# Patient Record
Sex: MALE | Race: WHITE | Employment: OTHER | URBAN - METROPOLITAN AREA
[De-identification: names, ages, dates, MRNs, and addresses within clinical notes are randomized per-mention and may not be internally consistent; named-entity substitution may affect disease eponyms.]

---

## 2017-05-15 ENCOUNTER — APPOINTMENT (OUTPATIENT)
Dept: GENERAL RADIOLOGY | Age: 72
DRG: 853 | End: 2017-05-15
Attending: EMERGENCY MEDICINE
Payer: MEDICARE

## 2017-05-15 ENCOUNTER — HOSPITAL ENCOUNTER (INPATIENT)
Age: 72
LOS: 16 days | Discharge: HOME | DRG: 853 | End: 2017-05-31
Attending: EMERGENCY MEDICINE | Admitting: INTERNAL MEDICINE
Payer: MEDICARE

## 2017-05-15 ENCOUNTER — APPOINTMENT (OUTPATIENT)
Dept: CT IMAGING | Age: 72
DRG: 853 | End: 2017-05-15
Attending: INTERNAL MEDICINE
Payer: MEDICARE

## 2017-05-15 DIAGNOSIS — R06.89 INEFFECTIVE AIRWAY CLEARANCE: ICD-10-CM

## 2017-05-15 DIAGNOSIS — N17.9 AKI (ACUTE KIDNEY INJURY) (HCC): ICD-10-CM

## 2017-05-15 DIAGNOSIS — R29.818 SUSPECTED SLEEP APNEA: ICD-10-CM

## 2017-05-15 DIAGNOSIS — J91.8 PARAPNEUMONIC EFFUSION: ICD-10-CM

## 2017-05-15 DIAGNOSIS — J90 PLEURAL EFFUSION, BILATERAL: ICD-10-CM

## 2017-05-15 DIAGNOSIS — J18.9 PNEUMONIA OF BOTH LUNGS DUE TO INFECTIOUS ORGANISM, UNSPECIFIED PART OF LUNG: Primary | ICD-10-CM

## 2017-05-15 DIAGNOSIS — R13.10 DYSPHAGIA, UNSPECIFIED TYPE: ICD-10-CM

## 2017-05-15 DIAGNOSIS — D84.9 IMMUNOSUPPRESSION (HCC): Chronic | ICD-10-CM

## 2017-05-15 DIAGNOSIS — M47.819 SPONDYLOARTHRITIS: Chronic | ICD-10-CM

## 2017-05-15 DIAGNOSIS — D75.839 THROMBOCYTOSIS: ICD-10-CM

## 2017-05-15 DIAGNOSIS — J18.9 PARAPNEUMONIC EFFUSION: ICD-10-CM

## 2017-05-15 DIAGNOSIS — E61.1 IRON DEFICIENCY: ICD-10-CM

## 2017-05-15 DIAGNOSIS — M45.4 ANKYLOSING SPONDYLITIS OF THORACIC REGION (HCC): ICD-10-CM

## 2017-05-15 DIAGNOSIS — B37.9 CANDIDIASIS: ICD-10-CM

## 2017-05-15 DIAGNOSIS — J18.9 CAP (COMMUNITY ACQUIRED PNEUMONIA): ICD-10-CM

## 2017-05-15 DIAGNOSIS — R09.02 HYPOXEMIA: ICD-10-CM

## 2017-05-15 DIAGNOSIS — A41.9 SEPSIS, DUE TO UNSPECIFIED ORGANISM: ICD-10-CM

## 2017-05-15 DIAGNOSIS — J93.9 PNEUMOTHORAX ON LEFT: ICD-10-CM

## 2017-05-15 LAB
ALBUMIN SERPL BCP-MCNC: 2.7 G/DL (ref 3.2–4.6)
ALBUMIN/GLOB SERPL: 0.7 {RATIO} (ref 1.2–3.5)
ALP SERPL-CCNC: 52 U/L (ref 50–136)
ALT SERPL-CCNC: 19 U/L (ref 12–65)
ANION GAP BLD CALC-SCNC: 10 MMOL/L (ref 7–16)
ANION GAP BLD CALC-SCNC: 11 MMOL/L (ref 7–16)
ARTERIAL PATENCY WRIST A: POSITIVE
AST SERPL W P-5'-P-CCNC: 27 U/L (ref 15–37)
ATRIAL RATE: 96 BPM
BASE DEFICIT BLDA-SCNC: 2.7 MMOL/L (ref 0–2)
BASOPHILS # BLD AUTO: 0 K/UL (ref 0–0.2)
BASOPHILS # BLD: 0 % (ref 0–2)
BDY SITE: ABNORMAL
BILIRUB SERPL-MCNC: 1.3 MG/DL (ref 0.2–1.1)
BNP SERPL-MCNC: 192 PG/ML
BUN SERPL-MCNC: 26 MG/DL (ref 8–23)
BUN SERPL-MCNC: 29 MG/DL (ref 8–23)
CALCIUM SERPL-MCNC: 7.3 MG/DL (ref 8.3–10.4)
CALCIUM SERPL-MCNC: 8.2 MG/DL (ref 8.3–10.4)
CALCULATED P AXIS, ECG09: 50 DEGREES
CALCULATED R AXIS, ECG10: 82 DEGREES
CALCULATED T AXIS, ECG11: 80 DEGREES
CHLORIDE SERPL-SCNC: 105 MMOL/L (ref 98–107)
CHLORIDE SERPL-SCNC: 98 MMOL/L (ref 98–107)
CO2 SERPL-SCNC: 25 MMOL/L (ref 21–32)
CO2 SERPL-SCNC: 27 MMOL/L (ref 21–32)
COHGB MFR BLD: 0.3 % (ref 0.5–1.5)
CREAT SERPL-MCNC: 0.95 MG/DL (ref 0.8–1.5)
CREAT SERPL-MCNC: 1.11 MG/DL (ref 0.8–1.5)
DIAGNOSIS, 93000: NORMAL
DIFFERENTIAL METHOD BLD: ABNORMAL
DO-HGB BLD-MCNC: 2 % (ref 0–5)
EOSINOPHIL # BLD: 0 K/UL (ref 0–0.8)
EOSINOPHIL NFR BLD: 0 % (ref 0.5–7.8)
ERYTHROCYTE [DISTWIDTH] IN BLOOD BY AUTOMATED COUNT: 13 % (ref 11.9–14.6)
FIO2 ON VENT: 50 %
GAS FLOW.O2 O2 DELIVERY SYS: 7 L/MIN
GLOBULIN SER CALC-MCNC: 4 G/DL (ref 2.3–3.5)
GLUCOSE SERPL-MCNC: 66 MG/DL (ref 65–100)
GLUCOSE SERPL-MCNC: 78 MG/DL (ref 65–100)
HCO3 BLDA-SCNC: 21 MMOL/L (ref 22–26)
HCT VFR BLD AUTO: 34.2 % (ref 41.1–50.3)
HGB BLD-MCNC: 11.3 G/DL (ref 13.6–17.2)
HGB BLDMV-MCNC: 11.6 GM/DL (ref 11.7–15)
IMM GRANULOCYTES # BLD: 0 K/UL (ref 0–0.5)
IMM GRANULOCYTES NFR BLD AUTO: 0.9 % (ref 0–5)
LACTATE BLD-SCNC: 2.4 MMOL/L (ref 0.5–1.9)
LACTATE BLD-SCNC: 2.5 MMOL/L (ref 0.5–1.9)
LACTATE BLD-SCNC: 3.5 MMOL/L (ref 0.5–1.9)
LACTATE SERPL-SCNC: 4.2 MMOL/L (ref 0.4–2)
LYMPHOCYTES # BLD AUTO: 9 % (ref 13–44)
LYMPHOCYTES # BLD: 0.3 K/UL (ref 0.5–4.6)
MCH RBC QN AUTO: 30.3 PG (ref 26.1–32.9)
MCHC RBC AUTO-ENTMCNC: 33 G/DL (ref 31.4–35)
MCV RBC AUTO: 91.7 FL (ref 79.6–97.8)
METHGB MFR BLD: 0.8 % (ref 0–1.5)
MONOCYTES # BLD: 0.1 K/UL (ref 0.1–1.3)
MONOCYTES NFR BLD AUTO: 3 % (ref 4–12)
NEUTS SEG # BLD: 3 K/UL (ref 1.7–8.2)
NEUTS SEG NFR BLD AUTO: 87 % (ref 43–78)
OXYHGB MFR BLDA: 96.8 % (ref 94–97)
P-R INTERVAL, ECG05: 112 MS
PCO2 BLDA: 32 MMHG (ref 35–45)
PH BLDA: 7.43 [PH] (ref 7.35–7.45)
PLATELET # BLD AUTO: 126 K/UL (ref 150–450)
PMV BLD AUTO: 10.7 FL (ref 10.8–14.1)
PO2 BLDA: 107 MMHG (ref 75–100)
POTASSIUM SERPL-SCNC: 4 MMOL/L (ref 3.5–5.1)
POTASSIUM SERPL-SCNC: 4 MMOL/L (ref 3.5–5.1)
PROCALCITONIN SERPL-MCNC: 11.6 NG/ML
PROCALCITONIN SERPL-MCNC: 12.7 NG/ML
PROT SERPL-MCNC: 6.7 G/DL (ref 6.3–8.2)
Q-T INTERVAL, ECG07: 316 MS
QRS DURATION, ECG06: 86 MS
QTC CALCULATION (BEZET), ECG08: 399 MS
RBC # BLD AUTO: 3.73 M/UL (ref 4.23–5.67)
SAO2 % BLD: 98 % (ref 92–98.5)
SERVICE CMNT-IMP: ABNORMAL
SODIUM SERPL-SCNC: 136 MMOL/L (ref 136–145)
SODIUM SERPL-SCNC: 140 MMOL/L (ref 136–145)
TROPONIN I SERPL-MCNC: <0.02 NG/ML (ref 0.02–0.05)
VENTILATION MODE VENT: ABNORMAL
VENTRICULAR RATE, ECG03: 96 BPM
WBC # BLD AUTO: 3.5 K/UL (ref 4.3–11.1)

## 2017-05-15 PROCEDURE — 87070 CULTURE OTHR SPECIMN AEROBIC: CPT | Performed by: INTERNAL MEDICINE

## 2017-05-15 PROCEDURE — 85025 COMPLETE CBC W/AUTO DIFF WBC: CPT | Performed by: EMERGENCY MEDICINE

## 2017-05-15 PROCEDURE — 87077 CULTURE AEROBIC IDENTIFY: CPT | Performed by: INTERNAL MEDICINE

## 2017-05-15 PROCEDURE — 84145 PROCALCITONIN (PCT): CPT | Performed by: INTERNAL MEDICINE

## 2017-05-15 PROCEDURE — 87186 SC STD MICRODIL/AGAR DIL: CPT | Performed by: INTERNAL MEDICINE

## 2017-05-15 PROCEDURE — 74011250636 HC RX REV CODE- 250/636: Performed by: EMERGENCY MEDICINE

## 2017-05-15 PROCEDURE — 96365 THER/PROPH/DIAG IV INF INIT: CPT | Performed by: EMERGENCY MEDICINE

## 2017-05-15 PROCEDURE — 99223 1ST HOSP IP/OBS HIGH 75: CPT | Performed by: INTERNAL MEDICINE

## 2017-05-15 PROCEDURE — 99285 EMERGENCY DEPT VISIT HI MDM: CPT | Performed by: EMERGENCY MEDICINE

## 2017-05-15 PROCEDURE — 87040 BLOOD CULTURE FOR BACTERIA: CPT | Performed by: EMERGENCY MEDICINE

## 2017-05-15 PROCEDURE — 74011000258 HC RX REV CODE- 258: Performed by: EMERGENCY MEDICINE

## 2017-05-15 PROCEDURE — 71260 CT THORAX DX C+: CPT

## 2017-05-15 PROCEDURE — 36600 WITHDRAWAL OF ARTERIAL BLOOD: CPT

## 2017-05-15 PROCEDURE — 83880 ASSAY OF NATRIURETIC PEPTIDE: CPT | Performed by: EMERGENCY MEDICINE

## 2017-05-15 PROCEDURE — 96361 HYDRATE IV INFUSION ADD-ON: CPT | Performed by: EMERGENCY MEDICINE

## 2017-05-15 PROCEDURE — 74011250637 HC RX REV CODE- 250/637: Performed by: INTERNAL MEDICINE

## 2017-05-15 PROCEDURE — 65270000029 HC RM PRIVATE

## 2017-05-15 PROCEDURE — 83605 ASSAY OF LACTIC ACID: CPT

## 2017-05-15 PROCEDURE — 82803 BLOOD GASES ANY COMBINATION: CPT

## 2017-05-15 PROCEDURE — 84145 PROCALCITONIN (PCT): CPT | Performed by: EMERGENCY MEDICINE

## 2017-05-15 PROCEDURE — 74011636320 HC RX REV CODE- 636/320: Performed by: INTERNAL MEDICINE

## 2017-05-15 PROCEDURE — 71020 XR CHEST PA LAT: CPT

## 2017-05-15 PROCEDURE — 80053 COMPREHEN METABOLIC PANEL: CPT | Performed by: EMERGENCY MEDICINE

## 2017-05-15 PROCEDURE — 80048 BASIC METABOLIC PNL TOTAL CA: CPT | Performed by: INTERNAL MEDICINE

## 2017-05-15 PROCEDURE — 84484 ASSAY OF TROPONIN QUANT: CPT | Performed by: EMERGENCY MEDICINE

## 2017-05-15 PROCEDURE — 83605 ASSAY OF LACTIC ACID: CPT | Performed by: INTERNAL MEDICINE

## 2017-05-15 PROCEDURE — 36415 COLL VENOUS BLD VENIPUNCTURE: CPT | Performed by: INTERNAL MEDICINE

## 2017-05-15 PROCEDURE — 74011000258 HC RX REV CODE- 258: Performed by: INTERNAL MEDICINE

## 2017-05-15 PROCEDURE — 74011250636 HC RX REV CODE- 250/636: Performed by: INTERNAL MEDICINE

## 2017-05-15 PROCEDURE — 93005 ELECTROCARDIOGRAM TRACING: CPT | Performed by: EMERGENCY MEDICINE

## 2017-05-15 RX ORDER — SODIUM CHLORIDE, SODIUM LACTATE, POTASSIUM CHLORIDE, CALCIUM CHLORIDE 600; 310; 30; 20 MG/100ML; MG/100ML; MG/100ML; MG/100ML
100 INJECTION, SOLUTION INTRAVENOUS CONTINUOUS
Status: DISCONTINUED | OUTPATIENT
Start: 2017-05-15 | End: 2017-05-17

## 2017-05-15 RX ORDER — SODIUM CHLORIDE 0.9 % (FLUSH) 0.9 %
5-10 SYRINGE (ML) INJECTION AS NEEDED
Status: DISCONTINUED | OUTPATIENT
Start: 2017-05-15 | End: 2017-05-31 | Stop reason: HOSPADM

## 2017-05-15 RX ORDER — SODIUM CHLORIDE 0.9 % (FLUSH) 0.9 %
5 SYRINGE (ML) INJECTION EVERY 8 HOURS
Status: DISCONTINUED | OUTPATIENT
Start: 2017-05-15 | End: 2017-05-31 | Stop reason: HOSPADM

## 2017-05-15 RX ORDER — SODIUM CHLORIDE 0.9 % (FLUSH) 0.9 %
10 SYRINGE (ML) INJECTION
Status: COMPLETED | OUTPATIENT
Start: 2017-05-15 | End: 2017-05-15

## 2017-05-15 RX ORDER — TRAMADOL HYDROCHLORIDE 50 MG/1
50 TABLET ORAL
Status: DISCONTINUED | OUTPATIENT
Start: 2017-05-15 | End: 2017-05-31 | Stop reason: HOSPADM

## 2017-05-15 RX ORDER — PANTOPRAZOLE SODIUM 40 MG/1
40 TABLET, DELAYED RELEASE ORAL
Status: DISCONTINUED | OUTPATIENT
Start: 2017-05-16 | End: 2017-05-26

## 2017-05-15 RX ORDER — DULOXETIN HYDROCHLORIDE 30 MG/1
30 CAPSULE, DELAYED RELEASE ORAL DAILY
Status: DISCONTINUED | OUTPATIENT
Start: 2017-05-16 | End: 2017-05-31 | Stop reason: HOSPADM

## 2017-05-15 RX ORDER — ENOXAPARIN SODIUM 100 MG/ML
40 INJECTION SUBCUTANEOUS EVERY 24 HOURS
Status: DISCONTINUED | OUTPATIENT
Start: 2017-05-15 | End: 2017-05-31 | Stop reason: HOSPADM

## 2017-05-15 RX ORDER — HYDROCODONE BITARTRATE AND ACETAMINOPHEN 7.5; 325 MG/1; MG/1
1 TABLET ORAL
Status: DISCONTINUED | OUTPATIENT
Start: 2017-05-15 | End: 2017-05-21

## 2017-05-15 RX ADMIN — CEFTRIAXONE 2 G: 2 INJECTION, POWDER, FOR SOLUTION INTRAMUSCULAR; INTRAVENOUS at 20:12

## 2017-05-15 RX ADMIN — SODIUM CHLORIDE, SODIUM LACTATE, POTASSIUM CHLORIDE, AND CALCIUM CHLORIDE 100 ML/HR: 600; 310; 30; 20 INJECTION, SOLUTION INTRAVENOUS at 18:56

## 2017-05-15 RX ADMIN — PIPERACILLIN SODIUM,TAZOBACTAM SODIUM 4.5 G: 4; .5 INJECTION, POWDER, FOR SOLUTION INTRAVENOUS at 13:30

## 2017-05-15 RX ADMIN — IOPAMIDOL 100 ML: 755 INJECTION, SOLUTION INTRAVENOUS at 19:34

## 2017-05-15 RX ADMIN — SODIUM CHLORIDE 1000 ML: 900 INJECTION, SOLUTION INTRAVENOUS at 14:34

## 2017-05-15 RX ADMIN — SODIUM CHLORIDE 1000 ML: 900 INJECTION, SOLUTION INTRAVENOUS at 15:35

## 2017-05-15 RX ADMIN — Medication 10 ML: at 19:34

## 2017-05-15 RX ADMIN — AZITHROMYCIN MONOHYDRATE 500 MG: 500 INJECTION, POWDER, LYOPHILIZED, FOR SOLUTION INTRAVENOUS at 21:24

## 2017-05-15 RX ADMIN — SODIUM CHLORIDE 1000 ML: 900 INJECTION, SOLUTION INTRAVENOUS at 13:33

## 2017-05-15 RX ADMIN — HYDROCODONE BITARTRATE AND ACETAMINOPHEN 1 TABLET: 7.5; 325 TABLET ORAL at 22:42

## 2017-05-15 RX ADMIN — SODIUM CHLORIDE 100 ML: 900 INJECTION, SOLUTION INTRAVENOUS at 19:34

## 2017-05-15 RX ADMIN — Medication 5 ML: at 21:29

## 2017-05-15 RX ADMIN — TRAMADOL HYDROCHLORIDE 50 MG: 50 TABLET, FILM COATED ORAL at 18:55

## 2017-05-15 RX ADMIN — ENOXAPARIN SODIUM 40 MG: 40 INJECTION SUBCUTANEOUS at 18:55

## 2017-05-15 NOTE — ED NOTES
TRANSFER - OUT REPORT:    Verbal report given to Jason Doran on Jenniffer Stern  being transferred to 8th Floor for routine progression of care       Report consisted of patients Situation, Background, Assessment and   Recommendations(SBAR). Information from the following report(s) SBAR was reviewed with the receiving nurse. Lines:   2o gauge right and left [facility]          Opportunity for questions and clarification was provided.       Patient transported with:

## 2017-05-15 NOTE — PROGRESS NOTES
TRANSFER - IN REPORT:    Verbal report received from [facility], [facility] (name) on Eduar Reeder  being received from ER (unit) for routine progression of care      Report consisted of patients Situation, Background, Assessment and   Recommendations(SBAR). Information from the following report(s) Kardex, ED Summary, MAR and Recent Results was reviewed with the receiving nurse. Opportunity for questions and clarification was provided. Report given to Ana Peña RN who will be assuming primary care on arrival to floor. Lorenso Frankel

## 2017-05-15 NOTE — PROGRESS NOTES
Verbal bedside report given to oncoming nurse Ileene [place]. Patient's situation, background, assessment and recommendations provided. Opportunity for questions provided. Ultram 50mg PO given for c/o back pain. . No distress noted. Oncoming RN assumed care of patient.

## 2017-05-15 NOTE — ED TRIAGE NOTES
Per ems left side chest pain that radiates to his back, 9/10 that started Saturday while watching snl, per ems at [facility] saturation 80's patient placed on 4l via nc, patient 324 asa 3ntg, with pain 5/10, patient 300ml normal saline, Patient states coughing with no production, patient states sob upon movement.      bgl 74

## 2017-05-15 NOTE — CONSULTS
HISTORY AND PHYSICAL      mikiepedor Grant    5/15/2017    Date of Admission:  5/15/2017    The patient's chart is reviewed and the patient is discussed with the staff. Subjective:     Patient is a 80 y.o.  male  With a hx of anklelosing spondelitis on remikade was in usual state of health until Saturday 2 days ago when he developed shaking chills and nausea. He has been sob and has had left side pleuritic chest  And back pain. Today he was taken to Dr. Hellen Kraus office and O2 sat was low so he was sent to the er. He was noted to have lactate of 3.5 and cxr showed LLL and RUL infiltrate. .    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.90    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    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 Taking? DULoxetine (CYMBALTA) 30 mg capsule   No No   Sig: Take 1 Cap by mouth daily.    INFLIXIMAB (REMICADE IV) Yes No   Sig: by IntraVENous route. Every 6 weeks. calcium-vitamin D (OYSTER SHELL) 500 mg(1,250mg) -200 unit per tablet   Yes No   Sig: Take 1 Tab by mouth daily. cholecalciferol, vitamin D3, (VITAMIN D3) 2,000 unit Tab   Yes No   Sig: Take  by mouth. Taking 1 1/2 tabs of 2000 international units once daily   diclofenac (VOLTAREN) 1 % topical gel   No No   Sig: Apply 4 g to affected area four (4) times daily. omeprazole (PRILOSEC) 40 mg capsule   No No   Sig: Take 1 Cap by mouth daily. traMADol (ULTRAM) 50 mg tablet   No No   Sig: Take 1 Tab by mouth three (3) times daily as needed for Pain. Max Daily Amount: 150 mg. Facility-Administered Medications: None       Past Medical History:   Diagnosis Date    Arthritis     Elevated prostate specific antigen (PSA)     HLA-B27 positive     Impotence of organic origin     Neck pain     Osteoarthritis, hand     Osteopenia     Osteoporosis     Other nonspecific abnormal finding of lung field     Polyarthritis     Polymyalgia rheumatica (HCC)     Prostate cancer ([address]     Raynaud's disease     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[address]     Thoracic spine pain      Past Surgical History:   Procedure Laterality Date    BIOPSY PROSTATE      HX CATARACT REMOVAL  2012    left     HX CATARACT REMOVAL  2013    right    HX KNEE ARTHROSCOPY  2009    left    HX PROSTATECTOMY  2012    HX UROLOGICAL      prostate ultrasound and biopsy    HX VASECTOMY  40+ yrs     Social History     Social History    Marital status:      Spouse name: N/A    Number of children: N/A    Years of education: N/A     Occupational History    Not on file.      Social History Main Topics    Smoking status: Former Smoker     Packs/day: 1.00     Years: 15.00    Smokeless tobacco: Never Used      Comment: quit 1980    Alcohol use No    Drug use: No    Sexual activity: Not on file     Other Topics Concern    Not on file     Social History Narrative     Family History   Problem Relation Age of Onset   Jose A Rao Stroke Mother     Other Mother      cirrhosis    Stroke Father     Cancer Other      grandmother    Heart Disease Other      grandfather    Diabetes Other      grandfather     Allergies   Allergen Reactions    Codeine Nausea and Vomiting       Current Facility-Administered Medications   Medication Dose Route Frequency    sodium chloride 0.9 % bolus infusion 1,000 mL  1,000 mL IntraVENous ONCE     Current Outpatient Prescriptions   Medication Sig    DULoxetine (CYMBALTA) 30 mg capsule Take 1 Cap by mouth daily.  traMADol (ULTRAM) 50 mg tablet Take 1 Tab by mouth three (3) times daily as needed for Pain. Max Daily Amount: 150 mg.    omeprazole (PRILOSEC) 40 mg capsule Take 1 Cap by mouth daily.  diclofenac (VOLTAREN) 1 % topical gel Apply 4 g to affected area four (4) times daily.  INFLIXIMAB (REMICADE IV) by IntraVENous route. Every 6 weeks.  calcium-vitamin D (OYSTER SHELL) 500 mg(1,250mg) -200 unit per tablet Take 1 Tab by mouth daily.  cholecalciferol, vitamin D3, (VITAMIN D3) 2,000 unit Tab Take  by mouth. Taking 1 1/2 tabs of 2000 international units once daily           Objective:     Vitals:    05/15/17 1430 05/15/17 1446 05/15/17 1501 05/15/17 1516   BP: (!) 85/50 95/51 96/52 99/55   Pulse:  82 92    Resp:  14 (!) 32    Temp:       SpO2:  100% 100%    Weight:       Height:           PHYSICAL EXAM     Constitutional:  the patient is well developed and in no acute distress  EENMT:  Sclera clear, pupils equal, oral mucosa moist  Respiratory: decreased breath sounds on the left  Cardiovascular:  RRR without M,G,R  Gastrointestinal: soft and non-tender; with positive bowel sounds. Musculoskeletal: warm without cyanosis. There is bi lower leg edema.   Skin:  no jaundice or rashes, no wounds   Neurologic: no gross neuro deficits     Psychiatric:  alert and oriented x 3    CXR:      Recent Labs      05/15/17   1228   WBC  3.5*   HGB  11.3*   HCT  34.2*   PLT  126*     Recent Labs      05/15/17   1228   NA  136   K  4.0   CL  98   GLU  78   CO2  27   BUN  29*   CREA  1.11   CA  8.2*   TROIQ  <0.02*   ALB  2.7*   TBILI  1.3*   ALT  19   SGOT  27     No results for input(s): PH, PCO2, PO2, HCO3 in the last 72 hours. No results for input(s): LCAD, LAC in the last 72 hours. Assessment:  (Medical Decision Making)     Hospital Problems  Date Reviewed: 5/15/2017          Codes Class Noted POA    Immunosuppression ([facility] (Chronic) ICD-10-CM: D89.9  ICD-9-CM: 279.9  5/15/2017 Yes        * (Principal)CAP (community acquired pneumonia) ICD-10-CM: J18.9  ICD-9-CM: 816  5/15/2017 Yes        Sepsis ([facility] ICD-10-CM: A41.9  ICD-9-CM: 038.9, 995.91  5/15/2017 Unknown        DENIS (acute kidney injury) ([facility] ICD-10-CM: N17.9  ICD-9-CM: 584.9  5/15/2017 Unknown        Hypoxemia ICD-10-CM: R09.02  ICD-9-CM: 799.02  5/15/2017 Unknown        Ankylosing spondylitis ([facility] ICD-10-CM: M45.9  ICD-9-CM: 720.0  1/29/2015 Yes        Spondyloarthritis ([facility] (Chronic) ICD-10-CM: M46.90  ICD-9-CM: 721.90  9/17/2013 Yes              Plan:  (Medical Decision Making)     --Will admit for further medical management  --Supplemental O2 , abfg  --chest ct  --culture     Fluid resusitation  --antibiotic therapy for cap    Repeat   lactate    More than 50% of the time documented was spent in face-to-face contact with the patient and in the care of the patient on the floor/unit where the patient is located.     Brayden Frye MD

## 2017-05-15 NOTE — ED PROVIDER NOTES
HPI Comments: Patient presented to the ER complaining of chest pain. Patient states that he's been more weak and fatigued the past couple days. States he began experiencing some left-sided chest pain rating towards his back about 3 nights ago. States pain is worse with coughing as well as take a deep breath. He does report subjective chills. States cough has been minimally productive of sputum. He was seen at his primary care physician's office today where he found to be mildly hypotensive and hypoxic. He was sent to the ER for further evaluation. Patient is a 80 y.o. male presenting with chest pain. The history is provided by the patient. Chest Pain (Angina)    This is a new problem. The current episode started more than 2 days ago. The problem has not changed since onset. The pain is present in the left side. The pain is at a severity of 4/10. The pain is moderate. The quality of the pain is described as sharp. The pain does not radiate. Associated symptoms include cough and shortness of breath. Pertinent negatives include no abdominal pain, no back pain, no fever, no headaches, no malaise/fatigue, no nausea, no near-syncope and no vomiting. His past medical history does not include HTN.         Past Medical History:   Diagnosis Date    Arthritis     Elevated prostate specific antigen (PSA)     HLA-B27 positive     Impotence of organic origin     Neck pain     Osteoarthritis, hand     Osteopenia     Osteoporosis     Other nonspecific abnormal finding of lung field     Polyarthritis     Polymyalgia rheumatica ([address]     Prostate cancer ([address]     Raynaud's disease     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[address]     Thoracic spine pain        Past Surgical History:   Procedure Laterality Date    BIOPSY PROSTATE      HX CATARACT REMOVAL  2012    left     HX CATARACT REMOVAL  2013    right    HX KNEE ARTHROSCOPY  2009    left    HX PROSTATECTOMY  2012    HX UROLOGICAL      prostate ultrasound and biopsy    HX [de-identified]  40+ yrs         Family History:   Problem Relation Age of Onset   Alfredo Raman Stroke Mother     Other Mother      cirrhosis    Stroke Father     Cancer Other      grandmother    Heart Disease Other      grandfather    Diabetes Other      grandfather       Social History     Social History    Marital status:      Spouse name: N/A    Number of children: N/A    Years of education: N/A     Occupational History    Not on file. Social History Main Topics    Smoking status: Former Smoker     Packs/day: 1.00     Years: 15.00    Smokeless tobacco: Never Used      Comment: quit 1980    Alcohol use No    Drug use: No    Sexual activity: Not on file     Other Topics Concern    Not on file     Social History Narrative         ALLERGIES: Codeine    Review of Systems   Constitutional: Positive for fatigue. Negative for fever and malaise/fatigue. HENT: Negative for congestion. Eyes: Negative for photophobia, redness and visual disturbance. Respiratory: Positive for cough and shortness of breath. Negative for chest tightness. Cardiovascular: Positive for chest pain. Negative for near-syncope. Gastrointestinal: Negative for abdominal pain, nausea and vomiting. Endocrine: Negative for polydipsia and polyphagia. Genitourinary: Negative for flank pain, frequency and urgency. Musculoskeletal: Negative for back pain and gait problem. Skin: Negative for pallor and rash. Allergic/Immunologic: Negative for food allergies and immunocompromised state. Neurological: Negative for seizures, speech difficulty and headaches. Hematological: Negative for adenopathy. Does not bruise/bleed easily. Psychiatric/Behavioral: Negative for behavioral problems and confusion. All other systems reviewed and are negative.       Vitals:    05/15/17 1245 05/15/17 1314 05/15/17 1319 05/15/17 1320   BP: 101/50 93/53     Pulse: (!) 102  93    Resp: 18  27    Temp: 98.4 °F (36.9 °C)      SpO2: 91%  94% 93% Weight: 52.2 kg (115 lb)      Height: 5' 7.5\" (1.715 m)               Physical Exam   Constitutional: He is oriented to person, place, and time. He appears well-developed and well-nourished. HENT:   Head: Normocephalic and atraumatic. Mouth/Throat: Oropharynx is clear and moist.   Eyes: Conjunctivae and EOM are normal. Pupils are equal, round, and reactive to light. No scleral icterus. Neck: Normal range of motion. Neck supple. No tracheal deviation present. No thyromegaly present. Cardiovascular: Normal rate and regular rhythm. Pulmonary/Chest: Effort normal and breath sounds normal. No respiratory distress. He has no wheezes. Course breath sounds noted bilaterally particularly in left base   Abdominal: Soft. Bowel sounds are normal. He exhibits no distension. There is no tenderness. There is no rebound. Musculoskeletal: Normal range of motion. He exhibits no edema or deformity. Neurological: He is alert and oriented to person, place, and time. He has normal reflexes. No cranial nerve deficit. Nursing note and vitals reviewed. MDM  Number of Diagnoses or Management Options  Diagnosis management comments: Differential diagnosis includes PE, pleural effusions, pneumonia, volume overload    3:19 PM  Chest x-ray is consistent with pneumonia. Elevated lactic acid noted. Patient given IV fluids here as well as a dose of IV Zosyn    3:33 PM  Blood pressure has improved with IV fluids.   Case discussed with pulmonary for admission       Amount and/or Complexity of Data Reviewed  Clinical lab tests: ordered and reviewed  Tests in the radiology section of CPT®: ordered and reviewed    Risk of Complications, Morbidity, and/or Mortality  Presenting problems: high  Diagnostic procedures: high  Management options: high    Critical Care  Total time providing critical care: 30-74 minutes (Critical Care Time 60 Minutes: Excluding all billable procedures, time spent at the bedside directing patients resucsitation, updating family, and coordinating care with consultants  )    Patient Progress  Patient progress: stable    ED Course       Procedures

## 2017-05-15 NOTE — IP AVS SNAPSHOT
Current Discharge Medication List  
  
START taking these medications Dose & Instructions Dispensing Information Comments Morning Noon Evening Bedtime  
 aspirin 81 mg chewable tablet Your last dose was:  5/31/2017 9am  
Your next dose is:  6/1/2017 am  
   
 Dose:  81 mg Take 1 Tab by mouth daily. Quantity:  30 Tab Refills:  1  
     
  
   
   
   
  
 ferrous sulfate 325 mg (65 mg iron) tablet Your last dose was:  5/31/2017 9am  
Your next dose is:  5/31/2017 5pm  
   
 Dose:  325 mg Take 1 Tab by mouth two (2) times daily (with meals). Quantity:  60 Tab Refills:  1  
     
  
   
   
  
   
  
 potassium chloride 20 mEq tablet Commonly known as:  K-DUR, KLOR-CON Your last dose was:  5/31/2017 9am  
Your next dose is:  6/1/2017 am  
   
 Dose:  40 mEq Take 2 Tabs by mouth daily. Quantity:  30 Tab Refills:  1  
     
  
   
   
   
  
 predniSONE 20 mg tablet Commonly known as:  Nahomy Darnell Your last dose was:  Via IV 5/31/2017 am  
Your next dose is:  6/1/2017 am  
   
 Take 2 tablets daily for 1 week then 1 tablet daily until seen in the office for follow up Quantity:  40 Tab Refills:  0 CONTINUE these medications which have NOT CHANGED Dose & Instructions Dispensing Information Comments Morning Noon Evening Bedtime  
 calcium-vitamin D 500 mg(1,250mg) -200 unit per tablet Commonly known as:  OYSTER SHELL Your next dose is:  Resume home schedule. Dose:  1 Tab Take 1 Tab by mouth daily. Refills:  0  
     
  
   
   
   
  
 diclofenac 1 % Gel Commonly known as:  VOLTAREN Your next dose is: May resume home schedule. Dose:  4 g Apply 4 g to affected area four (4) times daily. Quantity:  100 g Refills:  2 DULoxetine 30 mg capsule Commonly known as:  CYMBALTA Your last dose was:  5/31/2017 9am  
Your next dose is:  6/1/2017 am  
   
 Dose:  30 mg  
 Take 1 Cap by mouth daily. Quantity:  90 Cap Refills:  1  
     
  
   
   
   
  
 omeprazole 40 mg capsule Commonly known as:  PRILOSEC Your last dose was:  5/31/2017 6am  
Your next dose is:  6/1/2017 before breakfast  
   
 Dose:  40 mg Take 1 Cap by mouth daily. Quantity:  30 Cap Refills:  5  
     
  
   
   
   
  
 traMADol 50 mg tablet Commonly known as:  ULTRAM  
Your next dose is:  As needed Dose:  50 mg Take 1 Tab by mouth three (3) times daily as needed for Pain. Max Daily Amount: 150 mg.  
 Quantity:  90 Tab Refills:  2 VITAMIN D3 2,000 unit Tab Generic drug:  cholecalciferol (vitamin D3) Your next dose is: May resume home schedule. Take  by mouth. Taking 1 1/2 tabs of 2000 international units once daily Refills:  0 STOP taking these medications REMICADE IV Where to Get Your Medications These medications were sent to [facility], [address] [place] Phone:  [phone]  
  ferrous sulfate 325 mg (65 mg iron) tablet Information on where to get these meds will be given to you by the nurse or doctor. ! Ask your nurse or doctor about these medications  
  aspirin 81 mg chewable tablet  
 potassium chloride 20 mEq tablet  
 predniSONE 20 mg tablet

## 2017-05-15 NOTE — IP AVS SNAPSHOT
[address] 
[phone] Patient: Maria G Navarrete MRN: [id number] OJR:[birth year] You are allergic to the following Allergen Reactions Codeine Nausea and Vomiting Immunizations Administered for This Admission Name Date  
 TB Skin Test (PPD) Intradermal 5/16/2017 Recent Documentation Height Weight BMI Smoking Status 1.702 m 53.5 kg 18.48 kg/m2 Former Smoker Unresulted Labs Order Current Status AFB CULTURE + SMEAR W/RFLX ID FROM CULTURE Preliminary result AFB CULTURE + SMEAR W/RFLX ID FROM CULTURE Preliminary result AFB CULTURE + SMEAR W/RFLX ID FROM CULTURE Preliminary result PLEASE READ & DOCUMENT PPD TEST IN 24 HRS Preliminary result Emergency Contacts Name Discharge Info Relation Home Work Mobile Pollyann Pump  Spouse [3] [phone] About your hospitalization You were admitted on:  May 15, 2017 You last received care in the:  [facility] 8 MED SURG You were discharged on:  May 31, 2017 Unit phone number:  [phone] Why you were hospitalized Your primary diagnosis was:  Cap (Community Acquired Pneumonia) Your diagnoses also included:  Spondyloarthritis (Hcc), Immunosuppression (Hcc), Ankylosing Spondylitis (Hcc), Sepsis (Hcc), Alfie (Acute Kidney Injury) (Hcc), Hypoxemia, Pleural Effusion, Bilateral, Candidiasis, Parapneumonic Effusion, Ineffective Airway Clearance, Thrombocytosis (Hcc), Suspected Sleep Apnea, Iron Deficiency, Pneumothorax On Left, Dysphagia Providers Seen During Your Hospitalizations Provider Role Specialty Primary office phone Blanca Catherine MD Attending Provider Emergency Medicine [phone] Jennifer Cruz MD Attending Provider Pulmonary Disease [phone] Your Primary Care Physician (PCP) Primary Care Physician Office Phone Office Fax Via Wero Berkowitz [address] [place] [address] [phone] Follow-up Information Follow up With Details Comments Contact Info [address] [facility] [address] 
[phone] Robin Shultz MD On 6/5/2017 1:45pm with Kimber Heredia NP [address]  [address] 
[phone] 
  
 [facility] [employer] On 6/7/2017 6/7/17 at 10:15 am   
 [facility] On 6/14/2017 Appointment time -1:00 pm [facility] [address] [facility] [address] Kam Mccall MD On 6/21/2017 10;50am 3 [address] [facility] 
[address] [facility] and Crit [facility] [address] 
[phone] Your Appointments Monday June 05, 2017  1:45 PM EDT Office Visit with Chente Mcgraw NP 1333 [facility] (hospitals 1051 [facility]) 101 [facility] ([facility]) [place] [address]  
[phone] Wednesday June 07, 2017 10:30 AM EDT  
SC PT INITIAL ORTHO with Alfred Simpson, PT  
[facility] ([address] [facility]) [address] [place] [phone]  
  
    
2695 [facility], [address] [place] [address] Wednesday June 14, 2017 10:00 AM EDT  
infusion with [facility] NURSE ONLY  
[facility] OSTEOPOROSIS AND ARTHRITIS ([facility] [address]) [address] [place] [id number] [phone] Wednesday June 14, 2017  1:00 PM EDT  
SC ST INITIAL VISIT with TAMIKA Cross ([address] [place] [address]) 217 [facility], 
[address] [place] [phone] Please arrive 10-15 minutes prior to your appointment time. Wear comfortable clothing. Please bring your insurance cards with you. Please bring a list of your medications including herbal supplements. Please bring a list of your allergies including food allergies. 1 [facility],[address], [address] [facility] [place], [facility] Wednesday June 21, 2017  8:30 AM EDT  
LAB with [facility] LAB RESOURCE 1333 [facility] ([facility] 1051 [facility]) 101 [facility]) [place] [address]  
[phone] Wednesday June 21, 2017 12:00 PM EDT HOSPITAL with Sunni Babb MD  
[facility] Pulmonary and Critical Care ([facility]) [address] 5601 [facility]  
[phone] Current Discharge Medication List  
  
START taking these medications Dose & Instructions Dispensing Information Comments Morning Noon Evening Bedtime  
 aspirin 81 mg chewable tablet Your last dose was:  5/31/2017 9am  
Your next dose is:  6/1/2017 am  
   
 Dose:  81 mg Take 1 Tab by mouth daily. Quantity:  30 Tab Refills:  1  
     
  
   
   
   
  
 ferrous sulfate 325 mg (65 mg iron) tablet Your last dose was:  5/31/2017 9am  
Your next dose is:  5/31/2017 5pm  
   
 Dose:  325 mg Take 1 Tab by mouth two (2) times daily (with meals). Quantity:  60 Tab Refills:  1  
     
  
   
   
  
   
  
 potassium chloride 20 mEq tablet Commonly known as:  K-DUR, KLOR-CON Your last dose was:  5/31/2017 9am  
Your next dose is:  6/1/2017 am  
   
 Dose:  40 mEq Take 2 Tabs by mouth daily. Quantity:  30 Tab Refills:  1  
     
  
   
   
   
  
 predniSONE 20 mg tablet Commonly known as:  Mey [place] Your last dose was:  Via IV 5/31/2017 am  
Your next dose is:  6/1/2017 am  
   
 Take 2 tablets daily for 1 week then 1 tablet daily until seen in the office for follow up Quantity:  40 Tab Refills:  0 CONTINUE these medications which have NOT CHANGED Dose & Instructions Dispensing Information Comments Morning Noon Evening Bedtime  
 calcium-vitamin D 500 mg(1,250mg) -200 unit per tablet Commonly known as:  OYSTER SHELL  
 Your next dose is:  Resume home schedule. Dose:  1 Tab Take 1 Tab by mouth daily. Refills:  0  
     
  
   
   
   
  
 diclofenac 1 % Gel Commonly known as:  VOLTAREN Your next dose is: May resume home schedule. Dose:  4 g Apply 4 g to affected area four (4) times daily. Quantity:  100 g Refills:  2 DULoxetine 30 mg capsule Commonly known as:  CYMBALTA Your last dose was:  5/31/2017 9am  
Your next dose is:  6/1/2017 am  
   
 Dose:  30 mg Take 1 Cap by mouth daily. Quantity:  90 Cap Refills:  1  
     
  
   
   
   
  
 omeprazole 40 mg capsule Commonly known as:  PRILOSEC Your last dose was:  5/31/2017 6am  
Your next dose is:  6/1/2017 before breakfast  
   
 Dose:  40 mg Take 1 Cap by mouth daily. Quantity:  30 Cap Refills:  5  
     
  
   
   
   
  
 traMADol 50 mg tablet Commonly known as:  ULTRAM  
Your next dose is:  As needed Dose:  50 mg Take 1 Tab by mouth three (3) times daily as needed for Pain. Max Daily Amount: 150 mg.  
 Quantity:  90 Tab Refills:  2 VITAMIN D3 2,000 unit Tab Generic drug:  cholecalciferol (vitamin D3) Your next dose is: May resume home schedule. Take  by mouth. Taking 1 1/2 tabs of 2000 international units once daily Refills:  0 STOP taking these medications REMICADE IV Where to Get Your Medications These medications were sent to [facility], 300 [facility] 250 [facility] Phone:  [phone]  
  ferrous sulfate 325 mg (65 mg iron) tablet Information on where to get these meds will be given to you by the nurse or doctor. ! Ask your nurse or doctor about these medications  
  aspirin 81 mg chewable tablet  
 potassium chloride 20 mEq tablet  
 predniSONE 20 mg tablet Discharge Instructions DISCHARGE SUMMARY from Nurse The following personal items are in your possession at time of discharge: 
 
Dental Appliances: Partials Visual Aid: Glasses Home Medications: None Jewelry: Ring, Watch, With patient Clothing: Pants, Shirt, Footwear Other Valuables: None PATIENT INSTRUCTIONS: 
 
 
F-face looks uneven A-arms unable to move or move unevenly S-speech slurred or non-existent T-time-call 911 as soon as signs and symptoms begin-DO NOT go Back to bed or wait to see if you get better-TIME IS BRAIN. Warning Signs of HEART ATTACK Call 911 if you have these symptoms: 
? Chest discomfort. Most heart attacks involve discomfort in the center of the chest that lasts more than a few minutes, or that goes away and comes back. It can feel like uncomfortable pressure, squeezing, fullness, or pain. ? Discomfort in other areas of the upper body. Symptoms can include pain or discomfort in one or both arms, the back, neck, jaw, or stomach. ? Shortness of breath with or without chest discomfort. ? Other signs may include breaking out in a cold sweat, nausea, or lightheadedness. Don't wait more than five minutes to call [address]! Fast action can save your life. Calling 911 is almost always the fastest way to get lifesaving treatment. Emergency Medical Services staff can begin treatment when they arrive  up to an hour sooner than if someone gets to the hospital by car. The discharge information has been reviewed with the patient. The patient verbalized understanding.  
 
Discharge medications reviewed with the patient and appropriate educational materials and side effects teaching were provided. Pneumonia: Care Instructions Your Care Instructions Pneumonia is an infection of the lungs. Most cases are caused by infections from bacteria or viruses. Pneumonia may be mild or very severe. If it is caused by bacteria, you will be treated with antibiotics. It may take a few weeks to a few months to recover fully from pneumonia, depending on how sick you were and whether your overall health is good. Follow-up care is a key part of your treatment and safety. Be sure to make and go to all appointments, and call your doctor if you are having problems. Its also a good idea to know your test results and keep a list of the medicines you take. How can you care for yourself at home? · Take your antibiotics exactly as directed. Do not stop taking the medicine just because you are feeling better. You need to take the full course of antibiotics. · Take your medicines exactly as prescribed. Call your doctor if you think you are having a problem with your medicine. · Get plenty of rest and sleep. You may feel weak and tired for a while, but your energy level will improve with time. · To prevent dehydration, drink plenty of fluids, enough so that your urine is light yellow or clear like water. Choose water and other caffeine-free clear liquids until you feel better. If you have kidney, heart, or liver disease and have to limit fluids, talk with your doctor before you increase the amount of fluids you drink. · Take care of your cough so you can rest. A cough that brings up mucus from your lungs is common with pneumonia. It is one way your body gets rid of the infection. But if coughing keeps you from resting or causes severe fatigue and chest-wall pain, talk to your doctor. He or she may suggest that you take a medicine to reduce the cough. · Use a vaporizer or humidifier to add moisture to your bedroom.  Follow the directions for cleaning the machine. · Do not smoke or allow others to smoke around you. Smoke will make your cough last longer. If you need help quitting, talk to your doctor about stop-smoking programs and medicines. These can increase your chances of quitting for good. · Take an over-the-counter pain medicine, such as acetaminophen (Tylenol), ibuprofen (Advil, Motrin), or naproxen (Aleve). Read and follow all instructions on the label. · Do not take two or more pain medicines at the same time unless the doctor told you to. Many pain medicines have acetaminophen, which is Tylenol. Too much acetaminophen (Tylenol) can be harmful. · If you were given a spirometer to measure how well your lungs are working, use it as instructed. This can help your doctor tell how your recovery is going. · To prevent pneumonia in the future, talk to your doctor about getting a flu vaccine (once a year) and a pneumococcal vaccine (one time only for most people). When should you call for help? Call 911 anytime you think you may need emergency care. For example, call if: 
· You have severe trouble breathing. Call your doctor now or seek immediate medical care if: 
· You cough up dark brown or bloody mucus (sputum). · You have new or worse trouble breathing. · You are dizzy or lightheaded, or you feel like you may faint. Watch closely for changes in your health, and be sure to contact your doctor if: 
· You have a new or higher fever. · You are coughing more deeply or more often. · You are not getting better after 2 days (48 hours). · You do not get better as expected. Where can you learn more? Go to http://bobo-gorge.info/. Enter [id number] in the search box to learn more about \"Pneumonia: Care Instructions. \" Current as of: May 23, 2016 Content Version: 11.2 © 9264-3454 [employer].  Care instructions adapted under license by [employer] (which disclaims liability or warranty for this information). If you have questions about a medical condition or this instruction, always ask your healthcare professional. [address] any warranty or liability for your use of this information. Sepsis: Care Instructions Your Care Instructions Sepsis is an infection that has spread throughout your body. It is a life-threatening condition and often causes extremely low blood pressure. This can lead to problems with many different organs. The cause of sepsis is not always clear, but it can happen as part of a long-term or sudden illness. Sometimes even a mild illness can lead to sepsis. Follow-up care is a key part of your treatment and safety. Be sure to make and go to all appointments, and call your doctor if you are having problems. Its also a good idea to know your test results and keep a list of the medicines you take. How can you care for yourself at home? · If your doctor prescribed antibiotics, take them as directed. Do not stop taking them just because you feel better. You need to take the full course of antibiotics. · Drink plenty of fluids, enough so that your urine is light yellow or clear like water. Choose water or caffeine-free clear liquids until you feel better. If you have kidney, heart, or liver disease and have to limit fluids, talk with your doctor before you increase your fluid intake. You can try rehydration drinks, such as Gatorade or Powerade. · Do not drink alcohol. · Eat a healthy diet. Include fruits, vegetables, and whole grains in your diet every day. · Walking is an easy way to get exercise. Gradually increase the amount you walk every day. Make sure your doctor knows that you are starting an exercise program. 
· Do not smoke or use other tobacco products. If you need help quitting, talk to your doctor about stop-smoking programs and medicines. These can increase your chances of quitting for good. When should you call for help? Call 911 anytime you think you may need emergency care. For example, call if: 
· You passed out (lost consciousness). Call your doctor now or seek immediate medical care if: 
· You have a fever or chills. · You have cool, pale, or clammy skin. · You are dizzy or lightheaded, or you feel like you may faint. · You have any new symptoms, such as a cough, pain in one part of your body, or urinary problems. Watch closely for changes in your health, and be sure to contact your doctor if: 
· You do not get better as expected. Where can you learn more? Go to http://bobo-gorge.info/. Enter I596 in the search box to learn more about \"Sepsis: Care Instructions. \" Current as of: May 27, 2016 Content Version: 11.2 © 8058-3235 [employer]. Care instructions adapted under license by [employer] (which disclaims liability or warranty for this information). If you have questions about a medical condition or this instruction, always ask your healthcare professional. [place] [address] any warranty or liability for your use of this information. Collapsed Lung: Care Instructions Your Care Instructions A collapsed lung (pneumothorax) is a buildup of air in the space between the lung and the chest wall. As more air builds up in this space, the pressure against the lung makes the lung collapse. This causes shortness of breath and chest pain because your lung cannot fully expand. A collapsed lung is usually caused by an injury to the chest, but it may also occur suddenly without an injury because of a lung illness, such as emphysema or lung fibrosis. Your lung may collapse after lung surgery or another medical procedure. Sometimes it happens for no known reason in an otherwise healthy person (spontaneous pneumothorax). Treatment depends on the cause of the collapse.  It may heal with rest, although your doctor will want to keep track of your progress. It can take several days for the lung to expand again. Your doctor may have drained the air with a needle or tube inserted into the space between your chest and the collapsed lung. If you have a chest tube, be sure to follow your doctor's instructions about how to care for the tube. You may need further treatment if you are not getting better. Surgery is sometimes needed to keep the lung inflated. The doctor will want to keep track of your progress, so you will need a follow-up exam within a few days. The doctor has checked you carefully, but problems can develop later. If you notice any problems or new symptoms, get medical treatment right away. Follow-up care is a key part of your treatment and safety. Be sure to make and go to all appointments, and call your doctor if you are having problems. It's also a good idea to know your test results and keep a list of the medicines you take. How can you care for yourself at home? · Get plenty of rest and sleep. You may feel weak and tired for a while, but your energy level will improve with time. · Hold a pillow against your chest when you cough or take deep breaths. This will support your chest and decrease your pain. · Take pain medicines exactly as directed. ¨ If the doctor gave you a prescription medicine for pain, take it as prescribed. ¨ If you are not taking a prescription pain medicine, ask your doctor if you can take an over-the-counter medicine. · If your doctor prescribed antibiotics, take them as directed. Do not stop taking them just because you feel better. You need to take the full course of antibiotics. · If you have a bandage over your chest tube, or the place where the chest tube was inserted, keep it clean and dry. Follow your doctor's instructions on bandage care. · If you go home with a tube in place, follow the doctor's directions.  Do not adjust the tube in any way. This could break the seal or cause other problems. Keep the tube dry. · Avoid any movements that require your muscles, especially your chest muscles, to strain. Such movements include laughing hard, bearing down to have a bowel movement, and heavy lifting. Try not to cough. · Do not fly in an airplane until your doctor tells you it is okay. Avoid any situations where there is increased air pressure. · Do not smoke or allow others to smoke around you. If you need help quitting, talk to your doctor about stop-smoking programs and medicines. These can increase your chances of quitting for good. When should you call for help? Call 911 anytime you think you may need emergency care. For example, call if: 
· You have severe trouble breathing. · You passed out (lost consciousness). Call your doctor now or seek immediate medical care if: 
· You have new or worse trouble breathing. · You have new pain or your pain gets worse. · You have a fever. · You cough up blood. · Your chest tube starts to come out or falls out. · You are bleeding through the bandage where the tube was put in. Watch closely for changes in your health, and be sure to contact your doctor if: · The skin around the place where the chest tube was put in is red or irritated. · You do not get better as expected. Where can you learn more? Go to http://bobo-gorge.info/. Enter Z195 in the search box to learn more about \"Collapsed Lung: Care Instructions. \" Current as of: May 23, 2016 Content Version: 11.2 © 7399-4809 [employer]. Care instructions adapted under license by [employer] (which disclaims liability or warranty for this information). If you have questions about a medical condition or this instruction, always ask your healthcare professional. [address] any warranty or liability for your use of this information. Discharge Orders Procedure Order Date Status Priority Quantity Spec Type Associated Dx SOCIAL WORK 05/31/17 0921 Normal Routine 1  Dysphagia, unspecified type [[id number]] Comments:  Please arrange for OP speech Rx follow up. ACO Transitions of Care Introducing [employer] 508 Renae Song offers a voluntary care coordination program to provide high quality service and care to [facility] fee-for-service beneficiaries. Hiram Mcmullen was designed to help you enhance your health and well-being through the following services: ? Transitions of Care  support for individuals who are transitioning from one care setting to another (example: Hospital to home). ? Chronic and Complex Care Coordination  support for individuals and caregivers of those with serious or chronic illnesses or with more than one chronic (ongoing) condition and those who take a number of different medications. If you meet specific medical criteria, a [address] [place] [address] Rd may call you directly to coordinate your care with your primary care physician and your other care providers. For questions about the [facility] programs, please, contact your physicians office. For general questions or additional information about Accountable Care Organizations: 
Please visit www.medicare.gov/acos. html or call 1-800-MEDICARE (7-201.416.9774) TTY users should call 0-385.391.1513. iKONVERSE Announcement We are excited to announce that we are making your provider's discharge notes available to you in iKONVERSE. You will see these notes when they are completed and signed by the physician that discharged you from your recent hospital stay.   If you have any questions or concerns about any information you see in iKONVERSE, please call the Health Information Department where you were seen or reach out to your Primary Care Provider for more information about your plan of care. Introducing [facility] & HEALTH SERVICES! Dear Michelle Hernandez: 
Thank you for requesting a [employer] account. Our records indicate that you already have an active [employer] account. You can access your account anytime at https://115 network disks. Newmerix/115 network disks Did you know that you can access your hospital and ER discharge instructions at any time in [employer]? You can also review all of your test results from your hospital stay or ER visit. Additional Information If you have questions, please visit the Frequently Asked Questions section of the [employer] website at [email or web address] network disks. [employer]merix/115 network disks/. Remember, [employer] is NOT to be used for urgent needs. For medical emergencies, dial 911. Now available from your iPhone and Android! General Information Please provide this summary of care documentation to your next provider. Patient Signature:  ____________________________________________________________ Date:  ____________________________________________________________  
  
David Rodriges Provider Signature:  ____________________________________________________________ Date:  ____________________________________________________________

## 2017-05-16 LAB
ALBUMIN SERPL BCP-MCNC: 2 G/DL (ref 3.2–4.6)
ALBUMIN/GLOB SERPL: 0.6 {RATIO} (ref 1.2–3.5)
ALP SERPL-CCNC: 45 U/L (ref 50–136)
ALT SERPL-CCNC: 17 U/L (ref 12–65)
ANION GAP BLD CALC-SCNC: 9 MMOL/L (ref 7–16)
AST SERPL W P-5'-P-CCNC: 29 U/L (ref 15–37)
BILIRUB SERPL-MCNC: 0.6 MG/DL (ref 0.2–1.1)
BUN SERPL-MCNC: 21 MG/DL (ref 8–23)
CALCIUM SERPL-MCNC: 7.3 MG/DL (ref 8.3–10.4)
CHLORIDE SERPL-SCNC: 107 MMOL/L (ref 98–107)
CO2 SERPL-SCNC: 24 MMOL/L (ref 21–32)
CREAT SERPL-MCNC: 0.72 MG/DL (ref 0.8–1.5)
GLOBULIN SER CALC-MCNC: 3.5 G/DL (ref 2.3–3.5)
GLUCOSE SERPL-MCNC: 76 MG/DL (ref 65–100)
LACTATE SERPL-SCNC: 1.4 MMOL/L (ref 0.4–2)
LACTATE SERPL-SCNC: 2.6 MMOL/L (ref 0.4–2)
POTASSIUM SERPL-SCNC: 3.6 MMOL/L (ref 3.5–5.1)
PROT SERPL-MCNC: 5.5 G/DL (ref 6.3–8.2)
SODIUM SERPL-SCNC: 140 MMOL/L (ref 136–145)

## 2017-05-16 PROCEDURE — 74011000258 HC RX REV CODE- 258: Performed by: INTERNAL MEDICINE

## 2017-05-16 PROCEDURE — 36415 COLL VENOUS BLD VENIPUNCTURE: CPT | Performed by: INTERNAL MEDICINE

## 2017-05-16 PROCEDURE — 77030020120 HC VLV RESP PEP HI -B

## 2017-05-16 PROCEDURE — 65270000029 HC RM PRIVATE

## 2017-05-16 PROCEDURE — 74011250636 HC RX REV CODE- 250/636: Performed by: INTERNAL MEDICINE

## 2017-05-16 PROCEDURE — 74011250637 HC RX REV CODE- 250/637: Performed by: INTERNAL MEDICINE

## 2017-05-16 PROCEDURE — 74011000302 HC RX REV CODE- 302: Performed by: INTERNAL MEDICINE

## 2017-05-16 PROCEDURE — 83605 ASSAY OF LACTIC ACID: CPT | Performed by: INTERNAL MEDICINE

## 2017-05-16 PROCEDURE — 77030032490 HC SLV COMPR SCD KNE COVD -B

## 2017-05-16 PROCEDURE — 80053 COMPREHEN METABOLIC PANEL: CPT | Performed by: INTERNAL MEDICINE

## 2017-05-16 PROCEDURE — 99232 SBSQ HOSP IP/OBS MODERATE 35: CPT | Performed by: INTERNAL MEDICINE

## 2017-05-16 PROCEDURE — 94760 N-INVAS EAR/PLS OXIMETRY 1: CPT

## 2017-05-16 PROCEDURE — 86580 TB INTRADERMAL TEST: CPT | Performed by: INTERNAL MEDICINE

## 2017-05-16 PROCEDURE — 77010033678 HC OXYGEN DAILY

## 2017-05-16 RX ADMIN — HYDROCODONE BITARTRATE AND ACETAMINOPHEN 1 TABLET: 7.5; 325 TABLET ORAL at 05:55

## 2017-05-16 RX ADMIN — DULOXETINE HYDROCHLORIDE 30 MG: 30 CAPSULE, DELAYED RELEASE ORAL at 09:20

## 2017-05-16 RX ADMIN — SODIUM CHLORIDE, SODIUM LACTATE, POTASSIUM CHLORIDE, AND CALCIUM CHLORIDE 100 ML/HR: 600; 310; 30; 20 INJECTION, SOLUTION INTRAVENOUS at 18:32

## 2017-05-16 RX ADMIN — PANTOPRAZOLE SODIUM 40 MG: 40 TABLET, DELAYED RELEASE ORAL at 05:52

## 2017-05-16 RX ADMIN — HYDROCODONE BITARTRATE AND ACETAMINOPHEN 1 TABLET: 7.5; 325 TABLET ORAL at 11:52

## 2017-05-16 RX ADMIN — Medication 5 ML: at 05:52

## 2017-05-16 RX ADMIN — Medication 5 ML: at 18:42

## 2017-05-16 RX ADMIN — HYDROCODONE BITARTRATE AND ACETAMINOPHEN 1 TABLET: 7.5; 325 TABLET ORAL at 17:51

## 2017-05-16 RX ADMIN — TUBERCULIN PURIFIED PROTEIN DERIVATIVE 5 UNITS: 5 INJECTION INTRADERMAL at 18:32

## 2017-05-16 RX ADMIN — ENOXAPARIN SODIUM 40 MG: 40 INJECTION SUBCUTANEOUS at 18:32

## 2017-05-16 RX ADMIN — CEFTRIAXONE 2 G: 2 INJECTION, POWDER, FOR SOLUTION INTRAMUSCULAR; INTRAVENOUS at 18:45

## 2017-05-16 RX ADMIN — AZITHROMYCIN MONOHYDRATE 500 MG: 500 INJECTION, POWDER, LYOPHILIZED, FOR SOLUTION INTRAVENOUS at 18:45

## 2017-05-16 RX ADMIN — SODIUM CHLORIDE, SODIUM LACTATE, POTASSIUM CHLORIDE, AND CALCIUM CHLORIDE 100 ML/HR: 600; 310; 30; 20 INJECTION, SOLUTION INTRAVENOUS at 06:19

## 2017-05-16 NOTE — PROGRESS NOTES
Received shift report from [facility], [address] [place] [address]. Patient alert and oriented, resting in bed. Bed low and locked, call light within reach. Family at bedside. Patient reports pain to lower back and chest 4/10, [facility] off-going nurse. Respirations even and unlabored on 2l/min via nasal cannula. Patient denies shortness of breath or chest tightness at this time. Abrasion noted to right knee. Instructed patient to call for assistance, patient verbalized understanding.

## 2017-05-16 NOTE — PROGRESS NOTES
Mr. Kimberli Steward resting in bed with wife at bedside. Has been weaned to 3 lpm Nc this shift with current O2 sat 100%. Norco given for back pain 9/10 on numeric scale. States \"I just don't feel good. I'm starting to get worse as the day goes on. \" Wife remains at bedside. Without further needs. Akhil light in lap and door open.

## 2017-05-16 NOTE — PROGRESS NOTES
Patient resting quietly in bed, spouse at bedside. BP 86/50 with monitor, re-checked manually bp 98/58. Respirations even and unlabored on 7l/min via nc. Continent of bladder using urinal. Clear cristina urine noted. SCDs to BLE in place. Denies any pain or discomfort at this time. Instructed to call for assistance as needed, patient verbalized understanding.

## 2017-05-16 NOTE — PROGRESS NOTES
Mr. Wood Solis standing on edge of bed to use urinal. Assisted by wife at bedside. Alert, oriented in all spheres. Lung sounds diminished on 5 lpm NC with some dyspnea on exertion. Scant productive cough. LR infusing at 100 ml/hr. Without pain or needs at this time. Will monitor with call light in lap and door open.

## 2017-05-16 NOTE — H&P
HISTORY AND PHYSICAL      Jo Ann Sosa    5/16/2017    Date of Admission:  5/15/2017    The patient's chart is reviewed and the patient is discussed with the staff. Subjective:     Patient is a 80 y.o.  male  With a hx of anklelosing spondelitis on remikade was in usual state of health until Saturday 2 days ago when he developed shaking chills and nausea. He has been sob and has had left side pleuritic chest  And back pain. Today he was taken to Dr. Nini Yuan office and O2 sat was low so he was sent to the er. He was noted to have lactate of 3.5 and cxr showed LLL and RUL infiltrate. .    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.90    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    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 Taking? DULoxetine (CYMBALTA) 30 mg capsule   No No   Sig: Take 1 Cap by mouth daily.    INFLIXIMAB (REMICADE IV) Yes No   Sig: by IntraVENous route. Every 6 weeks. calcium-vitamin D (OYSTER SHELL) 500 mg(1,250mg) -200 unit per tablet   Yes No   Sig: Take 1 Tab by mouth daily. cholecalciferol, vitamin D3, (VITAMIN D3) 2,000 unit Tab   Yes No   Sig: Take  by mouth. Taking 1 1/2 tabs of 2000 international units once daily   diclofenac (VOLTAREN) 1 % topical gel   No No   Sig: Apply 4 g to affected area four (4) times daily. omeprazole (PRILOSEC) 40 mg capsule   No No   Sig: Take 1 Cap by mouth daily. traMADol (ULTRAM) 50 mg tablet   No No   Sig: Take 1 Tab by mouth three (3) times daily as needed for Pain. Max Daily Amount: 150 mg. Facility-Administered Medications: None       Past Medical History:   Diagnosis Date    Arthritis     Elevated prostate specific antigen (PSA)     HLA-B27 positive     Impotence of organic origin     Neck pain     Osteoarthritis, hand     Osteopenia     Osteoporosis     Other nonspecific abnormal finding of lung field     Polyarthritis     Polymyalgia rheumatica (HCC)     Prostate cancer ([address]     Raynaud's disease     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[address]     Thoracic spine pain      Past Surgical History:   Procedure Laterality Date    BIOPSY PROSTATE      HX CATARACT REMOVAL  2012    left     HX CATARACT REMOVAL  2013    right    HX KNEE ARTHROSCOPY  2009    left    HX PROSTATECTOMY  2012    HX UROLOGICAL      prostate ultrasound and biopsy    HX VASECTOMY  40+ yrs     Social History     Social History    Marital status:      Spouse name: N/A    Number of children: N/A    Years of education: N/A     Occupational History    Not on file.      Social History Main Topics    Smoking status: Former Smoker     Packs/day: 1.00     Years: 15.00    Smokeless tobacco: Never Used      Comment: quit 1980    Alcohol use No    Drug use: No    Sexual activity: Not on file     Other Topics Concern    Not on file     Social History Narrative     Family History   Problem Relation Age of Onset    Stroke Mother     Other Mother      cirrhosis    Stroke Father     Cancer Other      grandmother    Heart Disease Other      grandfather    Diabetes Other      grandfather     Allergies   Allergen Reactions    Codeine Nausea and Vomiting       Current Facility-Administered Medications   Medication Dose Route Frequency    tuberculin injection 5 Units  5 Units IntraDERMal ONCE    DULoxetine (CYMBALTA) capsule 30 mg  30 mg Oral DAILY    pantoprazole (PROTONIX) tablet 40 mg  40 mg Oral ACB    traMADol (ULTRAM) tablet 50 mg  50 mg Oral TID PRN    sodium chloride (NS) flush 5 mL  5 mL InterCATHeter Q8H    sodium chloride (NS) flush 5-10 mL  5-10 mL InterCATHeter PRN    cefTRIAXone (ROCEPHIN) 2 g in 0.9% sodium chloride (MBP/ADV) 50 mL  2 g IntraVENous Q24H    azithromycin (ZITHROMAX) 500 mg in 0.9% sodium chloride (MBP/ADV) 250 mL  500 mg IntraVENous Q24H    enoxaparin (LOVENOX) injection 40 mg  40 mg SubCUTAneous Q24H    HYDROcodone-acetaminophen (NORCO) 7.5-325 mg per tablet 1 Tab  1 Tab Oral Q6H PRN    lactated ringers infusion  100 mL/hr IntraVENous CONTINUOUS           Objective:     Vitals:    05/16/17 0357 05/16/17 0755 05/16/17 1212 05/16/17 1601   BP: 98/58 98/54 97/67 105/57   Pulse:  79 79 78   Resp:  18 18 18   Temp:  97.7 °F (36.5 °C) 97.9 °F (36.6 °C) 97.9 °F (36.6 °C)   SpO2:  93% 98% 96%   Weight:       Height:           PHYSICAL EXAM     Constitutional:  the patient is well developed and in no acute distress  EENMT:  Sclera clear, pupils equal, oral mucosa moist  Respiratory: decreased breath sounds on the left  Cardiovascular:  RRR without M,G,R  Gastrointestinal: soft and non-tender; with positive bowel sounds. Musculoskeletal: warm without cyanosis. There is bi lower leg edema.   Skin:  no jaundice or rashes, no wounds   Neurologic: no gross neuro deficits     Psychiatric:  alert and oriented x 3    CXR:      Recent Labs      05/15/17   1228   WBC  3.5*   HGB  11.3* HCT  34.2*   PLT  126*     Recent Labs      05/16/17   0340  05/15/17   1912  05/15/17   1228   NA  140  140  136   K  3.6  4.0  4.0   CL  107  105  98   GLU  76  66  78   CO2  24  25  27   BUN  21  26*  29*   CREA  0.72*  0.95  1.11   CA  7.3*  7.3*  8.2*   TROIQ   --    --   <0.02*   ALB  2.0*   --   2.7*   TBILI  0.6   --   1.3*   ALT  17   --   19   SGOT  29   --   27     Recent Labs      05/15/17   2345   PH  7.43   PCO2  32*   PO2  107*   HCO3  21*     Recent Labs      05/16/17   0340  05/15/17   2303  05/15/17   1912   LAC  1.4  2.6*  4.2*       Assessment:  (Medical Decision Making)     Hospital Problems  Date Reviewed: 5/15/2017          Codes Class Noted POA    Immunosuppression ([facility] (Chronic) ICD-10-CM: D89.9  ICD-9-CM: 279.9  5/15/2017 Yes        * (Principal)CAP (community acquired pneumonia) ICD-10-CM: J18.9  ICD-9-CM: 260  5/15/2017 Yes        Sepsis ([facility] ICD-10-CM: A41.9  ICD-9-CM: 038.9, 995.91  5/15/2017 Unknown        DENIS (acute kidney injury) ([facility] ICD-10-CM: N17.9  ICD-9-CM: 584.9  5/15/2017 Unknown        Hypoxemia ICD-10-CM: R09.02  ICD-9-CM: 799.02  5/15/2017 Unknown        Ankylosing spondylitis ([facility] ICD-10-CM: M45.9  ICD-9-CM: 720.0  1/29/2015 Yes        Spondyloarthritis ([facility] (Chronic) ICD-10-CM: M46.90  ICD-9-CM: 721.90  9/17/2013 Yes              Plan:  (Medical Decision Making)     --Will admit for further medical management  --Supplemental O2 , abfg  --chest ct  --culture     Fluid resusitation  --antibiotic therapy for cap    Repeat   lactate    More than 50% of the time documented was spent in face-to-face contact with the patient and in the care of the patient on the floor/unit where the patient is located.     Washington Harrison MD

## 2017-05-16 NOTE — PROGRESS NOTES
Mary Galdamez  Admission Date: 5/15/2017             Daily Progress Note: 5/16/2017    The patient's chart is reviewed and the patient is discussed with the staff. Subjective:     Patient is a 80 y.o.  male With a hx of anklelosing spondelitis on remikade was in usual state of health until Saturday 2 days ago when he developed shaking chills and nausea. He has been sob and has had left side pleuritic chest And back pain. Today he was taken to Dr. Isela Deshpande office and O2 sat was low so he was sent to the er. He was noted to have lactate of 3.5 and cxr showed LLL and RUL infiltrate  He is better this am with less chest pain and sob.   Lactate did decrease    Current Facility-Administered Medications   Medication Dose Route Frequency    DULoxetine (CYMBALTA) capsule 30 mg  30 mg Oral DAILY    pantoprazole (PROTONIX) tablet 40 mg  40 mg Oral ACB    traMADol (ULTRAM) tablet 50 mg  50 mg Oral TID PRN    sodium chloride (NS) flush 5 mL  5 mL InterCATHeter Q8H    sodium chloride (NS) flush 5-10 mL  5-10 mL InterCATHeter PRN    cefTRIAXone (ROCEPHIN) 2 g in 0.9% sodium chloride (MBP/ADV) 50 mL  2 g IntraVENous Q24H    azithromycin (ZITHROMAX) 500 mg in 0.9% sodium chloride (MBP/ADV) 250 mL  500 mg IntraVENous Q24H    enoxaparin (LOVENOX) injection 40 mg  40 mg SubCUTAneous Q24H    HYDROcodone-acetaminophen (NORCO) 7.5-325 mg per tablet 1 Tab  1 Tab Oral Q6H PRN    lactated ringers infusion  100 mL/hr IntraVENous CONTINUOUS       Review of Systems    Constitutional: negative for fever, chills, sweats  Cardiovascular: negative for chest pain, palpitations, syncope, edema  Gastrointestinal:  negative for dysphagia, reflux, vomiting, diarrhea, abdominal pain, or melena  Neurologic:  negative for focal weakness, numbness, headache    Objective:     Vitals:    05/16/17 0107 05/16/17 0345 05/16/17 0357 05/16/17 0755   BP: 102/50 (!) 86/50 98/58 98/54   Pulse:  79  79   Resp:  18  18   Temp: 97.7 °F (36.5 °C)  97.7 °F (36.5 °C)   SpO2:  96%  93%   Weight:       Height:         Intake and Output:   05/14 1901 - 05/16 0700  In: -   Out: 1100 [Urine:1100]  05/16 0701 - 05/16 1900  In: 240 [P.O.:240]  Out: -     Physical Exam:   Constitution:  the patient is well developed and in no acute distress  EENMT:  Sclera clear, pupils equal, oral mucosa moist  Respiratory:    Crackles , decreased breath sounds on the left  Cardiovascular:  RRR without M,G,R  Gastrointestinal: soft and non-tender; with positive bowel sounds. Musculoskeletal: warm without cyanosis. There is no lower leg edema. Skin:  no jaundice or rashes, no wounds   Neurologic: no gross neuro deficits     Psychiatric:  alert and oriented x 3    CHEST XRAY:       LAB  No results for input(s): GLUCPOC in the last 72 hours.     No lab exists for component: [place]   Recent Labs      05/15/17   1228   WBC  3.5*   HGB  11.3*   HCT  34.2*   PLT  126*     Recent Labs      05/16/17   0340  05/15/17   1912  05/15/17   1228   NA  140  140  136   K  3.6  4.0  4.0   CL  107  105  98   CO2  24  25  27   GLU  76  66  78   BUN  21  26*  29*   CREA  0.72*  0.95  1.11   CA  7.3*  7.3*  8.2*   TROIQ   --    --   <0.02*   ALB  2.0*   --   2.7*   TBILI  0.6   --   1.3*   ALT  17   --   19   SGOT  29   --   27     Recent Labs      05/15/17   2345   PH  7.43   PCO2  32*   PO2  107*   HCO3  21*     Recent Labs      05/16/17   0340  05/15/17   2303  05/15/17   1912   LAC  1.4  2.6*  4.2*         Assessment:  (Medical Decision Making)     Hospital Problems  Date Reviewed: 5/15/2017          Codes Class Noted POA    Immunosuppression ([facility] (Chronic) ICD-10-CM: D89.9  ICD-9-CM: 279.9  5/15/2017 Yes    remicade    * (Principal)CAP (community acquired pneumonia) ICD-10-CM: J18.9  ICD-9-CM: 974  5/15/2017 Yes    LLL and rul    Sepsis ([facility] ICD-10-CM: A41.9  ICD-9-CM: 038.9, 995.91  5/15/2017 Unknown        DENIS (acute kidney injury) ([facility] ICD-10-CM: N17.9  ICD-9-CM: 584.9 5/15/2017 Unknown    better    Hypoxemia ICD-10-CM: R09.02  ICD-9-CM: 799.02  5/15/2017 Unknown    On O2    Ankylosing spondylitis ([facility] ICD-10-CM: M45.9  ICD-9-CM: 720.0  1/29/2015 Yes        Spondyloarthritis ([facility] (Chronic) ICD-10-CM: M46.90  ICD-9-CM: 721.90  9/17/2013 Yes              Plan:  (Medical Decision Making)   1    cxr tomorrow  2    Lab tomorrow  3   Continue iv fluids  4   zithromax and rocephine day 2  --    More than 50% of the time documented was spent in face-to-face contact with the patient and in the care of the patient on the floor/unit where the patient is located.     Walter Echols MD

## 2017-05-16 NOTE — PROGRESS NOTES
Problem: Interdisciplinary Rounds  Goal: Interdisciplinary Rounds  Outcome: Progressing Towards Goal  Interdisciplinary team rounds were held 5/16/2017 with the following team members:Care Management, Nursing and Clinical Coordinator and the patient. Plan of care discussed. See clinical pathway and/or care plan for interventions and desired outcomes.

## 2017-05-17 ENCOUNTER — APPOINTMENT (OUTPATIENT)
Dept: GENERAL RADIOLOGY | Age: 72
DRG: 853 | End: 2017-05-17
Attending: INTERNAL MEDICINE
Payer: MEDICARE

## 2017-05-17 LAB
ANION GAP BLD CALC-SCNC: 7 MMOL/L (ref 7–16)
BASOPHILS # BLD AUTO: 0 K/UL (ref 0–0.2)
BASOPHILS # BLD: 0 % (ref 0–2)
BUN SERPL-MCNC: 14 MG/DL (ref 8–23)
CALCIUM SERPL-MCNC: 7.6 MG/DL (ref 8.3–10.4)
CHLORIDE SERPL-SCNC: 106 MMOL/L (ref 98–107)
CO2 SERPL-SCNC: 27 MMOL/L (ref 21–32)
CREAT SERPL-MCNC: 0.59 MG/DL (ref 0.8–1.5)
DIFFERENTIAL METHOD BLD: ABNORMAL
EOSINOPHIL # BLD: 0 K/UL (ref 0–0.8)
EOSINOPHIL NFR BLD: 1 % (ref 0.5–7.8)
ERYTHROCYTE [DISTWIDTH] IN BLOOD BY AUTOMATED COUNT: 12.8 % (ref 11.9–14.6)
GLUCOSE SERPL-MCNC: 97 MG/DL (ref 65–100)
HCT VFR BLD AUTO: 31.9 % (ref 41.1–50.3)
HGB BLD-MCNC: 10.9 G/DL (ref 13.6–17.2)
IMM GRANULOCYTES # BLD: 0 K/UL (ref 0–0.5)
IMM GRANULOCYTES NFR BLD AUTO: 0.7 % (ref 0–5)
LYMPHOCYTES # BLD AUTO: 7 % (ref 13–44)
LYMPHOCYTES # BLD: 0.4 K/UL (ref 0.5–4.6)
MCH RBC QN AUTO: 30.4 PG (ref 26.1–32.9)
MCHC RBC AUTO-ENTMCNC: 34.2 G/DL (ref 31.4–35)
MCV RBC AUTO: 88.9 FL (ref 79.6–97.8)
MM INDURATION POC: NORMAL 0 (ref 0–5)
MONOCYTES # BLD: 0.3 K/UL (ref 0.1–1.3)
MONOCYTES NFR BLD AUTO: 5 % (ref 4–12)
NEUTS SEG # BLD: 4.8 K/UL (ref 1.7–8.2)
NEUTS SEG NFR BLD AUTO: 86 % (ref 43–78)
PLATELET # BLD AUTO: 135 K/UL (ref 150–450)
PMV BLD AUTO: 10.8 FL (ref 10.8–14.1)
POTASSIUM SERPL-SCNC: 3.4 MMOL/L (ref 3.5–5.1)
PPD POC: NORMAL NEGATIVE
RBC # BLD AUTO: 3.59 M/UL (ref 4.23–5.67)
SODIUM SERPL-SCNC: 140 MMOL/L (ref 136–145)
WBC # BLD AUTO: 5.5 K/UL (ref 4.3–11.1)

## 2017-05-17 PROCEDURE — 74011250637 HC RX REV CODE- 250/637: Performed by: INTERNAL MEDICINE

## 2017-05-17 PROCEDURE — 85025 COMPLETE CBC W/AUTO DIFF WBC: CPT | Performed by: INTERNAL MEDICINE

## 2017-05-17 PROCEDURE — 97161 PT EVAL LOW COMPLEX 20 MIN: CPT

## 2017-05-17 PROCEDURE — 80048 BASIC METABOLIC PNL TOTAL CA: CPT | Performed by: INTERNAL MEDICINE

## 2017-05-17 PROCEDURE — 97530 THERAPEUTIC ACTIVITIES: CPT

## 2017-05-17 PROCEDURE — 74011250636 HC RX REV CODE- 250/636: Performed by: INTERNAL MEDICINE

## 2017-05-17 PROCEDURE — 36415 COLL VENOUS BLD VENIPUNCTURE: CPT | Performed by: INTERNAL MEDICINE

## 2017-05-17 PROCEDURE — 65270000029 HC RM PRIVATE

## 2017-05-17 PROCEDURE — 74011000258 HC RX REV CODE- 258: Performed by: INTERNAL MEDICINE

## 2017-05-17 PROCEDURE — 97166 OT EVAL MOD COMPLEX 45 MIN: CPT

## 2017-05-17 PROCEDURE — 99232 SBSQ HOSP IP/OBS MODERATE 35: CPT | Performed by: INTERNAL MEDICINE

## 2017-05-17 PROCEDURE — 71020 XR CHEST PA LAT: CPT

## 2017-05-17 RX ORDER — AZITHROMYCIN 250 MG/1
500 TABLET, FILM COATED ORAL EVERY 24 HOURS
Status: DISCONTINUED | OUTPATIENT
Start: 2017-05-17 | End: 2017-05-21

## 2017-05-17 RX ORDER — ADHESIVE BANDAGE
30 BANDAGE TOPICAL DAILY PRN
Status: DISCONTINUED | OUTPATIENT
Start: 2017-05-17 | End: 2017-05-31 | Stop reason: HOSPADM

## 2017-05-17 RX ADMIN — HYDROCODONE BITARTRATE AND ACETAMINOPHEN 1 TABLET: 7.5; 325 TABLET ORAL at 07:42

## 2017-05-17 RX ADMIN — Medication 5 ML: at 00:14

## 2017-05-17 RX ADMIN — DULOXETINE HYDROCHLORIDE 30 MG: 30 CAPSULE, DELAYED RELEASE ORAL at 07:43

## 2017-05-17 RX ADMIN — TRAMADOL HYDROCHLORIDE 50 MG: 50 TABLET, FILM COATED ORAL at 21:51

## 2017-05-17 RX ADMIN — CEFTRIAXONE 2 G: 2 INJECTION, POWDER, FOR SOLUTION INTRAMUSCULAR; INTRAVENOUS at 21:52

## 2017-05-17 RX ADMIN — MAGNESIUM HYDROXIDE 30 ML: 400 SUSPENSION ORAL at 21:50

## 2017-05-17 RX ADMIN — Medication 5 ML: at 14:00

## 2017-05-17 RX ADMIN — Medication 5 ML: at 21:53

## 2017-05-17 RX ADMIN — HYDROCODONE BITARTRATE AND ACETAMINOPHEN 1 TABLET: 7.5; 325 TABLET ORAL at 00:14

## 2017-05-17 RX ADMIN — PANTOPRAZOLE SODIUM 40 MG: 40 TABLET, DELAYED RELEASE ORAL at 06:27

## 2017-05-17 RX ADMIN — ENOXAPARIN SODIUM 40 MG: 40 INJECTION SUBCUTANEOUS at 21:52

## 2017-05-17 RX ADMIN — AZITHROMYCIN MONOHYDRATE 500 MG: 250 TABLET ORAL at 15:13

## 2017-05-17 RX ADMIN — HYDROCODONE BITARTRATE AND ACETAMINOPHEN 1 TABLET: 7.5; 325 TABLET ORAL at 15:13

## 2017-05-17 NOTE — PROGRESS NOTES
Routine VS per PCT, mechanical machine=90/40, re eric moore per nursing, 100/60 pt alert, responsive, states pain is \"better\", will monitor closely, call to MD to report changes in BP

## 2017-05-17 NOTE — PROGRESS NOTES
Co back discomfort from coughing norco 7.5mgs po , pt states he just feels real bad, comfort offered, encouraged to call for assist

## 2017-05-17 NOTE — PROGRESS NOTES
Pulmonary Daily Progress Note      Jimmy Glover    5/17/2017    Date of Admission:  5/15/2017     Patient is a 80 y.o.  male  With a hx of anklelosing spondylitis on remicade was in usual state of health until Saturday 2 days ago when he developed shaking chills and nausea. He has been sob and has had left side pleuritic chest  And back pain. Today he was taken to Dr. Florencia Hernandez office and O2 sat was low so he was sent to the er. He was noted to have lactate of 3.5 and cxr showed LLL and RUL infiltrate. The patient's chart is reviewed and the patient is discussed with the staff. Subjective:     Patient is sitting in bed and just finished eating. Feels congested and coughing up brown secretions.  No fevers    Review of Systems:  -Fever  -Headaches  -Chest pain  +Dyspnea, -wheezing, +cough  -Abdominal pain, -constipation  -Leg swelling  All other organ systems grossly normal.        .    Current Facility-Administered Medications   Medication Dose Route Frequency    DULoxetine (CYMBALTA) capsule 30 mg  30 mg Oral DAILY    pantoprazole (PROTONIX) tablet 40 mg  40 mg Oral ACB    traMADol (ULTRAM) tablet 50 mg  50 mg Oral TID PRN    sodium chloride (NS) flush 5 mL  5 mL InterCATHeter Q8H    sodium chloride (NS) flush 5-10 mL  5-10 mL InterCATHeter PRN    cefTRIAXone (ROCEPHIN) 2 g in 0.9% sodium chloride (MBP/ADV) 50 mL  2 g IntraVENous Q24H    azithromycin (ZITHROMAX) 500 mg in 0.9% sodium chloride (MBP/ADV) 250 mL  500 mg IntraVENous Q24H    enoxaparin (LOVENOX) injection 40 mg  40 mg SubCUTAneous Q24H    HYDROcodone-acetaminophen (NORCO) 7.5-325 mg per tablet 1 Tab  1 Tab Oral Q6H PRN    lactated ringers infusion  100 mL/hr IntraVENous CONTINUOUS           Objective:     Vitals:    05/15/17 1245 05/17/17 0009 05/17/17 0453 05/17/17 0822   BP:  118/55 111/80 114/56   Pulse:  86 81 79   Resp:  18 18 20   Temp:  98.4 °F (36.9 °C) 97.3 °F (36.3 °C) 97.8 °F (36.6 °C)   SpO2: 96% 94% 95%   Weight: 115 lb (52.2 kg)      Height: 5' 7.5\" (1.715 m)          PHYSICAL EXAM     Constitutional:  the patient is well developed and in no acute distress  EENMT:  Sclera clear, pupils equal, oral mucosa moist  Respiratory: distant sounds. Moist cough  Cardiovascular:  RRR without M,G,R  Gastrointestinal: soft and non-tender; with positive bowel sounds. Musculoskeletal: warm without cyanosis. There is nolower leg edema.   Skin:  no jaundice or rashes, no wounds   Neurologic: no gross neuro deficits     Psychiatric:  alert and oriented x 3    CXR:      Recent Labs      05/17/17   0605  05/15/17   1228   WBC  5.5  3.5*   HGB  10.9*  11.3*   HCT  31.9*  34.2*   PLT  135*  126*     Recent Labs      05/17/17   0605  05/16/17   0340  05/15/17   1912  05/15/17   1228   NA  140  140  140  136   K  3.4*  3.6  4.0  4.0   CL  106  107  105  98   GLU  97  76  66  78   CO2  27  24  25  27   BUN  14  21  26*  29*   CREA  0.59*  0.72*  0.95  1.11   CA  7.6*  7.3*  7.3*  8.2*   TROIQ   --    --    --   <0.02*   ALB   --   2.0*   --   2.7*   TBILI   --   0.6   --   1.3*   ALT   --   17   --   19   SGOT   --   29   --   27     Recent Labs      05/15/17   2345   PH  7.43   PCO2  32*   PO2  107*   HCO3  21*     Recent Labs      05/16/17   0340  05/15/17   2303  05/15/17   1912   LAC  1.4  2.6*  4.2*       Assessment:  (Medical Decision Making)     Hospital Problems  Date Reviewed: 5/15/2017          Codes Class Noted POA    Immunosuppression ([facility] (Chronic) ICD-10-CM: D89.9  ICD-9-CM: 279.9  5/15/2017 Yes    -- on remicade    * (Principal)CAP (community acquired pneumonia) ICD-10-CM: J18.9  ICD-9-CM: 408  5/15/2017 Yes    --b/l infiltrates    Sepsis ([address] ICD-10-CM: A41.9  ICD-9-CM: 038.9, 995.91  5/15/2017 Unknown    No fevers now    DENIS (acute kidney injury) ([facility] ICD-10-CM: N17.9  ICD-9-CM: 584.9  5/15/2017 Unknown    Now < 1     Hypoxemia ICD-10-CM: R09.02  ICD-9-CM: 799.02  5/15/2017 Unknown    --on 4 LPM currently and does not use at home    Ankylosing spondylitis [facility]) ICD-10-CM: M45.9  ICD-9-CM: 720.0  1/29/2015 Yes        Spondyloarthritis ([facility] (Chronic) ICD-10-CM: M46.90  ICD-9-CM: 721.90  9/17/2013 Yes              Plan:  (Medical Decision Making)     --continue oxygen and taper as tolerated  --use flutter valve for moist cough  --R/C with [facility] f/u final organism -- on abx -- D2. B/C x 2 negative  --chest ct, pending -- going down now for a test  --continue remaining treatment. Answered all of the wife's questions to the best of my ability    More than 50% of the time documented was spent in face-to-face contact with the patient and in the care of the patient on the floor/unit where the patient is located.     Emily Cardozo MD

## 2017-05-17 NOTE — PROGRESS NOTES
Problem: Self Care Deficits Care Plan (Adult)  Goal: *Acute Goals and Plan of Care (Insert Text)  1. Patient will complete lower body bathing and dressing with independence and adaptive equipment as needed. 2. Patient will complete toileting with independence. 3. Patient will tolerate 23 minutes of therapeutic activity with less than 3 rest breaks to increase activity tolerance for ADLs. 4. Patient will complete 23 minutes of therapeutic exercise to increase strength for ADLs. 5. Patient will complete functional transfers with independence and adaptive equipment as needed. Timeframe: 7 visits     Comments:       OCCUPATIONAL THERAPY: Initial Assessment and AM 5/17/2017  INPATIENT: Hospital Day: 3  [place] / Product Type: Medicare /      NAME/AGE/GENDER: rPatima Baptiste is a 80 y.o. male       PRIMARY DIAGNOSIS:  CAP (community acquired pneumonia) CAP (community acquired pneumonia) CAP (community acquired pneumonia)        ICD-10: Treatment Diagnosis:        · Generalized Muscle Weakness (M62.81)   Precautions/Allergies:         Codeine       ASSESSMENT:      Mr. Alleen Boast presents to hospital for above. Upon arrival of OT, pt is supine in bed, pleasant and agreeable to OT evaluation. He reports that he is very independent at baseline with ADLs/IADLS, includes driving, in addition to being very active with his physical well-being (i.e. Walks 10,000 steps per day). Today, he is on 4L supplemental O2 NC and his SaO2 is 93% at rest.  Pt performs all bed mobility with supervision, completes STS with supervision, and bed to chair transfer with SBA. He sat in chair with supervision and his SaO2 reads 90% at that time. He was left seated in chair with call bell within reach and all needs met.  Mr. Alleen Boast is functioning below his baseline with decreased activity tolerance, strength, ADL performance, and functional transfers and would benefit from continued skilled OT services to address these functional deficits in order to improve safety and independence with ADLs/IADLs upon d/c. Will follow. This section established at most recent assessment   PROBLEM LIST (Impairments causing functional limitations):  1. Decreased Strength  2. Decreased ADL/Functional Activities  3. Decreased Transfer Abilities  4. Decreased Ambulation Ability/Technique  5. Decreased Balance  6. Decreased Activity Tolerance  7. Decreased Pacing Skills  8. Decreased Work Simplification/Energy Conservation Techniques  9. Increased Fatigue  10. Increased Shortness of Breath    INTERVENTIONS PLANNED: (Benefits and precautions of occupational therapy have been discussed with the patient.)  1. Activities of daily living training  2. Adaptive equipment training  3. Group therapy  4. Theraputic activity  5. Theraputic exercise      TREATMENT PLAN: Frequency/Duration: Follow patient 3x/week to address above goals. Rehabilitation Potential For Stated Goals: GOOD      RECOMMENDED REHABILITATION/EQUIPMENT: (at time of discharge pending progress): Continue Skilled Therapy. OCCUPATIONAL PROFILE AND HISTORY:   History of Present Injury/Illness (Reason for Referral):  See H&P  Past Medical History/Comorbidities:   Mr. Wood Solis  has a past medical history of Arthritis; Elevated prostate specific antigen (PSA); HLA-B27 positive; Impotence of organic origin; Neck pain; Osteoarthritis, hand; Osteopenia; Osteoporosis; Other nonspecific abnormal finding of lung field; Polyarthritis; Polymyalgia rheumatica ([address] Prostate cancer ([address] Raynaud's disease; 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[address] and Thoracic spine pain. He also has no past medical history of Asthma; Autoimmune disease ([address] Chronic kidney disease; Chronic obstructive pulmonary disease ([address] Chronic pain; Diabetes ([address] Difficult intubation; GERD (gastroesophageal reflux disease); Heart abnormalities;  Hypertension; Liver disease; Malignant hyperthermia due to anesthesia; Nausea & vomiting; Neurological disorder; Other unknown and unspecified cause of morbidity or mortality; Pseudocholinesterase deficiency; Psychiatric disorder; PUD (peptic ulcer disease); Seizures ([facility] Stroke [facility]); Thromboembolus ([facility] [facility] Thyroid disease; or Unspecified adverse effect of anesthesia. Mr. Radha Enriquez  has a past surgical history that includes vasectomy (40+ yrs); knee arthroscopy (2009); urological; biopsy prostate; prostatectomy (2012); cataract removal (2012); and cataract removal (2013). Social History/Living Environment:   Home Environment: Private residence  # Steps to Enter: 2  One/Two Story Residence: One story  Living Alone: No  Support Systems: Spouse/Significant Other/Partner  Patient Expects to be Discharged to[de-identified] Private residence  Current DME Used/Available at Home: None  Tub or Shower Type: Tub/Shower combination  Prior Level of Function/Work/Activity:  Lives with spouse in [facility]. Is very active and independent at baseline with ADLs/IADLS. Drives. Personal Factors:          Sex:  male        Age:  80 y.o. Social Background:  Strong support from wife   Number of Personal Factors/Comorbidities that affect the Plan of Care: Expanded review of therapy/medical records (1-2):  MODERATE COMPLEXITY   ASSESSMENT OF OCCUPATIONAL PERFORMANCE[de-identified]   Activities of Daily Living:           Basic ADLs (From Assessment) Complex ADLs (From Assessment)   Basic ADL  Feeding: Independent  Oral Facial Hygiene/Grooming: Independent  Bathing: Stand-by assistance  Upper Body Dressing: Independent  Lower Body Dressing: Minimum assistance  Toileting: Stand by assistance Instrumental ADL  Meal Preparation: Minimum assistance  Homemaking:  Moderate assistance  Medication Management: Independent  Financial Management: Independent   Grooming/Bathing/Dressing Activities of Daily Living     Cognitive Retraining  Safety/Judgement: Awareness of environment                       Bed/Mat Mobility  Rolling: Supervision  Supine to Sit: Supervision  Sit to Stand: Supervision  Bed to Chair: Stand-by asssistance  Scooting: Supervision          Most Recent Physical Functioning:   Gross Assessment:  AROM: Within functional limits  Strength: Generally decreased, functional  Tone: Normal  Sensation: Intact               Posture:  Posture (WDL): Exceptions to WDL  Posture Assessment: Forward head  Balance:  Sitting: Intact  Standing: Impaired  Standing - Static: Good  Standing - Dynamic : Fair Bed Mobility:  Rolling: Supervision  Supine to Sit: Supervision  Scooting: Supervision  Wheelchair Mobility:     Transfers:  Sit to Stand: Supervision  Stand to Sit: Supervision  Bed to Chair: Stand-by asssistance                    No data found. Mental Status  Neurologic State: Alert  Orientation Level: Oriented X4  Cognition: Follows commands, Appropriate decision making, Appropriate for age attention/concentration, Appropriate safety awareness  Perception: Appears intact  Perseveration: No perseveration noted  Safety/Judgement: Awareness of environment                               Physical Skills Involved:  1. Balance  2. Mobility  3. Strength  4. Endurance Cognitive Skills Affected (resulting in the inability to perform in a timely and safe manner): 1. none Psychosocial Skills Affected:  1. Routines and Behaviors   Number of elements that affect the Plan of Care: 3-5:  MODERATE COMPLEXITY   CLINICAL DECISION MAKIN [facility] [address] [id number] AM-PAC 6 Clicks   Basic Mobility Inpatient Short Form  How much help from another person does the patient currently need. .. Total A Lot A Little None   1. Putting on and taking off regular lower body clothing?   [ ] 1   [ ] 2   [X] 3   [ ] 4   2. Bathing (including washing, rinsing, drying)? [ ] 1   [ ] 2   [X] 3   [ ] 4   3. Toileting, which includes using toilet, bedpan or urinal?   [ ] 1   [ ] 2   [ ] 3   [X] 4   4.   Putting on and taking off regular upper body clothing?   [ ] 1   [ ] 2   [ ] 3   [X] 4   5. Taking care of personal grooming such as brushing teeth? [ ] 1   [ ] 2   [ ] 3   [X] 4   6. Eating meals? [ ] 1   [ ] 2   [ ] 3   [X] 4   © 2007, Trustees of [address] [place] [address] [address] [id number], under license to [employer]. All rights reserved    Score:  Initial: 22 Most Recent: X (Date: -- )     Interpretation of Tool:  Represents activities that are increasingly more difficult (i.e. Bed mobility, Transfers, Gait). Score 24 23 22-20 19-15 14-10 9-7 6       Modifier CH CI CJ CK CL CM CN         · Self Care:               - CURRENT STATUS:    CJ - 20%-39% impaired, limited or restricted               - GOAL STATUS:           CI - 1%-19% impaired, limited or restricted               - D/C STATUS:                       ---------------To be determined---------------  [place] / Product Type: Medicare /       Medical Necessity:     · Patient demonstrates good rehab potential due to higher previous functional level. Reason for Services/Other Comments:  · Patient continues to demonstrate capacity to improve strength and activity tolerance which will increase independence.    Use of outcome tool(s) and clinical judgement create a POC that gives a: MODERATE COMPLEXITY             TREATMENT:   (In addition to Assessment/Re-Assessment sessions the following treatments were rendered)      Pre-treatment Symptoms/Complaints:    Pain: Initial:   Pain Intensity 1: 0  Post Session:  same      Assessment/Reassessment only, no treatment provided today     Braces/Orthotics/Lines/Etc:   · O2 Device: Nasal cannula  Treatment/Session Assessment:    · Response to Treatment:  Tolerated well w/o complications or complaints  · Interdisciplinary Collaboration:  · Occupational Therapist  · Registered Nurse  · After treatment position/precautions:  · Up in chair  · Bed/Chair-wheels locked  · Call light within reach  · RN notified  · Compliance with Program/Exercises: compliant all of the time today. · Recommendations/Intent for next treatment session: \"Next visit will focus on advancements to more challenging activities and reduction in assistance provided\".   Total Treatment Duration:  OT Patient Time In/Time Out  Time In: 1107  Time Out: [address]0749 Osiris Garcia

## 2017-05-17 NOTE — PROGRESS NOTES
Problem: Mobility Impaired (Adult and Pediatric)  Goal: *Acute Goals and Plan of Care (Insert Text)  DISCHARGE GOALS:  (1.)Mr. Faye Heard will move from supine to sit and sit to supine , scoot up and down and roll side to side with INDEPENDENCE within 7 day(s). (2.)Mr. Faye Heard will transfer from bed to chair and chair to bed with INDEPENDENT using the least restrictive device within 7 day(s). (3.)Mr. Faye Heard will ambulate with INDEPENDENT for 1000 feet with the least restrictive device within 7 day(s). All goals with O2 >90%       PHYSICAL THERAPY: INITIAL ASSESSMENT, TREATMENT DAY: DAY OF ASSESSMENT, AM 5/17/2017  INPATIENT: Hospital Day: 3  [place] / Product Type: Medicare /      NAME/AGE/GENDER: Dalila Chew is a 80 y.o. male       PRIMARY DIAGNOSIS: CAP (community acquired pneumonia) CAP (community acquired pneumonia) CAP (community acquired pneumonia)        ICD-10: Treatment Diagnosis:       · Generalized Muscle Weakness (M62.81)  · Difficulty in walking, Not elsewhere classified (R26.2)   Precaution/Allergies:  Codeine       ASSESSMENT:      Mr. Faye Heard presents supine in bed, agreeable to therapy with spouse in room. He has hx of ankylosing spondylitis and more recent shakiness/nausea as of 3 days ago. At baseline, he is very independent with regards to ADLs, driving, and self care needs. He has seen and is familiar with outpatient PT over the years, but not related to this hospitalization. He is currently on 4L O2 (note: baseline not on O2). O2 at rest 92%. His fingers are very cold and there has been difficulty getting reading for O2 sats. He stood with supervision and ambulated 225 feet in hallway with overall supervision/CGA. He has great safety awareness and knowledge of energy conservation techniques. He takes breaks (3, each about 45 seconds, standing holding onto rail) after ambulating 75 feet. His O2 drops to 87% on 4L.   No sway or balance deficits with gait. Recommending continued PT to assist with cardiopulmonary endurance. He is not at baseline at this time--Lina Hernandez will benefit from skilled PT (medically necessary) to address decreased strength, decreased balance, decreased functional tolerance, decreased cardiopulmonary endurance affecting participation in basic ADLs and functional tasks. Anticipating home DC with outpatient PT, if necessary. This section established at most recent assessment   PROBLEM LIST (Impairments causing functional limitations):  1. Decreased Strength  2. Decreased ADL/Functional Activities  3. Decreased Transfer Abilities  4. Decreased Ambulation Ability/Technique  5. Decreased Balance  6. Increased Pain  7. Decreased Activity Tolerance  8. Increased Fatigue  9. Increased Shortness of Breath  10. Decreased [place] with Home Exercise Program    INTERVENTIONS PLANNED: (Benefits and precautions of physical therapy have been discussed with the patient.)  1. Balance Exercise  2. Bed Mobility  3. Family Education  4. Gait Training  5. Home Exercise Program (HEP)  6. Manual Therapy  7. Neuromuscular Re-education/Strengthening  8. Range of Motion (ROM)  9. Therapeutic Activites  10. Therapeutic Exercise/Strengthening  11. Transfer Training  12. Group Therapy      TREATMENT PLAN: Frequency/Duration: 3 times a week for 12 weeks  Rehabilitation Potential For Stated Goals: GOOD      RECOMMENDED REHABILITATION/EQUIPMENT: (at time of discharge pending progress): Continue Skilled Therapy. HISTORY:   History of Present Injury/Illness (Reason for Referral):See assessment  Past Medical History/Comorbidities:   Mr. Selina Hernandez  has a past medical history of Arthritis; Elevated prostate specific antigen (PSA); HLA-B27 positive; Impotence of organic origin; Neck pain; Osteoarthritis, hand; Osteopenia; Osteoporosis; Other nonspecific abnormal finding of lung field; Polyarthritis;  Polymyalgia rheumatica ([facility] [address] Prostate cancer ([facility] [address] Raynaud's disease; 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[facility] [address] and Thoracic spine pain. He also has no past medical history of Asthma; Autoimmune disease ([facility] Chronic kidney disease; Chronic obstructive pulmonary disease ([facility] Chronic pain; Diabetes ([facility] [facility] Difficult intubation; GERD (gastroesophageal reflux disease); Heart abnormalities; Hypertension; Liver disease; Malignant hyperthermia due to anesthesia; Nausea & vomiting; Neurological disorder; Other unknown and unspecified cause of morbidity or mortality; Pseudocholinesterase deficiency; Psychiatric disorder; PUD (peptic ulcer disease); Seizures ([facility] [address] Stroke [facility]); [facility] Thyroid disease; or Unspecified adverse effect of anesthesia. Mr. Pamela Coffman  has a past surgical history that includes vasectomy (40+ yrs); knee arthroscopy (2009); urological; biopsy prostate; prostatectomy (2012); cataract removal (2012); and cataract removal (2013). Social History/Living Environment:   Home Environment: Private residence  # Steps to Enter: 2  One/Two Story Residence: One story  Living Alone: No  Support Systems: Spouse/Significant Other/Partner, Child(alfred)  Patient Expects to be Discharged to[de-identified] Private residence  Current DME Used/Available at Home: None  Prior Level of Function/Work/Activity:  See assessment  Personal Factors:          Sex:  male        Age:  80 y.o. Number of Personal Factors/Comorbidities that affect the Plan of Care: 3+: HIGH COMPLEXITY   EXAMINATION:   Most Recent Physical Functioning:   Gross Assessment:  AROM: Generally decreased, functional  PROM: Generally decreased, functional  Strength: Generally decreased, functional  Coordination: Generally decreased, functional  Tone: Normal  Sensation: Intact               Posture:  Posture (WDL): Exceptions to WDL  Posture Assessment:  Forward head  Balance:  Sitting: Intact  Standing: Impaired  Standing - Static: Good  Standing - Dynamic : Fair Bed Mobility:  Rolling: Supervision  Supine to Sit: Supervision  Scooting: Supervision  Wheelchair Mobility:     Transfers:  Sit to Stand: Supervision  Stand to Sit: Supervision  Bed to Chair: Supervision  Gait:     Speed/Annelise: Slow  Step Length: Right shortened;Left shortened  Gait Abnormalities: Decreased step clearance  Distance (ft): 225 Feet (ft) (rest breaks (3) every 75 feet)  Ambulation - Level of Assistance: Supervision       Body Structures Involved:  1. Nerves  2. Lungs  3. Bones  4. Joints  5. Muscles Body Functions Affected:  1. Sensory/Pain  2. Respiratory  3. Neuromusculoskeletal  4. Movement Related Activities and Participation Affected:  1. Mobility  2. Self Care   Number of elements that affect the Plan of Care: 4+: HIGH COMPLEXITY   CLINICAL PRESENTATION:   Presentation: Stable and uncomplicated: LOW COMPLEXITY   CLINICAL DECISION MAKING:   [facility] AM-PAC 6 Clicks   Basic Mobility Inpatient Short Form  How much difficulty does the patient currently have. .. Unable A Lot A Little None   1. Turning over in bed (including adjusting bedclothes, sheets and blankets)? [ ] 1   [ ] 2   [ ] 3   [X] 4   2. Sitting down on and standing up from a chair with arms ( e.g., wheelchair, bedside commode, etc.)   [ ] 1   [ ] 2   [ ] 3   [X] 4   3. Moving from lying on back to sitting on the side of the bed? [ ] 1   [ ] 2   [ ] 3   [X] 4   How much help from another person does the patient currently need. .. Total A Lot A Little None   4. Moving to and from a bed to a chair (including a wheelchair)? [ ] 1   [ ] 2   [ ] 3   [X] 4   5. Need to walk in hospital room? [ ] 1   [ ] 2   [X] 3   [ ] 4   6. Climbing 3-5 steps with a railing? [ ] 1   [ ] 2   [X] 3   [ ] 4   © 2007, Trustees of [facility], under license to [employer]. All rights reserved    Score:  Initial: 22 Most Recent: X (Date: -- )     Interpretation of Tool:  Represents activities that are increasingly more difficult (i.e. Bed mobility, Transfers, Gait). Score 24 23 22-20 19-15 14-10 9-7 6       Modifier CH CI CJ CK CL CM CN         · Mobility - Walking and Moving Around:               - CURRENT STATUS:    CJ - 20%-39% impaired, limited or restricted               - GOAL STATUS:           CI - 1%-19% impaired, limited or restricted               - D/C STATUS:                       ---------------To be determined---------------  [place] / Product Type: Medicare /       Medical Necessity:     · Skilled intervention continues to be required due to decreased strength, decreased balance, decreased functional tolerance, decreased cardiopulmonary endurance affecting participation in basic ADLs and functional tasks. .  Reason for Services/Other Comments:  · Patient continues to require skilled intervention due to medical complications and patient unable to attend/participate in therapy as expected. Use of outcome tool(s) and clinical judgement create a POC that gives a: Clear prediction of patient's progress: LOW COMPLEXITY                 TREATMENT:   (In addition to Assessment/Re-Assessment sessions the following treatments were rendered)   Pre-treatment Symptoms/Complaints:  Dalila Chew verbalized understanding of role of PT and POC. Pain: Initial:   Pain Intensity 1: 0  Post Session:  0/10      Therapeutic Activity: (    8 minutes): Therapeutic activities including Bed transfers, Chair transfers and Ambulation on level ground to improve mobility, strength, balance and coordination. Required minimal   to promote static and dynamic balance in standing. Braces/Orthotics/Lines/Etc:   · O2 Device: Nasal cannula  Treatment/Session Assessment:    · Response to Treatment:  Dalila Chew verbalized understanding of role of PT and POC.   ·   · Interdisciplinary Collaboration:  · Physical Therapist  · Registered Nurse  · After treatment position/precautions:  · Up in chair  · Bed/Chair-wheels locked  · Bed in low position  · Call light within reach  · RN notified  · Compliance with Program/Exercises: Will assess as treatment progresses. · Recommendations/Intent for next treatment session: \"Next visit will focus on advancements to more challenging activities and reduction in assistance provided\".   Total Treatment Duration:  PT Patient Time In/Time Out  Time In: 1881  Time Out: Tim Mendoza DPT

## 2017-05-18 PROCEDURE — 94760 N-INVAS EAR/PLS OXIMETRY 1: CPT

## 2017-05-18 PROCEDURE — 74011250637 HC RX REV CODE- 250/637: Performed by: INTERNAL MEDICINE

## 2017-05-18 PROCEDURE — 65270000029 HC RM PRIVATE

## 2017-05-18 PROCEDURE — 99232 SBSQ HOSP IP/OBS MODERATE 35: CPT | Performed by: INTERNAL MEDICINE

## 2017-05-18 PROCEDURE — 74011250636 HC RX REV CODE- 250/636: Performed by: INTERNAL MEDICINE

## 2017-05-18 PROCEDURE — 77030027688 HC DRSG MEPILEX 16-48IN NO BORD MOLN -A

## 2017-05-18 PROCEDURE — 97530 THERAPEUTIC ACTIVITIES: CPT

## 2017-05-18 PROCEDURE — 77030019605

## 2017-05-18 PROCEDURE — 74011000258 HC RX REV CODE- 258: Performed by: INTERNAL MEDICINE

## 2017-05-18 RX ADMIN — DULOXETINE HYDROCHLORIDE 30 MG: 30 CAPSULE, DELAYED RELEASE ORAL at 10:29

## 2017-05-18 RX ADMIN — ENOXAPARIN SODIUM 40 MG: 40 INJECTION SUBCUTANEOUS at 20:32

## 2017-05-18 RX ADMIN — Medication 5 ML: at 17:28

## 2017-05-18 RX ADMIN — PANTOPRAZOLE SODIUM 40 MG: 40 TABLET, DELAYED RELEASE ORAL at 06:23

## 2017-05-18 RX ADMIN — Medication 5 ML: at 06:24

## 2017-05-18 RX ADMIN — CEFTRIAXONE 2 G: 2 INJECTION, POWDER, FOR SOLUTION INTRAMUSCULAR; INTRAVENOUS at 20:32

## 2017-05-18 RX ADMIN — AZITHROMYCIN MONOHYDRATE 500 MG: 250 TABLET ORAL at 17:28

## 2017-05-18 RX ADMIN — HYDROCODONE BITARTRATE AND ACETAMINOPHEN 1 TABLET: 7.5; 325 TABLET ORAL at 17:31

## 2017-05-18 RX ADMIN — HYDROCODONE BITARTRATE AND ACETAMINOPHEN 1 TABLET: 7.5; 325 TABLET ORAL at 04:52

## 2017-05-18 NOTE — PROGRESS NOTES
Problem: Mobility Impaired (Adult and Pediatric)  Goal: *Acute Goals and Plan of Care (Insert Text)  DISCHARGE GOALS:  (1.)Mr. Nora Reyna will move from supine to sit and sit to supine , scoot up and down and roll side to side with INDEPENDENCE within 7 day(s). (2.)Mr. Nora Reyna will transfer from bed to chair and chair to bed with INDEPENDENT using the least restrictive device within 7 day(s). (3.)Mr. Nora Reyna will ambulate with INDEPENDENT for 1000 feet with the least restrictive device within 7 day(s). All goals with O2 >90%       PHYSICAL THERAPY: Daily Note, Treatment Day: 1st and PM 5/18/2017  INPATIENT: Hospital Day: 4  [place] / [place] / Product Type: Medicare /      NAME/AGE/GENDER: Madeline Page is a 80 y.o. male       PRIMARY DIAGNOSIS: CAP (community acquired pneumonia) CAP (community acquired pneumonia) CAP (community acquired pneumonia)        ICD-10: Treatment Diagnosis:       · Generalized Muscle Weakness (M62.81)  · Difficulty in walking, Not elsewhere classified (R26.2)   Precaution/Allergies:  Codeine       ASSESSMENT:      Mr. Nora Reyna was supine upon contact and agreeable to PT. Patient is very motivated. Patient able to perform bed mobility and transfer to standing with supevision. Patient ambulates on 4L O2 via nasal canula. Once standing patient able to ambulate 250' occasionally holding onto hallway railing demonstrating slight increased trunk sway/path deviations but no LOB noted. Patient ambulates with SBA-supevision requiring two short standing rest breaks. Patient reports breathing feels improved. Returns to EOB and to supine with supervision. Overall good progress towards physical therapy goals. No goals have been met thus far. Will continue efforts. This section established at most recent assessment   PROBLEM LIST (Impairments causing functional limitations):  1. Decreased Strength  2. Decreased ADL/Functional Activities  3.  Decreased Transfer Abilities  4. Decreased Ambulation Ability/Technique  5. Decreased Balance  6. Increased Pain  7. Decreased Activity Tolerance  8. Increased Fatigue  9. Increased Shortness of Breath  10. Decreased [place] with Home Exercise Program    INTERVENTIONS PLANNED: (Benefits and precautions of physical therapy have been discussed with the patient.)  1. Balance Exercise  2. Bed Mobility  3. Family Education  4. Gait Training  5. Home Exercise Program (HEP)  6. Manual Therapy  7. Neuromuscular Re-education/Strengthening  8. Range of Motion (ROM)  9. Therapeutic Activites  10. Therapeutic Exercise/Strengthening  11. Transfer Training  12. Group Therapy      TREATMENT PLAN: Frequency/Duration: 3 times a week for 12 weeks  Rehabilitation Potential For Stated Goals: GOOD      RECOMMENDED REHABILITATION/EQUIPMENT: (at time of discharge pending progress): Continue Skilled Therapy. HISTORY:   History of Present Injury/Illness (Reason for Referral):See assessment  Past Medical History/Comorbidities:   Mr. Selina Hernandez  has a past medical history of Arthritis; Elevated prostate specific antigen (PSA); HLA-B27 positive; Impotence of organic origin; Neck pain; Osteoarthritis, hand; Osteopenia; Osteoporosis; Other nonspecific abnormal finding of lung field; Polyarthritis; Polymyalgia rheumatica ([address] Prostate cancer ([address] Raynaud's disease; 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[address] and Thoracic spine pain. He also has no past medical history of Asthma; Autoimmune disease ([address] Chronic kidney disease; Chronic obstructive pulmonary disease ([address] Chronic pain; Diabetes ([address] Difficult intubation; GERD (gastroesophageal reflux disease); Heart abnormalities; Hypertension; Liver disease; Malignant hyperthermia due to anesthesia; Nausea & vomiting; Neurological disorder; Other unknown and unspecified cause of morbidity or mortality; Pseudocholinesterase deficiency; Psychiatric disorder; PUD (peptic ulcer disease); Seizures ([address]  Stroke ([facility] Thromboembolus ([facility] Thyroid disease; or Unspecified adverse effect of anesthesia. Mr. Serge Gonzalez  has a past surgical history that includes vasectomy (40+ yrs); knee arthroscopy (2009); urological; biopsy prostate; prostatectomy (2012); cataract removal (2012); and cataract removal (2013). Social History/Living Environment:   Home Environment: Private residence  # Steps to Enter: 2  One/Two Story Residence: One story  Living Alone: No  Support Systems: Spouse/Significant Other/Partner  Patient Expects to be Discharged to[de-identified] Private residence  Current DME Used/Available at Home: None  Tub or Shower Type: Tub/Shower combination  Prior Level of Function/Work/Activity:  See assessment  Personal Factors:          Sex:  male        Age:  80 y.o. Number of Personal Factors/Comorbidities that affect the Plan of Care: 3+: HIGH COMPLEXITY   EXAMINATION:   Most Recent Physical Functioning:   Gross Assessment:                  Posture:     Balance:  Sitting: Intact  Standing: Impaired  Standing - Static: Good  Standing - Dynamic : Fair Bed Mobility:  Supine to Sit: Supervision  Sit to Supine: Supervision  Wheelchair Mobility:     Transfers:  Sit to Stand: Supervision  Stand to Sit: Supervision  Gait:     Speed/Annelise: Slow  Step Length: Left shortened;Right shortened  Gait Abnormalities: Decreased step clearance;Trunk sway increased; Path deviations  Distance (ft): 250 Feet (ft)  Assistive Device:  (occasional use of hallway railing)  Ambulation - Level of Assistance: Stand-by asssistance;Supervision  Interventions: Safety awareness training; Tactile cues; Verbal cues       Body Structures Involved:  1. Nerves  2. Lungs  3. Bones  4. Joints  5. Muscles Body Functions Affected:  1. Sensory/Pain  2. Respiratory  3. Neuromusculoskeletal  4. Movement Related Activities and Participation Affected:  1. Mobility  2.  Self Care   Number of elements that affect the Plan of Care: 4+: HIGH COMPLEXITY   CLINICAL PRESENTATION: Presentation: Stable and uncomplicated: LOW COMPLEXITY   CLINICAL DECISION MAKING:   [facility] AM-PAC 6 Clicks   Basic Mobility Inpatient Short Form  How much difficulty does the patient currently have. .. Unable A Lot A Little None   1. Turning over in bed (including adjusting bedclothes, sheets and blankets)? [ ] 1   [ ] 2   [ ] 3   [X] 4   2. Sitting down on and standing up from a chair with arms ( e.g., wheelchair, bedside commode, etc.)   [ ] 1   [ ] 2   [ ] 3   [X] 4   3. Moving from lying on back to sitting on the side of the bed? [ ] 1   [ ] 2   [ ] 3   [X] 4   How much help from another person does the patient currently need. .. Total A Lot A Little None   4. Moving to and from a bed to a chair (including a wheelchair)? [ ] 1   [ ] 2   [ ] 3   [X] 4   5. Need to walk in hospital room? [ ] 1   [ ] 2   [X] 3   [ ] 4   6. Climbing 3-5 steps with a railing? [ ] 1   [ ] 2   [X] 3   [ ] 4   © 2007, Trustees of [facility], under license to [employer]. All rights reserved    Score:  Initial: 22 Most Recent: X (Date: -- )     Interpretation of Tool:  Represents activities that are increasingly more difficult (i.e. Bed mobility, Transfers, Gait). Score 24 23 22-20 19-15 14-10 9-7 6       Modifier CH CI CJ CK CL CM CN         · Mobility - Walking and Moving Around:               - CURRENT STATUS:    CJ - 20%-39% impaired, limited or restricted               - GOAL STATUS:           CI - 1%-19% impaired, limited or restricted               - D/C STATUS:                       ---------------To be determined---------------  [place] / Product Type: Medicare /       Medical Necessity:     · Skilled intervention continues to be required due to decreased strength, decreased balance, decreased functional tolerance, decreased cardiopulmonary endurance affecting participation in basic ADLs and functional tasks.    .  Reason for Services/Other Comments:  · Patient continues to require skilled intervention due to medical complications and patient unable to attend/participate in therapy as expected. Use of outcome tool(s) and clinical judgement create a POC that gives a: Clear prediction of patient's progress: LOW COMPLEXITY                 TREATMENT:   (In addition to Assessment/Re-Assessment sessions the following treatments were rendered)   Pre-treatment Symptoms/Complaints:  River Khalil verbalized understanding of role of PT and POC. Pain: Initial:   Pain Intensity 1: 0  Post Session:  0/10      Therapeutic Activity: (    19 minutes): Therapeutic activities including bed mobility training, transfer training, static/dynamic standing balance activities, ambulation on level ground, and patient education to improve mobility, strength, balance, coordination and activity tolerance. Required minimal Safety awareness training; Tactile cues; Verbal cues to promote static and dynamic balance in standing and promote coordination of bilateral, lower extremity(s). Braces/Orthotics/Lines/Etc:   · O2 Device: Nasal cannula  Treatment/Session Assessment:    · Response to Treatment: None  · Interdisciplinary Collaboration:  · Physical Therapy Assistant and Registered Nurse  · After treatment position/precautions:  · Supine in bed, Bed/Chair-wheels locked, Bed in low position, Call light within reach, RN notified and Family at bedside  · Compliance with Program/Exercises: Will assess as treatment progresses. · Recommendations/Intent for next treatment session: \"Next visit will focus on advancements to more challenging activities and reduction in assistance provided\".   Total Treatment Duration:  PT Patient Time In/Time Out  Time In: 1415  Time Out: [address] [facility] [place], PTA

## 2017-05-18 NOTE — PROGRESS NOTES
Trevin Reilly  Admission Date: 5/15/2017             Daily Progress Note: 5/18/2017    The patient's chart is reviewed and the patient is discussed with the staff. Subjective:     Patient is a 80 y.o.  male With a hx of anklelosing spondylitis on remicade was in usual state of health until Saturday 2 days ago when he developed shaking chills and nausea. He has been sob and has had left side pleuritic chest And back pain. Today he was taken to Dr. Jeremías Sidhu office and O2 sat was low so he was sent to the er. He was noted to have lactate of 3.5 and cxr showed LLL and RUL infiltrate.   He is still some better but still on 5 L-cxr yesterday some worse    Current Facility-Administered Medications   Medication Dose Route Frequency    magnesium hydroxide (MILK OF MAGNESIA) 400 mg/5 mL oral suspension 30 mL  30 mL Oral DAILY PRN    azithromycin (ZITHROMAX) tablet 500 mg  500 mg Oral Q24H    DULoxetine (CYMBALTA) capsule 30 mg  30 mg Oral DAILY    pantoprazole (PROTONIX) tablet 40 mg  40 mg Oral ACB    traMADol (ULTRAM) tablet 50 mg  50 mg Oral TID PRN    sodium chloride (NS) flush 5 mL  5 mL InterCATHeter Q8H    sodium chloride (NS) flush 5-10 mL  5-10 mL InterCATHeter PRN    cefTRIAXone (ROCEPHIN) 2 g in 0.9% sodium chloride (MBP/ADV) 50 mL  2 g IntraVENous Q24H    enoxaparin (LOVENOX) injection 40 mg  40 mg SubCUTAneous Q24H    HYDROcodone-acetaminophen (NORCO) 7.5-325 mg per tablet 1 Tab  1 Tab Oral Q6H PRN       Review of Systems    Constitutional: negative for fever, chills, sweats  Cardiovascular: negative for chest pain, palpitations, syncope, edema  Gastrointestinal:  negative for dysphagia, reflux, vomiting, diarrhea, abdominal pain, or melena  Neurologic:  negative for focal weakness, numbness, headache    Objective:     Vitals:    05/18/17 0004 05/18/17 0338 05/18/17 0712 05/18/17 1229   BP: 133/66 128/62 107/53 129/65   Pulse: 79 78 72 84   Resp: 18 20 20 16   Temp: 98.8 °F (37.1 °C) 100.1 °F (37.8 °C) 98.4 °F (36.9 °C) 98.7 °F (37.1 °C)   SpO2: 99% 97% 93% 99%   Weight:       Height:         Intake and Output:   05/16 1901 - 05/18 0700  In: 1080 [P.O.:1080]  Out: 2200 [Urine:2200]       Physical Exam:   Constitution:  the patient is well developed and in no acute distress  EENMT:  Sclera clear, pupils equal, oral mucosa moist  Respiratory: crackles left base  Cardiovascular:  RRR without M,G,R  Gastrointestinal: soft and non-tender; with positive bowel sounds. Musculoskeletal: warm without cyanosis. There is no lower leg edema. Skin:  no jaundice or rashes, no wounds   Neurologic: no gross neuro deficits     Psychiatric:  alert and oriented x 3    CHEST XRAY:       LAB  No results for input(s): GLUCPOC in the last 72 hours.     No lab exists for component: [place]   Recent Labs      05/17/17   0605   WBC  5.5   HGB  10.9*   HCT  31.9*   PLT  135*     Recent Labs      05/17/17   0605  05/16/17   0340  05/15/17   1912   NA  140  140  140   K  3.4*  3.6  4.0   CL  106  107  105   CO2  27  24  25   GLU  97  76  66   BUN  14  21  26*   CREA  0.59*  0.72*  0.95   CA  7.6*  7.3*  7.3*   ALB   --   2.0*   --    TBILI   --   0.6   --    ALT   --   17   --    SGOT   --   29   --      Recent Labs      05/15/17   2345   PH  7.43   PCO2  32*   PO2  107*   HCO3  21*     Recent Labs      05/16/17   0340  05/15/17   2303  05/15/17   1912   LAC  1.4  2.6*  4.2*         Assessment:  (Medical Decision Making)     Hospital Problems  Date Reviewed: 5/15/2017          Codes Class Noted POA    Immunosuppression ([facility] (Chronic) ICD-10-CM: D89.9  ICD-9-CM: 279.9  5/15/2017 Yes        * (Principal)CAP (community acquired pneumonia) ICD-10-CM: J18.9  ICD-9-CM: 994  5/15/2017 Yes    L>R -strep pneumo    Sepsis ([facility] ICD-10-CM: A41.9  ICD-9-CM: 038.9, 995.91  5/15/2017 Unknown    bl culture neg    DENIS (acute kidney injury) ([facility] ICD-10-CM: N17.9  ICD-9-CM: 584.9  5/15/2017 Unknown        Hypoxemia ICD-10-CM: R09.02  ICD-9-CM: 799.02  5/15/2017 Unknown    On nc    Ankylosing spondylitis ([facility] ICD-10-CM: M45.9  ICD-9-CM: 720.0  1/29/2015 Yes        Spondyloarthritis ([facility] (Chronic) ICD-10-CM: M46.90  ICD-9-CM: 721.90  9/17/2013 Yes              Plan:  (Medical Decision Making)   1    Continue iv antibx  2    Wean O2    3    rocephine and zithromax  -day 4  --    More than 50% of the time documented was spent in face-to-face contact with the patient and in the care of the patient on the floor/unit where the patient is located.     Adin Michele MD

## 2017-05-19 ENCOUNTER — APPOINTMENT (OUTPATIENT)
Dept: GENERAL RADIOLOGY | Age: 72
DRG: 853 | End: 2017-05-19
Attending: INTERNAL MEDICINE
Payer: MEDICARE

## 2017-05-19 LAB
ANION GAP BLD CALC-SCNC: 9 MMOL/L (ref 7–16)
BACTERIA SPEC CULT: ABNORMAL
BACTERIA SPEC CULT: ABNORMAL
BUN SERPL-MCNC: 15 MG/DL (ref 8–23)
CALCIUM SERPL-MCNC: 8.1 MG/DL (ref 8.3–10.4)
CHLORIDE SERPL-SCNC: 103 MMOL/L (ref 98–107)
CO2 SERPL-SCNC: 30 MMOL/L (ref 21–32)
CREAT SERPL-MCNC: 0.64 MG/DL (ref 0.8–1.5)
GLUCOSE SERPL-MCNC: 117 MG/DL (ref 65–100)
GRAM STN SPEC: ABNORMAL
POTASSIUM SERPL-SCNC: 3.3 MMOL/L (ref 3.5–5.1)
PROCALCITONIN SERPL-MCNC: 2.2 NG/ML
SERVICE CMNT-IMP: ABNORMAL
SODIUM SERPL-SCNC: 142 MMOL/L (ref 136–145)

## 2017-05-19 PROCEDURE — 84145 PROCALCITONIN (PCT): CPT | Performed by: INTERNAL MEDICINE

## 2017-05-19 PROCEDURE — 80048 BASIC METABOLIC PNL TOTAL CA: CPT | Performed by: INTERNAL MEDICINE

## 2017-05-19 PROCEDURE — 74011250636 HC RX REV CODE- 250/636: Performed by: INTERNAL MEDICINE

## 2017-05-19 PROCEDURE — 65270000029 HC RM PRIVATE

## 2017-05-19 PROCEDURE — 97150 GROUP THERAPEUTIC PROCEDURES: CPT

## 2017-05-19 PROCEDURE — 36415 COLL VENOUS BLD VENIPUNCTURE: CPT | Performed by: INTERNAL MEDICINE

## 2017-05-19 PROCEDURE — 94760 N-INVAS EAR/PLS OXIMETRY 1: CPT

## 2017-05-19 PROCEDURE — 74011250637 HC RX REV CODE- 250/637: Performed by: INTERNAL MEDICINE

## 2017-05-19 PROCEDURE — 97530 THERAPEUTIC ACTIVITIES: CPT

## 2017-05-19 PROCEDURE — 74011000258 HC RX REV CODE- 258: Performed by: INTERNAL MEDICINE

## 2017-05-19 PROCEDURE — 71020 XR CHEST PA LAT: CPT

## 2017-05-19 PROCEDURE — 77010033678 HC OXYGEN DAILY

## 2017-05-19 PROCEDURE — 99232 SBSQ HOSP IP/OBS MODERATE 35: CPT | Performed by: INTERNAL MEDICINE

## 2017-05-19 RX ORDER — POTASSIUM CHLORIDE 20 MEQ/1
40 TABLET, EXTENDED RELEASE ORAL
Status: COMPLETED | OUTPATIENT
Start: 2017-05-19 | End: 2017-05-19

## 2017-05-19 RX ORDER — POTASSIUM CHLORIDE 20 MEQ/1
40 TABLET, EXTENDED RELEASE ORAL DAILY
Status: DISCONTINUED | OUTPATIENT
Start: 2017-05-20 | End: 2017-05-21

## 2017-05-19 RX ADMIN — PANTOPRAZOLE SODIUM 40 MG: 40 TABLET, DELAYED RELEASE ORAL at 05:10

## 2017-05-19 RX ADMIN — HYDROCODONE BITARTRATE AND ACETAMINOPHEN 1 TABLET: 7.5; 325 TABLET ORAL at 09:11

## 2017-05-19 RX ADMIN — Medication 5 ML: at 23:26

## 2017-05-19 RX ADMIN — HYDROCODONE BITARTRATE AND ACETAMINOPHEN 1 TABLET: 7.5; 325 TABLET ORAL at 19:34

## 2017-05-19 RX ADMIN — HYDROCODONE BITARTRATE AND ACETAMINOPHEN 1 TABLET: 7.5; 325 TABLET ORAL at 14:11

## 2017-05-19 RX ADMIN — ENOXAPARIN SODIUM 40 MG: 40 INJECTION SUBCUTANEOUS at 19:39

## 2017-05-19 RX ADMIN — HYDROCODONE BITARTRATE AND ACETAMINOPHEN 1 TABLET: 7.5; 325 TABLET ORAL at 05:18

## 2017-05-19 RX ADMIN — POTASSIUM CHLORIDE 40 MEQ: 20 TABLET, EXTENDED RELEASE ORAL at 19:33

## 2017-05-19 RX ADMIN — AZITHROMYCIN MONOHYDRATE 500 MG: 250 TABLET ORAL at 17:04

## 2017-05-19 RX ADMIN — DULOXETINE HYDROCHLORIDE 30 MG: 30 CAPSULE, DELAYED RELEASE ORAL at 09:08

## 2017-05-19 RX ADMIN — ENOXAPARIN SODIUM 40 MG: 40 INJECTION SUBCUTANEOUS at 17:04

## 2017-05-19 RX ADMIN — CEFTRIAXONE 2 G: 2 INJECTION, POWDER, FOR SOLUTION INTRAMUSCULAR; INTRAVENOUS at 19:33

## 2017-05-19 RX ADMIN — Medication 5 ML: at 05:10

## 2017-05-19 NOTE — PROGRESS NOTES
Problem: Self Care Deficits Care Plan (Adult)  Goal: *Acute Goals and Plan of Care (Insert Text)  1. Patient will complete lower body bathing and dressing with independence and adaptive equipment as needed. 2. Patient will complete toileting with independence. 3. Patient will tolerate 23 minutes of therapeutic activity with less than 3 rest breaks to increase activity tolerance for ADLs. 4. Patient will complete 23 minutes of therapeutic exercise to increase strength for ADLs. 5. Patient will complete functional transfers with independence and adaptive equipment as needed. Timeframe: 7 visits     Comments:       OCCUPATIONAL THERAPY: Daily Note, Treatment Day: 1st and AM    5/19/2017  INPATIENT: Hospital Day: 5  [place] / [place] / Product Type: Medicare /      NAME/AGE/GENDER: Yoselin Queen is a 80 y.o. male       PRIMARY DIAGNOSIS:  CAP (community acquired pneumonia) CAP (community acquired pneumonia) CAP (community acquired pneumonia)        ICD-10: Treatment Diagnosis:        · Generalized Muscle Weakness (M62.81)   Precautions/Allergies:         Codeine       ASSESSMENT:      Mr. Kingsley Carreon presents to hospital for above. Upon arrival of OT, pt is supine in bed, pleasant and agreeable to OT evaluation. He reports that he is very independent at baseline with ADLs/IADLS, includes driving, in addition to being very active with his physical well-being (i.e. Walks 10,000 steps per day). 5/19/2017 Pt was up in the chair upon arrival. Pt completed standing and functional mobility in his room with no AD and completed toileting and sink side ADL with supervision. Pt completed additional mobility with a rolling walker down to the gym to participate in group exercises. Pt completed exercises with visual and verbal cues. Pt returned to room with same level of assistance.  Pt participated with good effort and will continue to benefit from OT to increase progression toward above goals.     This section established at most recent assessment   PROBLEM LIST (Impairments causing functional limitations):  1. Decreased Strength  2. Decreased ADL/Functional Activities  3. Decreased Transfer Abilities  4. Decreased Ambulation Ability/Technique  5. Decreased Balance  6. Decreased Activity Tolerance  7. Decreased Pacing Skills  8. Decreased Work Simplification/Energy Conservation Techniques  9. Increased Fatigue  10. Increased Shortness of Breath    INTERVENTIONS PLANNED: (Benefits and precautions of occupational therapy have been discussed with the patient.)  1. Activities of daily living training  2. Adaptive equipment training  3. Group therapy  4. Theraputic activity  5. Theraputic exercise      TREATMENT PLAN: Frequency/Duration: Follow patient 3x/week to address above goals. Rehabilitation Potential For Stated Goals: GOOD      RECOMMENDED REHABILITATION/EQUIPMENT: (at time of discharge pending progress): Continue Skilled Therapy. OCCUPATIONAL PROFILE AND HISTORY:   History of Present Injury/Illness (Reason for Referral):  See H&P  Past Medical History/Comorbidities:   Mr. Caryle Hoot  has a past medical history of Arthritis; Elevated prostate specific antigen (PSA); HLA-B27 positive; Impotence of organic origin; Neck pain; Osteoarthritis, hand; Osteopenia; Osteoporosis; Other nonspecific abnormal finding of lung field; Polyarthritis; Polymyalgia rheumatica ([address] Prostate cancer ([address] Raynaud's disease; 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[address] and Thoracic spine pain. He also has no past medical history of Asthma; Autoimmune disease ([address] Chronic kidney disease; Chronic obstructive pulmonary disease ([address] Chronic pain; Diabetes ([address] Difficult intubation; GERD (gastroesophageal reflux disease); Heart abnormalities; Hypertension; Liver disease; Malignant hyperthermia due to anesthesia; Nausea & vomiting; Neurological disorder;  Other unknown and unspecified cause of morbidity or mortality; Pseudocholinesterase deficiency; Psychiatric disorder; PUD (peptic ulcer disease); Seizures ([facility] Stroke [facility]); Thromboembolus ([facility] Thyroid disease; or Unspecified adverse effect of anesthesia. Mr. William Herrera  has a past surgical history that includes vasectomy (40+ yrs); knee arthroscopy (2009); urological; biopsy prostate; prostatectomy (2012); cataract removal (2012); and cataract removal (2013). Social History/Living Environment:   Home Environment: Private residence  # Steps to Enter: 2  One/Two Story Residence: One story  Living Alone: No  Support Systems: Spouse/Significant Other/Partner  Patient Expects to be Discharged to[de-identified] Private residence  Current DME Used/Available at Home: None  Tub or Shower Type: Tub/Shower combination  Prior Level of Function/Work/Activity:  [facility]. Is very active and independent at baseline with ADLs/IADLS. Drives. Personal Factors:          Sex:  male        Age:  80 y.o. Social Background:  Strong support from wife   Number of Personal Factors/Comorbidities that affect the Plan of Care: Expanded review of therapy/medical records (1-2):  MODERATE COMPLEXITY   ASSESSMENT OF OCCUPATIONAL PERFORMANCE[de-identified]   Activities of Daily Living:           Basic ADLs (From Assessment) Complex ADLs (From Assessment)   Basic ADL  Feeding: Independent  Oral Facial Hygiene/Grooming: Independent  Bathing: Stand-by assistance  Upper Body Dressing: Independent  Lower Body Dressing: Minimum assistance  Toileting: Stand by assistance Instrumental ADL  Meal Preparation: Minimum assistance  Homemaking:  Moderate assistance  Medication Management: Independent  Financial Management: Independent   Grooming/Bathing/Dressing Activities of Daily Living                 Toileting  Toileting Assistance: Independent  Bladder Hygiene: Independent  Clothing Management: Independent     Functional Transfers  Bathroom Mobility: Independent     Bed/Mat Mobility  Sit to Stand: Independent Most Recent Physical Functioning:   Gross Assessment:                  Posture:  Posture (WDL): Exceptions to WDL  Posture Assessment: Forward head  Balance:  Sitting: Intact  Standing: Impaired  Standing - Static: Good  Standing - Dynamic : Fair Bed Mobility:     Wheelchair Mobility:     Transfers:  Sit to Stand: Independent                    Patient Vitals for the past 6 hrs:   BP BP Patient Position SpO2 O2 Flow Rate (L/min) Pulse   05/19/17 0856 - - 92 % 4 l/min -   05/19/17 1037 105/53 At rest 97 % - 76        Mental Status  Neurologic State: Alert  Orientation Level: Oriented X4  Cognition: Follows commands  Perception: Appears intact  Perseveration: No perseveration noted  Safety/Judgement: Awareness of environment                               Physical Skills Involved:  1. Balance  2. Mobility  3. Strength  4. Endurance Cognitive Skills Affected (resulting in the inability to perform in a timely and safe manner): 1. none Psychosocial Skills Affected:  1. Routines and Behaviors   Number of elements that affect the Plan of Care: 3-5:  MODERATE COMPLEXITY   CLINICAL DECISION MAKING:   [facility] AM-PAC 6 Clicks   Basic Mobility Inpatient Short Form  How much help from another person does the patient currently need. .. Total A Lot A Little None   1. Putting on and taking off regular lower body clothing?   [ ] 1   [ ] 2   [X] 3   [ ] 4   2. Bathing (including washing, rinsing, drying)? [ ] 1   [ ] 2   [X] 3   [ ] 4   3. Toileting, which includes using toilet, bedpan or urinal?   [ ] 1   [ ] 2   [ ] 3   [X] 4   4. Putting on and taking off regular upper body clothing?   [ ] 1   [ ] 2   [ ] 3   [X] 4   5. Taking care of personal grooming such as brushing teeth? [ ] 1   [ ] 2   [ ] 3   [X] 4   6. Eating meals? [ ] 1   [ ] 2   [ ] 3   [X] 4   © 2007, Trustees of [facility], under license to [employer].  All rights reserved    Score:  Initial: 22 Most Recent: X (Date: -- ) Interpretation of Tool:  Represents activities that are increasingly more difficult (i.e. Bed mobility, Transfers, Gait). Score 24 23 22-20 19-15 14-10 9-7 6       Modifier CH CI CJ CK CL CM CN         · Self Care:               - CURRENT STATUS:    CJ - 20%-39% impaired, limited or restricted               - GOAL STATUS:           CI - 1%-19% impaired, limited or restricted               - D/C STATUS:                       ---------------To be determined---------------  [place] / [place] / Product Type: Medicare /       Medical Necessity:     · Patient demonstrates good rehab potential due to higher previous functional level. Reason for Services/Other Comments:  · Patient continues to demonstrate capacity to improve strength and activity tolerance which will increase independence. Use of outcome tool(s) and clinical judgement create a POC that gives a: MODERATE COMPLEXITY             TREATMENT:   (In addition to Assessment/Re-Assessment sessions the following treatments were rendered)      Pre-treatment Symptoms/Complaints:    Pain: Initial:   Pain Intensity 1: 0  Post Session:  same      Therapeutic Activity: (15 minutes): Therapeutic activities including Chair transfers, Toilet transfers and static/dynamic standing  to improve mobility, strength, balance, coordination and activity tolerance. Required SBA  to promote dynamic balance in standing. Braces/Orthotics/Lines/Etc:   · O2 Device: Nasal cannula  Treatment/Session Assessment:    · Response to Treatment:  Tolerated well w/o complications or complaints  · Interdisciplinary Collaboration:  · Certified Occupational Therapy Assistant and Registered Nurse  · After treatment position/precautions:  · Up in chair, Bed/Chair-wheels locked, Call light within reach, RN notified and Family at bedside  · Compliance with Program/Exercises: compliant all of the time today.   · Recommendations/Intent for next treatment session: \"Next visit will focus on advancements to more challenging activities and reduction in assistance provided\".   Total Treatment Duration:  OT Patient Time In/Time Out  Time In: 4288 (8715)  Time Out: 1114 [phone]) [facility]

## 2017-05-19 NOTE — PROGRESS NOTES
Pt is resting comfortably in bed. Family is at the bedside. No needs or complaints voiced at this time. Akhil light is within reach. Will continue to monitor.

## 2017-05-19 NOTE — PROGRESS NOTES
PT Daily Note  Attempted to see patient for physical therapy treatment this morning but patient was working with occupational therapy. Will check back on patient later today or this weekend if schedule permits.   Thank you,  Dinorah Al, PTA

## 2017-05-19 NOTE — PROGRESS NOTES
To xray earlier and now back in rm, lab calls to confirm that pt is in rm for lab draws,  Will observe

## 2017-05-19 NOTE — PROGRESS NOTES
Lesly Cevallos  Admission Date: 5/15/2017             Daily Progress Note: 5/19/2017    The patient's chart is reviewed and the patient is discussed with the staff. Subjective:     Patient is a 80 y.o.  male With a hx of anklelosing spondylitis on remicade was in usual state of health until Saturday 2 days ago when he developed shaking chills and nausea. He has been sob and has had left side pleuritic chest And back pain. Today he was taken to Dr. Emmie Hawley office and O2 sat was low so he was sent to the er.  He was noted to have lactate of 3.5 and cxr showed LLL and RUL infiltrate  Still has low grade fever  And left neck hurts    Current Facility-Administered Medications   Medication Dose Route Frequency    magnesium hydroxide (MILK OF MAGNESIA) 400 mg/5 mL oral suspension 30 mL  30 mL Oral DAILY PRN    azithromycin (ZITHROMAX) tablet 500 mg  500 mg Oral Q24H    DULoxetine (CYMBALTA) capsule 30 mg  30 mg Oral DAILY    pantoprazole (PROTONIX) tablet 40 mg  40 mg Oral ACB    traMADol (ULTRAM) tablet 50 mg  50 mg Oral TID PRN    sodium chloride (NS) flush 5 mL  5 mL InterCATHeter Q8H    sodium chloride (NS) flush 5-10 mL  5-10 mL InterCATHeter PRN    cefTRIAXone (ROCEPHIN) 2 g in 0.9% sodium chloride (MBP/ADV) 50 mL  2 g IntraVENous Q24H    enoxaparin (LOVENOX) injection 40 mg  40 mg SubCUTAneous Q24H    HYDROcodone-acetaminophen (NORCO) 7.5-325 mg per tablet 1 Tab  1 Tab Oral Q6H PRN       Review of Systems    Constitutional: negative for fever, chills, sweats  Cardiovascular: negative for chest pain, palpitations, syncope, edema  Gastrointestinal:  negative for dysphagia, reflux, vomiting, diarrhea, abdominal pain, or melena  Neurologic:  negative for focal weakness, numbness, headache    Objective:     Vitals:    05/19/17 0344 05/19/17 0734 05/19/17 0856 05/19/17 1037   BP: 117/53 108/56  105/53   Pulse: 82 72  76   Resp: 20 20  20   Temp: 100 °F (37.8 °C) 98.3 °F (36.8 °C) 98.4 °F (36.9 °C)   SpO2: 96% 96% 92% 97%   Weight:       Height:         Intake and Output:   05/17 1901 - 05/19 0700  In: 960 [P.O.:960]  Out: 1000 [Urine:1000]  05/19 0701 - 05/19 1900  In: 120 [P.O.:120]  Out: -     Physical Exam:   Constitution:  the patient is well developed and in no acute distress  EENMT:  Sclera clear, pupils equal, oral mucosa moist  Respiratory: some basilar crackles  Cardiovascular:  RRR without M,G,R  Gastrointestinal: soft and non-tender; with positive bowel sounds. Musculoskeletal: warm without cyanosis. There is no lower leg edema. Skin:  no jaundice or rashes, no wounds   Neurologic: no gross neuro deficits     Psychiatric:  alert and oriented x 3    CHEST XRAY:       LAB  No results for input(s): GLUCPOC in the last 72 hours. No lab exists for component: [address]      05/17/17   0605   WBC  5.5   HGB  10.9*   HCT  31.9*   PLT  135*     Recent Labs      05/17/17   0605   NA  140   K  3.4*   CL  106   CO2  27   GLU  97   BUN  14   CREA  0.59*   CA  7.6*     No results for input(s): PH, PCO2, PO2, HCO3 in the last 72 hours. No results for input(s): LCAD, LAC in the last 72 hours.       Assessment:  (Medical Decision Making)     Hospital Problems  Date Reviewed: 5/15/2017          Codes Class Noted POA    Immunosuppression ([facility] (Chronic) ICD-10-CM: D89.9  ICD-9-CM: 279.9  5/15/2017 Yes        * (Principal)CAP (community acquired pneumonia) ICD-10-CM: J18.9  ICD-9-CM: 917  5/15/2017 Yes    Still having fever    Sepsis ([facility] ICD-10-CM: A41.9  ICD-9-CM: 038.9, 995.91  5/15/2017 Unknown        DENIS (acute kidney injury) ([facility] ICD-10-CM: N17.9  ICD-9-CM: 584.9  5/15/2017 Unknown    Check lab    Hypoxemia ICD-10-CM: R09.02  ICD-9-CM: 799.02  5/15/2017 Unknown    On nc    Ankylosing spondylitis ([facility] ICD-10-CM: M45.9  ICD-9-CM: 720.0  1/29/2015 Yes        Spondyloarthritis ([facility] (Chronic) ICD-10-CM: M46.90  ICD-9-CM: 721.90  9/17/2013 Yes              Plan:  (Medical Decision Making)   1    cxr   2    Cbc, procal, bmp  3    Continue antibx-rocephine ,zithromax  --    More than 50% of the time documented was spent in face-to-face contact with the patient and in the care of the patient on the floor/unit where the patient is located.     Christa Hollins MD

## 2017-05-20 LAB
BACTERIA SPEC CULT: NORMAL
BACTERIA SPEC CULT: NORMAL
BASOPHILS # BLD AUTO: 0 K/UL (ref 0–0.2)
BASOPHILS # BLD: 0 % (ref 0–2)
DIFFERENTIAL METHOD BLD: ABNORMAL
EOSINOPHIL # BLD: 0.1 K/UL (ref 0–0.8)
EOSINOPHIL NFR BLD: 1 % (ref 0.5–7.8)
ERYTHROCYTE [DISTWIDTH] IN BLOOD BY AUTOMATED COUNT: 13 % (ref 11.9–14.6)
HCT VFR BLD AUTO: 31.5 % (ref 41.1–50.3)
HGB BLD-MCNC: 10.7 G/DL (ref 13.6–17.2)
IMM GRANULOCYTES # BLD: 0.1 K/UL (ref 0–0.5)
IMM GRANULOCYTES NFR BLD AUTO: 1.2 % (ref 0–5)
LYMPHOCYTES # BLD AUTO: 7 % (ref 13–44)
LYMPHOCYTES # BLD: 0.5 K/UL (ref 0.5–4.6)
MCH RBC QN AUTO: 30.1 PG (ref 26.1–32.9)
MCHC RBC AUTO-ENTMCNC: 34 G/DL (ref 31.4–35)
MCV RBC AUTO: 88.7 FL (ref 79.6–97.8)
MONOCYTES # BLD: 0.7 K/UL (ref 0.1–1.3)
MONOCYTES NFR BLD AUTO: 9 % (ref 4–12)
NEUTS SEG # BLD: 6.4 K/UL (ref 1.7–8.2)
NEUTS SEG NFR BLD AUTO: 82 % (ref 43–78)
PLATELET # BLD AUTO: 241 K/UL (ref 150–450)
PMV BLD AUTO: 10.1 FL (ref 10.8–14.1)
RBC # BLD AUTO: 3.55 M/UL (ref 4.23–5.67)
SERVICE CMNT-IMP: NORMAL
SERVICE CMNT-IMP: NORMAL
WBC # BLD AUTO: 7.7 K/UL (ref 4.3–11.1)

## 2017-05-20 PROCEDURE — 65270000029 HC RM PRIVATE

## 2017-05-20 PROCEDURE — 99232 SBSQ HOSP IP/OBS MODERATE 35: CPT | Performed by: INTERNAL MEDICINE

## 2017-05-20 PROCEDURE — 77010033711 HC HIGH FLOW OXYGEN

## 2017-05-20 PROCEDURE — 74011250637 HC RX REV CODE- 250/637: Performed by: INTERNAL MEDICINE

## 2017-05-20 PROCEDURE — 74011000258 HC RX REV CODE- 258: Performed by: INTERNAL MEDICINE

## 2017-05-20 PROCEDURE — 36415 COLL VENOUS BLD VENIPUNCTURE: CPT | Performed by: INTERNAL MEDICINE

## 2017-05-20 PROCEDURE — 94762 N-INVAS EAR/PLS OXIMTRY CONT: CPT

## 2017-05-20 PROCEDURE — 74011250636 HC RX REV CODE- 250/636: Performed by: INTERNAL MEDICINE

## 2017-05-20 PROCEDURE — 94760 N-INVAS EAR/PLS OXIMETRY 1: CPT

## 2017-05-20 PROCEDURE — 85025 COMPLETE CBC W/AUTO DIFF WBC: CPT | Performed by: INTERNAL MEDICINE

## 2017-05-20 RX ORDER — DEXTROSE 50 % IN WATER (D50W) INTRAVENOUS SYRINGE
25 AS NEEDED
Status: DISCONTINUED | OUTPATIENT
Start: 2017-05-20 | End: 2017-05-20

## 2017-05-20 RX ADMIN — TRAMADOL HYDROCHLORIDE 50 MG: 50 TABLET, FILM COATED ORAL at 09:44

## 2017-05-20 RX ADMIN — CEFTRIAXONE 2 G: 2 INJECTION, POWDER, FOR SOLUTION INTRAMUSCULAR; INTRAVENOUS at 19:45

## 2017-05-20 RX ADMIN — HYDROCODONE BITARTRATE AND ACETAMINOPHEN 1 TABLET: 7.5; 325 TABLET ORAL at 06:09

## 2017-05-20 RX ADMIN — HYDROCODONE BITARTRATE AND ACETAMINOPHEN 1 TABLET: 7.5; 325 TABLET ORAL at 19:43

## 2017-05-20 RX ADMIN — DULOXETINE HYDROCHLORIDE 30 MG: 30 CAPSULE, DELAYED RELEASE ORAL at 09:40

## 2017-05-20 RX ADMIN — HYDROCODONE BITARTRATE AND ACETAMINOPHEN 1 TABLET: 7.5; 325 TABLET ORAL at 13:35

## 2017-05-20 RX ADMIN — TRAMADOL HYDROCHLORIDE 50 MG: 50 TABLET, FILM COATED ORAL at 23:38

## 2017-05-20 RX ADMIN — Medication 5 ML: at 06:12

## 2017-05-20 RX ADMIN — AZITHROMYCIN MONOHYDRATE 500 MG: 250 TABLET ORAL at 17:42

## 2017-05-20 RX ADMIN — TRAMADOL HYDROCHLORIDE 50 MG: 50 TABLET, FILM COATED ORAL at 18:43

## 2017-05-20 RX ADMIN — Medication 5 ML: at 17:45

## 2017-05-20 RX ADMIN — PANTOPRAZOLE SODIUM 40 MG: 40 TABLET, DELAYED RELEASE ORAL at 06:10

## 2017-05-20 RX ADMIN — Medication 5 ML: at 23:20

## 2017-05-20 RX ADMIN — POTASSIUM CHLORIDE 40 MEQ: 20 TABLET, EXTENDED RELEASE ORAL at 09:40

## 2017-05-20 NOTE — PROGRESS NOTES
Pt complaining of left neck pain. States it hurts when he swallows, making it difficult when he eats.

## 2017-05-20 NOTE — PROGRESS NOTES
Pt and wife state that pt is having difficulty swallowing his medicines due to soreness in throat, also co L rib/chest discomfort due to coughing, 7/10 requesting pain meds, pt reminded of earlier dose of narcotic analgesic but that nursing can offer Ultram 50mgs po for  Mild pain, pt agrees and takes ultram 50mgs po at this time, will monitor

## 2017-05-20 NOTE — PROGRESS NOTES
Pt given scheduled 1900 Lovenox without knowing pt had received it previous shift at 1704. Pt and family very concern for risk of bleeding. Nurse will monitor pt BP. Instructed pt to stay in bed and use urinal to avoid any falls. Will continue to monitor.

## 2017-05-20 NOTE — PROGRESS NOTES
Pt states that pain is some better, continues to be short of breath from time to time while at rest, wife present and nursing will monitor

## 2017-05-20 NOTE — PROGRESS NOTES
dischage papers given to brother at bedside, phone instructions given over phone to wife regarding meds, follow up with primary MD, remote tele removed and monitor rm notified

## 2017-05-20 NOTE — PROGRESS NOTES
Nohemi Plant  Admission Date: 5/15/2017             Daily Progress Note: 5/20/2017    The patient's chart is reviewed and the patient is discussed with the staff. Subjective:     Patient is a 80 y.o.  male With a hx of anklelosing spondylitis on remicade was in usual state of health until Saturday 2 days ago when he developed shaking chills and nausea. He has been sob and has had left side pleuritic chest And back pain. Today he was taken to Dr. Vinita Campbell office and O2 sat was low so he was sent to the er. He was noted to have lactate of 3.5 and cxr showed LLL and RUL infiltrate  Still has low grade fever And left neck hurts  Today feels worse with pleuritic chest pain and sob.    O2 turned up  procal is better 12 to 2    Current Facility-Administered Medications   Medication Dose Route Frequency    potassium chloride (K-DUR, KLOR-CON) SR tablet 40 mEq  40 mEq Oral DAILY    magnesium hydroxide (MILK OF MAGNESIA) 400 mg/5 mL oral suspension 30 mL  30 mL Oral DAILY PRN    azithromycin (ZITHROMAX) tablet 500 mg  500 mg Oral Q24H    DULoxetine (CYMBALTA) capsule 30 mg  30 mg Oral DAILY    pantoprazole (PROTONIX) tablet 40 mg  40 mg Oral ACB    traMADol (ULTRAM) tablet 50 mg  50 mg Oral TID PRN    sodium chloride (NS) flush 5 mL  5 mL InterCATHeter Q8H    sodium chloride (NS) flush 5-10 mL  5-10 mL InterCATHeter PRN    cefTRIAXone (ROCEPHIN) 2 g in 0.9% sodium chloride (MBP/ADV) 50 mL  2 g IntraVENous Q24H    enoxaparin (LOVENOX) injection 40 mg  40 mg SubCUTAneous Q24H    HYDROcodone-acetaminophen (NORCO) 7.5-325 mg per tablet 1 Tab  1 Tab Oral Q6H PRN       Review of Systems    Constitutional: negative for fever, chills, sweats  Cardiovascular: negative for chest pain, palpitations, syncope, edema  Gastrointestinal:  negative for dysphagia, reflux, vomiting, diarrhea, abdominal pain, or melena  Neurologic:  negative for focal weakness, numbness, headache    Objective: Vitals:    05/19/17 1931 05/19/17 2332 05/20/17 0325 05/20/17 0719   BP: 113/63 119/59 120/75 120/62   Pulse: 72 98 79 72   Resp: 20 20 20 20   Temp: 98.6 °F (37 °C) 98.1 °F (36.7 °C) 99.3 °F (37.4 °C) 98.3 °F (36.8 °C)   SpO2: 98% 97% 94% 96%   Weight:       Height:         Intake and Output:   05/18 1901 - 05/20 0700  In: 480 [P.O.:480]  Out: 900 [Urine:900]       Physical Exam:   Constitution:  the patient is well developed and in no acute distress  EENMT:  Sclera clear, pupils equal, oral mucosa moist  Respiratory:   Bronchial breath sounds left base  Cardiovascular:  RRR without M,G,R  Gastrointestinal: soft and non-tender; with positive bowel sounds. Musculoskeletal: warm without cyanosis. There is no lower leg edema. Skin:  no jaundice or rashes, no wounds   Neurologic: no gross neuro deficits     Psychiatric:  alert and oriented x 3    CHEST XRAY:       LAB  No results for input(s): GLUCPOC in the last 72 hours. No lab exists for component: [address]      05/20/17   0723   WBC  7.7   HGB  10.7*   HCT  31.5*   PLT  241     Recent Labs      05/19/17   1605   NA  142   K  3.3*   CL  103   CO2  30   GLU  117*   BUN  15   CREA  0.64*   CA  8.1*     No results for input(s): PH, PCO2, PO2, HCO3 in the last 72 hours. No results for input(s): LCAD, LAC in the last 72 hours.       Assessment:  (Medical Decision Making)     Hospital Problems  Date Reviewed: 5/15/2017          Codes Class Noted POA    Immunosuppression ([facility] (Chronic) ICD-10-CM: D89.9  ICD-9-CM: 279.9  5/15/2017 Yes        * (Principal)CAP (community acquired pneumonia) ICD-10-CM: J18.9  ICD-9-CM: 071  5/15/2017 Yes    procal better but clinically no better    Sepsis [facility]) ICD-10-CM: A41.9  ICD-9-CM: 038.9, 995.91  5/15/2017 Unknown        DENIS (acute kidney injury) ([facility] ICD-10-CM: N17.9  ICD-9-CM: 584.9  5/15/2017 Unknown    better    Hypoxemia ICD-10-CM: R09.02  ICD-9-CM: 799.02  5/15/2017 Unknown    On o2    Ankylosing spondylitis [facility]) ICD-10-CM: M45.9  ICD-9-CM: 720.0  1/29/2015 Yes        Spondyloarthritis ([facility] (Chronic) ICD-10-CM: M46.90  ICD-9-CM: 721.90  9/17/2013 Yes              Plan:  (Medical Decision Making)   1    Oximetry -continuous  2    Will need ultrasound, possible thoracentesis  3   rocephine and zithromax day5  --    More than 50% of the time documented was spent in face-to-face contact with the patient and in the care of the patient on the floor/unit where the patient is located.     Osvaldo Ayala MD

## 2017-05-20 NOTE — PROGRESS NOTES
Pt sitting up in bed, has 02 sat on finger, readings fluctuate between 99%/96%, nursing suggest that we remove finger probe, wife and pt become very excited, insisting that nursing leave on finger \"he had such a bad night\" and they had to turn his oxygen \"way up\", pt with no visible distress, 02 in place 4L HF, will monitor

## 2017-05-21 ENCOUNTER — APPOINTMENT (OUTPATIENT)
Dept: GENERAL RADIOLOGY | Age: 72
DRG: 853 | End: 2017-05-21
Attending: INTERNAL MEDICINE
Payer: MEDICARE

## 2017-05-21 ENCOUNTER — APPOINTMENT (OUTPATIENT)
Dept: CT IMAGING | Age: 72
DRG: 853 | End: 2017-05-21
Attending: INTERNAL MEDICINE
Payer: MEDICARE

## 2017-05-21 PROBLEM — J90 PLEURAL EFFUSION, BILATERAL: Status: ACTIVE | Noted: 2017-05-21

## 2017-05-21 PROBLEM — B37.9 CANDIDIASIS: Status: ACTIVE | Noted: 2017-05-21

## 2017-05-21 LAB
ANION GAP BLD CALC-SCNC: 5 MMOL/L (ref 7–16)
APPEARANCE FLD: NORMAL
APPEARANCE FLD: NORMAL
ARTERIAL PATENCY WRIST A: POSITIVE
BACTERIA SPEC CULT: NORMAL
BASE EXCESS BLDA CALC-SCNC: 4.7 MMOL/L (ref 0–3)
BDY SITE: ABNORMAL
BUN SERPL-MCNC: 15 MG/DL (ref 8–23)
CA-I BLD-SCNC: 1.16 MMOL/L (ref 1–1.3)
CALCIUM SERPL-MCNC: 8.4 MG/DL (ref 8.3–10.4)
CHLORIDE BLDA-SCNC: 97 MMOL/L (ref 98–106)
CHLORIDE SERPL-SCNC: 100 MMOL/L (ref 98–107)
CO2 SERPL-SCNC: 34 MMOL/L (ref 21–32)
COHGB MFR BLD: 0.3 % (ref 0.5–1.5)
COLOR FLD: YELLOW
COLOR FLD: YELLOW
CREAT SERPL-MCNC: 0.57 MG/DL (ref 0.8–1.5)
DO-HGB BLD-MCNC: 5 % (ref 0–5)
ERYTHROCYTE [DISTWIDTH] IN BLOOD BY AUTOMATED COUNT: 13.2 % (ref 11.9–14.6)
FLUID COMMENTS, FCOM: NORMAL
GAS FLOW.O2 O2 DELIVERY SYS: 3 L/MIN
GLUCOSE SERPL-MCNC: 93 MG/DL (ref 65–100)
HCO3 BLDA-SCNC: 30 MMOL/L (ref 22–26)
HCT VFR BLD AUTO: 35.1 % (ref 41.1–50.3)
HGB BLD-MCNC: 11.8 G/DL (ref 13.6–17.2)
HGB BLDMV-MCNC: 13 GM/DL (ref 11.7–15)
LYMPHOCYTES NFR FLD: 2 %
LYMPHOCYTES NFR FLD: 5 %
MCH RBC QN AUTO: 30.1 PG (ref 26.1–32.9)
MCHC RBC AUTO-ENTMCNC: 33.6 G/DL (ref 31.4–35)
MCV RBC AUTO: 89.5 FL (ref 79.6–97.8)
METHGB MFR BLD: 0.7 % (ref 0–1.5)
MONOS+MACROS NFR FLD: 17 %
MONOS+MACROS NFR FLD: 5 %
NEUTS SEG NFR FLD: 78 %
NEUTS SEG NFR FLD: 93 %
NUC CELL # FLD: 1459 /CU MM
NUC CELL # FLD: 6307 /CU MM
OXYHGB MFR BLDA: 93.9 % (ref 94–97)
PCO2 BLDA: 47 MMHG (ref 35–45)
PH BLDA: 7.42 [PH] (ref 7.35–7.45)
PLATELET # BLD AUTO: 339 K/UL (ref 150–450)
PMV BLD AUTO: 9.9 FL (ref 10.8–14.1)
PO2 BLDA: 76 MMHG (ref 75–100)
POTASSIUM BLDA-SCNC: 4.99 MMOL/L (ref 3.5–5.3)
POTASSIUM SERPL-SCNC: 4.2 MMOL/L (ref 3.5–5.1)
RBC # BLD AUTO: 3.92 M/UL (ref 4.23–5.67)
RBC # FLD: NORMAL /CU MM
RBC # FLD: NORMAL /CU MM
SAO2 % BLD: 95 % (ref 92–98.5)
SERVICE CMNT-IMP: ABNORMAL
SERVICE CMNT-IMP: NORMAL
SODIUM BLDA-SCNC: 134 MMOL/L (ref 135–148)
SODIUM SERPL-SCNC: 139 MMOL/L (ref 136–145)
SPECIMEN SOURCE FLD: NORMAL
SPECIMEN SOURCE FLD: NORMAL
WBC # BLD AUTO: 8.4 K/UL (ref 4.3–11.1)

## 2017-05-21 PROCEDURE — 74011250636 HC RX REV CODE- 250/636: Performed by: INTERNAL MEDICINE

## 2017-05-21 PROCEDURE — 77030032490 HC SLV COMPR SCD KNE COVD -B

## 2017-05-21 PROCEDURE — 89050 BODY FLUID CELL COUNT: CPT | Performed by: INTERNAL MEDICINE

## 2017-05-21 PROCEDURE — 77030019605

## 2017-05-21 PROCEDURE — 36415 COLL VENOUS BLD VENIPUNCTURE: CPT | Performed by: INTERNAL MEDICINE

## 2017-05-21 PROCEDURE — 0W9B3ZX DRAINAGE OF LEFT PLEURAL CAVITY, PERCUTANEOUS APPROACH, DIAGNOSTIC: ICD-10-PCS | Performed by: INTERNAL MEDICINE

## 2017-05-21 PROCEDURE — 74011250637 HC RX REV CODE- 250/637: Performed by: INTERNAL MEDICINE

## 2017-05-21 PROCEDURE — 74011000258 HC RX REV CODE- 258: Performed by: INTERNAL MEDICINE

## 2017-05-21 PROCEDURE — 77010033711 HC HIGH FLOW OXYGEN

## 2017-05-21 PROCEDURE — 87205 SMEAR GRAM STAIN: CPT | Performed by: INTERNAL MEDICINE

## 2017-05-21 PROCEDURE — 87102 FUNGUS ISOLATION CULTURE: CPT | Performed by: INTERNAL MEDICINE

## 2017-05-21 PROCEDURE — 83615 LACTATE (LD) (LDH) ENZYME: CPT | Performed by: INTERNAL MEDICINE

## 2017-05-21 PROCEDURE — 88305 TISSUE EXAM BY PATHOLOGIST: CPT | Performed by: INTERNAL MEDICINE

## 2017-05-21 PROCEDURE — 82945 GLUCOSE OTHER FLUID: CPT | Performed by: INTERNAL MEDICINE

## 2017-05-21 PROCEDURE — 94762 N-INVAS EAR/PLS OXIMTRY CONT: CPT

## 2017-05-21 PROCEDURE — 32555 ASPIRATE PLEURA W/ IMAGING: CPT | Performed by: INTERNAL MEDICINE

## 2017-05-21 PROCEDURE — 71010 XR CHEST PORT: CPT

## 2017-05-21 PROCEDURE — 85027 COMPLETE CBC AUTOMATED: CPT | Performed by: INTERNAL MEDICINE

## 2017-05-21 PROCEDURE — 88112 CYTOPATH CELL ENHANCE TECH: CPT | Performed by: INTERNAL MEDICINE

## 2017-05-21 PROCEDURE — 87641 MR-STAPH DNA AMP PROBE: CPT | Performed by: INTERNAL MEDICINE

## 2017-05-21 PROCEDURE — 0W993ZX DRAINAGE OF RIGHT PLEURAL CAVITY, PERCUTANEOUS APPROACH, DIAGNOSTIC: ICD-10-PCS | Performed by: INTERNAL MEDICINE

## 2017-05-21 PROCEDURE — 65620000000 HC RM CCU GENERAL

## 2017-05-21 PROCEDURE — 77030034850

## 2017-05-21 PROCEDURE — 82803 BLOOD GASES ANY COMBINATION: CPT

## 2017-05-21 PROCEDURE — 87116 MYCOBACTERIA CULTURE: CPT | Performed by: INTERNAL MEDICINE

## 2017-05-21 PROCEDURE — 36600 WITHDRAWAL OF ARTERIAL BLOOD: CPT

## 2017-05-21 PROCEDURE — C1729 CATH, DRAINAGE: HCPCS | Performed by: INTERNAL MEDICINE

## 2017-05-21 PROCEDURE — 99232 SBSQ HOSP IP/OBS MODERATE 35: CPT | Performed by: INTERNAL MEDICINE

## 2017-05-21 PROCEDURE — 76040000025: Performed by: INTERNAL MEDICINE

## 2017-05-21 PROCEDURE — 80048 BASIC METABOLIC PNL TOTAL CA: CPT | Performed by: INTERNAL MEDICINE

## 2017-05-21 PROCEDURE — 84157 ASSAY OF PROTEIN OTHER: CPT | Performed by: INTERNAL MEDICINE

## 2017-05-21 RX ORDER — SODIUM CHLORIDE, SODIUM LACTATE, POTASSIUM CHLORIDE, CALCIUM CHLORIDE 600; 310; 30; 20 MG/100ML; MG/100ML; MG/100ML; MG/100ML
25 INJECTION, SOLUTION INTRAVENOUS CONTINUOUS
Status: DISCONTINUED | OUTPATIENT
Start: 2017-05-21 | End: 2017-05-25

## 2017-05-21 RX ORDER — FUROSEMIDE 10 MG/ML
20 INJECTION INTRAMUSCULAR; INTRAVENOUS ONCE
Status: COMPLETED | OUTPATIENT
Start: 2017-05-21 | End: 2017-05-21

## 2017-05-21 RX ORDER — FLUCONAZOLE 100 MG/1
200 TABLET ORAL DAILY
Status: DISCONTINUED | OUTPATIENT
Start: 2017-05-21 | End: 2017-05-31 | Stop reason: HOSPADM

## 2017-05-21 RX ORDER — LORAZEPAM 2 MG/ML
1 INJECTION INTRAMUSCULAR
Status: DISCONTINUED | OUTPATIENT
Start: 2017-05-21 | End: 2017-05-22

## 2017-05-21 RX ORDER — HYDROCODONE BITARTRATE AND ACETAMINOPHEN 7.5; 325 MG/1; MG/1
1 TABLET ORAL
Status: DISCONTINUED | OUTPATIENT
Start: 2017-05-21 | End: 2017-05-31 | Stop reason: HOSPADM

## 2017-05-21 RX ORDER — MORPHINE SULFATE 2 MG/ML
2 INJECTION, SOLUTION INTRAMUSCULAR; INTRAVENOUS
Status: DISCONTINUED | OUTPATIENT
Start: 2017-05-21 | End: 2017-05-23 | Stop reason: SDUPTHER

## 2017-05-21 RX ORDER — POTASSIUM CHLORIDE 20 MEQ/1
40 TABLET, EXTENDED RELEASE ORAL 2 TIMES DAILY
Status: DISCONTINUED | OUTPATIENT
Start: 2017-05-21 | End: 2017-05-31 | Stop reason: HOSPADM

## 2017-05-21 RX ORDER — LORAZEPAM 2 MG/ML
0.5 INJECTION INTRAMUSCULAR
Status: DISCONTINUED | OUTPATIENT
Start: 2017-05-21 | End: 2017-05-21

## 2017-05-21 RX ORDER — SODIUM CHLORIDE 0.9 % (FLUSH) 0.9 %
10 SYRINGE (ML) INJECTION
Status: ACTIVE | OUTPATIENT
Start: 2017-05-21 | End: 2017-05-21

## 2017-05-21 RX ADMIN — HYDROCODONE BITARTRATE AND ACETAMINOPHEN 1 TABLET: 7.5; 325 TABLET ORAL at 08:13

## 2017-05-21 RX ADMIN — HYDROCODONE BITARTRATE AND ACETAMINOPHEN 1 TABLET: 7.5; 325 TABLET ORAL at 00:18

## 2017-05-21 RX ADMIN — FLUCONAZOLE 200 MG: 100 TABLET ORAL at 11:43

## 2017-05-21 RX ADMIN — MORPHINE SULFATE 2 MG: 2 INJECTION, SOLUTION INTRAMUSCULAR; INTRAVENOUS at 15:59

## 2017-05-21 RX ADMIN — POTASSIUM CHLORIDE 40 MEQ: 20 TABLET, EXTENDED RELEASE ORAL at 18:49

## 2017-05-21 RX ADMIN — HYDROCODONE BITARTRATE AND ACETAMINOPHEN 1 TABLET: 7.5; 325 TABLET ORAL at 03:23

## 2017-05-21 RX ADMIN — POTASSIUM CHLORIDE 40 MEQ: 20 TABLET, EXTENDED RELEASE ORAL at 08:13

## 2017-05-21 RX ADMIN — Medication 5 ML: at 12:57

## 2017-05-21 RX ADMIN — Medication: at 09:02

## 2017-05-21 RX ADMIN — DULOXETINE HYDROCHLORIDE 30 MG: 30 CAPSULE, DELAYED RELEASE ORAL at 08:13

## 2017-05-21 RX ADMIN — PANTOPRAZOLE SODIUM 40 MG: 40 TABLET, DELAYED RELEASE ORAL at 05:01

## 2017-05-21 RX ADMIN — Medication 5 ML: at 20:00

## 2017-05-21 RX ADMIN — LORAZEPAM 1 MG: 2 INJECTION INTRAMUSCULAR; INTRAVENOUS at 15:58

## 2017-05-21 RX ADMIN — CEFTRIAXONE 2 G: 2 INJECTION, POWDER, FOR SOLUTION INTRAMUSCULAR; INTRAVENOUS at 20:16

## 2017-05-21 RX ADMIN — ENOXAPARIN SODIUM 40 MG: 40 INJECTION SUBCUTANEOUS at 20:17

## 2017-05-21 RX ADMIN — FUROSEMIDE 20 MG: 10 INJECTION, SOLUTION INTRAMUSCULAR; INTRAVENOUS at 05:00

## 2017-05-21 RX ADMIN — Medication 5 ML: at 09:01

## 2017-05-21 RX ADMIN — Medication 5 ML: at 22:00

## 2017-05-21 RX ADMIN — MORPHINE SULFATE 2 MG: 2 INJECTION, SOLUTION INTRAMUSCULAR; INTRAVENOUS at 11:40

## 2017-05-21 RX ADMIN — Medication 5 ML: at 05:01

## 2017-05-21 RX ADMIN — SODIUM CHLORIDE, SODIUM LACTATE, POTASSIUM CHLORIDE, AND CALCIUM CHLORIDE 75 ML/HR: 600; 310; 30; 20 INJECTION, SOLUTION INTRAVENOUS at 16:03

## 2017-05-21 RX ADMIN — Medication 5 ML: at 12:25

## 2017-05-21 RX ADMIN — MAGNESIUM HYDROXIDE 30 ML: 400 SUSPENSION ORAL at 08:13

## 2017-05-21 NOTE — PROGRESS NOTES
Family concern that \"doctor has not looked at pt's neck. It has been hurting him for days and he isn't eating because it's too painful\". Nurse communicated with family/pt that note will be made.

## 2017-05-21 NOTE — PROGRESS NOTES
Pt complaining of sharp pain under left rib. States he has relief with Norco but that \"it wears off too soon\". Given 50 mg Ultram PO for pain, 7/10. Would like Norco but not due for another two hours. Will call MD to see if frequency can be changed.

## 2017-05-21 NOTE — PROGRESS NOTES
Multiple calls from nurses. After thoracentesis the pt. Is anxious and his wife says he is gasping for breath but O2 sats remain in the 90s  Now he tells me his abd. Feels like it is on fire in RLQ. There is guarding but he is only mildly tender there. CXR has improved.     Will check abg- move to icu if he gets worse or if abg looks bad

## 2017-05-21 NOTE — PROGRESS NOTES
Family very concern that pt did not have ultrasound today to \"check the amount of fluid in his lungs\". Communicated to family/pt has ultrasound mentioned in MD notes and pt will possibly have one tomorrow.

## 2017-05-21 NOTE — PROGRESS NOTES
Pt arrived to room 3308 accompanied by 8th floor RN. Pt alert and oriented to person, place and situation. Follows commands. Pt with dazed look but will respond to voice when asked questions. Afebrile, NSR, BP stable, O2 sat stable on NC. Pt denies SOB, but has irregular breathing and tachypnea. Respiratory at bedside to place pt on AirVo. Denies CP or any other discomfort at this time. Dual skin assessment performed. R knee abrasion with bandaid. Fragile skin with red, blanchable sacrum. 2 puncture sites from bilateral thoracentesis today with dressings c/d/i.

## 2017-05-21 NOTE — PROGRESS NOTES
Continuous pulse oximetry fluctuating from 86%-91%. Increased to 6 L to maintain O2 sat >90%. Pt RR 26. Crackles noted to left lung. Pt appears to be in distress despite pain medication. MD paged.

## 2017-05-21 NOTE — PROGRESS NOTES
Per patients request, ativan 1 mg and Morphine 2 mg given for anxiety and abdominal discomfort. Blood gas is unremarkable per Dr Lacie Haskins and family updated. Advised them to not let him get up by himself. Rails up x2 and call light within reach. Family advised to let him rest. Encouraged to call for needs. Discussed with primary RN, Geoff Pate. Will continue to monitor.

## 2017-05-21 NOTE — PROGRESS NOTES
Bedside report received from night nurse Steph Dominguez. Assessment done as noted  Respiration even and unlabored 20/min at rest; c/o chest pain with coughing and left neck pain 8/10. Remains on continuous O2 sats with sats i 96-98% at rest. Family at bedside. Door open. Encouraged to call with needs.

## 2017-05-21 NOTE — PROGRESS NOTES
Received a phone call from patients wife concerning her , Mr Rodriguez December. He keeps insisting, \"I can't breathe\" . Upon assessment, sat is between 91-94%,  BP= 134/56, HR=74. Lungs are coarse and diminished. States abdominal discomfort with coughing. Discussed with Dr Erik Tran, who states, chest x ray looks better and labs are unremarkable. Patient appears very anxious and might benefit from Ativan. New orders received. Will continue to monitor.

## 2017-05-21 NOTE — PROGRESS NOTES
TRANSFER - OUT REPORT:    Verbal report given to Arvind Chua RN (name) on River Khalil  being transferred to CCU 3308 (unit) for urgent transfer       Report consisted of patients Situation, Background, Assessment and   Recommendations(SBAR). Information from the following report(s) SBAR was reviewed with the receiving nurse. Lines:   Peripheral IV 87/40/24 Right Cephalic (Active)       Peripheral IV 05/15/17 Right Antecubital (Active)   Site Assessment Clean, dry, & intact 5/21/2017  4:55 PM   Phlebitis Assessment 0 5/21/2017  4:55 PM   Infiltration Assessment 0 5/21/2017  4:55 PM   Dressing Status Clean, dry, & intact 5/21/2017  4:55 PM   Dressing Type Tape;Transparent 5/21/2017  4:55 PM   Hub Color/Line Status Capped 5/21/2017  4:55 PM   Action Taken Open ports on tubing capped 5/18/2017  3:16 PM   Alcohol Cap Used No 5/21/2017  4:55 PM       Peripheral IV 05/15/17 Left Antecubital (Active)   Site Assessment Clean, dry, & intact 5/21/2017  4:55 PM   Phlebitis Assessment 0 5/21/2017  4:55 PM   Infiltration Assessment 0 5/21/2017  4:55 PM   Dressing Status Clean, dry, & intact 5/21/2017  4:55 PM   Dressing Type Transparent;Tape 5/21/2017  4:55 PM   Hub Color/Line Status Capped 5/21/2017  4:55 PM   Action Taken Open ports on tubing capped 5/18/2017  3:16 PM   Alcohol Cap Used No 5/21/2017  4:55 PM        Opportunity for questions and clarification was provided. Patient transported with:   O2 @ 3  liters    Wife and family at bedside and aware of transfer. Belongings with family.

## 2017-05-21 NOTE — ROUTINE PROCESS
Pt sat up on side of bed for thoracentesis. Consent obtained. Time out performed. Pts vitals monitored throughout procedure. Left and right ultrasound done and pic taken of pleural fluid.  ~500 ml yellow pleural fluid from L and 600 ml from R.  Pt tolerated procedure well with no adverse rxn. Specimens sent to the lab x 6 and labeled appropriately. Site dressed appropriately and report given to pts RN. Lung sliding done and ultrasound findings reviewed by MD. CXR ordered post procedure tomorrow. TRANSFER - OUT REPORT:    Verbal report given to [facility] RN on Jimmy Glover for routine progression of care       Report consisted of patients Situation, Background, Assessment and   Recommendations(SBAR). Information from the following report(s) Procedure Summary was reviewed with the receiving nurse. Opportunity for questions and clarification was provided. RN aware of new  Diflucan order and CXR tomorrow am per Dr Elo Freeman.

## 2017-05-21 NOTE — PROGRESS NOTES
Refused to go to CT X2 due to respiratory distress and left side chest pain. Morphine 2mg slow IVP given. Will continue to monitor.

## 2017-05-21 NOTE — PROGRESS NOTES
Dr. Romayne Brasil returned phone call. Given orders to increase frequency 7.5 mg Norco from q 6 hrs to q 4 hrs prn AND stat chest xray.

## 2017-05-21 NOTE — PROGRESS NOTES
Maria G Navarrete  Admission Date: 5/15/2017             Daily Progress Note: 5/21/2017    The patient's chart is reviewed and the patient is discussed with the staff. Subjective:     Patient is a 80 y.o.  male With a hx of anklelosing spondylitis on remicade was in usual state of health until Saturday 2 days ago when he developed shaking chills and nausea. He has been sob and has had left side pleuritic chest And back pain. On 5/15  he was taken to Dr. Yolette Worley office and O2 sat was low so he was sent to the er. He was noted to have lactate of 3.5 and cxr showed LLL and RUL infiltrate  Still has low grade fever And left neck hurts  He is  having pleuritic chest pain and sob.   There is pain with swallow and neck pain    Current Facility-Administered Medications   Medication Dose Route Frequency    HYDROcodone-acetaminophen (NORCO) 7.5-325 mg per tablet 1 Tab  1 Tab Oral Q4H PRN    potassium chloride (K-DUR, KLOR-CON) SR tablet 40 mEq  40 mEq Oral BID    magic mouthwash (GRACIA) oral suspension 5 mL  5 mL Oral X8I    methyl salicylate-menthol (BENGAY) 15-10 % cream   Topical QID PRN    sodium chloride 0.9 % bolus infusion 100 mL  100 mL IntraVENous ONCE    saline peripheral flush soln 10 mL  10 mL InterCATHeter RAD ONCE    iopamidol (ISOVUE-370) 76 % injection 80 mL  80 mL IntraVENous RAD ONCE    magnesium hydroxide (MILK OF MAGNESIA) 400 mg/5 mL oral suspension 30 mL  30 mL Oral DAILY PRN    azithromycin (ZITHROMAX) tablet 500 mg  500 mg Oral Q24H    DULoxetine (CYMBALTA) capsule 30 mg  30 mg Oral DAILY    pantoprazole (PROTONIX) tablet 40 mg  40 mg Oral ACB    traMADol (ULTRAM) tablet 50 mg  50 mg Oral TID PRN    sodium chloride (NS) flush 5 mL  5 mL InterCATHeter Q8H    sodium chloride (NS) flush 5-10 mL  5-10 mL InterCATHeter PRN    cefTRIAXone (ROCEPHIN) 2 g in 0.9% sodium chloride (MBP/ADV) 50 mL  2 g IntraVENous Q24H    enoxaparin (LOVENOX) injection 40 mg  40 mg SubCUTAneous Q24H       Review of Systems    Constitutional: negative for fever, chills, sweats  Cardiovascular: + for chest pain  Gastrointestinal:  negative for dysphagia, reflux, vomiting, diarrhea, abdominal pain, or melena  Neurologic:  negative for focal weakness, numbness, headache    Objective:     Vitals:    05/21/17 0342 05/21/17 0459 05/21/17 0500 05/21/17 0702   BP: 115/61 115/59  99/56   Pulse: 63  65 60   Resp: 18   18   Temp: 98.3 °F (36.8 °C)   98.2 °F (36.8 °C)   SpO2: 97%   97%   Weight:       Height:         Intake and Output:   05/19 1901 - 05/21 0700  In: 600 [P.O.:600]  Out: 500 [Urine:500]       Physical Exam:   Constitution:  the patient is well developed and in no acute distress  EENMT:  Sclera clear, pupils equal, oral mucosa moist  Respiratory:  Decreased breath sounds in lung bases  Cardiovascular:  RRR without M,G,R  Gastrointestinal: soft and non-tender; with positive bowel sounds. Musculoskeletal: warm without cyanosis. There is no lower leg edema. Skin:  no jaundice or rashes, no wounds   Neurologic: no gross neuro deficits     Psychiatric:  alert and oriented x 3    CHEST XRAY:       LAB  No results for input(s): GLUCPOC in the last 72 hours. No lab exists for component: [place]   Recent Labs      05/21/17   0600  05/20/17   0723   WBC  8.4  7.7   HGB  11.8*  10.7*   HCT  35.1*  31.5*   PLT  339  241     Recent Labs      05/21/17   0600  05/19/17   1605   NA  139  142   K  4.2  3.3*   CL  100  103   CO2  34*  30   GLU  93  117*   BUN  15  15   CREA  0.57*  0.64*   CA  8.4  8.1*     No results for input(s): PH, PCO2, PO2, HCO3 in the last 72 hours. No results for input(s): LCAD, LAC in the last 72 hours.       Assessment:  (Medical Decision Making)     Hospital Problems  Date Reviewed: 5/15/2017          Codes Class Noted POA    Immunosuppression ([facility] (Chronic) ICD-10-CM: D89.9  ICD-9-CM: 279.9  5/15/2017 Yes        * (Principal)CAP (community acquired pneumonia) ICD-10-CM: J18.9  ICD-9-CM: 696  5/15/2017 Yes        Sepsis ([facility] ICD-10-CM: A41.9  ICD-9-CM: 038.9, 995.91  5/15/2017 Unknown        DENIS (acute kidney injury) ([facility] ICD-10-CM: N17.9  ICD-9-CM: 584.9  5/15/2017 Unknown        Hypoxemia ICD-10-CM: R09.02  ICD-9-CM: 799.02  5/15/2017 Unknown        Ankylosing spondylitis ([facility] ICD-10-CM: M45.9  ICD-9-CM: 720.0  1/29/2015 Yes        Spondyloarthritis ([facility] (Chronic) ICD-10-CM: M46.90  ICD-9-CM: 721.90  9/17/2013 Yes              Plan:  (Medical Decision Making)   1    Add diflucan  2    Thoracentesis bilateral  3    Continue antibx  4    May need to increase narcotics  5   Iv fluids- he is not eating  --    More than 50% of the time documented was spent in face-to-face contact with the patient and in the care of the patient on the floor/unit where the patient is located.     Adin Michele MD

## 2017-05-21 NOTE — PROCEDURES
PROCEDURE:    DIAGNOSTIC/THERAPEUTIC THORACENTESIS/PLEURAL MANNOMETRY. PRE-OP DIAGNOSIS:    R PLEURAL EFFUSION    POST-OP DIAGNOSIS:    R PLEURAL EFFUSION    ASSISTANT:    Tay    ANESTHESIA:    LOCAL ANESTHESIA WITH 1% LIDOCAINE 10 CC TOTAL. CHEST ULTRASOUND FINDINGS:    A Turbo-M, Sonosite ultrasound with a 5-16 mHz probe was used to image the chest and localize the pleural effusion on the Left/and/Right chest.    A /moderate/ anechoic space was seen on the /Right consistent with an uncomplicated pleural effusion. DESCRIPTION OF PROCEDURE:    After obtaining informed consent and localizing the safest location for thoracentesis, the  9th intercostal space was marked with a blunt, plastic needle cap in the mid scapular line. An [employer] AK-0100 Pleral-Seal thoracentesis kit was used to perform the procedure. The skin was  cleansed with the supplied  chlorhexididne swab and then draped in the usual fasion. Using the previously marked location as a giude, a 22 G 1.5 inch needle was used to inject 10 cc of 1% lidocaine into the skin and subcutaneous tissue, as well as onto the underlying rib and inter-costal muscles, pleural fluid was aspirated to assure proper location, prior to removing the anesthesia needle. A 3mm  incision was then made, with the supplied scalpel in the usual fashion to facilitate the insertiopn of the thoracentesis needle. The needle with an 8French thoracentesis catheter was then introduced into the chest through the previously made incision in the usual fashion, the rib localized with the needle, and the catheter then marched over the rib into the pleural space. After aspirating fluid, the thoracentesis catheter was then placed into the chest using the needle itself as a trocar. The needle was then removed and the catheter was attached to the supplied tubing without complication.     600 cc of /Yellow/ fluid, was aspirated and sent for analysis. Fluid was sent for the following tests:    Cell count with differential  LDH  Glucose  Total protein  Cytology    AFB  Fungus  Routine culture and Gram stain      Post procedure US confirmed complete/incomplete drainage of the effusion.     EBL:     2 ml      COMPLICATIONS:    none    Jennifer Cruz MD

## 2017-05-21 NOTE — PROGRESS NOTES
PM assessment completed. Pt sitting up in bed. AAO x 3. Crackles noted to left lung. Pt on continuous pulse oximetry while wearing 4 L via NC. Family in room. Aware to call for assistance when needed. Bed locked, in low position, call light in room. Will monitor.

## 2017-05-21 NOTE — PROCEDURES
PROCEDURE:    DIAGNOSTIC/THERAPEUTIC THORACENTESIS/PLEURAL MANNOMETRY. PRE-OP DIAGNOSIS:    L PLEURAL EFFUSION    POST-OP DIAGNOSIS:    L PLEURAL EFFUSION    ASSISTANT:    Tay    ANESTHESIA:    LOCAL ANESTHESIA WITH 1% LIDOCAINE 10 CC TOTAL. CHEST ULTRASOUND FINDINGS:    A Turbo-M, Sonosite ultrasound with a 5-16 mHz probe was used to image the chest and localize the pleural effusion on the Left/and/Right chest.    A /moderate/ anechoic space was seen on the Left/ consistent with an uncomplicated pleural effusion. DESCRIPTION OF PROCEDURE:    After obtaining informed consent and localizing the safest location for thoracentesis, the  9th intercostal space was marked with a blunt, plastic needle cap in the mid scapular line. An [employer] AK-0100 Pleral-Seal thoracentesis kit was used to perform the procedure. The skin was  cleansed with the supplied  chlorhexididne swab and then draped in the usual fasion. Using the previously marked location as a giude, a 22 G 1.5 inch needle was used to inject 10 cc of 1% lidocaine into the skin and subcutaneous tissue, as well as onto the underlying rib and inter-costal muscles, pleural fluid was aspirated to assure proper location, prior to removing the anesthesia needle. A 3mm  incision was then made, with the supplied scalpel in the usual fashion to facilitate the insertiopn of the thoracentesis needle. The needle with an 8French thoracentesis catheter was then introduced into the chest through the previously made incision in the usual fashion, the rib localized with the needle, and the catheter then marched over the rib into the pleural space. After aspirating fluid, the thoracentesis catheter was then placed into the chest using the needle itself as a trocar. The needle was then removed and the catheter was attached to the supplied tubing without complication.     500 cc of /Yellow/ fluid, was aspirated and sent for analysis. Fluid was sent for the following tests:    Cell count with differential  LDH  Glucose  Total protein  Cytology    AFB  Fungus  Routine culture and Gram stain      Post procedure US confirmed complete/incomplete drainage of the effusion.     EBL:     2 ml      COMPLICATIONS:    none    Preston Neal MD

## 2017-05-21 NOTE — PROGRESS NOTES
Sitting on side of the bed stating \"can't breath\". O2 sats 100% on 3 liters NC. RT at bedside and O2 turned down to 2 liters NC. MD paged. Awaiting call back.

## 2017-05-21 NOTE — PROGRESS NOTES
TRANSFER - IN REPORT:    Verbal report received from [address] [place] [address] on Christine Arenas  being received from 8th floor for urgent transfer      Report consisted of patients Situation, Background, Assessment and   Recommendations(SBAR). Information from the following report(s) SBAR, Kardex, Intake/Output and Recent Results was reviewed with the receiving nurse. Opportunity for questions and clarification was provided. Assessment completed upon patients arrival to unit and care assumed.

## 2017-05-21 NOTE — H&P
Date of Surgery Update:  Charlene Tillman was seen and examined. History and physical has been reviewed. The patient has been examined.  There have been no significant clinical changes since the completion of the originally dated History and Physical.    Signed By: Zeynep Calvo MD     May 21, 2017 10:35 AM

## 2017-05-22 ENCOUNTER — APPOINTMENT (OUTPATIENT)
Dept: GENERAL RADIOLOGY | Age: 72
DRG: 853 | End: 2017-05-22
Attending: INTERNAL MEDICINE
Payer: MEDICARE

## 2017-05-22 ENCOUNTER — APPOINTMENT (OUTPATIENT)
Dept: CT IMAGING | Age: 72
DRG: 853 | End: 2017-05-22
Attending: INTERNAL MEDICINE
Payer: MEDICARE

## 2017-05-22 PROBLEM — J91.8 PARAPNEUMONIC EFFUSION: Status: ACTIVE | Noted: 2017-05-22

## 2017-05-22 PROBLEM — J18.9 PARAPNEUMONIC EFFUSION: Status: ACTIVE | Noted: 2017-05-22

## 2017-05-22 LAB
ANION GAP BLD CALC-SCNC: 14 MMOL/L (ref 7–16)
ARTERIAL PATENCY WRIST A: POSITIVE
BASE EXCESS BLDA CALC-SCNC: 2.2 MMOL/L (ref 0–3)
BASOPHILS # BLD AUTO: 0 K/UL (ref 0–0.2)
BASOPHILS # BLD: 0 % (ref 0–2)
BDY SITE: ABNORMAL
BNP SERPL-MCNC: 91 PG/ML
BUN SERPL-MCNC: 19 MG/DL (ref 8–23)
CALCIUM SERPL-MCNC: 8.7 MG/DL (ref 8.3–10.4)
CHLORIDE SERPL-SCNC: 99 MMOL/L (ref 98–107)
CO2 SERPL-SCNC: 25 MMOL/L (ref 21–32)
COHGB MFR BLD: 0.2 % (ref 0.5–1.5)
CREAT SERPL-MCNC: 0.52 MG/DL (ref 0.8–1.5)
DIFFERENTIAL METHOD BLD: ABNORMAL
DO-HGB BLD-MCNC: 8 % (ref 0–5)
EOSINOPHIL # BLD: 0 K/UL (ref 0–0.8)
EOSINOPHIL NFR BLD: 0 % (ref 0.5–7.8)
ERYTHROCYTE [DISTWIDTH] IN BLOOD BY AUTOMATED COUNT: 13.1 % (ref 11.9–14.6)
FIO2 ON VENT: 38 %
GLUCOSE FLD-MCNC: 100 MG/DL
GLUCOSE FLD-MCNC: 91 MG/DL
GLUCOSE SERPL-MCNC: 85 MG/DL (ref 65–100)
HCO3 BLDA-SCNC: 25 MMOL/L (ref 22–26)
HCT VFR BLD AUTO: 35.9 % (ref 41.1–50.3)
HGB BLD-MCNC: 12.3 G/DL (ref 13.6–17.2)
HGB BLDMV-MCNC: 12.2 GM/DL (ref 11.7–15)
IMM GRANULOCYTES # BLD: 0.1 K/UL (ref 0–0.5)
IMM GRANULOCYTES NFR BLD AUTO: 0.6 % (ref 0–5)
LACTATE SERPL-SCNC: 1.5 MMOL/L (ref 0.4–2)
LDH FLD L TO P-CCNC: 172 U/L
LDH FLD L TO P-CCNC: 84 U/L
LYMPHOCYTES # BLD AUTO: 5 % (ref 13–44)
LYMPHOCYTES # BLD: 0.6 K/UL (ref 0.5–4.6)
MCH RBC QN AUTO: 31 PG (ref 26.1–32.9)
MCHC RBC AUTO-ENTMCNC: 34.3 G/DL (ref 31.4–35)
MCV RBC AUTO: 90.4 FL (ref 79.6–97.8)
METHGB MFR BLD: 0.6 % (ref 0–1.5)
MONOCYTES # BLD: 0.7 K/UL (ref 0.1–1.3)
MONOCYTES NFR BLD AUTO: 6 % (ref 4–12)
NEUTS SEG # BLD: 10.8 K/UL (ref 1.7–8.2)
NEUTS SEG NFR BLD AUTO: 88 % (ref 43–78)
OXYHGB MFR BLDA: 91.7 % (ref 94–97)
PCO2 BLDA: 35 MMHG (ref 35–45)
PH BLDA: 7.48 [PH] (ref 7.35–7.45)
PLATELET # BLD AUTO: 437 K/UL (ref 150–450)
PMV BLD AUTO: 9.6 FL (ref 10.8–14.1)
PO2 BLDA: 64 MMHG (ref 75–100)
POTASSIUM SERPL-SCNC: 5.5 MMOL/L (ref 3.5–5.1)
PROCALCITONIN SERPL-MCNC: 0.5 NG/ML
PROT FLD-MCNC: 2.3 G/DL
PROT FLD-MCNC: 2.7 G/DL
RBC # BLD AUTO: 3.97 M/UL (ref 4.23–5.67)
SAO2 % BLD: 92 % (ref 92–98.5)
SODIUM SERPL-SCNC: 138 MMOL/L (ref 136–145)
SPECIMEN SOURCE FLD: NORMAL
VENTILATION MODE VENT: ABNORMAL
WBC # BLD AUTO: 12.3 K/UL (ref 4.3–11.1)

## 2017-05-22 PROCEDURE — 80048 BASIC METABOLIC PNL TOTAL CA: CPT | Performed by: INTERNAL MEDICINE

## 2017-05-22 PROCEDURE — 74011000250 HC RX REV CODE- 250: Performed by: INTERNAL MEDICINE

## 2017-05-22 PROCEDURE — 94640 AIRWAY INHALATION TREATMENT: CPT

## 2017-05-22 PROCEDURE — 84145 PROCALCITONIN (PCT): CPT | Performed by: INTERNAL MEDICINE

## 2017-05-22 PROCEDURE — 74011250636 HC RX REV CODE- 250/636: Performed by: INTERNAL MEDICINE

## 2017-05-22 PROCEDURE — 77030013032 HC MSK BPAP/CPAP FISP -B

## 2017-05-22 PROCEDURE — 36415 COLL VENOUS BLD VENIPUNCTURE: CPT | Performed by: INTERNAL MEDICINE

## 2017-05-22 PROCEDURE — 65620000000 HC RM CCU GENERAL

## 2017-05-22 PROCEDURE — 74011250637 HC RX REV CODE- 250/637: Performed by: PHYSICIAN ASSISTANT

## 2017-05-22 PROCEDURE — 92610 EVALUATE SWALLOWING FUNCTION: CPT

## 2017-05-22 PROCEDURE — 74011250637 HC RX REV CODE- 250/637: Performed by: INTERNAL MEDICINE

## 2017-05-22 PROCEDURE — 94760 N-INVAS EAR/PLS OXIMETRY 1: CPT

## 2017-05-22 PROCEDURE — 83880 ASSAY OF NATRIURETIC PEPTIDE: CPT | Performed by: INTERNAL MEDICINE

## 2017-05-22 PROCEDURE — 77030012793 HC CIRC VNTLTR FISP -B

## 2017-05-22 PROCEDURE — 99233 SBSQ HOSP IP/OBS HIGH 50: CPT | Performed by: INTERNAL MEDICINE

## 2017-05-22 PROCEDURE — 74011000258 HC RX REV CODE- 258: Performed by: INTERNAL MEDICINE

## 2017-05-22 PROCEDURE — 83605 ASSAY OF LACTIC ACID: CPT | Performed by: INTERNAL MEDICINE

## 2017-05-22 PROCEDURE — 77010033711 HC HIGH FLOW OXYGEN

## 2017-05-22 PROCEDURE — 82803 BLOOD GASES ANY COMBINATION: CPT

## 2017-05-22 PROCEDURE — 71010 XR CHEST PORT: CPT

## 2017-05-22 PROCEDURE — 36600 WITHDRAWAL OF ARTERIAL BLOOD: CPT

## 2017-05-22 PROCEDURE — 94660 CPAP INITIATION&MGMT: CPT

## 2017-05-22 PROCEDURE — 85025 COMPLETE CBC W/AUTO DIFF WBC: CPT | Performed by: INTERNAL MEDICINE

## 2017-05-22 PROCEDURE — 77030021668 HC NEB PREFIL KT VYRM -A

## 2017-05-22 RX ORDER — ALBUTEROL SULFATE 0.83 MG/ML
2.5 SOLUTION RESPIRATORY (INHALATION)
Status: DISCONTINUED | OUTPATIENT
Start: 2017-05-22 | End: 2017-05-24

## 2017-05-22 RX ORDER — SODIUM CHLORIDE 0.9 % (FLUSH) 0.9 %
10 SYRINGE (ML) INJECTION
Status: ACTIVE | OUTPATIENT
Start: 2017-05-22 | End: 2017-05-22

## 2017-05-22 RX ORDER — ACETAMINOPHEN 650 MG/1
650 SUPPOSITORY RECTAL
Status: DISCONTINUED | OUTPATIENT
Start: 2017-05-22 | End: 2017-05-31 | Stop reason: HOSPADM

## 2017-05-22 RX ADMIN — MORPHINE SULFATE 2 MG: 2 INJECTION, SOLUTION INTRAMUSCULAR; INTRAVENOUS at 01:38

## 2017-05-22 RX ADMIN — Medication 5 ML: at 16:15

## 2017-05-22 RX ADMIN — ALBUTEROL SULFATE 2.5 MG: 2.5 SOLUTION RESPIRATORY (INHALATION) at 07:44

## 2017-05-22 RX ADMIN — MORPHINE SULFATE 2 MG: 2 INJECTION, SOLUTION INTRAMUSCULAR; INTRAVENOUS at 12:57

## 2017-05-22 RX ADMIN — ALBUTEROL SULFATE 2.5 MG: 2.5 SOLUTION RESPIRATORY (INHALATION) at 02:01

## 2017-05-22 RX ADMIN — DULOXETINE HYDROCHLORIDE 30 MG: 30 CAPSULE, DELAYED RELEASE ORAL at 08:38

## 2017-05-22 RX ADMIN — Medication 5 ML: at 08:39

## 2017-05-22 RX ADMIN — Medication 5 ML: at 23:38

## 2017-05-22 RX ADMIN — ALBUTEROL SULFATE 2.5 MG: 2.5 SOLUTION RESPIRATORY (INHALATION) at 13:52

## 2017-05-22 RX ADMIN — Medication 5 ML: at 05:02

## 2017-05-22 RX ADMIN — ENOXAPARIN SODIUM 40 MG: 40 INJECTION SUBCUTANEOUS at 19:29

## 2017-05-22 RX ADMIN — Medication 5 ML: at 19:30

## 2017-05-22 RX ADMIN — Medication 5 ML: at 12:32

## 2017-05-22 RX ADMIN — Medication 5 ML: at 14:00

## 2017-05-22 RX ADMIN — MORPHINE SULFATE 2 MG: 2 INJECTION, SOLUTION INTRAMUSCULAR; INTRAVENOUS at 19:47

## 2017-05-22 RX ADMIN — ALBUTEROL SULFATE 2.5 MG: 2.5 SOLUTION RESPIRATORY (INHALATION) at 20:31

## 2017-05-22 RX ADMIN — PANTOPRAZOLE SODIUM 40 MG: 40 TABLET, DELAYED RELEASE ORAL at 08:38

## 2017-05-22 RX ADMIN — CEFTRIAXONE 2 G: 2 INJECTION, POWDER, FOR SOLUTION INTRAMUSCULAR; INTRAVENOUS at 19:29

## 2017-05-22 RX ADMIN — Medication 5 ML: at 06:00

## 2017-05-22 RX ADMIN — FLUCONAZOLE 200 MG: 100 TABLET ORAL at 08:38

## 2017-05-22 RX ADMIN — SODIUM CHLORIDE, SODIUM LACTATE, POTASSIUM CHLORIDE, AND CALCIUM CHLORIDE 25 ML/HR: 600; 310; 30; 20 INJECTION, SOLUTION INTRAVENOUS at 16:14

## 2017-05-22 RX ADMIN — HYDROCODONE BITARTRATE AND ACETAMINOPHEN 1 TABLET: 7.5; 325 TABLET ORAL at 05:02

## 2017-05-22 RX ADMIN — MORPHINE SULFATE 2 MG: 2 INJECTION, SOLUTION INTRAMUSCULAR; INTRAVENOUS at 22:11

## 2017-05-22 RX ADMIN — MORPHINE SULFATE 2 MG: 2 INJECTION, SOLUTION INTRAMUSCULAR; INTRAVENOUS at 08:55

## 2017-05-22 RX ADMIN — ACETAMINOPHEN 650 MG: 650 SUPPOSITORY RECTAL at 23:43

## 2017-05-22 RX ADMIN — Medication 5 ML: at 00:00

## 2017-05-22 NOTE — PROGRESS NOTES
Problem: Nutrition Deficit  Goal: *Optimize nutritional status  Nutrition LOS Note: day 7  Assessment  Diet order(s): Mechanical soft   Food,Nutrition, and Pertinent History: Patient admitted with PNA, transferred to CCU for decline in respiratory status and potential for BIPAP. Patient's wife reports that he was a great appetite until Wednesday when his neck pain increased and he began having difficulty swallowing (solids and liquids per patient's wife). Does patient need a SLP evaluation? She states that he had a \"couple of bites\" of yogurt yesterday. She states that he is not used to having a \"big breakfast\" as he typically only has cereal and milk at home. Labs are remarkable for K of 5.5, glucose 85. Anthropometrics: Height: 5' 7.5\" (171.5 cm), Weight Source: Bed, Weight: 54.9 kg (121 lb 0.5 oz), Body mass index is 18.68 kg/(m^2). BMI class of underweight for age. Macronutrient Needs:  · EER:  9102-5889 kcal /day (25-30 kcal/kg listed BW)  · EPR:  55-66 grams protein/day (1-1.2 grams/kg listed BW)(GFR >60)  Intake/Comparative Standards: Average intake for past 7 day(s)/17 recorded meal(s): 55%. This potentially meets ~92% of kcal and ~96% of protein needs. Nutrition Diagnosis: Inadequate oral intake r/t decreased ability to consume adequate oral intake, as evidenced by patient report of neck pain and decline in respiratory status inhibiting adequate oral intake, underweight for age. Intervention:  Meals and snacks: Continue current diet. Supplementation: magic cup BID, ensure enlive TID  Patient may benefit from a SLP evaluation. If patient is unable to meet needs via oral intake d/t potential dysphagia or decline in respiratory status, consider enteral nutrition. RD provided patient with two boxes of cereal and encouraged choosing off of the always available menu.     Coordination of care: AM CCU interdisciplinary rounds, Joyce Madden RN 87, [address], [address] [place] [address], [phone]

## 2017-05-22 NOTE — PROGRESS NOTES
Bedside shift report received from [address] [place] [address],[address], RN. Pt AOx4, follows commands. Afebrile, VSS, NSR. Tachypneic, but pt denies SOB or chest pain. Remains on AirVo. Lines:  Peripheral IV x2  Blas    Drips:  LR @ 25 mL/hr    Skin assessment performed. R knee abrasion with bandaid. Fragile skin with red, blanchable sacrum. 2 puncture sites from bilateral thoracentesis today with dressings c/d/i.

## 2017-05-22 NOTE — PROGRESS NOTES
[facility] notified of pt's temp 100.5 axillary. Orders received for tylenol suppository Q6 PRN due to NPO status.

## 2017-05-22 NOTE — PROGRESS NOTES
Pt resting quietly with eyes closed, wife at bedside, hypervigilant. No needs expressed will continue to monitor.

## 2017-05-22 NOTE — PROGRESS NOTES
STG: Pt will participate with modified barium swallow study (MBS) x1 when respiratory status allows  STG: Pt will tolerate trials of puree/honey thick liquids without overt signs/sx of aspiration with 100% accuracy  LTG: Pt will tolerate the least restrictive diet at discharge without respiratory compromise    Speech language pathology: bedside swallow note: Initial Assessment    NAME/AGE/GENDER: Robb Mo is a 80 y.o. male  DATE: 5/22/2017  PRIMARY DIAGNOSIS: Pleural effusion [J90]  Procedure(s) (LRB):  THORACENTESIS (Bilateral)  ULTRASOUND (Bilateral) 1 Day Post-Op  ICD-10: Treatment Diagnosis: dysphagia; oropharyngeal R13.12  INTERDISCIPLINARY COLLABORATION: Registered Nurse  PRECAUTIONS/ALLERGIES: Codeine ASSESSMENT:Based on the objective data described below, Mr. Lexie Guevara presents with dysphagia. Patient with noted coughing at desaturation during breakfast per RN and recently required suctioning. Patient reports neck pain but is inconsistent regarding location and describes odynophagia but could not quantify. His voice is low and weak. Daughter at bedside reports decreased appetite this admission with very limited intake. Reports patient coughing at times when drinking water. Weak cough upon inhalation following thin liquid trials. Delayed double swallow with thicker consistencies with desaturation, increased RR, and weak coughing after the swallow. Not appropriate for additional trials at this time. Recommend NPO for now with modified barium swallow study (MBS) for further assessment of pharyngeal swallow as respiratory status allows. Patient will benefit from skilled intervention to address the below impairments. ?????? ? ? This section established at most recent assessment??????????  PROBLEM LIST (Impairments causing functional limitations):  1. Dysphagia  2.  Odynophagia  3. dysphonia  REHABILITATION POTENTIAL FOR STATED GOALS: Good  PLAN OF CARE:   Patient will benefit from skilled intervention to address the following impairments. RECOMMENDATIONS AND PLANNED INTERVENTIONS (Benefits and precautions of therapy have been discussed with the patient.):  · NPO  MEDICATIONS:  · Non-oral  COMPENSATORY STRATEGIES/MODIFICATIONS INCLUDING:  · n/a  OTHER RECOMMENDATIONS (including follow up treatment recommendations): · Family training/education  · Patient education  RECOMMENDED DIET MODIFICATIONS DISCUSSED WITH:  · Nursing  · Family  · Patient  FREQUENCY/DURATION: Continue to follow patient 3x a week or until short term goals are met to address above goals. RECOMMENDED REHABILITATION/EQUIPMENT: (at time of discharge pending progress):   Continue Skilled Therapy. SUBJECTIVE:   Weak. History of Present Injury/Illness: Mr. Serge Gonzalez  has a past medical history of Arthritis; Elevated prostate specific antigen (PSA); HLA-B27 positive; Impotence of organic origin; Neck pain; Osteoarthritis, hand; Osteopenia; Osteoporosis; Other nonspecific abnormal finding of lung field; Polyarthritis; Polymyalgia rheumatica ([address] Prostate cancer ([address] Raynaud's disease; 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[address] and Thoracic spine pain. He also has no past medical history of Asthma; Autoimmune disease ([address] Chronic kidney disease; Chronic obstructive pulmonary disease ([address] Chronic pain; Diabetes ([address] Difficult intubation; GERD (gastroesophageal reflux disease); Heart abnormalities; Hypertension; Liver disease; Malignant hyperthermia due to anesthesia; Nausea & vomiting; Neurological disorder; Other unknown and unspecified cause of morbidity or mortality; Pseudocholinesterase deficiency; Psychiatric disorder; PUD (peptic ulcer disease); Seizures ([facility] Stroke [facility]); Thromboembolus ([address] Thyroid disease; or Unspecified adverse effect of anesthesia. Zumla Thomson   He also  has a past surgical history that includes vasectomy (40+ yrs); knee arthroscopy (2009); urological; biopsy prostate; prostatectomy (2012); cataract removal (2012); and cataract removal (2013). Present Symptoms: cough with decrease in 02 s/p po intake   Pain Intensity 1: 0  Pain Location 1: Rib cage, Chest  Pain Orientation 1: Left  Pain Intervention(s) 1: Medication (see MAR)  Current Medications:   No current facility-administered medications on file prior to encounter. Current Outpatient Prescriptions on File Prior to Encounter   Medication Sig Dispense Refill    DULoxetine (CYMBALTA) 30 mg capsule Take 1 Cap by mouth daily. 90 Cap 1    traMADol (ULTRAM) 50 mg tablet Take 1 Tab by mouth three (3) times daily as needed for Pain. Max Daily Amount: 150 mg. 90 Tab 2    omeprazole (PRILOSEC) 40 mg capsule Take 1 Cap by mouth daily. 30 Cap 5    diclofenac (VOLTAREN) 1 % topical gel Apply 4 g to affected area four (4) times daily. 100 g 2    INFLIXIMAB (REMICADE IV) by IntraVENous route. Every 6 weeks.  calcium-vitamin D (OYSTER SHELL) 500 mg(1,250mg) -200 unit per tablet Take 1 Tab by mouth daily.  cholecalciferol, vitamin D3, (VITAMIN D3) 2,000 unit Tab Take  by mouth. Taking 1 1/2 tabs of 2000 international units once daily       Current Dietary Status:  Mechanical soft      History of reflux:  yes   Reflux medication: prilosec  Social History/Home Situation: home with family  Home Environment: Private residence  # Steps to Enter: 2  One/Two Story Residence: One story  Living Alone: No  Support Systems: Spouse/Significant Other/Partner  Patient Expects to be Discharged to[de-identified] Private residence  Current DME Used/Available at Home: None  Tub or Shower Type: Tub/Shower combination  OBJECTIVE:   Respiratory Status:  Heated; Hi flow nasal cannula  40 l/min  CXR Results: There is small left pleural effusion with associated atelectasis. There is  patchy density at the right upper lung, similar to prior exam. No pneumothorax. No evidence of pulmonary edema.  Cardiac mediastinal contour and the surrounding  bones are stable  MRI/CT Results: n/a  Oral Motor Structure/Speech:  Kim Crawford Structure/Motor Speech  Labial: No impairment  Dentition: Natural, Partials (comment)  Lingual: No impairment    Cognitive and Communication Status:  Neurologic State: Alert;Confused  Orientation Level: Oriented to person;Disoriented to situation  Cognition: Decreased attention/concentration  Perception: Appears intact  Perseveration: No perseveration noted       BEDSIDE SWALLOW EVALUATION  Oral Assessment:  Oral Assessment  Labial: No impairment  Dentition: Natural;Partials (comment)  Lingual: No impairment  P.O. Trials:  Patient Position: upright in bed    The patient was given tsp to straw amounts of the following:   Consistency Presented: Thin liquid;Puree;Nectar thick liquid;Mechanical soft; Honey thick liquid  How Presented: Self-fed/presented;Spoon;Straw    ORAL PHASE:  Bolus Acceptance: Impaired  Bolus Formation/Control: Impaired  Propulsion: Delayed (# of seconds)  Type of Impairment: Delayed;Mastication  Oral Residue: None    PHARYNGEAL PHASE:  Initiation of Swallow: Delayed (# of seconds)  Laryngeal Elevation: Decreased  Aspiration Signs/Symptoms: Decrease in O2 saturations;Weak cough; Increase in RR; Infiltrate on chest xray  Vocal Quality: No impairment           Pharyngeal Phase Characteristics: Easily fatigued ; Poor endurance; Suspected pharyngeal residue    OTHER OBSERVATIONS:  Rate/bite size: Impaired   Endurance:  Impaired     Tool Used: Dysphagia Outcome and Severity Scale (LION)    Score Comments   Normal Diet  [] 7 With no strategies or extra time needed   Functional Swallow  [] 6 May have mild oral or pharyngeal delay       Mild Dysphagia    [] 5 Which may require one diet consistency restricted (those who demonstrate penetration which is entirely cleared on MBS would be included)   Mild-Moderate Dysphagia  [] 4 With 1-2 diet consistencies restricted       Moderate Dysphagia  [] 3 With 2 or more diet consistencies restricted       Moderately Severe Dysphagia  [x] 2 With partial PO strategies (trials with ST only)       Severe Dysphagia  [] 1 With inability to tolerate any PO safely          Score:  Initial: 2 Most Recent: X (Date: -- )   Interpretation of Tool: The Dysphagia Outcome and Severity Scale (LION) is a simple, easy-to-use, 7-point scale developed to systematically rate the functional severity of dysphagia based on objective assessment and make recommendations for diet level, independence level, and type of nutrition. Score 7 6 5 4 3 2 1   Modifier CH CI CJ CK CL CM CN   ? Swallowing:     - CURRENT STATUS: CM - 80%-99% impaired, limited or restricted    - GOAL STATUS:  CK - 40%-59% impaired, limited or restricted    - D/C STATUS:  ---------------To be determined---------------  [place] / [place] B / Product Type: Medicare /     TREATMENT:    (In addition to Assessment/Re-Assessment sessions the following treatments were rendered)  Assessment/Reassessment only, no treatment provided today  MODALITIES:                                                                    ORAL MOTOR  EXERCISES:                                                                                                                                                                      LARYNGEAL / PHARYNGEAL EXERCISES:                                                                                                                                     __________________________________________________________________________________________________  Safety:   After treatment position/precautions:  · RN notified  · Upright in Bed  Progression/Medical Necessity:   · Skilled intervention continues to be required due to persistent signs and symptoms of aspiration and patient still consuming a modified diet. Compliance with Program/Exercises: Will assess as treatment progresses.    Reason for Continuation of Services/Other Comments:  · Patient continues to require skilled intervention due to patient unable to attend/participate in therapy as expected. Recommendations/Intent for next treatment session: \"Treatment next visit will focus on po trials or MBS if respiratory status improves\".     Total Treatment Duration:  Time In: 1415  Time Out: 70[facility] MS, CCC-SLP

## 2017-05-22 NOTE — PROGRESS NOTES
Occupational Therapy Note:  Therapist is discharging patient from OT at this time due to decline in medical status and transfer to CCU. Please reconsult OT when MD deems patient appropriate for continued services. Thank you.   Shirin Keith OTR/L

## 2017-05-22 NOTE — PROGRESS NOTES
Pt with increased oral secretions and inability to cough them up. OT suctioned. Pt tolerated well and VSS. Pt with increased coughing and inability to lay flat. MD notified d/t CT neck order. OK to hold off on CT at this time.

## 2017-05-22 NOTE — PROGRESS NOTES
Bedside and Verbal shift change report given to Guera Martinez RN (oncoming nurse) by Kirstie Anderson RN (offgoing nurse). Report included the following information SBAR, Kardex, Intake/Output, MAR and Recent Results.

## 2017-05-22 NOTE — PROGRESS NOTES
Critical Care Daily Progress Note: 5/22/2017    Jaden Moreno   Admission Date: 5/15/2017         The patient's chart is reviewed and the patient is discussed with the staff. Patient is a 80 y.o.  male with a hx of anklelosing spondylitis on remicade who was in usual state of health until 5/13. He developed shaking chills and nausea. He has been sob and has had left side pleuritic chest and back pain. On 5/15  he was taken to Dr. Tatiana Lewis office and O2 sat was low so he was sent to the er. He was noted to have lactate of 3.5 and cxr showed LLL and RUL infiltrate. BP initially low but responded to fluids, admitted to 8th floor. Sputum culture with strep pneumo. CXR with pleural effusion. Had B thoracentesis 5/21 and developed worsening anxiety and pain afterward, transferred to ICU, no PTX on CXR. Also c/o L neck pain, found to have thrush. Subjective: Today the patient is on airvo. Pt still having significant predominately L lower chest pain coinciding where his worse infiltrate is on CT chest. Breathing still shallow due to pain. Ongoing L neck pain with swallowing. Not tender to push on, does not feel like he is aspirating.      Current Facility-Administered Medications   Medication Dose Route Frequency    albuterol (PROVENTIL VENTOLIN) nebulizer solution 2.5 mg  2.5 mg Nebulization Q6H RT    HYDROcodone-acetaminophen (NORCO) 7.5-325 mg per tablet 1 Tab  1 Tab Oral Q4H PRN    potassium chloride (K-DUR, KLOR-CON) SR tablet 40 mEq  40 mEq Oral BID    magic mouthwash (GRACIA) oral suspension 5 mL  5 mL Oral Q7O    methyl salicylate-menthol (BENGAY) 15-10 % cream   Topical QID PRN    fluconazole (DIFLUCAN) tablet 200 mg  200 mg Oral DAILY    morphine injection 2 mg  2 mg IntraVENous Q3H PRN    lactated ringers infusion  25 mL/hr IntraVENous CONTINUOUS    magnesium hydroxide (MILK OF MAGNESIA) 400 mg/5 mL oral suspension 30 mL  30 mL Oral DAILY PRN    DULoxetine (CYMBALTA) capsule 30 mg  30 mg Oral DAILY    pantoprazole (PROTONIX) tablet 40 mg  40 mg Oral ACB    traMADol (ULTRAM) tablet 50 mg  50 mg Oral TID PRN    sodium chloride (NS) flush 5 mL  5 mL InterCATHeter Q8H    sodium chloride (NS) flush 5-10 mL  5-10 mL InterCATHeter PRN    cefTRIAXone (ROCEPHIN) 2 g in 0.9% sodium chloride (MBP/ADV) 50 mL  2 g IntraVENous Q24H    enoxaparin (LOVENOX) injection 40 mg  40 mg SubCUTAneous Q24H       Review of Systems  Constitutional: negative for fever, chills, sweats  Cardiovascular: + chest pain. negative for palpitations, syncope, edema  Gastrointestinal: negative for dysphagia, reflux, vomiting, diarrhea, abdominal pain, or melena  Neurologic: negative for focal weakness, numbness, headache      Objective:     Vitals:    05/22/17 0532 05/22/17 0601 05/22/17 0632 05/22/17 0701   BP: 128/86 121/60 134/62 128/60   Pulse: 69 68 71 70   Resp: (!) 36 22 26 24   Temp:    97.2 °F (36.2 °C)   SpO2: 96% 98% 98% 98%   Weight:       Height:           Intake and Output:   05/20 1901 - 05/22 0700  In: 845 [P.O.:120; I.V.:725]  Out: 1350 [Urine:1350]  05/22 0701 - 05/22 1900  In: -   Out: 150 [Urine:150]    Physical Exam:          Constitutional:  the patient is well developed and in no acute distress  EENMT:  Sclera clear, pupils equal, oral mucosa moist  Respiratory: Decreased at left base. Cardiovascular:  RRR without M,G,R  Gastrointestinal: soft and non-tender; with positive bowel sounds. Musculoskeletal: warm without cyanosis. There is no lower leg edema. Skin:  no jaundice or rashes, no wounds   Neurologic: no gross neuro deficits     Psychiatric:  alert and oriented x ppt    CHEST XRAY:   5/22/17  1. No pneumothorax. 2. Small left pleural effusion with associated atelectasis.       LAB  No lab exists for component: [place]   Recent Labs      05/22/17   0412  05/21/17   0600  05/20/17   0723   WBC  12.3*  8.4  7.7   HGB  12.3*  11.8*  10.7*   HCT  35.9*  35.1*  31.5*   PLT  437  339 241     Recent Labs      05/22/17   0412  05/21/17   0600  05/19/17   1605   NA  138  139  142   K  5.5*  4.2  3.3*   CL  99  100  103   CO2  25  34*  30   GLU  85  93  117*   BUN  19  15  15   CREA  0.52*  0.57*  0.64*   CA  8.7  8.4  8.1*     Recent Labs      05/21/17   1600   PH  7.42   PCO2  47*   PO2  76   HCO3  30*       Assessment:  (Medical Decision Making)     Hospital Problems  Date Reviewed: 5/15/2017          Codes Class Noted POA    Parapneumonic effusion ICD-10-CM: J18.9, J91.8  ICD-9-CM: 511.89  5/22/2017 Unknown        Pleural effusion, bilateral ICD-10-CM: J90  ICD-9-CM: 511.9  5/21/2017 Unknown        Candidiasis ICD-10-CM: B37.9  ICD-9-CM: 112.9  5/21/2017 Unknown        Immunosuppression (HCC) (Chronic) ICD-10-CM: D89.9  ICD-9-CM: 279.9  5/15/2017 Yes        * (Principal)CAP (community acquired pneumonia) ICD-10-CM: J18.9  ICD-9-CM: 874  5/15/2017 Yes        Sepsis ([facility] ICD-10-CM: A41.9  ICD-9-CM: 038.9, 995.91  5/15/2017 Unknown        DENIS (acute kidney injury) ([facility] ICD-10-CM: N17.9  ICD-9-CM: 584.9  5/15/2017 Unknown        Hypoxemia ICD-10-CM: R09.02  ICD-9-CM: 799.02  5/15/2017 Unknown        Ankylosing spondylitis ([facility] ICD-10-CM: M45.9  ICD-9-CM: 720.0  1/29/2015 Yes        Spondyloarthritis ([facility] (Chronic) ICD-10-CM: M46.90  ICD-9-CM: 721.90  9/17/2013 Yes            Patient with strep pneumonia with parapneumonic effusion, L>R. Resultant pleuritic chest pain and splinting. WBC up some but no fever. AG elevated today as well, hard to tell if infection is improving or not. Plan:  (Medical Decision Making)     --will check pct, lactate, abg now to help determine if his current course of abx is adequate. Should be based on cultures, on ceftriaxone alone. --will need to monitor pain and daily CXR's. If it looks like his L effusion is enlarging may need repeat tap or even small bore drain placed for parapneumonic effusion. --f/u ct neck for neck pain.  Change diet to mechanical soft for his comfort. Has iv or po pain medication, adjust regimen as needed.   -cont diflucan for thrush. Does not sound like esophageal candidiasis but will keep in mind given his relative immunosuppression and neck discomfort. --monitor o2 needs. Positive pressure will likely help his splinting.   --still quite ill, not ready to leave ICU. Total critical care time spent 40 minutes.     Marlen Obregon MD

## 2017-05-22 NOTE — PROGRESS NOTES
in ccu notified of 's c/o shortness of breath and expiratory wheezing. Order obtained to administer current ordered morphine 2 mg iv dose now once. Also decrease LR IVF to 25ml/hour. Chart accessed to assist primary rn Lizabeth, updated.

## 2017-05-22 NOTE — PROGRESS NOTES
Pt noted to have small bit of coughing after magic mouth wash. Tachypnic 30s-40s. C/O Rib pain 5/10, norco given.

## 2017-05-22 NOTE — PROGRESS NOTES
Bedside and Verbal shift change report given to Eddie Wynne RN (oncoming nurse) by Lyla Barnes RN (offgoing nurse). Report included the following information SBAR and Kardex.

## 2017-05-22 NOTE — INTERDISCIPLINARY ROUNDS
Interdisciplinary team rounds were held 5/22/2017 with the following team members:Care Management, Nursing, Nurse Practitioner, Nutrition, Palliative Care, Pastoral Care, Pharmacy, Physical Therapy, Physician, Respiratory Therapy and Clinical Coordinator. Plan of care discussed. See clinical pathway and/or care plan for interventions and desired outcomes.

## 2017-05-22 NOTE — PROGRESS NOTES
Pt restless and occasional O2 desaturation to mid-high 80s. Reoriented pt and RT at bedside to administer resp treatment. Pt responded well.

## 2017-05-22 NOTE — PROGRESS NOTES
Pt attempted to eat breakfast and during breakfast, had occasional coughing and episodes of O2 sat dropping to mid 80s. O2 sat returned quickly. Will continue to monitor.

## 2017-05-22 NOTE — PROGRESS NOTES
Pt remains in ICU. Alert. Oriented to person, place, and time. Disoriented to situation. Follows commands. Positive for SOB and dizziness. Denies chest pain and n/v. VSS. NSR with HR in 80s. /60. Radial and pedal pulses palpable. On airvo with tachypneic breathing. SpO2 91% with coarse bilateral breath sounds. Morphine given per MAR. Abdomen soft with hypoactive bowel sounds. Blas in place draining yellow urine. Skin warm and slightly diaphoretic. Oral temp 100F. Abrasion to RLE. 2 thoracentesis sites to back. Sacrum red, but blanchable. Family at bedside.

## 2017-05-22 NOTE — PROGRESS NOTES
Physical Therapy Note:    Therapist is discontinuing physical therapy at this time due to decline in medical status/transfer to CCU. Mr. Minal Saha physical therapy goals were not met. Please reorder PT when our services are again appropriate. Thank you.     Yamila Boone, EFRAIN  5/22/2017

## 2017-05-23 ENCOUNTER — APPOINTMENT (OUTPATIENT)
Dept: GENERAL RADIOLOGY | Age: 72
DRG: 853 | End: 2017-05-23
Attending: INTERNAL MEDICINE
Payer: MEDICARE

## 2017-05-23 LAB
ANION GAP BLD CALC-SCNC: 10 MMOL/L (ref 7–16)
BACTERIA SPEC CULT: NORMAL
BACTERIA SPEC CULT: NORMAL
BUN SERPL-MCNC: 17 MG/DL (ref 8–23)
CALCIUM SERPL-MCNC: 8.5 MG/DL (ref 8.3–10.4)
CHLORIDE SERPL-SCNC: 97 MMOL/L (ref 98–107)
CO2 SERPL-SCNC: 29 MMOL/L (ref 21–32)
CREAT SERPL-MCNC: 0.63 MG/DL (ref 0.8–1.5)
ERYTHROCYTE [DISTWIDTH] IN BLOOD BY AUTOMATED COUNT: 13 % (ref 11.9–14.6)
GLUCOSE SERPL-MCNC: 96 MG/DL (ref 65–100)
GRAM STN SPEC: NORMAL
HCT VFR BLD AUTO: 33.2 % (ref 41.1–50.3)
HGB BLD-MCNC: 11.1 G/DL (ref 13.6–17.2)
MCH RBC QN AUTO: 29.8 PG (ref 26.1–32.9)
MCHC RBC AUTO-ENTMCNC: 33.4 G/DL (ref 31.4–35)
MCV RBC AUTO: 89 FL (ref 79.6–97.8)
PLATELET # BLD AUTO: 567 K/UL (ref 150–450)
PMV BLD AUTO: 9.3 FL (ref 10.8–14.1)
POTASSIUM SERPL-SCNC: 4.9 MMOL/L (ref 3.5–5.1)
RBC # BLD AUTO: 3.73 M/UL (ref 4.23–5.67)
SERVICE CMNT-IMP: NORMAL
SERVICE CMNT-IMP: NORMAL
SODIUM SERPL-SCNC: 136 MMOL/L (ref 136–145)
WBC # BLD AUTO: 17.5 K/UL (ref 4.3–11.1)

## 2017-05-23 PROCEDURE — BH4BZZZ ULTRASONOGRAPHY OF CHEST WALL: ICD-10-PCS | Performed by: INTERNAL MEDICINE

## 2017-05-23 PROCEDURE — 36415 COLL VENOUS BLD VENIPUNCTURE: CPT | Performed by: INTERNAL MEDICINE

## 2017-05-23 PROCEDURE — 74011000258 HC RX REV CODE- 258: Performed by: INTERNAL MEDICINE

## 2017-05-23 PROCEDURE — 0W9B3ZX DRAINAGE OF LEFT PLEURAL CAVITY, PERCUTANEOUS APPROACH, DIAGNOSTIC: ICD-10-PCS | Performed by: INTERNAL MEDICINE

## 2017-05-23 PROCEDURE — 74011250637 HC RX REV CODE- 250/637: Performed by: PHYSICIAN ASSISTANT

## 2017-05-23 PROCEDURE — 76604 US EXAM CHEST: CPT | Performed by: INTERNAL MEDICINE

## 2017-05-23 PROCEDURE — 80048 BASIC METABOLIC PNL TOTAL CA: CPT | Performed by: INTERNAL MEDICINE

## 2017-05-23 PROCEDURE — 77030027138 HC INCENT SPIROMETER -A

## 2017-05-23 PROCEDURE — 74011000250 HC RX REV CODE- 250: Performed by: INTERNAL MEDICINE

## 2017-05-23 PROCEDURE — 65620000000 HC RM CCU GENERAL

## 2017-05-23 PROCEDURE — 71010 XR CHEST PORT: CPT

## 2017-05-23 PROCEDURE — 85027 COMPLETE CBC AUTOMATED: CPT | Performed by: INTERNAL MEDICINE

## 2017-05-23 PROCEDURE — 74011250636 HC RX REV CODE- 250/636: Performed by: INTERNAL MEDICINE

## 2017-05-23 PROCEDURE — 94640 AIRWAY INHALATION TREATMENT: CPT

## 2017-05-23 PROCEDURE — 74011250637 HC RX REV CODE- 250/637: Performed by: INTERNAL MEDICINE

## 2017-05-23 PROCEDURE — 92526 ORAL FUNCTION THERAPY: CPT

## 2017-05-23 PROCEDURE — 99232 SBSQ HOSP IP/OBS MODERATE 35: CPT | Performed by: INTERNAL MEDICINE

## 2017-05-23 RX ORDER — MORPHINE SULFATE 4 MG/ML
2 INJECTION, SOLUTION INTRAMUSCULAR; INTRAVENOUS
Status: DISCONTINUED | OUTPATIENT
Start: 2017-05-23 | End: 2017-05-31 | Stop reason: HOSPADM

## 2017-05-23 RX ADMIN — ACETAMINOPHEN 650 MG: 650 SUPPOSITORY RECTAL at 06:09

## 2017-05-23 RX ADMIN — ALBUTEROL SULFATE 2.5 MG: 2.5 SOLUTION RESPIRATORY (INHALATION) at 19:35

## 2017-05-23 RX ADMIN — ALBUTEROL SULFATE 2.5 MG: 2.5 SOLUTION RESPIRATORY (INHALATION) at 01:41

## 2017-05-23 RX ADMIN — Medication 5 ML: at 08:23

## 2017-05-23 RX ADMIN — ALBUTEROL SULFATE 2.5 MG: 2.5 SOLUTION RESPIRATORY (INHALATION) at 07:14

## 2017-05-23 RX ADMIN — ALBUTEROL SULFATE 2.5 MG: 2.5 SOLUTION RESPIRATORY (INHALATION) at 14:10

## 2017-05-23 RX ADMIN — Medication 5 ML: at 23:17

## 2017-05-23 RX ADMIN — Medication 5 ML: at 13:43

## 2017-05-23 RX ADMIN — Medication 5 ML: at 18:38

## 2017-05-23 RX ADMIN — Medication 5 ML: at 13:48

## 2017-05-23 RX ADMIN — Medication 5 ML: at 04:19

## 2017-05-23 RX ADMIN — MORPHINE SULFATE 2 MG: 4 INJECTION, SOLUTION INTRAMUSCULAR; INTRAVENOUS at 04:10

## 2017-05-23 RX ADMIN — MORPHINE SULFATE 2 MG: 4 INJECTION, SOLUTION INTRAMUSCULAR; INTRAVENOUS at 23:23

## 2017-05-23 RX ADMIN — Medication 5 ML: at 23:16

## 2017-05-23 RX ADMIN — CEFTRIAXONE 2 G: 2 INJECTION, POWDER, FOR SOLUTION INTRAMUSCULAR; INTRAVENOUS at 19:49

## 2017-05-23 RX ADMIN — Medication 10 ML: at 23:16

## 2017-05-23 RX ADMIN — MORPHINE SULFATE 2 MG: 4 INJECTION, SOLUTION INTRAMUSCULAR; INTRAVENOUS at 13:43

## 2017-05-23 RX ADMIN — Medication 5 ML: at 19:50

## 2017-05-23 RX ADMIN — Medication 5 ML: at 06:01

## 2017-05-23 RX ADMIN — ENOXAPARIN SODIUM 40 MG: 40 INJECTION SUBCUTANEOUS at 19:49

## 2017-05-23 NOTE — PROGRESS NOTES
Pt restless. States \"hard to catch breath. \" SpO2 fluctuating between high 80s and low 90s. Pt would like to try bipap to rest. RT at bedside now.

## 2017-05-23 NOTE — PROGRESS NOTES
IS    IS provided for pt and [employer] flutter valve placed inline with IS. IS teaching performed with family and pt.  500ml inspiratory volume achieved on IS.

## 2017-05-23 NOTE — PROGRESS NOTES
Bedside shift change report given to [employer], RN (oncoming nurse) by Joyce Blake RN (offgoing nurse). Report included the following information SBAR, Kardex, ED Summary, Procedure Summary, Intake/Output, MAR, Recent Results and Cardiac Rhythm NSR. Pt turned. Skin assessed.

## 2017-05-23 NOTE — PROGRESS NOTES
Placed on BIPAP, unable to tolerate, placed on minimal settings, V60 4 CPAP @ 45% with full face mask.

## 2017-05-23 NOTE — PROGRESS NOTES
Bedside shift change report given to Ramiro Lewis RN (oncoming nurse) by Zhane Mitchell RN (offgoing nurse). Report included the following information SBAR, Kardex, ED Summary, Procedure Summary, Intake/Output, MAR, Recent Results and Cardiac Rhythm NSR. Skin assessed. Dual neuro assessment.

## 2017-05-23 NOTE — PROGRESS NOTES
Critical Care Daily Progress Note: 5/23/2017    Emilee Hampton   Admission Date: 5/15/2017         The patient's chart is reviewed and the patient is discussed with the staff. Subjective:     Patient is a 80 y.o.  male with a hx of anklelosing spondylitis on remicade who was in usual state of health until 5/13. He developed shaking chills and nausea. He has been sob and has had left side pleuritic chest and back pain. On 5/15 he was taken to Dr. Margi Harris office and O2 sat was low so he was sent to the er. He was noted to have lactate of 3.5 and cxr showed LLL and RUL infiltrate. BP initially low but responded to fluids, admitted to 8th floor. Sputum culture with strep pneumo. CXR with pleural effusion. Had B thoracentesis 5/21 and developed worsening anxiety and pain afterward, transferred to ICU, no PTX on CXR. Also c/o L neck pain, found to have thrush. Subjective:      Today the patient is on airvo. .     Today he is better with less sob and chest pain. Says neck is not hurting.        Current Facility-Administered Medications   Medication Dose Route Frequency    morphine injection 2 mg  2 mg IntraVENous Q3H PRN    albuterol (PROVENTIL VENTOLIN) nebulizer solution 2.5 mg  2.5 mg Nebulization Q6H RT    acetaminophen (TYLENOL) suppository 650 mg  650 mg Rectal Q4H PRN    HYDROcodone-acetaminophen (NORCO) 7.5-325 mg per tablet 1 Tab  1 Tab Oral Q4H PRN    potassium chloride (K-DUR, KLOR-CON) SR tablet 40 mEq  40 mEq Oral BID    magic mouthwash (GRACIA) oral suspension 5 mL  5 mL Oral B0L    methyl salicylate-menthol (BENGAY) 15-10 % cream   Topical QID PRN    fluconazole (DIFLUCAN) tablet 200 mg  200 mg Oral DAILY    lactated ringers infusion  25 mL/hr IntraVENous CONTINUOUS    magnesium hydroxide (MILK OF MAGNESIA) 400 mg/5 mL oral suspension 30 mL  30 mL Oral DAILY PRN    DULoxetine (CYMBALTA) capsule 30 mg  30 mg Oral DAILY    pantoprazole (PROTONIX) tablet 40 mg  40 mg Oral ACB    traMADol (ULTRAM) tablet 50 mg  50 mg Oral TID PRN    sodium chloride (NS) flush 5 mL  5 mL InterCATHeter Q8H    sodium chloride (NS) flush 5-10 mL  5-10 mL InterCATHeter PRN    cefTRIAXone (ROCEPHIN) 2 g in 0.9% sodium chloride (MBP/ADV) 50 mL  2 g IntraVENous Q24H    enoxaparin (LOVENOX) injection 40 mg  40 mg SubCUTAneous Q24H       Review of Systems    Constitutional:  negative for fever, chills, sweats  Cardiovascular:  negative for chest pain, palpitations, syncope, edema  Gastrointestinal:  negative for dysphagia, reflux, vomiting, diarrhea, abdominal pain, or melena  Neurologic:  negative for focal weakness, numbness, headache      Objective:     Vitals:    05/23/17 0602 05/23/17 0632 05/23/17 0702 05/23/17 0714   BP: 129/64 114/57 127/61    Pulse: 86 77 77    Resp: 27 20 21    Temp:       SpO2: 96% 97% 98% 97%   Weight:       Height:           Intake and Output:   05/21 1901 - 05/23 0700  In: 1375.8 [I.V.:1375.8]  Out: 3300 [Urine:3300]       Physical Exam:          Constitutional:  the patient is well developed and in no acute distress  EENMT:  Sclera clear, pupils equal, oral mucosa moist  Respiratory: decreased breath sounds  Cardiovascular:  RRR without M,G,R  Gastrointestinal: soft and non-tender; with positive bowel sounds. Musculoskeletal: warm without cyanosis. There is no lower leg edema. Skin:  no jaundice or rashes, no wounds   Neurologic: no gross neuro deficits     Psychiatric:  alert and oriented x 3    LINES: peripheral    DRIPS:   none    CXR:  Increased opacity on the left      LAB  No results for input(s): GLUCPOC in the last 72 hours.     No lab exists for component: [place]   Recent Labs      05/23/17 0425 05/22/17   0412  05/21/17   0600   WBC  17.5*  12.3*  8.4   HGB  11.1*  12.3*  11.8*   HCT  33.2*  35.9*  35.1*   PLT  567*  437  339     Recent Labs      05/23/17   0425 05/22/17   0412  05/21/17   0600   NA  136  138  139   K  4.9  5.5*  4.2   CL  97*  99  100   CO2 29  25  34*   GLU  96  85  93   BUN  17  19  15   CREA  0.63*  0.52*  0.57*   CA  8.5  8.7  8.4     Recent Labs      05/22/17   1012  05/21/17   1600   PH  7.48*  7.42   PCO2  35  47*   PO2  64*  76   HCO3  25  30*     Recent Labs      05/22/17   1045   LAC  1.5       Assessment:  (Medical Decision Making)     Hospital Problems  Date Reviewed: 5/15/2017          Codes Class Noted POA    Parapneumonic effusion ICD-10-CM: J18.9, J91.8  ICD-9-CM: 511.89  5/22/2017 Unknown    ? Increased on the left    Pleural effusion, bilateral ICD-10-CM: J90  ICD-9-CM: 511.9  5/21/2017 Unknown        Candidiasis ICD-10-CM: B37.9  ICD-9-CM: 112.9  5/21/2017 Unknown    better    Immunosuppression ([facility] (Chronic) ICD-10-CM: D89.9  ICD-9-CM: 279.9  5/15/2017 Yes        * (Principal)CAP (community acquired pneumonia) ICD-10-CM: J18.9  ICD-9-CM: 791  5/15/2017 Yes    Strep pneumo    Sepsis ([facility] ICD-10-CM: A41.9  ICD-9-CM: 038.9, 995.91  5/15/2017 Unknown        DENIS (acute kidney injury) ([facility] ICD-10-CM: N17.9  ICD-9-CM: 584.9  5/15/2017 Unknown    better    Hypoxemia ICD-10-CM: R09.02  ICD-9-CM: 799.02  5/15/2017 Unknown    On airvo    Ankylosing spondylitis ([facility] ICD-10-CM: M45.9  ICD-9-CM: 720.0  1/29/2015 Yes        Spondyloarthritis ([facility] (Chronic) ICD-10-CM: M46.90  ICD-9-CM: 721.90  9/17/2013 Yes              Plan:  (Medical Decision Making)   1   Ultrasound, thoracentesis today  2   May need bronch  3   Continue iv antibx  4   Needs  nutrition  5   Speech evaluating  --    More than 50% of the time documented was spent in face-to-face contact with the patient and in the care of the patient on the floor/unit where the patient is located.     Chinmay Edward MD

## 2017-05-23 NOTE — PROGRESS NOTES
Pt sat up on side of bed for thoracentesis. Consent noted on pts chart. Time out performed. Pts vitals monitored throughout procedure. Left ultrasound done and pic taken of pleural fluid. Not enough fluid to perform thoracentesis at this time per md.  Consolidation noted per md and pt to be npo for bronchoscopy tomorrow. Pt tolerated procedure well with no adverse rxn. Report given to pts EULALIA Vargas who was at bedside throughout procedure.   Ultrasound findings reviewed by MD.

## 2017-05-23 NOTE — PROGRESS NOTES
STG: Pt will participate with modified barium swallow study (MBS) x1 when respiratory status allows  STG: Pt will tolerate trials of puree/honey thick liquids without overt signs/sx of aspiration with 100% accuracy  LTG: Pt will tolerate the least restrictive diet at discharge without respiratory compromise    Speech language pathology: bedside swallow note: Daily Note 1    NAME/AGE/GENDER: Ricardo Nino is a 80 y.o. male  DATE: 5/23/2017  PRIMARY DIAGNOSIS: Pleural effusion [J90]  Pleural effusion [J90]  Atelectasis [J98.11]  Procedure(s) (LRB):  ULTRASOUND (Left) Day of Surgery  ICD-10: Treatment Diagnosis: dysphagia; oropharyngeal R13.12  INTERDISCIPLINARY COLLABORATION: Registered Nurse  PRECAUTIONS/ALLERGIES: Codeine ASSESSMENT:Patient on airvo. Required bipap to rest last evening. Weak vocal quality. Swallow elicited x3 with half tsp bolus of honey thick liquids. Wet vocal quality and cough. Expulsion of sputum. Additional trials deferred. Recommend continue NPO. Patient scheduled for bronchoscopy tomorrow am.  Will continue to follow for dysphagia treatment. Would benefit from [facility] as respiratory status allows. Patient will benefit from skilled intervention to address the below impairments. ?????? ? ? This section established at most recent assessment??????????  PROBLEM LIST (Impairments causing functional limitations):  1. Dysphagia  2. Odynophagia  3. dysphonia  REHABILITATION POTENTIAL FOR STATED GOALS: Good  PLAN OF CARE:   Patient will benefit from skilled intervention to address the following impairments. RECOMMENDATIONS AND PLANNED INTERVENTIONS (Benefits and precautions of therapy have been discussed with the patient.):  · NPO  MEDICATIONS:  · Non-oral  COMPENSATORY STRATEGIES/MODIFICATIONS INCLUDING:  · n/a  OTHER RECOMMENDATIONS (including follow up treatment recommendations):    · Family training/education  · Patient education  RECOMMENDED DIET MODIFICATIONS DISCUSSED WITH:  · Nursing  · Family  · Patient  FREQUENCY/DURATION: Continue to follow patient 3x a week or until short term goals are met to address above goals. RECOMMENDED REHABILITATION/EQUIPMENT: (at time of discharge pending progress):   Continue Skilled Therapy. SUBJECTIVE:   Weak. History of Present Injury/Illness: Mr. Yady Tillman  has a past medical history of Arthritis; Elevated prostate specific antigen (PSA); HLA-B27 positive; Impotence of organic origin; Neck pain; Osteoarthritis, hand; Osteopenia; Osteoporosis; Other nonspecific abnormal finding of lung field; Polyarthritis; Polymyalgia rheumatica ([address] Prostate cancer ([address] Raynaud's disease; 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[address] and Thoracic spine pain. He also has no past medical history of Asthma; Autoimmune disease ([address] Chronic kidney disease; Chronic obstructive pulmonary disease ([address] Chronic pain; Diabetes ([address] Difficult intubation; GERD (gastroesophageal reflux disease); Heart abnormalities; Hypertension; Liver disease; Malignant hyperthermia due to anesthesia; Nausea & vomiting; Neurological disorder; Other unknown and unspecified cause of morbidity or mortality; Pseudocholinesterase deficiency; Psychiatric disorder; PUD (peptic ulcer disease); Seizures ([facility] Stroke [facility]); Thromboembolus ([address] Thyroid disease; or Unspecified adverse effect of anesthesia. Ever Bending He also  has a past surgical history that includes vasectomy (40+ yrs); knee arthroscopy (2009); urological; biopsy prostate; prostatectomy (2012); cataract removal (2012); and cataract removal (2013). Present Symptoms: cough with decrease in 02 s/p po intake   Pain Intensity 1: 0  Pain Location 1: Chest  Pain Orientation 1: Anterior  Pain Intervention(s) 1: Medication (see MAR)  Current Medications:   No current facility-administered medications on file prior to encounter.       Current Outpatient Prescriptions on File Prior to Encounter   Medication Sig Dispense Refill    DULoxetine (CYMBALTA) 30 mg capsule Take 1 Cap by mouth daily. 90 Cap 1    traMADol (ULTRAM) 50 mg tablet Take 1 Tab by mouth three (3) times daily as needed for Pain. Max Daily Amount: 150 mg. 90 Tab 2    omeprazole (PRILOSEC) 40 mg capsule Take 1 Cap by mouth daily. 30 Cap 5    diclofenac (VOLTAREN) 1 % topical gel Apply 4 g to affected area four (4) times daily. 100 g 2    INFLIXIMAB (REMICADE IV) by IntraVENous route. Every 6 weeks.  calcium-vitamin D (OYSTER SHELL) 500 mg(1,250mg) -200 unit per tablet Take 1 Tab by mouth daily.  cholecalciferol, vitamin D3, (VITAMIN D3) 2,000 unit Tab Take  by mouth. Taking 1 1/2 tabs of 2000 international units once daily       Current Dietary Status:  Mechanical soft      History of reflux:  yes   Reflux medication: prilosec  Social History/Home Situation: home with family  Home Environment: Private residence  # Steps to Enter: 2  One/Two Story Residence: One story  Living Alone: No  Support Systems: Spouse/Significant Other/Partner  Patient Expects to be Discharged to[de-identified] Private residence  Current DME Used/Available at Home: None  Tub or Shower Type: Tub/Shower combination  OBJECTIVE:   Respiratory Status:  Hi flow nasal cannula  35 l/min  CXR Results: There is small left pleural effusion with associated atelectasis. There is  patchy density at the right upper lung, similar to prior exam. No pneumothorax. No evidence of pulmonary edema. Cardiac mediastinal contour and the surrounding  bones are stable  MRI/CT Results: n/a  Oral Motor Structure/Speech:  Oral-Motor Structure/Motor Speech  Labial: No impairment  Dentition: Natural, Partials (comment)  Lingual: No impairment    Cognitive and Communication Status:  Neurologic State: Eyes open spontaneously; Restless  Orientation Level: Oriented to person;Oriented to place; Disoriented to situation;Oriented to time  Cognition: Follows commands; Impulsive;Decreased attention/concentration;Recognition of people/places             BEDSIDE SWALLOW EVALUATION  Oral Assessment:  Oral Assessment  Labial: No impairment  Dentition: Natural;Partials (comment)  P. O. Trials:  Patient Position: upright in bed    The patient was given tsp to straw amounts of the following:   Consistency Presented: Honey thick liquid  How Presented: Spoon    ORAL PHASE:  Bolus Acceptance: Impaired  Bolus Formation/Control: Impaired  Propulsion: Delayed (# of seconds)  Type of Impairment: Delayed  Oral Residue: None    PHARYNGEAL PHASE:  Initiation of Swallow: Delayed (# of seconds)  Laryngeal Elevation: Decreased;Weak  Aspiration Signs/Symptoms: Infiltrate on chest xray;Strong cough;Delayed cough/throat clear; Increase in RR  Vocal Quality: Low volume           Pharyngeal Phase Characteristics: Poor endurance; Suspected pharyngeal residue;Multiple swallows    OTHER OBSERVATIONS:  Rate/bite size: Impaired   Endurance:  Impaired     Tool Used: Dysphagia Outcome and Severity Scale (LION)    Score Comments   Normal Diet  [] 7 With no strategies or extra time needed   Functional Swallow  [] 6 May have mild oral or pharyngeal delay       Mild Dysphagia    [] 5 Which may require one diet consistency restricted (those who demonstrate penetration which is entirely cleared on MBS would be included)   Mild-Moderate Dysphagia  [] 4 With 1-2 diet consistencies restricted       Moderate Dysphagia  [] 3 With 2 or more diet consistencies restricted       Moderately Severe Dysphagia  [x] 2 With partial PO strategies (trials with ST only)       Severe Dysphagia  [] 1 With inability to tolerate any PO safely          Score:  Initial: 2 Most Recent: X (Date: -- )   Interpretation of Tool: The Dysphagia Outcome and Severity Scale (LION) is a simple, easy-to-use, 7-point scale developed to systematically rate the functional severity of dysphagia based on objective assessment and make recommendations for diet level, independence level, and type of nutrition.      Score 7 6 5 4 3 2 1   Modifier CH CI CJ CK CL CM CN   ? Swallowing:     - CURRENT STATUS: CM - 80%-99% impaired, limited or restricted    - GOAL STATUS:  CK - 40%-59% impaired, limited or restricted    - D/C STATUS:  ---------------To be determined---------------  [place] / [place] / Product Type: Medicare /     TREATMENT:    (In addition to Assessment/Re-Assessment sessions the following treatments were rendered)  Dysphagia Activities: Activities/Procedures listed utilized to improve progress in swallow strength and swallow function. Required maximal cueing to decrease aspiration risk. MODALITIES:                                                                    ORAL MOTOR  EXERCISES:                                                                                                                                                                      LARYNGEAL / PHARYNGEAL EXERCISES:                                                                                                                                     __________________________________________________________________________________________________  Safety:   After treatment position/precautions:  · RN notified  · Upright in Bed  Progression/Medical Necessity:   · Skilled intervention continues to be required due to persistent signs and symptoms of aspiration and patient still consuming a modified diet. Compliance with Program/Exercises: Will assess as treatment progresses. Reason for Continuation of Services/Other Comments:  · Patient continues to require skilled intervention due to patient unable to attend/participate in therapy as expected. Recommendations/Intent for next treatment session: \"Treatment next visit will focus on po trials or MBS if respiratory status improves\".     Total Treatment Duration:  Time In: 1530  Time Out: 1441 [facility] MS, CCC-SLP

## 2017-05-23 NOTE — PROCEDURES
PROCEDURE:    DIAGNOSTIC/THERAPEUTIC THORACENTESIS/PLEURAL MANNOMETRY. PRE-OP DIAGNOSIS:    L PLEURAL EFFUSION    POST-OP DIAGNOSIS:    L PLEURAL EFFUSION    ASSISTANT:    Pappus    . CHEST ULTRASOUND FINDINGS:        A /small,  complex pleural space was identified on ultrasound, with the pleural fluid having a mixed echogenic character.  Consolidation- thoracentesis not done                Adin Michele MD

## 2017-05-23 NOTE — PROGRESS NOTES
Pt complaining of worsening chest pain after BIPAP placement. Tachypneic, shallow breathing. Morphine given with little improvement. Switched back to [employer]. Chest pain improved and SpO2 93%. Family at bedside. Denies any need at this time.

## 2017-05-24 ENCOUNTER — APPOINTMENT (OUTPATIENT)
Dept: GENERAL RADIOLOGY | Age: 72
DRG: 853 | End: 2017-05-24
Attending: INTERNAL MEDICINE
Payer: MEDICARE

## 2017-05-24 PROBLEM — R06.89 INEFFECTIVE AIRWAY CLEARANCE: Status: ACTIVE | Noted: 2017-05-24

## 2017-05-24 LAB
ANION GAP BLD CALC-SCNC: 11 MMOL/L (ref 7–16)
BUN SERPL-MCNC: 16 MG/DL (ref 8–23)
CALCIUM SERPL-MCNC: 9.2 MG/DL (ref 8.3–10.4)
CHLORIDE SERPL-SCNC: 96 MMOL/L (ref 98–107)
CO2 SERPL-SCNC: 27 MMOL/L (ref 21–32)
CREAT SERPL-MCNC: 0.49 MG/DL (ref 0.8–1.5)
ERYTHROCYTE [DISTWIDTH] IN BLOOD BY AUTOMATED COUNT: 13 % (ref 11.9–14.6)
GLUCOSE SERPL-MCNC: 69 MG/DL (ref 65–100)
HCT VFR BLD AUTO: 37.9 % (ref 41.1–50.3)
HGB BLD-MCNC: 13 G/DL (ref 13.6–17.2)
MCH RBC QN AUTO: 30.3 PG (ref 26.1–32.9)
MCHC RBC AUTO-ENTMCNC: 34.3 G/DL (ref 31.4–35)
MCV RBC AUTO: 88.3 FL (ref 79.6–97.8)
PLATELET # BLD AUTO: 672 K/UL (ref 150–450)
PMV BLD AUTO: 9.5 FL (ref 10.8–14.1)
POTASSIUM SERPL-SCNC: 3.7 MMOL/L (ref 3.5–5.1)
RBC # BLD AUTO: 4.29 M/UL (ref 4.23–5.67)
SODIUM SERPL-SCNC: 134 MMOL/L (ref 136–145)
WBC # BLD AUTO: 14.4 K/UL (ref 4.3–11.1)

## 2017-05-24 PROCEDURE — 74011000250 HC RX REV CODE- 250: Performed by: INTERNAL MEDICINE

## 2017-05-24 PROCEDURE — 36415 COLL VENOUS BLD VENIPUNCTURE: CPT | Performed by: INTERNAL MEDICINE

## 2017-05-24 PROCEDURE — 71010 XR CHEST SNGL V: CPT

## 2017-05-24 PROCEDURE — 65620000000 HC RM CCU GENERAL

## 2017-05-24 PROCEDURE — 85027 COMPLETE CBC AUTOMATED: CPT | Performed by: INTERNAL MEDICINE

## 2017-05-24 PROCEDURE — 87102 FUNGUS ISOLATION CULTURE: CPT | Performed by: INTERNAL MEDICINE

## 2017-05-24 PROCEDURE — 74011250636 HC RX REV CODE- 250/636

## 2017-05-24 PROCEDURE — 74011250636 HC RX REV CODE- 250/636: Performed by: INTERNAL MEDICINE

## 2017-05-24 PROCEDURE — 88305 TISSUE EXAM BY PATHOLOGIST: CPT | Performed by: INTERNAL MEDICINE

## 2017-05-24 PROCEDURE — 76040000025: Performed by: INTERNAL MEDICINE

## 2017-05-24 PROCEDURE — 87106 FUNGI IDENTIFICATION YEAST: CPT | Performed by: INTERNAL MEDICINE

## 2017-05-24 PROCEDURE — 77030012699 HC VLV SUC CNTRL OCOA -A: Performed by: INTERNAL MEDICINE

## 2017-05-24 PROCEDURE — 99232 SBSQ HOSP IP/OBS MODERATE 35: CPT | Performed by: INTERNAL MEDICINE

## 2017-05-24 PROCEDURE — 87149 DNA/RNA DIRECT PROBE: CPT | Performed by: INTERNAL MEDICINE

## 2017-05-24 PROCEDURE — 74011000258 HC RX REV CODE- 258: Performed by: INTERNAL MEDICINE

## 2017-05-24 PROCEDURE — 77030021668 HC NEB PREFIL KT VYRM -A

## 2017-05-24 PROCEDURE — 80048 BASIC METABOLIC PNL TOTAL CA: CPT | Performed by: INTERNAL MEDICINE

## 2017-05-24 PROCEDURE — 71010 XR CHEST PORT: CPT

## 2017-05-24 PROCEDURE — 88112 CYTOPATH CELL ENHANCE TECH: CPT | Performed by: INTERNAL MEDICINE

## 2017-05-24 PROCEDURE — 94760 N-INVAS EAR/PLS OXIMETRY 1: CPT

## 2017-05-24 PROCEDURE — 77010033711 HC HIGH FLOW OXYGEN

## 2017-05-24 PROCEDURE — 0BCG8ZZ EXTIRPATION OF MATTER FROM LEFT UPPER LUNG LOBE, VIA NATURAL OR ARTIFICIAL OPENING ENDOSCOPIC: ICD-10-PCS | Performed by: INTERNAL MEDICINE

## 2017-05-24 PROCEDURE — 87116 MYCOBACTERIA CULTURE: CPT | Performed by: INTERNAL MEDICINE

## 2017-05-24 PROCEDURE — 94640 AIRWAY INHALATION TREATMENT: CPT

## 2017-05-24 PROCEDURE — 99152 MOD SED SAME PHYS/QHP 5/>YRS: CPT | Performed by: INTERNAL MEDICINE

## 2017-05-24 PROCEDURE — 87186 SC STD MICRODIL/AGAR DIL: CPT | Performed by: INTERNAL MEDICINE

## 2017-05-24 PROCEDURE — 31645 BRNCHSC W/THER ASPIR 1ST: CPT | Performed by: INTERNAL MEDICINE

## 2017-05-24 PROCEDURE — 87070 CULTURE OTHR SPECIMN AEROBIC: CPT | Performed by: INTERNAL MEDICINE

## 2017-05-24 PROCEDURE — 3E1F88Z IRRIGATION OF RESPIRATORY TRACT USING IRRIGATING SUBSTANCE, VIA NATURAL OR ARTIFICIAL OPENING ENDOSCOPIC: ICD-10-PCS | Performed by: INTERNAL MEDICINE

## 2017-05-24 PROCEDURE — 74011250637 HC RX REV CODE- 250/637: Performed by: INTERNAL MEDICINE

## 2017-05-24 RX ORDER — ALBUTEROL SULFATE 0.83 MG/ML
2.5 SOLUTION RESPIRATORY (INHALATION)
Status: DISCONTINUED | OUTPATIENT
Start: 2017-05-24 | End: 2017-05-31 | Stop reason: HOSPADM

## 2017-05-24 RX ORDER — FENTANYL CITRATE 50 UG/ML
50 INJECTION, SOLUTION INTRAMUSCULAR; INTRAVENOUS
Status: DISCONTINUED | OUTPATIENT
Start: 2017-05-24 | End: 2017-05-26 | Stop reason: HOSPADM

## 2017-05-24 RX ORDER — LIDOCAINE HYDROCHLORIDE 20 MG/ML
JELLY TOPICAL
Status: COMPLETED | OUTPATIENT
Start: 2017-05-24 | End: 2017-05-24

## 2017-05-24 RX ORDER — LIDOCAINE HYDROCHLORIDE 40 MG/ML
SOLUTION TOPICAL
Status: COMPLETED | OUTPATIENT
Start: 2017-05-24 | End: 2017-05-24

## 2017-05-24 RX ORDER — MIDAZOLAM HYDROCHLORIDE 1 MG/ML
2 INJECTION, SOLUTION INTRAMUSCULAR; INTRAVENOUS
Status: COMPLETED | OUTPATIENT
Start: 2017-05-24 | End: 2017-05-24

## 2017-05-24 RX ADMIN — ALBUTEROL SULFATE 2.5 MG: 2.5 SOLUTION RESPIRATORY (INHALATION) at 15:45

## 2017-05-24 RX ADMIN — ALBUTEROL SULFATE 2.5 MG: 2.5 SOLUTION RESPIRATORY (INHALATION) at 21:00

## 2017-05-24 RX ADMIN — Medication 5 ML: at 15:16

## 2017-05-24 RX ADMIN — FENTANYL CITRATE 50 MCG: 50 INJECTION, SOLUTION INTRAMUSCULAR; INTRAVENOUS at 09:35

## 2017-05-24 RX ADMIN — ALBUTEROL SULFATE 2.5 MG: 2.5 SOLUTION RESPIRATORY (INHALATION) at 08:10

## 2017-05-24 RX ADMIN — SODIUM CHLORIDE, SODIUM LACTATE, POTASSIUM CHLORIDE, AND CALCIUM CHLORIDE 25 ML/HR: 600; 310; 30; 20 INJECTION, SOLUTION INTRAVENOUS at 08:15

## 2017-05-24 RX ADMIN — Medication 5 ML: at 04:17

## 2017-05-24 RX ADMIN — FENTANYL CITRATE 50 MCG: 50 INJECTION, SOLUTION INTRAMUSCULAR; INTRAVENOUS at 09:38

## 2017-05-24 RX ADMIN — ALBUTEROL SULFATE 2.5 MG: 2.5 SOLUTION RESPIRATORY (INHALATION) at 12:16

## 2017-05-24 RX ADMIN — ALBUTEROL SULFATE 2.5 MG: 2.5 SOLUTION RESPIRATORY (INHALATION) at 01:10

## 2017-05-24 RX ADMIN — ENOXAPARIN SODIUM 40 MG: 40 INJECTION SUBCUTANEOUS at 19:08

## 2017-05-24 RX ADMIN — Medication 5 ML: at 05:41

## 2017-05-24 RX ADMIN — MORPHINE SULFATE 2 MG: 4 INJECTION, SOLUTION INTRAMUSCULAR; INTRAVENOUS at 20:36

## 2017-05-24 RX ADMIN — LIDOCAINE HYDROCHLORIDE: 40 SOLUTION TOPICAL at 09:35

## 2017-05-24 RX ADMIN — LIDOCAINE HYDROCHLORIDE: 20 JELLY TOPICAL at 09:35

## 2017-05-24 RX ADMIN — MORPHINE SULFATE 2 MG: 4 INJECTION, SOLUTION INTRAMUSCULAR; INTRAVENOUS at 03:45

## 2017-05-24 RX ADMIN — Medication 5 ML: at 19:43

## 2017-05-24 RX ADMIN — Medication 5 ML: at 08:42

## 2017-05-24 RX ADMIN — CEFTRIAXONE 2 G: 2 INJECTION, POWDER, FOR SOLUTION INTRAMUSCULAR; INTRAVENOUS at 19:04

## 2017-05-24 RX ADMIN — MIDAZOLAM HYDROCHLORIDE 2 MG: 1 INJECTION, SOLUTION INTRAMUSCULAR; INTRAVENOUS at 09:35

## 2017-05-24 NOTE — PROGRESS NOTES
Shift report received from [facility], [address] [place] [address]. Patient in bed resting comfortably. Drowsy/oriented x 4. NSR on the monitor with rate of 70. /64 (91). O2 sat 98% on 3L HFNC. Afebrile. Lung sounds diminished bilaterally. Weak cough on request. Small amount of thick, white secretions suctioned with yankeur. Bowel sounds hypoactive. Abdomen soft, non-tender. Blas draining clear, yellow urine. Denies pain at this time. Patient assisted with repositioning and mouth care performed. Daughter at bedside. Continuing to monitor.

## 2017-05-24 NOTE — PROGRESS NOTES
Interdisciplinary team rounds were held 5/24/2017 with the following team members:Care Management, Nursing, Nurse Practitioner, Nutrition, Pharmacy, Physical Therapy, Physician, Respiratory Therapy and Clinical Coordinator. Plan of care discussed. See clinical pathway and/or care plan for interventions and desired outcomes.

## 2017-05-24 NOTE — H&P
Date of Surgery Update:  Emilee Hampton was seen and examined. History and physical has been reviewed. The patient has been examined.  There have been no significant clinical changes since the completion of the originally dated History and Physical.    Signed By: Tatum Sampson MD     May 24, 2017 9:50 AM

## 2017-05-24 NOTE — PROGRESS NOTES
SPEECH PATHOLOGY NOTE:    Patient is NPO for bronchoscopy. Will follow for po trials as appropriate. Addendum: Spoke with RN, Jaylan Richards, this pm.  Patient has been weaned from airvo to nasal cannula and may be transferring to the floor later today. He was restless earlier and she is allowing him to rest at this time. Will place orders for modified barium swallow study (MBS) for tomorrow am, now that respiratory demands have decreased, to determine nutritional needs. Thank you.     Luis Alberto Ayoub MS, CCC-SLP

## 2017-05-24 NOTE — PROCEDURES
PROCEDURE    Bronchoscopy with airway inspection/cleanout/    INDICATION   Cap, ineffective airway clearance    EQUIPMENT:    Olympus Q 180 Bronchhoscope. ANESTHESIA    Concious sedation with: Fentanyl 100 mcg IV; Versed2 mg IV; Lidocaine 80 mg to tracheo-bronchial tree and vocal cords; . Please see ICU/CCU/CTICU medication record. AIRWAY INSPECTION    After obtaining informed consent, using a bite block/ET tube adapter, an Olympus q 180 video/fiberoptic bronchoscope was  introduced into the trachea through the vocal chords/, without complication. RIGHT    LOCATION NORM/ABNORM DESCRIPTION   VOCAL CORDS NL    TRACHEA NL    ANAMIKA NL    RMSB     RUL     BI     RML     SUP SEGM RLL     MED BASAL     ANTERIOR BASAL     LATERAL BASAL     POSTERIOR BASAL                                               LEFT    LOCATION NORMAL/ABNORMAL TYPE   LMSB     RUMA  Thick purulent secretions with mucus plugging obstructing the  LLL and L main bronchus removed with saline   LINGULA     SUPERIOR DIVISION  Acute inflammation with erythema   SUPERIOR SEG LLL     LOUIS-MEDIAL LLL     LATERAL LLL     POSTERIOR LLL       The following samples were obtained:      Bronchial Wash:    Samples were sent for:  Cultures, cytology  The procedure was completed  without complication and the patient tolerated the procedure well.   Estimated blood loss-  0 to minimum  Recommendations:  Continue antibx  Christa Hollins MD

## 2017-05-24 NOTE — PROGRESS NOTES
Spoke with Dr. Elo Freeman regarding patient transferring out of CCU. Instructed to watch patient for a few more hours as patient is still very lethargic.     1600 - Updated Dr. Elo Freeman on patient still sleeping soundly. Vital signs stable. Stated he would like for patient to be more awake before transferring but as soon as he wakes up he's OK to transfer.

## 2017-05-24 NOTE — PROGRESS NOTES
Called to do bedside bronchoscopy. Consent noted on chart. Time out performed. Pts vitals monitored throughout procedure. Scope # 17 used. Procedure tolerated with no adverse rxn. 2 mg versed and 100 mcg fentanyl given Iv per MD order to pts iv line in aliquots. Pt placed on a 100%fio2 NRB by RT for procedure. Bronch wash sent to the lab x1 and labeled appropriately. Pts EULALIA Gold at bedside throughout procedure.

## 2017-05-24 NOTE — PROGRESS NOTES
Patient continues to sleep soundly, arousing to loud voice or stimuli. When patient is deep asleep, his HR will drop into the 50s for a few seconds then increase back to 70s-80s. All other VS remain stable. Will continue to monitor.

## 2017-05-24 NOTE — PROGRESS NOTES
Spiritual Care visit. Initial.    Visited and prayed with patient and his daughter. Affirmed her saranya for patient's healing and health.       Spiritual Care Assessment/Progress Notes    Pamela Sheehan [id number]  [id number]    1945  80 y.o.  male    Patient Telephone Number: [phone] (home)   Roman Catholic Affiliation: Pentecostal   Language: English   Extended Emergency Contact Information  Primary Emergency Contact: dAithyaLeana  Address: [address] [place] [address] Phone: [phone]  Mobile Phone: [phone]  Relation: Spouse   Patient Active Problem List    Diagnosis Date Noted    Ineffective airway clearance 05/24/2017    Parapneumonic effusion 05/22/2017    Pleural effusion, bilateral 05/21/2017    Candidiasis 05/21/2017    Immunosuppression ([address] 05/15/2017    CAP (community acquired pneumonia) 05/15/2017    Sepsis ([address] 05/15/2017    DENIS (acute kidney injury) ([facility] 05/15/2017    Hypoxemia 05/15/2017    Encounter for long-term (current) drug use 11/12/2015    Malignant neoplasm of prostate ([facility] 07/24/2015    Leukopenia 05/28/2015    History of prostate cancer 03/12/2015    History of prostate surgery 03/12/2015    Ankylosing spondylitis ([facility] 01/29/2015    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    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    Spondyloarthritis ([facility] 09/17/2013    Osteoarthritis, hand     Thoracic spine pain     HLA-B27 positive     Neck pain     Polyarthritis         Date: 5/24/2017       Level of Roman Catholic/Spiritual Activity:  []         Involved in saranya tradition/spiritual practice    []         Not involved in saranya tradition/spiritual practice  []         Spiritually oriented    []         Claims no spiritual orientation    []         seeking spiritual identity  []         Feels alienated from Sikh practice/tradition  []         Feels angry about Sikh practice/tradition  [x] Spirituality/Faith tradition IS a resource for coping at this time. []         Not able to assess due to medical condition    Services Provided Today:  []         crisis intervention    []         reading Scriptures  [x]         spiritual assessment    [x]         prayer  []         empathic listening/emotional support  []         rites and rituals (cite in comments)  []         life review     []         Faith support  []         theological development   []         advocacy  []         ethical dialog     []         blessing  []         bereavement support    [x]         support to family  []         anticipatory grief support   []         help with AMD  []         spiritual guidance    []         meditation      Spiritual Care Needs  []         Emotional Support  []         Spiritual/Amish Care  []         Loss/Adjustment  []         Advocacy/Referral                /Ethics  []         No needs expressed at               this time  []         Other: (note in               comments)  [address]  []         Follow up visits with               pt/family  []         Provide materials  []         Schedule sacraments  []         Contact Community               Clergy  [x]         Follow up as needed  []         Other: (note in               comments)     Comments: Spiritual Assessment     Advance Directives  Legal Next of Kin remains as decision maker. Category/Code Status Full. Family Support  Yes.   Spiritual Support  [address] [place] [address] Avenue   Visit by Tam Cole M.Ed., Th.B. ,Staff

## 2017-05-24 NOTE — PROGRESS NOTES
Critical Care Daily Progress Note: 5/24/2017    Maria G Navarrete   Admission Date: 5/15/2017         The patient's chart is reviewed and the patient is discussed with the staff. Subjective:     Patient is a 80 y.o.  male with a hx of anklelosing spondylitis on remicade who was in usual state of health until 5/13. He developed shaking chills and nausea. He has been sob and has had left side pleuritic chest and back pain. On 5/15 he was taken to Dr. Yolette Worley office and O2 sat was low so he was sent to the er. He was noted to have lactate of 3.5 and cxr showed LLL and RUL infiltrate. BP initially low but responded to fluids, admitted to 8th floor. Sputum culture with strep pneumo. CXR with pleural effusion. Had B thoracentesis 5/21 and developed worsening anxiety and pain afterward, transferred to ICU, no PTX on CXR. Also c/o L neck pain, found to have thrush  Overall he feels better but is still on 35% airvo. Not as anxious. Still some cough.   Yesterday ultrasound revealed minimal effusion on left    Current Facility-Administered Medications   Medication Dose Route Frequency    morphine injection 2 mg  2 mg IntraVENous Q3H PRN    albuterol (PROVENTIL VENTOLIN) nebulizer solution 2.5 mg  2.5 mg Nebulization Q6H RT    acetaminophen (TYLENOL) suppository 650 mg  650 mg Rectal Q4H PRN    HYDROcodone-acetaminophen (NORCO) 7.5-325 mg per tablet 1 Tab  1 Tab Oral Q4H PRN    potassium chloride (K-DUR, KLOR-CON) SR tablet 40 mEq  40 mEq Oral BID    magic mouthwash (GRACIA) oral suspension 5 mL  5 mL Oral G8X    methyl salicylate-menthol (BENGAY) 15-10 % cream   Topical QID PRN    fluconazole (DIFLUCAN) tablet 200 mg  200 mg Oral DAILY    lactated ringers infusion  25 mL/hr IntraVENous CONTINUOUS    magnesium hydroxide (MILK OF MAGNESIA) 400 mg/5 mL oral suspension 30 mL  30 mL Oral DAILY PRN    DULoxetine (CYMBALTA) capsule 30 mg  30 mg Oral DAILY    pantoprazole (PROTONIX) tablet 40 mg  40 mg Oral ACB    traMADol (ULTRAM) tablet 50 mg  50 mg Oral TID PRN    sodium chloride (NS) flush 5 mL  5 mL InterCATHeter Q8H    sodium chloride (NS) flush 5-10 mL  5-10 mL InterCATHeter PRN    cefTRIAXone (ROCEPHIN) 2 g in 0.9% sodium chloride (MBP/ADV) 50 mL  2 g IntraVENous Q24H    enoxaparin (LOVENOX) injection 40 mg  40 mg SubCUTAneous Q24H       Review of Systems    Constitutional:  negative for fever, chills, sweats  Cardiovascular:  negative for chest pain, palpitations, syncope, edema  Gastrointestinal:  negative for dysphagia, reflux, vomiting, diarrhea, abdominal pain, or melena  Neurologic:  negative for focal weakness, numbness, headache      Objective:     Vitals:    05/24/17 0702 05/24/17 0802 05/24/17 0810 05/24/17 0902   BP: 143/67 118/58  127/60   Pulse: 85 75  99   Resp: 26 30  26   Temp: 97.9 °F (36.6 °C)      SpO2: 99% 100% 99% 95%   Weight:       Height:           Intake and Output:   05/22 1901 - 05/24 0700  In: 965.4 [I.V.:965.4]  Out: 2610 [Urine:2610]       Physical Exam:          Constitutional:  the patient is well developed and in no acute distress  EENMT:  Sclera clear, pupils equal, oral mucosa moist  Respiratory: crackles , decreased breath sounds on the left  Cardiovascular:  RRR without M,G,R  Gastrointestinal: soft and non-tender; with positive bowel sounds. Musculoskeletal: warm without cyanosis. There is no lower leg edema. Skin:  no jaundice or rashes, no wounds   Neurologic: no gross neuro deficits     Psychiatric:  alert and oriented x 3    LINES:  peripheral    DRIPS:   none    CXR:  No change      LAB  No results for input(s): GLUCPOC in the last 72 hours.     No lab exists for component: [place]   Recent Labs      05/24/17   0705  05/23/17   0425  05/22/17   0412   WBC  14.4*  17.5*  12.3*   HGB  13.0*  11.1*  12.3*   HCT  37.9*  33.2*  35.9*   PLT  672*  567*  437     Recent Labs      05/24/17   0705  05/23/17   0425  05/22/17   0412   NA  134*  136  138   K  3.7  4.9 5.5*   CL  96*  97*  99   CO2  27  29  25   GLU  69  96  85   BUN  16  17  19   CREA  0.49*  0.63*  0.52*   CA  9.2  8.5  8.7     Recent Labs      05/22/17   1012  05/21/17   1600   PH  7.48*  7.42   PCO2  35  47*   PO2  64*  76   HCO3  25  30*     Recent Labs      05/22/17   1045   LAC  1.5       Assessment:  (Medical Decision Making)     Hospital Problems  Date Reviewed: 5/15/2017          Codes Class Noted POA    Parapneumonic effusion ICD-10-CM: J18.9, J91.8  ICD-9-CM: 511.89  5/22/2017 Unknown    S/p thoracentesis    Pleural effusion, bilateral ICD-10-CM: J90  ICD-9-CM: 511.9  5/21/2017 Unknown        Candidiasis ICD-10-CM: B37.9  ICD-9-CM: 112.9  5/21/2017 Unknown    On diflucan    Immunosuppression (HCC) (Chronic) ICD-10-CM: D89.9  ICD-9-CM: 279.9  5/15/2017 Yes        * (Principal)CAP (community acquired pneumonia) ICD-10-CM: J18.9  ICD-9-CM: 057  5/15/2017 Yes    Strep pneumo    Sepsis ([facility] ICD-10-CM: A41.9  ICD-9-CM: 038.9, 995.91  5/15/2017 Unknown        DENIS (acute kidney injury) ([facility] ICD-10-CM: N17.9  ICD-9-CM: 584.9  5/15/2017 Unknown    stable    Hypoxemia ICD-10-CM: R09.02  ICD-9-CM: 799.02  5/15/2017 Unknown    35% airvo    Ankylosing spondylitis ([facility] ICD-10-CM: M45.9  ICD-9-CM: 720.0  1/29/2015 Yes        Spondyloarthritis (HCC) (Chronic) ICD-10-CM: M46.90  ICD-9-CM: 721.90  9/17/2013 Yes              Plan:  (Medical Decision Making)   1   Bronchoscopy  2   Continue antibx  3   Add ippb  4   Flutter valve  --    More than 50% of the time documented was spent in face-to-face contact with the patient and in the care of the patient on the floor/unit where the patient is located.     Suellen Rinne, MD

## 2017-05-24 NOTE — PROGRESS NOTES
Verbal bedside report received from Jazmín Guerin, [address] [facility]. Shift assessment completed. Patient is alert and oriented to person and time, disoriented to place and situation. Patient denies pain at this time. Respirations even and unlabored on airvo, breath sounds diminished. Incentive spirometer at bedside, only able to inhale 500ml, +productive weak cough. SCDs in place. Bed alarm on for patient safety and fall prevention.      Lines:  Peripheral IVs    Drips:  LR @ 25ml/hr    Drains:  Blas catheter    Airway:  Airvo 45L 40%

## 2017-05-25 ENCOUNTER — APPOINTMENT (OUTPATIENT)
Dept: GENERAL RADIOLOGY | Age: 72
DRG: 853 | End: 2017-05-25
Attending: INTERNAL MEDICINE
Payer: MEDICARE

## 2017-05-25 LAB
ANION GAP BLD CALC-SCNC: 9 MMOL/L (ref 7–16)
BUN SERPL-MCNC: 17 MG/DL (ref 8–23)
CALCIUM SERPL-MCNC: 8.2 MG/DL (ref 8.3–10.4)
CHLORIDE SERPL-SCNC: 100 MMOL/L (ref 98–107)
CO2 SERPL-SCNC: 29 MMOL/L (ref 21–32)
CREAT SERPL-MCNC: 0.44 MG/DL (ref 0.8–1.5)
ERYTHROCYTE [DISTWIDTH] IN BLOOD BY AUTOMATED COUNT: 12.9 % (ref 11.9–14.6)
GLUCOSE SERPL-MCNC: 76 MG/DL (ref 65–100)
HCT VFR BLD AUTO: 33 % (ref 41.1–50.3)
HGB BLD-MCNC: 11.3 G/DL (ref 13.6–17.2)
MCH RBC QN AUTO: 30.4 PG (ref 26.1–32.9)
MCHC RBC AUTO-ENTMCNC: 34.2 G/DL (ref 31.4–35)
MCV RBC AUTO: 88.7 FL (ref 79.6–97.8)
PLATELET # BLD AUTO: 709 K/UL (ref 150–450)
PMV BLD AUTO: 9.2 FL (ref 10.8–14.1)
POTASSIUM SERPL-SCNC: 4 MMOL/L (ref 3.5–5.1)
RBC # BLD AUTO: 3.72 M/UL (ref 4.23–5.67)
SODIUM SERPL-SCNC: 138 MMOL/L (ref 136–145)
WBC # BLD AUTO: 9.6 K/UL (ref 4.3–11.1)

## 2017-05-25 PROCEDURE — 99232 SBSQ HOSP IP/OBS MODERATE 35: CPT | Performed by: INTERNAL MEDICINE

## 2017-05-25 PROCEDURE — 74011250637 HC RX REV CODE- 250/637: Performed by: INTERNAL MEDICINE

## 2017-05-25 PROCEDURE — 36415 COLL VENOUS BLD VENIPUNCTURE: CPT | Performed by: INTERNAL MEDICINE

## 2017-05-25 PROCEDURE — 94760 N-INVAS EAR/PLS OXIMETRY 1: CPT

## 2017-05-25 PROCEDURE — 74011000250 HC RX REV CODE- 250: Performed by: INTERNAL MEDICINE

## 2017-05-25 PROCEDURE — 65270000029 HC RM PRIVATE

## 2017-05-25 PROCEDURE — 74011000258 HC RX REV CODE- 258: Performed by: INTERNAL MEDICINE

## 2017-05-25 PROCEDURE — 74230 X-RAY XM SWLNG FUNCJ C+: CPT

## 2017-05-25 PROCEDURE — 92611 MOTION FLUOROSCOPY/SWALLOW: CPT

## 2017-05-25 PROCEDURE — 74011250636 HC RX REV CODE- 250/636: Performed by: INTERNAL MEDICINE

## 2017-05-25 PROCEDURE — 74011000255 HC RX REV CODE- 255: Performed by: INTERNAL MEDICINE

## 2017-05-25 PROCEDURE — 77010033678 HC OXYGEN DAILY

## 2017-05-25 PROCEDURE — 80048 BASIC METABOLIC PNL TOTAL CA: CPT | Performed by: INTERNAL MEDICINE

## 2017-05-25 PROCEDURE — 94640 AIRWAY INHALATION TREATMENT: CPT

## 2017-05-25 PROCEDURE — 85027 COMPLETE CBC AUTOMATED: CPT | Performed by: INTERNAL MEDICINE

## 2017-05-25 PROCEDURE — 71010 XR CHEST PORT: CPT

## 2017-05-25 RX ADMIN — HYDROCODONE BITARTRATE AND ACETAMINOPHEN 1 TABLET: 7.5; 325 TABLET ORAL at 21:38

## 2017-05-25 RX ADMIN — Medication 5 ML: at 00:15

## 2017-05-25 RX ADMIN — Medication 5 ML: at 13:06

## 2017-05-25 RX ADMIN — Medication 5 ML: at 16:12

## 2017-05-25 RX ADMIN — ALBUTEROL SULFATE 2.5 MG: 2.5 SOLUTION RESPIRATORY (INHALATION) at 20:17

## 2017-05-25 RX ADMIN — ALBUTEROL SULFATE 2.5 MG: 2.5 SOLUTION RESPIRATORY (INHALATION) at 15:00

## 2017-05-25 RX ADMIN — Medication 5 ML: at 22:00

## 2017-05-25 RX ADMIN — BARIUM SULFATE 45 ML: 980 POWDER, FOR SUSPENSION ORAL at 10:10

## 2017-05-25 RX ADMIN — Medication 5 ML: at 19:48

## 2017-05-25 RX ADMIN — ALBUTEROL SULFATE 2.5 MG: 2.5 SOLUTION RESPIRATORY (INHALATION) at 09:02

## 2017-05-25 RX ADMIN — ALBUTEROL SULFATE 2.5 MG: 2.5 SOLUTION RESPIRATORY (INHALATION) at 04:19

## 2017-05-25 RX ADMIN — MORPHINE SULFATE 2 MG: 4 INJECTION, SOLUTION INTRAMUSCULAR; INTRAVENOUS at 00:50

## 2017-05-25 RX ADMIN — ALBUTEROL SULFATE 2.5 MG: 2.5 SOLUTION RESPIRATORY (INHALATION) at 12:10

## 2017-05-25 RX ADMIN — DULOXETINE HYDROCHLORIDE 30 MG: 30 CAPSULE, DELAYED RELEASE ORAL at 13:03

## 2017-05-25 RX ADMIN — POTASSIUM CHLORIDE 40 MEQ: 20 TABLET, EXTENDED RELEASE ORAL at 13:02

## 2017-05-25 RX ADMIN — Medication 5 ML: at 00:16

## 2017-05-25 RX ADMIN — Medication 5 ML: at 04:04

## 2017-05-25 RX ADMIN — MORPHINE SULFATE 2 MG: 4 INJECTION, SOLUTION INTRAMUSCULAR; INTRAVENOUS at 04:12

## 2017-05-25 RX ADMIN — FLUCONAZOLE 200 MG: 100 TABLET ORAL at 13:03

## 2017-05-25 RX ADMIN — CEFTRIAXONE 2 G: 2 INJECTION, POWDER, FOR SOLUTION INTRAMUSCULAR; INTRAVENOUS at 19:47

## 2017-05-25 RX ADMIN — POTASSIUM CHLORIDE 40 MEQ: 20 TABLET, EXTENDED RELEASE ORAL at 19:49

## 2017-05-25 RX ADMIN — Medication 5 ML: at 05:06

## 2017-05-25 RX ADMIN — BARIUM SULFATE 15 ML: 400 SUSPENSION ORAL at 10:12

## 2017-05-25 RX ADMIN — Medication 5 ML: at 08:00

## 2017-05-25 RX ADMIN — Medication 5 ML: at 12:07

## 2017-05-25 RX ADMIN — ENOXAPARIN SODIUM 40 MG: 40 INJECTION SUBCUTANEOUS at 19:48

## 2017-05-25 RX ADMIN — MORPHINE SULFATE 2 MG: 4 INJECTION, SOLUTION INTRAMUSCULAR; INTRAVENOUS at 08:15

## 2017-05-25 RX ADMIN — BARIUM SULFATE 15 ML: 400 PASTE ORAL at 10:12

## 2017-05-25 RX ADMIN — TRAMADOL HYDROCHLORIDE 50 MG: 50 TABLET, FILM COATED ORAL at 13:03

## 2017-05-25 RX ADMIN — ALBUTEROL SULFATE 2.5 MG: 2.5 SOLUTION RESPIRATORY (INHALATION) at 00:23

## 2017-05-25 NOTE — PROGRESS NOTES
TRANSFER - OUT REPORT:    Verbal report given to EULALIA Ribeiro(name) on Amanda Vora  being transferred to 8th floor(unit) for routine progression of care       Report consisted of patients Situation, Background, Assessment and   Recommendations(SBAR). Information from the following report(s) SBAR, OR Summary, Intake/Output, MAR and Recent Results was reviewed with the receiving nurse. Lines:   Peripheral IV 50/93/28 Right Cephalic (Active)       Peripheral IV 05/15/17 Right Antecubital (Active)   Site Assessment Clean, dry, & intact 5/25/2017  4:04 AM   Phlebitis Assessment 0 5/25/2017  4:04 AM   Infiltration Assessment 0 5/25/2017  4:04 AM   Dressing Status Clean, dry, & intact 5/25/2017  4:04 AM   Dressing Type Transparent;Tape 5/25/2017  4:04 AM   Hub Color/Line Status Pink;Patent; Infusing 5/25/2017  4:04 AM   Action Taken Open ports on tubing capped 5/18/2017  3:16 PM   Alcohol Cap Used No 5/25/2017  4:04 AM       Peripheral IV 05/15/17 Left Antecubital (Active)   Site Assessment Clean, dry, & intact 5/25/2017  4:04 AM   Phlebitis Assessment 0 5/25/2017  4:04 AM   Infiltration Assessment 0 5/25/2017  4:04 AM   Dressing Status Clean, dry, & intact 5/25/2017  4:04 AM   Dressing Type Transparent;Tape 5/25/2017  4:04 AM   Hub Color/Line Status Pink 5/25/2017  4:04 AM   Action Taken Open ports on tubing capped 5/18/2017  3:16 PM   Alcohol Cap Used No 5/25/2017  4:04 AM        Opportunity for questions and clarification was provided.       Patient transported with:   O2 @ 2 liters

## 2017-05-25 NOTE — PROGRESS NOTES
Critical Care Daily Progress Note: 5/25/2017    Jose R Garcia   Admission Date: 5/15/2017         The patient's chart is reviewed and the patient is discussed with the staff. Subjective:     Patient is a 80 y.o.  male with a hx of anklelosing spondylitis on remicade who was in usual state of health until 5/13. He developed shaking chills and nausea. He has been sob and has had left side pleuritic chest and back pain. On 5/15 he was taken to Dr. Garcia Hidden office and O2 sat was low so he was sent to the er and admitted. He was noted to have lactate of 3.5 and cxr showed LLL and RUL infiltrate. BP initially low but responded to fluids, admitted to 8th floor. Sputum culture with strep pneumo. CXR with pleural effusion. Had B thoracentesis 5/21 and developed worsening anxiety and pain afterward, transferred to ICU, no PTX on CXR. Also c/o L neck pain, found to have thrush  Overall he feels better but is still on 35% airvo. Not as anxious. Still some cough. 5/23 ultrasound revealed minimal effusion on left. Bronch done 5/24 and mucus plugs removed from left.   He is better today with O2 sats in 90s off O2    Current Facility-Administered Medications   Medication Dose Route Frequency    barium sulfate (VARIBAR THIN HONEY) 40 % (w/v) 29% (w/w) contrast suspension 30 mL  30 mL Oral RAD ONCE    albuterol (PROVENTIL VENTOLIN) nebulizer solution 2.5 mg  2.5 mg Nebulization Q4H RT    fentaNYL citrate (PF) injection 50 mcg  50 mcg IntraVENous Multiple    morphine injection 2 mg  2 mg IntraVENous Q3H PRN    acetaminophen (TYLENOL) suppository 650 mg  650 mg Rectal Q4H PRN    HYDROcodone-acetaminophen (NORCO) 7.5-325 mg per tablet 1 Tab  1 Tab Oral Q4H PRN    potassium chloride (K-DUR, KLOR-CON) SR tablet 40 mEq  40 mEq Oral BID    magic mouthwash (GRACIA) oral suspension 5 mL  5 mL Oral A1E    methyl salicylate-menthol (BENGAY) 15-10 % cream   Topical QID PRN    fluconazole (DIFLUCAN) tablet 200 mg  200 mg Oral DAILY    lactated ringers infusion  25 mL/hr IntraVENous CONTINUOUS    magnesium hydroxide (MILK OF MAGNESIA) 400 mg/5 mL oral suspension 30 mL  30 mL Oral DAILY PRN    DULoxetine (CYMBALTA) capsule 30 mg  30 mg Oral DAILY    pantoprazole (PROTONIX) tablet 40 mg  40 mg Oral ACB    traMADol (ULTRAM) tablet 50 mg  50 mg Oral TID PRN    sodium chloride (NS) flush 5 mL  5 mL InterCATHeter Q8H    sodium chloride (NS) flush 5-10 mL  5-10 mL InterCATHeter PRN    cefTRIAXone (ROCEPHIN) 2 g in 0.9% sodium chloride (MBP/ADV) 50 mL  2 g IntraVENous Q24H    enoxaparin (LOVENOX) injection 40 mg  40 mg SubCUTAneous Q24H       Review of Systems    Constitutional:  negative for fever, chills, sweats  Cardiovascular:  negative for chest pain, palpitations, syncope, edema  Gastrointestinal:  negative for dysphagia, reflux, vomiting, diarrhea, abdominal pain, or melena  Neurologic:  negative for focal weakness, numbness, headache      Objective:     Vitals:    05/25/17 0637 05/25/17 0649 05/25/17 0916 05/25/17 0936   BP:       Pulse: (!) 59 64     Resp: 16 18     Temp:       SpO2: 100% 99% 94%    Weight:    118 lb (53.5 kg)   Height:    5' 7\" (1.702 m)       Intake and Output:   05/23 1901 - 05/25 0700  In: 944.8 [I.V.:944.8]  Out: 1860 [Urine:1860]       Physical Exam:          Constitutional:  the patient is well developed and in no acute distress  EENMT:  Sclera clear, pupils equal, oral mucosa moist  Respiratory:  Some crackles  Cardiovascular:  RRR without M,G,R  Gastrointestinal: soft and non-tender; with positive bowel sounds. Musculoskeletal: warm without cyanosis. There is no lower leg edema. Skin:  no jaundice or rashes, no wounds   Neurologic: no gross neuro deficits     Psychiatric:  alert and oriented x 3    LINES:  peripheral    DRIPS:   none    CXR: improved      LAB  No results for input(s): GLUCPOC in the last 72 hours.     No lab exists for component: [place]   Recent Labs      05/25/17   0410 05/24/17   0705  05/23/17   0425   WBC  9.6  14.4*  17.5*   HGB  11.3*  13.0*  11.1*   HCT  33.0*  37.9*  33.2*   PLT  709*  672*  567*     Recent Labs      05/25/17   0410  05/24/17   0705  05/23/17   0425   NA  138  134*  136   K  4.0  3.7  4.9   CL  100  96*  97*   CO2  29  27  29   GLU  76  69  96   BUN  17  16  17   CREA  0.44*  0.49*  0.63*   CA  8.2*  9.2  8.5     No results for input(s): PH, PCO2, PO2, HCO3 in the last 72 hours. No results for input(s): LCAD, LAC in the last 72 hours.     Assessment:  (Medical Decision Making)     Hospital Problems  Date Reviewed: 5/15/2017          Codes Class Noted POA    Ineffective airway clearance ICD-10-CM: R06.89  ICD-9-CM: 786.09  5/24/2017 Unknown        Parapneumonic effusion ICD-10-CM: J18.9, J91.8  ICD-9-CM: 511.89  5/22/2017 Unknown        Pleural effusion, bilateral ICD-10-CM: J90  ICD-9-CM: 511.9  5/21/2017 Unknown        Candidiasis ICD-10-CM: B37.9  ICD-9-CM: 112.9  5/21/2017 Unknown        Immunosuppression (HCC) (Chronic) ICD-10-CM: D89.9  ICD-9-CM: 279.9  5/15/2017 Yes        * (Principal)CAP (community acquired pneumonia) ICD-10-CM: J18.9  ICD-9-CM: 950  5/15/2017 Yes    better    Sepsis ([facility] ICD-10-CM: A41.9  ICD-9-CM: 038.9, 995.91  5/15/2017 Unknown        DENIS (acute kidney injury) ([facility] ICD-10-CM: N17.9  ICD-9-CM: 584.9  5/15/2017 Unknown    stable    Hypoxemia ICD-10-CM: R09.02  ICD-9-CM: 799.02  5/15/2017 Unknown    better    Ankylosing spondylitis ([address] ICD-10-CM: M45.9  ICD-9-CM: 720.0  1/29/2015 Yes        Spondyloarthritis ([facility] (Chronic) ICD-10-CM: M46.90  ICD-9-CM: 721.90  9/17/2013 Yes              Plan:  (Medical Decision Making)   1   Move to floor  2   Diet with thickened liquids  3   Day 10 rocephine- check bronch results -stop antibx soon unless culturs +  --    More than 50% of the time documented was spent in face-to-face contact with the patient and in the care of the patient on the floor/unit where the patient is located.     Osvaldo Ayala MD

## 2017-05-25 NOTE — PROGRESS NOTES
Patient continues to have episodes of SB with rate in high 40's-50's when asleep. HR returns to 70's shortly after. All other VSS. Continuing to monitor.

## 2017-05-25 NOTE — PROGRESS NOTES
Pt returns from barium swallow procedure. He was on room air, sats 90%. Pt placed back on 2L/humidified NC. Pt up to recliner at this time. Able to take a couple of steps and bear weight but appears unsteady. VSS and sat 99% after getting up. He states he feels much better in his ribs/back after sitting up.

## 2017-05-25 NOTE — INTERDISCIPLINARY ROUNDS
Interdisciplinary team rounds were held 5/25/2017 with the following team members:Care Management, Nursing, Nurse Practitioner, Nutrition, Palliative Care, Pastoral Care, Pharmacy, Physician, Respiratory Therapy, Wound Care and Clinical Coordinator and the patient. Plan of care discussed. See clinical pathway and/or care plan for interventions and desired outcomes.

## 2017-05-25 NOTE — PROGRESS NOTES
Patient arrived to room 828 via transport from CCU. Patient alert and orientated with no distress noted. IV intact and patent with no s/s of infection noted. Respirations even and unlabored with heart rate regular. Duel skin assessment completed with EULALIA Gorman. Patient has abrasion on knee and blanchable redness on buttocks. Patient accompanied by daughter. Patient appears to comfortable at this time. Patient orientated to room and surroundings. Patient unable to ambulate independently without assistance; needs x1 r/t weakness. Bed in low locked position with call light within reach. Patient instructed to call if assistance is needed. Will continue to monitor.

## 2017-05-25 NOTE — PROGRESS NOTES
Met with pt in ICU after transfer from floor. Admitted with CAP. He is alert and oriented, very pleasant gentleman. Daughter and wife, Deandra Rivero ([phone]) at bedside. Pt was independent and active prior to admission. He lives with wife in house, does not use anything to ambulate with, drives self normally. He wears fitbit and gets in a least 10,000 steps per day. Pt has no DME's. Confirmed insurance as MCR/Supplemental plan and able to get Rx. Dr. Haydee Gregorio is PCP. We discussed possible need for HH vs inpt rehab, as pt has been here 9 days now. He and family verbalized understanding. When medically stable, needs PT/OT, speech already ordered and awaiting swallow test, as he failed bedside. Will place PPD for any potential rehab needs. We discussed SNF, and IRC as possibilities. CM will continue to follow for d/c needs. Care Management Interventions  PCP Verified by CM: Yes  Mode of Transport at Discharge: Other (see comment)  Physical Therapy Consult: Yes  Occupational Therapy Consult: Yes  Speech Therapy Consult: Yes  Current Support Network: Lives with Spouse, Own Home  Confirm Follow Up Transport: Family  Plan discussed with Pt/Family/Caregiver: Yes  Freedom of Choice Offered:  Yes

## 2017-05-25 NOTE — PROGRESS NOTES
TRANSFER - IN REPORT:    Verbal report received from Vito trivedi RN on Lesly Cevallos  being received from CCU for routine progression of care      Report consisted of patients Situation, Background, Assessment and   Recommendations(SBAR). Information from the following report(s) SBAR was reviewed with the receiving nurse. Opportunity for questions and clarification was provided. Assessment completed upon patients arrival to unit and care assumed.

## 2017-05-25 NOTE — PROGRESS NOTES
Problem: Dysphagia (Adult)  Goal: *Speech Goal: (INSERT TEXT)  STG: Pt will participate with modified barium swallow study (MBS) x1 when respiratory status allows. Goal met 5/25/17  STG: Pt will swallow mechanical soft diet/nectar thick liquids without overt signs/sx of aspiration with 100% accuracy. Goal updated 5/25/17  STG: Patient will perform laryngeal strengthening exercises x10 each with 80% accuracy. Goal added 5/25/17  LTG: Pt will tolerate the least restrictive diet at discharge without respiratory compromise     Speech language pathology: modified barium swallow study: Initial Assessment    NAME/AGE/GENDER: Madeline Page is a 80 y.o. male  DATE: 5/25/2017  PRIMARY DIAGNOSIS: Pleural effusion [J90]  Pleural effusion [J90]  Atelectasis [J98.11]  Procedure(s) (LRB):  BRONCHOSCOPY (N/A) 1 Day Post-Op  ICD-10: Treatment Diagnosis: Oropharyngeal dysphagia (R13.12)  INTERDISCIPLINARY COLLABORATION: Radiologist, Dr. Aleena Duff  PRECAUTIONS/ALLERGIES: Codeine ASSESSMENT/PLAN OF CARE:Based on the objective data described below, Mr. Nora Reyna presents with deep laryngeal penetration during swallow of thin liquids. No laryngeal penetration or aspiration observed with nectar-thick liquids, pudding, mixed consistency or cracker. There was moderate residue in valleculae and pyriform sinuses after swallows of all textures that cleared slightly with liquid rinse and double swallow. At completion of exam, patient with increased coughing and reporting sensation that food was coming back up into throat. Noted, per CT 5/15/17 distal esophageal thickening. Patient may benefit from further assessment of esophageal phase of swallow. Recommend mechanical soft diet and nectar-thick liquids. Crush  meds in puree. Patient should alternate solids with liquids 1:1 and double swallow after each PO presentation.  Patient will benefit from skilled intervention to address the below impairments and for laryngeal strengthening and coordination exercises. Results/recommendations discussed with Trung Alexandra RN.?????? ? ? This section established at most recent assessment??????????  RECOMMENDATIONS AND PLANNED INTERVENTIONS (Benefits and precautions of therapy have been discussed with the patient.):  · PO:  Mechanical soft with chopped meat and vegetables  · Liquids:  nectar  MEDICATIONS:  · Crushed in puree  COMPENSATORY STRATEGIES/MODIFICATIONS INCLUDING:  · Alternate liquids/solids  · Double swallow  · Small sips and bites  · Upright for all PO  · Remain upright 20-30 minutes after PO  OTHER RECOMMENDATIONS (including follow up treatment recommendations): · Family training/education  · Laryngeal exercises  · Patient education  · GI consult  FREQUENCY/DURATION: Continue to follow patient 3 times a week until goals met. RECOMMENDED REHABILITATION/EQUIPMENT: (at time of discharge pending progress):   Continue Skilled Therapy. SUBJECTIVE:   Patient pleasant and cooperative. He is accompanied by student nurse. History of Present Injury/Illness: Mr. Wesley Leung  has a past medical history of Arthritis; Elevated prostate specific antigen (PSA); HLA-B27 positive; Impotence of organic origin; Neck pain; Osteoarthritis, hand; Osteopenia; Osteoporosis; Other nonspecific abnormal finding of lung field; Polyarthritis; Polymyalgia rheumatica ([address] Prostate cancer ([address] Raynaud's disease; 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[address] and Thoracic spine pain. He also has no past medical history of Asthma; Autoimmune disease ([address] Chronic kidney disease; Chronic obstructive pulmonary disease ([address] Chronic pain; Diabetes ([address] Difficult intubation; GERD (gastroesophageal reflux disease); Heart abnormalities; Hypertension; Liver disease; Malignant hyperthermia due to anesthesia; Nausea & vomiting; Neurological disorder; Other unknown and unspecified cause of morbidity or mortality; Pseudocholinesterase deficiency; Psychiatric disorder; PUD (peptic ulcer disease); Seizures ([address]  Stroke [facility]); Thromboembolus ([facility] [facility] Thyroid disease; or Unspecified adverse effect of anesthesia. He also  has a past surgical history that includes vasectomy (40+ yrs); knee arthroscopy (2009); urological; biopsy prostate; prostatectomy (2012); cataract removal (2012); and cataract removal (2013). Present Symptoms: pneumonia   Pain Intensity 1: 0  Pain Location 1: Chest  Pain Orientation 1: Right  Pain Intervention(s) 1: Medication (see MAR)    Current Dietary Status:  npo      Social History/Home Situation:    Home Environment: Private residence  # Steps to Enter: 2  One/Two Story Residence: One story  Living Alone: No  Support Systems: Spouse/Significant Other/Partner  Patient Expects to be Discharged to[de-identified] Private residence  Current DME Used/Available at Home: None  Tub or Shower Type: Tub/Shower combination  OBJECTIVE:     Cognitive/Communication Status:  Mental Status  Neurologic State: Alert  Orientation Level: Oriented X4  Cognition: Follows commands  Perception: Appears intact  Perseveration: No perseveration noted  Safety/Judgement: Awareness of environment    Oral Assessment:  Oral Assessment  Labial: No impairment  Dentition: Limited, Natural  Lingual: No impairment  Velum: No impairment    Vocal Quality: adequate    Patient Viewed:    Film Views: Lateral, Fluoro    Oral Prepatory:  The patient was given the following: Consistency Presented:  Thin liquid, Nectar thick liquid, Pudding, Mixed consistency, Solid  How Presented: Self-fed/presented, SLP-fed/presented, Cup/sip, Cup/gulp, Spoon, Straw, Successive swallows    Oral Phase:  Bolus Acceptance: No impairment  Bolus Formation/Control: No impairment  Propulsion: No impairment     Oral Residue: None  Initiation of Swallow: No impairment  Oral Phase Severity: Minimal    Pharyngeal Phase:  Timing: No impairment  Decreased Tongue Base Retraction?: No  Laryngeal Elevation: Inadequate epiglottic inversion, Incomplete laryngeal closure  Penetration: During swallow, After swallow, To cords, To laryngeal vestibule, From initial swallow, From residual  Aspiration/Timing: No evidence of aspiration  Aspiration/Penetration Score: 5 (Penetration/Visible residue-Contrast contacts the folds, but is not ejected)   Pharyngeal Symmetry: Not assessed  Pharyngeal Dysfunction: Decreased elevation/closure  Pharyngeal Phase Severity: Mild moderate  Pharyngeal-Esophageal Segment: Suspected esophageal dysphagia    Assessment/Reassessment only, no treatment provided today    Tool Used: Dysphagia Outcome and Severity Scale (LION)    Score Comments   Normal Diet  [] 7 With no strategies or extra time needed       Functional Swallow  [] 6 May have mild oral or pharyngeal delay       Mild Dysphagia    [] 5 Which may require one diet consistency restricted (those who demonstrate penetration which is entirely cleared on MBS would be included)   Mild-Moderate Dysphagia  [] 4 With 1-2 diet consistencies restricted       Moderate Dysphagia  [x] 3 With 2 or more diet consistencies restricted       Moderately Severe Dysphagia  [] 2 With partial PO strategies (trials with ST only)       Severe Dysphagia  [] 1 With inability to tolerate any PO safely          Score:  Initial: 2 Most Recent: X (Date: -- )   Interpretation of Tool: The Dysphagia Outcome and Severity Scale (LION) is a simple, easy-to-use, 7-point scale developed to systematically rate the functional severity of dysphagia based on objective assessment and make recommendations for diet level, independence level, and type of nutrition. Score 7 6 5 4 3 2 1   Modifier CH CI CJ CK CL CM CN   ?  Swallowing:     - CURRENT STATUS: CL - 60%-79% impaired, limited or restricted    - GOAL STATUS:  CJ - 20%-39% impaired, limited or restricted    - D/C STATUS:  ---------------To be determined---------------  [place] / [place] / Product Type: Medicare / __________________________________________________________________________________________________  Safety:   After treatment position/precautions:  · Patient waiting in radiology holding bay for transport back to room.    Recommendations for treatment: laryngeal exercises  Total Treatment Duration:  Time In: 1000   Time Out: [address], [place], [facility]-SLP

## 2017-05-26 LAB
BACTERIA SPEC CULT: NORMAL
GRAM STN SPEC: NORMAL
SERVICE CMNT-IMP: NORMAL

## 2017-05-26 PROCEDURE — 65270000029 HC RM PRIVATE

## 2017-05-26 PROCEDURE — 97530 THERAPEUTIC ACTIVITIES: CPT

## 2017-05-26 PROCEDURE — 92526 ORAL FUNCTION THERAPY: CPT

## 2017-05-26 PROCEDURE — 74011250637 HC RX REV CODE- 250/637: Performed by: INTERNAL MEDICINE

## 2017-05-26 PROCEDURE — 97168 OT RE-EVAL EST PLAN CARE: CPT

## 2017-05-26 PROCEDURE — 74011250636 HC RX REV CODE- 250/636: Performed by: INTERNAL MEDICINE

## 2017-05-26 PROCEDURE — 97164 PT RE-EVAL EST PLAN CARE: CPT

## 2017-05-26 PROCEDURE — 94760 N-INVAS EAR/PLS OXIMETRY 1: CPT

## 2017-05-26 PROCEDURE — 94640 AIRWAY INHALATION TREATMENT: CPT

## 2017-05-26 PROCEDURE — 77010033678 HC OXYGEN DAILY

## 2017-05-26 PROCEDURE — 74011000250 HC RX REV CODE- 250: Performed by: INTERNAL MEDICINE

## 2017-05-26 PROCEDURE — 99232 SBSQ HOSP IP/OBS MODERATE 35: CPT | Performed by: INTERNAL MEDICINE

## 2017-05-26 RX ORDER — PANTOPRAZOLE SODIUM 40 MG/1
40 TABLET, DELAYED RELEASE ORAL
Status: DISCONTINUED | OUTPATIENT
Start: 2017-05-27 | End: 2017-05-31 | Stop reason: HOSPADM

## 2017-05-26 RX ADMIN — Medication 5 ML: at 05:35

## 2017-05-26 RX ADMIN — ALBUTEROL SULFATE 2.5 MG: 2.5 SOLUTION RESPIRATORY (INHALATION) at 07:47

## 2017-05-26 RX ADMIN — ALBUTEROL SULFATE 2.5 MG: 2.5 SOLUTION RESPIRATORY (INHALATION) at 23:45

## 2017-05-26 RX ADMIN — Medication 5 ML: at 12:00

## 2017-05-26 RX ADMIN — HYDROCODONE BITARTRATE AND ACETAMINOPHEN 1 TABLET: 7.5; 325 TABLET ORAL at 16:41

## 2017-05-26 RX ADMIN — Medication 5 ML: at 16:00

## 2017-05-26 RX ADMIN — Medication 5 ML: at 14:00

## 2017-05-26 RX ADMIN — POTASSIUM CHLORIDE 40 MEQ: 20 TABLET, EXTENDED RELEASE ORAL at 08:20

## 2017-05-26 RX ADMIN — HYDROCODONE BITARTRATE AND ACETAMINOPHEN 1 TABLET: 7.5; 325 TABLET ORAL at 07:25

## 2017-05-26 RX ADMIN — ALBUTEROL SULFATE 2.5 MG: 2.5 SOLUTION RESPIRATORY (INHALATION) at 03:10

## 2017-05-26 RX ADMIN — Medication 5 ML: at 20:00

## 2017-05-26 RX ADMIN — FLUCONAZOLE 200 MG: 100 TABLET ORAL at 08:20

## 2017-05-26 RX ADMIN — Medication 5 ML: at 08:21

## 2017-05-26 RX ADMIN — ALBUTEROL SULFATE 2.5 MG: 2.5 SOLUTION RESPIRATORY (INHALATION) at 20:40

## 2017-05-26 RX ADMIN — PANTOPRAZOLE SODIUM 40 MG: 40 TABLET, DELAYED RELEASE ORAL at 05:36

## 2017-05-26 RX ADMIN — Medication 5 ML: at 04:00

## 2017-05-26 RX ADMIN — POTASSIUM CHLORIDE 40 MEQ: 20 TABLET, EXTENDED RELEASE ORAL at 16:41

## 2017-05-26 RX ADMIN — DULOXETINE HYDROCHLORIDE 30 MG: 30 CAPSULE, DELAYED RELEASE ORAL at 08:20

## 2017-05-26 RX ADMIN — ENOXAPARIN SODIUM 40 MG: 40 INJECTION SUBCUTANEOUS at 19:53

## 2017-05-26 RX ADMIN — HYDROCODONE BITARTRATE AND ACETAMINOPHEN 1 TABLET: 7.5; 325 TABLET ORAL at 11:50

## 2017-05-26 RX ADMIN — ALBUTEROL SULFATE 2.5 MG: 2.5 SOLUTION RESPIRATORY (INHALATION) at 16:01

## 2017-05-26 RX ADMIN — ALBUTEROL SULFATE 2.5 MG: 2.5 SOLUTION RESPIRATORY (INHALATION) at 13:05

## 2017-05-26 RX ADMIN — HYDROCODONE BITARTRATE AND ACETAMINOPHEN 1 TABLET: 7.5; 325 TABLET ORAL at 21:48

## 2017-05-26 RX ADMIN — ALBUTEROL SULFATE 2.5 MG: 2.5 SOLUTION RESPIRATORY (INHALATION) at 00:05

## 2017-05-26 NOTE — PROGRESS NOTES
Patient sitting in chair with no complaints at this time. Patient is alert and orientated with no distress noted. IV intact and patent with no s/s of infection noted. Respirations even and unlabored with heart rate regular. Wife at bedside for support. Patient unable to ambulate independently without assistance; needs x1 r/t weakness and sob. Bed in low locked position with call light within reach. Patient instructed to call if assistance is needed. Will continue to monitor.     Patient request Norco r/t pain per order; see [facility]

## 2017-05-26 NOTE — PROGRESS NOTES
Problem: Dysphagia (Adult)  Goal: *Speech Goal: (INSERT TEXT)  STG: Pt will participate with modified barium swallow study (MBS) x1 when respiratory status allows. Goal met 5/25/17  STG: Pt will swallow mechanical soft diet/nectar thick liquids without overt signs/sx of aspiration with 100% accuracy. Goal updated 5/25/17  STG: Patient will perform laryngeal strengthening exercises x10 each with 80% accuracy. Goal added 5/25/17  LTG: Pt will tolerate the least restrictive diet at discharge without respiratory compromise     Speech language pathology: bedside swallow note: Daily Note 1    NAME/AGE/GENDER: Jo Ann Sosa is a 80 y.o. male  DATE: 5/26/2017  PRIMARY DIAGNOSIS: Pleural effusion [J90]  Pleural effusion [J90]  Atelectasis [J98.11]  Procedure(s) (LRB):  BRONCHOSCOPY (N/A) 2 Days Post-Op  ICD-10: Treatment Diagnosis: dysphagia; oropharyngeal R13.12  INTERDISCIPLINARY COLLABORATION: Registered Nurse  PRECAUTIONS/ALLERGIES: Codeine ASSESSMENT:Patient seen during noon meal of mechanical soft and nectar-thick liquids. Wife present with many questions. Patient following safe swallowing strategies (alternate solid with liquid and double swallow) without cues. No overt signs or symptoms of aspiration observed during meal. Patient with baseline cough prior to PO that actually decreased during meal.?????? ? ? This section established at most recent assessment??????????  PROBLEM LIST (Impairments causing functional limitations):  1. Dysphagia  2. Odynophagia  3. dysphonia  REHABILITATION POTENTIAL FOR STATED GOALS: Good  PLAN OF CARE:   Patient will benefit from skilled intervention to address the following impairments.   RECOMMENDATIONS AND PLANNED INTERVENTIONS (Benefits and precautions of therapy have been discussed with the patient.):  · PO: Mechanical soft with chopped meat and vegetables  · Liquids: nectar  MEDICATIONS:  · Crushed in puree  COMPENSATORY STRATEGIES/MODIFICATIONS INCLUDING:  · Alternate liquids/solids  · Double swallow  · Small sips and bites  · Upright for all PO  · Remain upright 20-30 min after PO  OTHER RECOMMENDATIONS (including follow up treatment recommendations): · Family training/education  · Patient education  · Laryngeal exercises  RECOMMENDED DIET MODIFICATIONS DISCUSSED WITH:  · Nursing  · Family  · Patient  FREQUENCY/DURATION: Continue to follow patient 3x a week or until short term goals are met to address above goals. RECOMMENDED REHABILITATION/EQUIPMENT: (at time of discharge pending progress):   Continue Skilled Therapy. SUBJECTIVE:   Patient pleasant and cooperative, smiling. History of Present Injury/Illness: Mr. Neha Ramirez  has a past medical history of Arthritis; Elevated prostate specific antigen (PSA); HLA-B27 positive; Impotence of organic origin; Neck pain; Osteoarthritis, hand; Osteopenia; Osteoporosis; Other nonspecific abnormal finding of lung field; Polyarthritis; Polymyalgia rheumatica ([address] Prostate cancer ([address] Raynaud's disease; 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[address] and Thoracic spine pain. He also has no past medical history of Asthma; Autoimmune disease ([address] Chronic kidney disease; Chronic obstructive pulmonary disease ([address] Chronic pain; Diabetes ([address] Difficult intubation; GERD (gastroesophageal reflux disease); Heart abnormalities; Hypertension; Liver disease; Malignant hyperthermia due to anesthesia; Nausea & vomiting; Neurological disorder; Other unknown and unspecified cause of morbidity or mortality; Pseudocholinesterase deficiency; Psychiatric disorder; PUD (peptic ulcer disease); Seizures ([address] Stroke [facility]); Thromboembolus ([address] Thyroid disease; or Unspecified adverse effect of anesthesia. [facility] Setting He also  has a past surgical history that includes vasectomy (40+ yrs); knee arthroscopy (2009); urological; biopsy prostate; prostatectomy (2012); cataract removal (2012); and cataract removal (2013).   Present Symptoms: dysphagia  Pain Intensity 1: 0  Pain Location 1: (between lateral rib cage and ischium)  Pain Orientation 1: Left, Right  Pain Intervention(s) 1: Exercise  Current Medications:   No current facility-administered medications on file prior to encounter. Current Outpatient Prescriptions on File Prior to Encounter   Medication Sig Dispense Refill    DULoxetine (CYMBALTA) 30 mg capsule Take 1 Cap by mouth daily. 90 Cap 1    traMADol (ULTRAM) 50 mg tablet Take 1 Tab by mouth three (3) times daily as needed for Pain. Max Daily Amount: 150 mg. 90 Tab 2    omeprazole (PRILOSEC) 40 mg capsule Take 1 Cap by mouth daily. 30 Cap 5    diclofenac (VOLTAREN) 1 % topical gel Apply 4 g to affected area four (4) times daily. 100 g 2    INFLIXIMAB (REMICADE IV) by IntraVENous route. Every 6 weeks.  calcium-vitamin D (OYSTER SHELL) 500 mg(1,250mg) -200 unit per tablet Take 1 Tab by mouth daily.  cholecalciferol, vitamin D3, (VITAMIN D3) 2,000 unit Tab Take  by mouth. Taking 1 1/2 tabs of 2000 international units once daily       Current Dietary Status:  Mechanical soft; nectar liquids      History of reflux:  yes   Reflux medication: prilosec  Social History/Home Situation: home with family  Home Environment: Private residence  # Steps to Enter: 2  One/Two Story Residence: One story  Living Alone: No  Support Systems: Spouse/Significant Other/Partner  Patient Expects to be Discharged to[de-identified] Private residence  Current DME Used/Available at Home: None  Tub or Shower Type: Tub/Shower combination  OBJECTIVE:   Respiratory Status:  Nasal cannula  2 l/min  CXR Results: There is small left pleural effusion with associated atelectasis. There is  patchy density at the right upper lung, similar to prior exam. No pneumothorax. No evidence of pulmonary edema.  Cardiac mediastinal contour and the surrounding  bones are stable  MRI/CT Results: n/a    Cognitive and Communication Status:  Neurologic State: Alert  Orientation Level: Appropriate for age  Cognition: Appropriate decision making  Perception: Appears intact  Perseveration: No perseveration noted  Safety/Judgement: Awareness of environment; Insight into deficits    BEDSIDE SWALLOW EVALUATION  Oral Assessment:  Oral Assessment  Labial: No impairment  Dentition: Natural  Lingual: No impairment  P.O. Trials:  Patient Position: upright in chair    The patient was given the following:   Consistency Presented: Mechanical soft; Nectar thick liquid  How Presented: Self-fed/presented;Cup/sip;Spoon    ORAL PHASE:  Bolus Acceptance: No impairment  Bolus Formation/Control: No impairment  Propulsion: No impairment          PHARYNGEAL PHASE:  Initiation of Swallow: Delayed (# of seconds)  Laryngeal Elevation: Functional  Aspiration Signs/Symptoms: None  Vocal Quality: Low volume                OTHER OBSERVATIONS:  Rate/bite size: Impaired   Endurance:  Impaired     Tool Used: Dysphagia Outcome and Severity Scale (LION)    Score Comments   Normal Diet  [] 7 With no strategies or extra time needed   Functional Swallow  [] 6 May have mild oral or pharyngeal delay       Mild Dysphagia    [] 5 Which may require one diet consistency restricted (those who demonstrate penetration which is entirely cleared on MBS would be included)   Mild-Moderate Dysphagia  [] 4 With 1-2 diet consistencies restricted       Moderate Dysphagia  [x] 3 With 2 or more diet consistencies restricted       Moderately Severe Dysphagia  [] 2 With partial PO strategies (trials with ST only)       Severe Dysphagia  [] 1 With inability to tolerate any PO safely          Score:  Initial: 2 Most Recent: 5/25/17 (Date: -- )   Interpretation of Tool: The Dysphagia Outcome and Severity Scale (LION) is a simple, easy-to-use, 7-point scale developed to systematically rate the functional severity of dysphagia based on objective assessment and make recommendations for diet level, independence level, and type of nutrition.      Score 7 6 5 4 3 2 1   Modifier CH CI CJ CK CL CM CN ? Swallowing:     - CURRENT STATUS: CL - 60%-79% impaired, limited or restricted    - GOAL STATUS:  CJ - 20%-39% impaired, limited or restricted    - D/C STATUS:  ---------------To be determined---------------  [place] / Product Type: Medicare /     TREATMENT:    (In addition to Assessment/Re-Assessment sessions the following treatments were rendered)  Dysphagia Activities: Activities/Procedures listed utilized to improve progress in swallow strength, swallow function, diet tolerance and swallow safety. Required no cueing to decrease aspiration risk. MODALITIES:                                                                    ORAL MOTOR  EXERCISES:                                                                                                                                                                      LARYNGEAL / PHARYNGEAL EXERCISES:                                                                                                                                     __________________________________________________________________________________________________  Safety:   After treatment position/precautions:  · Up in chair  · Family at bedside  Progression/Medical Necessity:   · Skilled intervention continues to be required due to patient still consuming a modified diet. Compliance with Program/Exercises: Will assess as treatment progresses. Reason for Continuation of Services/Other Comments:  · Patient continues to require skilled intervention due to modified diet recommendation. Recommendations/Intent for next treatment session: \"Treatment next visit will focus on laryngeal exercises\".     Total Treatment Duration:  Time In: 1215  Time Out: [address], [place], [facility]

## 2017-05-26 NOTE — PROGRESS NOTES
Pt is sitting up in bed. Family is at the bedside. Respirations are even and unlabored. No signs or symptoms of distress are noted. No SOB or pain is reported at this time. Call light is within reach. Will continue to monitor.

## 2017-05-26 NOTE — PROGRESS NOTES
Problem: Interdisciplinary Rounds  Goal: Interdisciplinary Rounds  Outcome: Progressing Towards Goal  Interdisciplinary team rounds were held 5/26/2017 with the following team members:Nursing, Physician and Clinical Coordinator and the patient and spouse. Plan of care discussed. See clinical pathway and/or care plan for interventions and desired outcomes.

## 2017-05-26 NOTE — PROGRESS NOTES
Problem: Self Care Deficits Care Plan (Adult)  Goal: *Acute Goals and Plan of Care (Insert Text)  1. Patient will complete lower body bathing and dressing with independence and adaptive equipment as needed. 2. Patient will complete toileting with independence. 3. Patient will tolerate 23 minutes of therapeutic activity with less than 3 rest breaks to increase activity tolerance for ADLs. MET  4. Patient will complete 23 minutes of therapeutic exercise to increase strength for ADLs. 5. Patient will complete functional transfers with independence and adaptive equipment as needed. Timeframe: 7 visits     Comments:       OCCUPATIONAL THERAPY: Re-evaluation and Treatment Day: 1st 5/26/2017  INPATIENT: Hospital Day: 12  [place] / [place] / Product Type: Medicare /      NAME/AGE/GENDER: Dalila Chew is a 80 y.o. male       PRIMARY DIAGNOSIS:  Pleural effusion [J90]  Pleural effusion [J90]  Atelectasis [J98.11] CAP (community acquired pneumonia) CAP (community acquired pneumonia)  Procedure(s) (LRB):  BRONCHOSCOPY (N/A)  2 Days Post-Op  ICD-10: Treatment Diagnosis:        · Generalized Muscle Weakness (M62.81)   Precautions/Allergies:         Codeine       ASSESSMENT:      Mr. Faye Heard re-evaluated after being discharged due to ICU stay. He is on 2 L oxygen via nasal cannula with oxygen saturations in 95-98% range pre/post activity. He has declined below his previous functioning  when first evaluated mainly due to his breathing status. He would continue to benefit from OT per plan of care. Goals still appropriate. This section established at most recent assessment   PROBLEM LIST (Impairments causing functional limitations):  1. Decreased Strength  2. Decreased ADL/Functional Activities  3. Decreased Transfer Abilities  4. Decreased Ambulation Ability/Technique  5. Decreased Balance  6. Decreased Activity Tolerance  7. Decreased Pacing Skills  8.  Decreased Work Simplification/Energy Conservation Techniques  9. Increased Fatigue  10. Increased Shortness of Breath    INTERVENTIONS PLANNED: (Benefits and precautions of occupational therapy have been discussed with the patient.)  1. Activities of daily living training  2. Adaptive equipment training  3. Group therapy  4. Theraputic activity  5. Theraputic exercise      TREATMENT PLAN: Frequency/Duration: Follow patient 3x/week to address above goals. Rehabilitation Potential For Stated Goals: GOOD      RECOMMENDED REHABILITATION/EQUIPMENT: (at time of discharge pending progress): Continue Skilled Therapy. OCCUPATIONAL PROFILE AND HISTORY:   History of Present Injury/Illness (Reason for Referral):  See H&P  Past Medical History/Comorbidities:   Mr. David Dunn  has a past medical history of Arthritis; Elevated prostate specific antigen (PSA); HLA-B27 positive; Impotence of organic origin; Neck pain; Osteoarthritis, hand; Osteopenia; Osteoporosis; Other nonspecific abnormal finding of lung field; Polyarthritis; Polymyalgia rheumatica ([address] Prostate cancer ([address] Raynaud's disease; 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[address] and Thoracic spine pain. He also has no past medical history of Asthma; Autoimmune disease ([address] Chronic kidney disease; Chronic obstructive pulmonary disease ([address] Chronic pain; Diabetes ([address] Difficult intubation; GERD (gastroesophageal reflux disease); Heart abnormalities; Hypertension; Liver disease; Malignant hyperthermia due to anesthesia; Nausea & vomiting; Neurological disorder; Other unknown and unspecified cause of morbidity or mortality; Pseudocholinesterase deficiency; Psychiatric disorder; PUD (peptic ulcer disease); Seizures ([address] Stroke [facility]); Thromboembolus ([address] Thyroid disease; or Unspecified adverse effect of anesthesia.   Mr. David Dunn  has a past surgical history that includes vasectomy (40+ yrs); knee arthroscopy (2009); urological; biopsy prostate; prostatectomy (2012); cataract removal (2012); and cataract removal (2013). Social History/Living Environment:   Home Environment: Private residence  # Steps to Enter: 2  One/Two Story Residence: One story  Living Alone: No  Support Systems: Spouse/Significant Other/Partner  Patient Expects to be Discharged to[de-identified] Private residence  Current DME Used/Available at Home: None  Tub or Shower Type: Tub/Shower combination  Prior Level of Function/Work/Activity:  Lives with spouse in [facility]. Is very active and independent at baseline with ADLs/IADLS. Drives. Personal Factors:          Sex:  male        Age:  80 y.o. Social Background:  Strong support from wife   Number of Personal Factors/Comorbidities that affect the Plan of Care: Expanded review of therapy/medical records (1-2):  MODERATE COMPLEXITY   ASSESSMENT OF OCCUPATIONAL PERFORMANCE[de-identified]   Activities of Daily Living:           Basic ADLs (From Assessment) Complex ADLs (From Assessment)   Basic ADL  Feeding: Supervision  Oral Facial Hygiene/Grooming: Supervision  Bathing: Stand-by assistance  Upper Body Dressing: Supervision  Lower Body Dressing: Stand-by assistance  Toileting: Stand by assistance Instrumental ADL  Meal Preparation: Minimum assistance  Homemaking: Moderate assistance  Medication Management: Setup  Financial Management: Setup   Grooming/Bathing/Dressing Activities of Daily Living     Cognitive Retraining  Safety/Judgement: Awareness of environment               Upper Body Dressing Assistance  Front Opened Shirt: Supervision/set-up (bath robe)  Cues: Verbal cues provided       Bed/Mat Mobility  Sit to Stand: Supervision  Bed to Chair: Supervision  Scooting: Supervision          Most Recent Physical Functioning:   Gross Assessment:  AROM: Generally decreased, functional  Strength: Generally decreased, functional               Posture:  Posture (WDL): Exceptions to WDL  Posture Assessment:  Forward head  Balance:  Sitting: Intact  Standing: Impaired  Standing - Static: Good  Standing - Dynamic : Fair Bed Mobility:  Scooting: Supervision  Wheelchair Mobility:     Transfers:  Sit to Stand: Supervision  Stand to Sit: Supervision  Bed to Chair: Supervision                    Patient Vitals for the past 6 hrs:   BP BP Patient Position SpO2 O2 Flow Rate (L/min) Pulse   17 0735 118/65 At rest 91 % - 70   17 0752 - - 96 % 3 l/min -   17 1045 - At rest 98 % - -   17 1050 - Post activity 95 % 2 l/min -        Mental Status  Neurologic State: Alert  Orientation Level: Oriented to person, Oriented to place, Oriented to time  Cognition: Follows commands  Perception: Appears intact  Perseveration: No perseveration noted  Safety/Judgement: Awareness of environment                               Physical Skills Involved:  1. Balance  2. Mobility  3. Strength  4. Endurance Cognitive Skills Affected (resulting in the inability to perform in a timely and safe manner): 1. none Psychosocial Skills Affected:  1. Routines and Behaviors   Number of elements that affect the Plan of Care: 3-5:  MODERATE COMPLEXITY   CLINICAL DECISION MAKIN[address] [place] [address] AM-PAC 6 Clicks   Basic Mobility Inpatient Short Form  How much help from another person does the patient currently need. .. Total A Lot A Little None   1. Putting on and taking off regular lower body clothing?   [ ] 1   [ ] 2   [X] 3   [ ] 4   2. Bathing (including washing, rinsing, drying)? [ ] 1   [ ] 2   [X] 3   [ ] 4   3. Toileting, which includes using toilet, bedpan or urinal?   [ ] 1   [ ] 2   [X] 3   [] 4   4. Putting on and taking off regular upper body clothing?   [ ] 1   [ ] 2   [X] 3   [] 4   5. Taking care of personal grooming such as brushing teeth? [ ] 1   [ ] 2   [X] 3   [] 4   6. Eating meals? [ ] 1   [ ] 2   [X] 3   [] 4   © , Trustees of [address] [place] [id number], under license to [employer].  All rights reserved    Score:  Initial: 22 Most Recent: 18 (Date: 2017 )     Interpretation of Tool:  Represents activities that are increasingly more difficult (i.e. Bed mobility, Transfers, Gait). Score 24 23 22-20 19-15 14-10 9-7 6       Modifier CH CI CJ CK CL CM CN         · Self Care:               - CURRENT STATUS:    CK - 40%-59% impaired, limited or restricted               - GOAL STATUS:           CI - 1%-19% impaired, limited or restricted               - D/C STATUS:                       ---------------To be determined---------------  [place] / [place] / Product Type: Medicare /       Medical Necessity:     · Patient demonstrates good rehab potential due to higher previous functional level. Reason for Services/Other Comments:  · Patient continues to demonstrate capacity to improve strength and activity tolerance which will increase independence. Use of outcome tool(s) and clinical judgement create a POC that gives a: MODERATE COMPLEXITY             TREATMENT:   (In addition to Assessment/Re-Assessment sessions the following treatments were rendered)      Pre-treatment Symptoms/Complaints:    Pain: Initial:   Pain Intensity 1: 4  Pain Location 1:  (between lateral rib cage and ischium)  Pain Orientation 1: Left, Right  Pain Intervention(s) 1: Exercise  Post Session:  same      Therapeutic Activity: (    15 minutes): Therapeutic activities including Bed transfers, Chair transfers and functional mobility for ADL in room/logan with close supervision to improve mobility, strength, balance and coordination. Required minimal   to promote static and dynamic balance in standing.       Date:  5/26 Date:   Date:     Activity/Exercise Parameters Parameters Parameters   Lat stretch L/R 10-15 seconds each     5 Sit to stands 30 seconds                                   Braces/Orthotics/Lines/Etc:   · O2 Device: Nasal cannula  Treatment/Session Assessment:    · Response to Treatment:  Tolerated well w/o complications or complaints  · Interdisciplinary Collaboration:  · Occupational Therapist  · Registered Nurse  · After treatment position/precautions:  · Bed/Chair-wheels locked, Call light within reach, RN notified, Side rails x 2 and sitting edge of bed  · Compliance with Program/Exercises: compliant all of the time today. · Recommendations/Intent for next treatment session: \"Next visit will focus on advancements to more challenging activities and reduction in assistance provided\".   Total Treatment Duration:  OT Patient Time In/Time Out  Time In: 1038  Time Out: WANDA Borges, OTR/L

## 2017-05-26 NOTE — PROGRESS NOTES
Patient voices no concerns at this time. Patient doing very well at this time. Call light within reach. Family at bedside for support. Will continue to monitor.

## 2017-05-26 NOTE — PROGRESS NOTES
Patient stable with no complaints at this time. Family at bedside for support. Call light within reach.   Report to be given to oncoming RN 7p-7a

## 2017-05-26 NOTE — PROGRESS NOTES
[facility] CRITICAL CARE OUTREACH NURSE PROGRESS REPORT      SUBJECTIVE: Called to assess patient secondary to outreach protocol. MEWS Score: 1 (05/26/17 0336)  Vitals:    05/26/17 0752 05/26/17 1045 05/26/17 1050 05/26/17 1131   BP:    91/50   Pulse:    88   Resp:    18   Temp:    98.3 °F (36.8 °C)   SpO2: 96% 98% 95% 92%   Weight:       Height:              LAB DATA:    Recent Labs      05/25/17   0410  05/24/17   0705   NA  138  134*   K  4.0  3.7   CL  100  96*   CO2  29  27   AGAP  9  11   GLU  76  69   BUN  17  16   CREA  0.44*  0.49*   GFRAA  >60  >60   GFRNA  >60  >60   CA  8.2*  9.2        Recent Labs      05/25/17   0410  05/24/17   0705   WBC  9.6  14.4*   HGB  11.3*  13.0*   HCT  33.0*  37.9*   PLT  709*  672*          OBJECTIVE: On arrival to room, I found patient to be in chair eating. Pain Assessment  Pain Intensity 1: 4 (05/26/17 1050)  Pain Location 1:  (between lateral rib cage and ischium)  Pain Intervention(s) 1: Exercise  Patient Stated Pain Goal: 0                                 ASSESSMENT:  Pt alert and oriented, no pain or distress noted. Lungs CTA. 02 sat 95% on 1L NC, wife at bedside. No immediate needs at this time. PLAN:        Will continue to follow up per outreach protocol.     Signed By:   Stefanie Rowan RN    May 26, 2017 11:53 AM

## 2017-05-26 NOTE — CONSULTS
[facility] Consult Note       Primary GI Physician: Stephanie Lopez MD?  Consulting Physician: Sallee Dakins, MD  Referring Physician:  Roxane Mohamud MD    Consult Date:  5/26/2017  Admit Date:  5/15/2017    Chief Complaint:  Esophageal wall thickening on Chest CT scan    Subjective:     History of Present Illness:  Patient is a 80 y.o. male who is seen in consultation at the request of Dr. Farhan Rojo for further evaluation of esophageal wall thickening. Has MMP outlined below. Patient admitted with CAP on 5/15. Chest CT scan on admission showed LLL PNA & esophageal wall thickening, likely esophagitis. PTA, patient reports having some difficulty swallowing food, no specific food culprits, & has has had some strangulation to liquids. Otherwise, patient denies any overt dysphagia. Recent speech evaluation did not demonstrate any signs or symptoms of aspiration. Patient is currently tolerating  soft, nectar diet. Reports a hx of HB which has been under good control with Pepcid for many years. Prior smoker, quit in 1980. He denies prior EGD. Reports having a colonoscopy with Dr. Garrison Wallace several years ago. No N/V, abdominal pain, diarrhea, hematochezia, or melena. No BMs for 3-4 days, though has not had much to eat over that time span.      PMH:  Past Medical History:   Diagnosis Date    Arthritis     Elevated prostate specific antigen (PSA)     HLA-B27 positive     Impotence of organic origin     Neck pain     Osteoarthritis, hand     Osteopenia     Osteoporosis     Other nonspecific abnormal finding of lung field     Polyarthritis     Polymyalgia rheumatica ([address]     Prostate cancer ([address]     Raynaud's disease     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[address]     Thoracic spine pain        PSH:  Past Surgical History:   Procedure Laterality Date    BIOPSY PROSTATE      HX CATARACT REMOVAL  2012    left     HX CATARACT REMOVAL  2013    right    HX KNEE ARTHROSCOPY  2009    left    HX PROSTATECTOMY 2012    HX UROLOGICAL      prostate ultrasound and biopsy    HX VASECTOMY  40+ yrs       Allergies: Allergies   Allergen Reactions    Codeine Nausea and Vomiting       Home Medications:  Prior to Admission medications    Medication Sig Start Date End Date Taking? Authorizing Provider   DULoxetine (CYMBALTA) 30 mg capsule Take 1 Cap by mouth daily. 5/3/17   Maynor Kramer MD   traMADol (ULTRAM) 50 mg tablet Take 1 Tab by mouth three (3) times daily as needed for Pain. Max Daily Amount: 150 mg. 5/3/17   Maynor Kramer MD   omeprazole (PRILOSEC) 40 mg capsule Take 1 Cap by mouth daily. 1/5/17   Ruby Schaffer MD   diclofenac (VOLTAREN) 1 % topical gel Apply 4 g to affected area four (4) times daily. 9/18/15   Maynor Kramer MD   INFLIXIMAB (REMICADE IV) by IntraVENous route. Every 6 weeks. Historical Provider   calcium-vitamin D (OYSTER SHELL) 500 mg(1,250mg) -200 unit per tablet Take 1 Tab by mouth daily. Historical Provider   cholecalciferol, vitamin D3, (VITAMIN D3) 2,000 unit Tab Take  by mouth.  Taking 1 1/2 tabs of 2000 international units once daily    Historical Provider       Hospital Medications:  Current Facility-Administered Medications   Medication Dose Route Frequency    albuterol (PROVENTIL VENTOLIN) nebulizer solution 2.5 mg  2.5 mg Nebulization Q4H RT    fentaNYL citrate (PF) injection 50 mcg  50 mcg IntraVENous Multiple    morphine injection 2 mg  2 mg IntraVENous Q3H PRN    acetaminophen (TYLENOL) suppository 650 mg  650 mg Rectal Q4H PRN    HYDROcodone-acetaminophen (NORCO) 7.5-325 mg per tablet 1 Tab  1 Tab Oral Q4H PRN    potassium chloride (K-DUR, KLOR-CON) SR tablet 40 mEq  40 mEq Oral BID    magic mouthwash (GRACIA) oral suspension 5 mL  5 mL Oral P0N    methyl salicylate-menthol (BENGAY) 15-10 % cream   Topical QID PRN    fluconazole (DIFLUCAN) tablet 200 mg  200 mg Oral DAILY    magnesium hydroxide (MILK OF MAGNESIA) 400 mg/5 mL oral suspension 30 mL  30 mL Oral DAILY PRN  DULoxetine (CYMBALTA) capsule 30 mg  30 mg Oral DAILY    pantoprazole (PROTONIX) tablet 40 mg  40 mg Oral ACB    traMADol (ULTRAM) tablet 50 mg  50 mg Oral TID PRN    sodium chloride (NS) flush 5 mL  5 mL InterCATHeter Q8H    sodium chloride (NS) flush 5-10 mL  5-10 mL InterCATHeter PRN    enoxaparin (LOVENOX) injection 40 mg  40 mg SubCUTAneous Q24H       Social History:  Social History   Substance Use Topics    Smoking status: Former Smoker     Packs/day: 1.00     Years: 15.00    Smokeless tobacco: Never Used      Comment: quit 1980    Alcohol use No         Family History:  Family History   Problem Relation Age of Onset    Stroke Mother     Other Mother      cirrhosis    Stroke Father     Cancer Other      grandmother    Heart Disease Other      grandfather    Diabetes Other      grandfather       Review of Systems:  A detailed 10 system ROS is obtained, with pertinent positives as listed above. All others are negative. Diet:   soft, nectar diet. Objective:     Physical Exam:  Vitals:  Visit Vitals    BP 91/50 (BP 1 Location: Right arm, BP Patient Position: Sitting)    Pulse 88    Temp 98.3 °F (36.8 °C)    Resp 18    Ht 5' 7\" (1.702 m)    Wt 53.5 kg (118 lb)    SpO2 92%    BMI 18.48 kg/m2     Gen:  Pt is alert, cooperative, THIN MALE IN no acute distress  Skin:  Extremities and face reveal no rashes. HEENT: Sclerae anicteric. Extra-occular muscles are intact. No oral ulcers. No abnormal pigmentation of the lips. The neck is supple. Cardiovascular: RRR No murmurs, gallops, or rubs. Respiratory:  + CRACKLES. On 2 L of oxygen  GI:  Abdomen nondistended, soft, and nontender. Normal active bowel sounds. No enlargement of the liver or spleen. No masses palpable. Rectal:  Deferred  Musculoskeletal:  No pitting edema of the lower legs. Neurological:  Gross memory appears intact. Patient is alert and oriented.   Psychiatric:  Mood appears appropriate with judgement intact. Lymphatic:  No cervical or supraclavicular adenopathy. Laboratory:    Recent Labs      05/25/17   0410  05/24/17   0705   WBC  9.6  14.4*   HGB  11.3*  13.0*   HCT  33.0*  37.9*   PLT  709*  672*   MCV  88.7  88.3   NA  138  134*   K  4.0  3.7   CL  100  96*   CO2  29  27   BUN  17  16   CREA  0.44*  0.49*   CA  8.2*  9.2   GLU  76  69          Assessment:     Principal Problem:  CAP (community acquired pneumonia) (5/15/2017)    Active Problems:  Spondyloarthritis ([facility] [address] [address] (9/17/2013)  Ankylosing spondylitis ([facility] [address] (1/29/2015)  Immunosuppression ([facility] [address] (5/15/2017)  Sepsis ([facility] [address] (5/15/2017)  DENIS (acute kidney injury) ([facility] [address] (5/15/2017)  Hypoxemia (5/15/2017)  Pleural effusion, bilateral (5/21/2017)  Candidiasis (5/21/2017)  Parapneumonic effusion (5/22/2017)  Ineffective airway clearance (5/24/2017)    79 y/o male with PMHx to include, but not limited to ankylosing spondylitis on Remicade, admitted with CAP. Reports moderate SOB, but breathing is overall better. Chest CT scan on 5/15 showed esophageal wall thickening, likely esophagitis. Reports a hx of HB controlled with Pepcid at home. PTA, reports occasional solid food dysphagia & some strangulation to liquid. SLP evaluation here with no evidence of aspiration. Patient is tolerating mechanical soft nectar diet. No prior EGD. Plan: Will need EGD at some point for further evaluation - inpatient vs outpatient (likely the latter since he is asymptomatic & PNA). Ddx: reflux esophagitis, neoplasm given the hx of tobacco use, candidal / viral esophagitis given the immunocompromised state, though pt denies any odynophagia. Patient is on Diflucan. Per Dr. Samantha Dugan, pulmonary stable, OK to do endoscopy if neede. Continue PPI . Mihaela Alvarado PA-C    Patient is seen and examined in collaboration with Dr. Jearld Melissa. Assessment and plan as per Dr. Shima Hernandez.

## 2017-05-26 NOTE — PROGRESS NOTES
Daily Progress Note: 5/26/2017    Jimmy Glover   Admission Date: 5/15/2017         The patient's chart is reviewed and the patient is discussed with the staff. Patient is a 80 y.o.  male with a hx of anklelosing spondylitis on remicade who was in usual state of health until 5/13. He developed shaking chills and nausea. He has been sob and has had left side pleuritic chest and back pain. On 5/15 he was taken to Dr. Florencia Hernandez office and O2 sat was low so he was sent to the er and admitted. He was noted to have lactate of 3.5 and cxr showed LLL and RUL infiltrate. BP initially low but responded to fluids, admitted to 8th floor. Sputum culture with strep pneumo. CXR with pleural effusion. Had B thoracentesis 5/21 and developed worsening anxiety and pain afterward, transferred to ICU, no PTX on CXR. Also c/o L neck pain, found to have thrush  Overall he feels better but is still on 35% airvo. Not as anxious. Still some cough. 5/23 ultrasound revealed minimal effusion on left. Bronch done 5/24 and mucus plugs removed from left. He is better today with O2 sats in 90s off O2    Subjective:     Feels Ok this AM. Some chest wall discomfort- had been coughing. Sputum lighter. Has not been OOB. Was not on oxygen PTA. Now on modified diet per ST.  Had esophageal thickening on prior CT chest.    Current Facility-Administered Medications   Medication Dose Route Frequency    albuterol (PROVENTIL VENTOLIN) nebulizer solution 2.5 mg  2.5 mg Nebulization Q4H RT    fentaNYL citrate (PF) injection 50 mcg  50 mcg IntraVENous Multiple    morphine injection 2 mg  2 mg IntraVENous Q3H PRN    acetaminophen (TYLENOL) suppository 650 mg  650 mg Rectal Q4H PRN    HYDROcodone-acetaminophen (NORCO) 7.5-325 mg per tablet 1 Tab  1 Tab Oral Q4H PRN    potassium chloride (K-DUR, KLOR-CON) SR tablet 40 mEq  40 mEq Oral BID    magic mouthwash (GRACIA) oral suspension 5 mL  5 mL Oral R1H    methyl salicylate-menthol (BENGAY) 15-10 % cream   Topical QID PRN    fluconazole (DIFLUCAN) tablet 200 mg  200 mg Oral DAILY    magnesium hydroxide (MILK OF MAGNESIA) 400 mg/5 mL oral suspension 30 mL  30 mL Oral DAILY PRN    DULoxetine (CYMBALTA) capsule 30 mg  30 mg Oral DAILY    pantoprazole (PROTONIX) tablet 40 mg  40 mg Oral ACB    traMADol (ULTRAM) tablet 50 mg  50 mg Oral TID PRN    sodium chloride (NS) flush 5 mL  5 mL InterCATHeter Q8H    sodium chloride (NS) flush 5-10 mL  5-10 mL InterCATHeter PRN    cefTRIAXone (ROCEPHIN) 2 g in 0.9% sodium chloride (MBP/ADV) 50 mL  2 g IntraVENous Q24H    enoxaparin (LOVENOX) injection 40 mg  40 mg SubCUTAneous Q24H       Review of Systems    Constitutional:  negative for fever, chills, sweats  Cardiovascular:  negative for chest pain, palpitations, syncope, edema  Gastrointestinal:  negative for dysphagia, reflux, vomiting, diarrhea, abdominal pain, or melena  Neurologic:  negative for focal weakness, numbness, headache      Objective:     Vitals:    05/26/17 0310 05/26/17 0336 05/26/17 0735 05/26/17 0752   BP:  110/57 118/65    Pulse:  70 70    Resp:  20 20    Temp:  97.6 °F (36.4 °C) 97.6 °F (36.4 °C)    SpO2: 97% 97% 91% 96%   Weight:       Height:           Intake and Output:   05/24 1901 - 05/26 0700  In: 436.7 [P.O.:120; I.V.:316.7]  Out: 1750 [Urine:1750]       Physical Exam:          Constitutional:  the patient is well developed and in no acute distress on 2L  EENMT:  Sclera clear, pupils equal, oral mucosa moist  Respiratory:  Some crackles  Cardiovascular:  RRR without M,G,R  Gastrointestinal: soft and non-tender; with positive bowel sounds. Musculoskeletal: warm without cyanosis. There is no lower leg edema. Skin:  no jaundice or rashes, no wounds   Neurologic: no gross neuro deficits     Psychiatric:  alert and oriented x 3    LINES:  peripheral    DRIPS:   none    CXR: improved      LAB  No results for input(s): GLUCPOC in the last 72 hours.     No lab exists for component: [place]   Recent Labs      05/25/17   0410  05/24/17   0705   WBC  9.6  14.4*   HGB  11.3*  13.0*   HCT  33.0*  37.9*   PLT  709*  672*     Recent Labs      05/25/17   0410  05/24/17   0705   NA  138  134*   K  4.0  3.7   CL  100  96*   CO2  29  27   GLU  76  69   BUN  17  16   CREA  0.44*  0.49*   CA  8.2*  9.2     No results for input(s): PH, PCO2, PO2, HCO3 in the last 72 hours. No results for input(s): LCAD, LAC in the last 72 hours. Bronch wash: (5/24): NRF, AFB negative; cytology negative    Assessment:  (Medical Decision Making)     Hospital Problems  Date Reviewed: 5/15/2017          Codes Class Noted POA    Ineffective airway clearance ICD-10-CM: R06.89  ICD-9-CM: 786.09  5/24/2017 Unknown    Seems better    Parapneumonic effusion ICD-10-CM: J18.9, J91.8  ICD-9-CM: 511.89  5/22/2017 Unknown    Small after tap    Pleural effusion, bilateral ICD-10-CM: J90  ICD-9-CM: 511.9  5/21/2017 Unknown    Minimal on last CXR    Candidiasis ICD-10-CM: B37.9  ICD-9-CM: 112.9  5/21/2017 Unknown    On fluconazole    Immunosuppression ([facility] (Chronic) ICD-10-CM: D89.9  ICD-9-CM: 279.9  5/15/2017 Yes        * (Principal)CAP (community acquired pneumonia) ICD-10-CM: J18.9  ICD-9-CM: 540  5/15/2017 Yes    Better. Sepsis ([facility] ICD-10-CM: A41.9  ICD-9-CM: 038.9, 995.91  5/15/2017 Unknown        DENIS (acute kidney injury) ([facility] ICD-10-CM: N17.9  ICD-9-CM: 584.9  5/15/2017 Unknown    Resolved    Hypoxemia ICD-10-CM: R09.02  ICD-9-CM: 799.02  5/15/2017 Unknown    Better. Ankylosing spondylitis ([facility] ICD-10-CM: M45.9  ICD-9-CM: 720.0  1/29/2015 Yes        Spondyloarthritis ([facility] (Chronic) ICD-10-CM: M46.90  ICD-9-CM: 721.90  9/17/2013 Yes              Plan:  (Medical Decision Making)     Stop ATB. Wean oxygen. PT eval.  Consult GI to see if endoscopy needed to assess esophageal thickening. Mobilize.   --    More than 50% of the time documented was spent in face-to-face contact with the patient and in the care of the patient on the floor/unit where the patient is located.     Hetal Ferreira MD

## 2017-05-26 NOTE — PROGRESS NOTES
Date of Outreach Update:  Marques Cheung was seen and assessed. MEWS Score: 1 (05/26/17 0336)  Vitals:    05/26/17 1131 05/26/17 1306 05/26/17 1546 05/26/17 1603   BP: 91/50  129/68    Pulse: 88  (!) 104    Resp: 18  20    Temp: 98.3 °F (36.8 °C)  99 °F (37.2 °C)    SpO2: 92% 95% 93% 94%   Weight:       Height:             Pain Assessment  Pain Intensity 1: 0 (05/26/17 1324)  Pain Location 1:  (between lateral rib cage and ischium)  Pain Intervention(s) 1: Exercise  Patient Stated Pain Goal: 0      Previous Outreach assessment has been reviewed. There have been no significant clinical changes since the completion of the last dated Outreach assessment. Will continue to follow up per outreach protocol.     Signed By:   Charmaine Light RN    May 26, 2017 5:22 PM

## 2017-05-26 NOTE — PROGRESS NOTES
Report to be given to oncoming nurse. Pt had an great night with spouse at the bedside. Will continue to monitor.

## 2017-05-26 NOTE — PROGRESS NOTES
Problem: Mobility Impaired (Adult and Pediatric)  Goal: *Acute Goals and Plan of Care (Insert Text)  UPDATED: 5/26/17  LTG:  (1.)Mr. Mukesh Deluca will move from supine to sit and sit to supine , scoot up and down and roll side to side with INDEPENDENCE within 7 day(s). (2.)Mr. Mukesh Deluca will transfer from bed to chair and chair to bed with MODIFIED INDEPENDENCE using the least restrictive device within 7 day(s). (3.)Mr. Mukesh Deluca will ambulate with MODIFIED INDEPENDENCE for 500 feet with the least restrictive device within 7 day(s). (4.)Mr. Mukesh Deluca will participate in therapeutic activity/exerices x 20 minutes for increased activity tolerance within 7 days. All goals with O2 >90%         PHYSICAL THERAPY: Re-evaluation and AM 5/26/2017  INPATIENT: Hospital Day: 12  [place] / Product Type: Medicare /      NAME/AGE/GENDER: Jaden Moreno is a 80 y.o. male       PRIMARY DIAGNOSIS: Pleural effusion [J90]  Pleural effusion [J90]  Atelectasis [J98.11] CAP (community acquired pneumonia) CAP (community acquired pneumonia)  Procedure(s) (LRB):  BRONCHOSCOPY (N/A)  2 Days Post-Op  ICD-10: Treatment Diagnosis:       · Generalized Muscle Weakness (M62.81)  · Difficulty in walking, Not elsewhere classified (R26.2)   Precaution/Allergies:  Codeine       ASSESSMENT:      Mr. Mukesh Deluca was sitting on EOB upon arrival on hi-flow NC with SpO2 of 93% at rest and agreeable to PT. Pt was previously being seen by PT before being discharged due to decline in status. Pt was re-evaluated today and continues to exhibits decreased strength in B LEs. He does have WFL B LE AROM and good sitting balance and static standing balance. Pt's O2 increased to 3 L/min for ambulation and pt able to walk in logan with SBA and use of hand rail on occasional for fair dynamic standing balance. Pt demonstrates very slow gait speed with short, shuffled steps.  He required one standing rest break during which Sp)2 was recorded at 76%. Pt's supplemental O2 increased to 5-6L/min and given instruction in pursed lip breathing with subsequent rise to 90%. Pt noted be tachycardic, 130 bpm, during ambulation back to room. He transferred to bedside chair and was placed back on 2 L/min O2 with 97% SpO2 and 90 bpm.  RN alerted to change in vitals. Pt's goals have been adjusted to reflect current status. Pt's main issue at this times appears to be poor activity tolerance and some weakness. He could continue to benefit from skilled PT to address above deficits. This section established at most recent assessment   PROBLEM LIST (Impairments causing functional limitations):  1. Decreased Strength  2. Decreased ADL/Functional Activities  3. Decreased Transfer Abilities  4. Decreased Ambulation Ability/Technique  5. Decreased Balance  6. Increased Pain  7. Decreased Activity Tolerance  8. Increased Fatigue  9. Increased Shortness of Breath  10. Decreased [place] with Home Exercise Program    INTERVENTIONS PLANNED: (Benefits and precautions of physical therapy have been discussed with the patient.)  1. Balance Exercise  2. Bed Mobility  3. Family Education  4. Gait Training  5. Home Exercise Program (HEP)  6. Manual Therapy  7. Neuromuscular Re-education/Strengthening  8. Range of Motion (ROM)  9. Therapeutic Activites  10. Therapeutic Exercise/Strengthening  11. Transfer Training  12. Group Therapy      TREATMENT PLAN: Frequency/Duration: 3 times a week for 12 weeks  Rehabilitation Potential For Stated Goals: GOOD      RECOMMENDED REHABILITATION/EQUIPMENT: (at time of discharge pending progress): Continue Skilled Therapy. HISTORY:   History of Present Injury/Illness (Reason for Referral):See assessment  Past Medical History/Comorbidities:   Mr. 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 He also has no past medical history of Asthma; Autoimmune disease ([address] Chronic kidney disease; Chronic obstructive pulmonary disease ([facility] Chronic pain; Diabetes ([facility] Difficult intubation; GERD (gastroesophageal reflux disease); Heart abnormalities; Hypertension; Liver disease; Malignant hyperthermia due to anesthesia; Nausea & vomiting; Neurological disorder; Other unknown and unspecified cause of morbidity or mortality; Pseudocholinesterase deficiency; Psychiatric disorder; PUD (peptic ulcer disease); Seizures ([facility] Stroke [facility]); Thromboembolus ([facility] Thyroid disease; or Unspecified adverse effect of anesthesia. Mr. Caryle Hoot  has a past surgical history that includes vasectomy (40+ yrs); knee arthroscopy (2009); urological; biopsy prostate; prostatectomy (2012); cataract removal (2012); and cataract removal (2013). Social History/Living Environment:   Home Environment: Private residence  # Steps to Enter: 2  One/Two Story Residence: One story  Living Alone: No  Support Systems: Spouse/Significant Other/Partner  Patient Expects to be Discharged to[de-identified] Private residence  Current DME Used/Available at Home: None  Tub or Shower Type: Tub/Shower combination  Prior Level of Function/Work/Activity:  See assessment  Personal Factors:          Sex:  male        Age:  80 y.o.    Number of Personal Factors/Comorbidities that affect the Plan of Care: 3+: HIGH COMPLEXITY   EXAMINATION:   PT Reassessment Table:   Initial Physical Functioning: Most Recent Physical Functioning:   Gross Assessment:  AROM: Generally decreased, functional  PROM: Generally decreased, functional  Strength: Generally decreased, functional  Coordination: Generally decreased, functional  Tone: Normal  Sensation: Intact Gross Assessment:   AROM: Generally decreased, functional  Strength: Generally decreased, functional   Posture:   Posture (WDL): Exceptions to WDL  Posture Assessment: Forward head Posture:       Balance:   Sitting: Intact  Standing: Impaired  Standing - Static: Good  Standing - Dynamic : Fair Balance:   Sitting: Intact  Standing: Impaired  Standing - Static: Good  Standing - Dynamic : Fair   Bed Mobility:   Rolling: Supervision  Supine to Sit: Supervision  Sit to Supine: Supervision  Scooting: Supervision Bed Mobility:   Scooting: Supervision   Wheelchair Mobility:    Wheelchair Mobility:      Transfers:   Sit to Stand: Supervision  Stand to Sit: Supervision  Bed to Chair: Supervision Transfers:   Sit to Stand: Supervision  Stand to Sit: Supervision  Bed to Chair: Supervision   Gait:   Base of Support: Center of gravity altered  Speed/Annelise: Slow  Step Length: Right shortened, Left shortened  Gait Abnormalities: Decreased step clearance  Ambulation - Level of Assistance: Supervision  Distance (ft): 225 Feet (ft) (rest breaks (3) every 75 feet)  Assistive Device:  (occasional use of hallway railing)  Interventions: Safety awareness training, Tactile cues, Verbal cues Gait:   Base of Support: Center of gravity altered  Speed/Annelise: Slow  Step Length: Right shortened;Left shortened  Gait Abnormalities: Decreased step clearance  Ambulation - Level of Assistance: Stand-by asssistance  Distance (ft): 125 Feet (ft) (x 2)  Assistive Device:  (wall handrail)  Interventions: Verbal cues; Safety awareness training   ROM:                         ROM:                           Strength:              Strength:                Spine:           Spine:                 Most Recent Physical Functioning:   Gross Assessment:  AROM: Within functional limits  Strength: Generally decreased, functional (B hip flexion and dorsiflexion 4-/5)               Posture:     Balance:  Sitting: Intact  Standing: Impaired  Standing - Static: Good  Standing - Dynamic : Fair Bed Mobility:     Wheelchair Mobility:     Transfers:  Sit to Stand: Stand-by asssistance  Stand to Sit: Supervision  Bed to Chair: Stand-by asssistance  Gait:     Base of Support: Center of gravity altered  Speed/Annelise: Slow  Step Length: Right shortened;Left shortened  Gait Abnormalities: Decreased step clearance  Distance (ft): 125 Feet (ft) (x 2)  Assistive Device:  (wall handrail)  Ambulation - Level of Assistance: Stand-by asssistance  Interventions: Verbal cues; Safety awareness training       Body Structures Involved:  1. Nerves  2. Lungs  3. Bones  4. Joints  5. Muscles Body Functions Affected:  1. Sensory/Pain  2. Respiratory  3. Neuromusculoskeletal  4. Movement Related Activities and Participation Affected:  1. Mobility  2. Self Care   Number of elements that affect the Plan of Care: 4+: HIGH COMPLEXITY   CLINICAL PRESENTATION:   Presentation: Stable and uncomplicated: LOW COMPLEXITY   CLINICAL DECISION MAKIN[address] [place] [address] AM-PAC 6 Clicks   Basic Mobility Inpatient Short Form  How much difficulty does the patient currently have. .. Unable A Lot A Little None   1. Turning over in bed (including adjusting bedclothes, sheets and blankets)? [ ] 1   [ ] 2   [ ] 3   [X] 4   2. Sitting down on and standing up from a chair with arms ( e.g., wheelchair, bedside commode, etc.)   [ ] 1   [ ] 2   [ ] 3   [X] 4   3. Moving from lying on back to sitting on the side of the bed? [ ] 1   [ ] 2   [ ] 3   [X] 4   How much help from another person does the patient currently need. .. Total A Lot A Little None   4. Moving to and from a bed to a chair (including a wheelchair)? [ ] 1   [ ] 2   [ ] 3   [X] 4   5. Need to walk in hospital room? [ ] 1   [ ] 2   [X] 3   [ ] 4   6. Climbing 3-5 steps with a railing? [ ] 1   [ ] 2   [X] 3   [ ] 4   © , Trustees of [address] [place] [address], under license to [employer].  All rights reserved    Score:  Initial: 22 Most Recent: 22 (Date: 17 ) Interpretation of Tool:  Represents activities that are increasingly more difficult (i.e. Bed mobility, Transfers, Gait). Score 24 23 22-20 19-15 14-10 9-7 6       Modifier CH CI CJ CK CL CM CN         · Mobility - Walking and Moving Around:               - CURRENT STATUS:    CJ - 20%-39% impaired, limited or restricted               - GOAL STATUS:           CI - 1%-19% impaired, limited or restricted               - D/C STATUS:                       ---------------To be determined---------------  [place] / Product Type: Medicare /       Medical Necessity:     · Skilled intervention continues to be required due to decreased strength, decreased balance, decreased functional tolerance, decreased cardiopulmonary endurance affecting participation in basic ADLs and functional tasks. .  Reason for Services/Other Comments:  · Patient continues to require skilled intervention due to medical complications and patient unable to attend/participate in therapy as expected.    Use of outcome tool(s) and clinical judgement create a POC that gives a: Clear prediction of patient's progress: LOW COMPLEXITY                 TREATMENT:   (In addition to Assessment/Re-Assessment sessions the following treatments were rendered)   Pre-treatment Symptoms/Complaints: Ribcage pain bilaterally      Pain: Initial:   Pain Intensity 1: 4  Pain Location 1: Rib cage  Pain Orientation 1: Right, Left  Post Session:  4/10      Assessment/Reassessment only, no treatment provided today           Braces/Orthotics/Lines/Etc:   · O2 Device: Nasal cannula  Treatment/Session Assessment:    · Response to Treatment: Pt desaturates with exertion but is quick to recover with rest.  · Interdisciplinary Collaboration:  · Physical Therapist, Registered Nurse and   · After treatment position/precautions:  · Up in chair, Call light within reach, RN notified and Family at bedside  · Compliance with Program/Exercises: Will assess as treatment progresses. · Recommendations/Intent for next treatment session: \"Next visit will focus on advancements to more challenging activities and reduction in assistance provided\".   Total Treatment Duration:  PT Patient Time In/Time Out  Time In: 0940  [facility] [address] [facility] TASHA Amor, DPT

## 2017-05-27 ENCOUNTER — APPOINTMENT (OUTPATIENT)
Dept: GENERAL RADIOLOGY | Age: 72
DRG: 853 | End: 2017-05-27
Attending: INTERNAL MEDICINE
Payer: MEDICARE

## 2017-05-27 PROBLEM — R29.818 SUSPECTED SLEEP APNEA: Status: ACTIVE | Noted: 2017-05-27

## 2017-05-27 PROBLEM — D75.839 THROMBOCYTOSIS: Status: ACTIVE | Noted: 2017-05-27

## 2017-05-27 LAB
BASOPHILS # BLD AUTO: 0 K/UL (ref 0–0.2)
BASOPHILS # BLD: 0 % (ref 0–2)
DIFFERENTIAL METHOD BLD: ABNORMAL
EOSINOPHIL # BLD: 0.1 K/UL (ref 0–0.8)
EOSINOPHIL NFR BLD: 2 % (ref 0.5–7.8)
ERYTHROCYTE [DISTWIDTH] IN BLOOD BY AUTOMATED COUNT: 13.1 % (ref 11.9–14.6)
HCT VFR BLD AUTO: 34.1 % (ref 41.1–50.3)
HGB BLD-MCNC: 11 G/DL (ref 13.6–17.2)
IMM GRANULOCYTES # BLD: 0.1 K/UL (ref 0–0.5)
IMM GRANULOCYTES NFR BLD AUTO: 0.6 % (ref 0–5)
LYMPHOCYTES # BLD AUTO: 11 % (ref 13–44)
LYMPHOCYTES # BLD: 0.9 K/UL (ref 0.5–4.6)
MCH RBC QN AUTO: 29.7 PG (ref 26.1–32.9)
MCHC RBC AUTO-ENTMCNC: 32.3 G/DL (ref 31.4–35)
MCV RBC AUTO: 92.2 FL (ref 79.6–97.8)
MONOCYTES # BLD: 0.6 K/UL (ref 0.1–1.3)
MONOCYTES NFR BLD AUTO: 7 % (ref 4–12)
NEUTS SEG # BLD: 6.4 K/UL (ref 1.7–8.2)
NEUTS SEG NFR BLD AUTO: 79 % (ref 43–78)
PLATELET # BLD AUTO: 987 K/UL (ref 150–450)
PMV BLD AUTO: 9.1 FL (ref 10.8–14.1)
PROCALCITONIN SERPL-MCNC: 0.2 NG/ML
RBC # BLD AUTO: 3.7 M/UL (ref 4.23–5.67)
WBC # BLD AUTO: 8 K/UL (ref 4.3–11.1)

## 2017-05-27 PROCEDURE — 74011250636 HC RX REV CODE- 250/636: Performed by: INTERNAL MEDICINE

## 2017-05-27 PROCEDURE — 99232 SBSQ HOSP IP/OBS MODERATE 35: CPT | Performed by: INTERNAL MEDICINE

## 2017-05-27 PROCEDURE — 74011000250 HC RX REV CODE- 250: Performed by: INTERNAL MEDICINE

## 2017-05-27 PROCEDURE — 94762 N-INVAS EAR/PLS OXIMTRY CONT: CPT

## 2017-05-27 PROCEDURE — 74011250637 HC RX REV CODE- 250/637: Performed by: INTERNAL MEDICINE

## 2017-05-27 PROCEDURE — 65270000029 HC RM PRIVATE

## 2017-05-27 PROCEDURE — 71020 XR CHEST PA LAT: CPT

## 2017-05-27 PROCEDURE — 94760 N-INVAS EAR/PLS OXIMETRY 1: CPT

## 2017-05-27 PROCEDURE — 85025 COMPLETE CBC W/AUTO DIFF WBC: CPT | Performed by: INTERNAL MEDICINE

## 2017-05-27 PROCEDURE — 77010033678 HC OXYGEN DAILY

## 2017-05-27 PROCEDURE — 94640 AIRWAY INHALATION TREATMENT: CPT

## 2017-05-27 PROCEDURE — 84145 PROCALCITONIN (PCT): CPT | Performed by: INTERNAL MEDICINE

## 2017-05-27 RX ADMIN — ALBUTEROL SULFATE 2.5 MG: 2.5 SOLUTION RESPIRATORY (INHALATION) at 15:36

## 2017-05-27 RX ADMIN — POTASSIUM CHLORIDE 40 MEQ: 20 TABLET, EXTENDED RELEASE ORAL at 09:06

## 2017-05-27 RX ADMIN — ALBUTEROL SULFATE 2.5 MG: 2.5 SOLUTION RESPIRATORY (INHALATION) at 07:44

## 2017-05-27 RX ADMIN — ALBUTEROL SULFATE 2.5 MG: 2.5 SOLUTION RESPIRATORY (INHALATION) at 04:18

## 2017-05-27 RX ADMIN — DULOXETINE HYDROCHLORIDE 30 MG: 30 CAPSULE, DELAYED RELEASE ORAL at 09:07

## 2017-05-27 RX ADMIN — PANTOPRAZOLE SODIUM 40 MG: 40 TABLET, DELAYED RELEASE ORAL at 17:32

## 2017-05-27 RX ADMIN — POTASSIUM CHLORIDE 40 MEQ: 20 TABLET, EXTENDED RELEASE ORAL at 17:32

## 2017-05-27 RX ADMIN — HYDROCODONE BITARTRATE AND ACETAMINOPHEN 1 TABLET: 7.5; 325 TABLET ORAL at 18:41

## 2017-05-27 RX ADMIN — Medication 5 ML: at 09:07

## 2017-05-27 RX ADMIN — HYDROCODONE BITARTRATE AND ACETAMINOPHEN 1 TABLET: 7.5; 325 TABLET ORAL at 05:29

## 2017-05-27 RX ADMIN — ALBUTEROL SULFATE 2.5 MG: 2.5 SOLUTION RESPIRATORY (INHALATION) at 20:45

## 2017-05-27 RX ADMIN — Medication 5 ML: at 05:26

## 2017-05-27 RX ADMIN — FLUCONAZOLE 200 MG: 100 TABLET ORAL at 09:06

## 2017-05-27 RX ADMIN — HYDROCODONE BITARTRATE AND ACETAMINOPHEN 1 TABLET: 7.5; 325 TABLET ORAL at 12:13

## 2017-05-27 RX ADMIN — Medication 5 ML: at 12:14

## 2017-05-27 RX ADMIN — Medication 5 ML: at 20:27

## 2017-05-27 RX ADMIN — ALBUTEROL SULFATE 2.5 MG: 2.5 SOLUTION RESPIRATORY (INHALATION) at 11:46

## 2017-05-27 RX ADMIN — ENOXAPARIN SODIUM 40 MG: 40 INJECTION SUBCUTANEOUS at 20:27

## 2017-05-27 RX ADMIN — PANTOPRAZOLE SODIUM 40 MG: 40 TABLET, DELAYED RELEASE ORAL at 05:27

## 2017-05-27 RX ADMIN — Medication 5 ML: at 17:32

## 2017-05-27 RX ADMIN — Medication 5 ML: at 12:15

## 2017-05-27 RX ADMIN — Medication 5 ML: at 20:30

## 2017-05-27 NOTE — PROGRESS NOTES
Norco (7.5mg/oral) given for chronicn pain in back at level of 6 out of 10. Family is at the bedside. Will continue to monitor.

## 2017-05-27 NOTE — PROGRESS NOTES
Verbal bedside report given to oncoming nurse Arsenio. Patient's situation, background, assessment and recommendations provided. Opportunity for questions provided. No distress noted. Multiple family at bedside. Oncoming RN assumed care of patient.

## 2017-05-27 NOTE — PROGRESS NOTES
Daily Progress Note: 5/27/2017    Robb Mo   Admission Date: 5/15/2017         The patient's chart is reviewed and the patient is discussed with the staff. Patient is a 80 y.o.  male with a hx of anklelosing spondylitis on remicade who was in usual state of health until 5/13. He developed shaking chills and nausea. He has been sob and has had left side pleuritic chest and back pain. On 5/15 he was taken to Dr. Sindhu Woodson office and O2 sat was low so he was sent to the er and admitted. He was noted to have lactate of 3.5 and cxr showed LLL and RUL infiltrate. BP initially low but responded to fluids, admitted to 8th floor. Sputum culture with strep pneumo. CXR with pleural effusion. Had B thoracentesis 5/21 and developed worsening anxiety and pain afterward, transferred to ICU, no PTX on CXR. Also c/o L neck pain, found to have thrush  Overall he feels better but is still on 35% airvo. Not as anxious. Still some cough. 5/23 ultrasound revealed minimal effusion on left. Bronch done 5/24 and mucus plugs removed from left. He is better today with O2 sats in 90s off O2    Subjective:     GI eval appreciated. OK for OP endoscopy. Tolerated exercise poorly yesterday as follows per PT:    Mr. Lexie Guevara was sitting on EOB upon arrival on hi-flow NC with SpO2 of 93% at rest and agreeable to PT. Pt was previously being seen by PT before being discharged due to decline in status. Pt was re-evaluated today and continues to exhibits decreased strength in B LEs. He does have WFL B LE AROM and good sitting balance and static standing balance. Pt's O2 increased to 3 L/min for ambulation and pt able to walk in logan with SBA and use of hand rail on occasional for fair dynamic standing balance. Pt demonstrates very slow gait speed with short, shuffled steps. He required one standing rest break during which Sp)2 was recorded at 76%.  Pt's supplemental O2 increased to 5-6L/min and given instruction in pursed lip breathing with subsequent rise to 90%. Pt noted be tachycardic, 130 bpm, during ambulation back to room. He transferred to bedside chair and was placed back on 2 L/min O2 with 97% SpO2 and 90 bpm. RN alerted to change in vitals. Pt's goals have been adjusted to reflect current status. Pt's main issue at this times appears to be poor activity tolerance and some weakness. He could continue to benefit from skilled PT to address above deficits. Wife also reports hx of loud snoring and likely apneas with sleep.      Current Facility-Administered Medications   Medication Dose Route Frequency    pantoprazole (PROTONIX) tablet 40 mg  40 mg Oral ACB&D    albuterol (PROVENTIL VENTOLIN) nebulizer solution 2.5 mg  2.5 mg Nebulization Q4H RT    morphine injection 2 mg  2 mg IntraVENous Q3H PRN    acetaminophen (TYLENOL) suppository 650 mg  650 mg Rectal Q4H PRN    HYDROcodone-acetaminophen (NORCO) 7.5-325 mg per tablet 1 Tab  1 Tab Oral Q4H PRN    potassium chloride (K-DUR, KLOR-CON) SR tablet 40 mEq  40 mEq Oral BID    magic mouthwash (GRACIA) oral suspension 5 mL  5 mL Oral F8S    methyl salicylate-menthol (BENGAY) 15-10 % cream   Topical QID PRN    fluconazole (DIFLUCAN) tablet 200 mg  200 mg Oral DAILY    magnesium hydroxide (MILK OF MAGNESIA) 400 mg/5 mL oral suspension 30 mL  30 mL Oral DAILY PRN    DULoxetine (CYMBALTA) capsule 30 mg  30 mg Oral DAILY    traMADol (ULTRAM) tablet 50 mg  50 mg Oral TID PRN    sodium chloride (NS) flush 5 mL  5 mL InterCATHeter Q8H    sodium chloride (NS) flush 5-10 mL  5-10 mL InterCATHeter PRN    enoxaparin (LOVENOX) injection 40 mg  40 mg SubCUTAneous Q24H       Review of Systems    Constitutional:  negative for fever, chills, sweats  Cardiovascular:  negative for chest pain, palpitations, syncope, edema  Gastrointestinal:  negative for dysphagia, reflux, vomiting, diarrhea, abdominal pain, or melena  Neurologic:  negative for focal weakness, numbness, headache      Objective:     Vitals:    05/27/17 0329 05/27/17 0418 05/27/17 0740 05/27/17 0746   BP: 107/64  108/58    Pulse: 99  93    Resp: 20  18    Temp: 98.8 °F (37.1 °C)  97.9 °F (36.6 °C)    SpO2: 96% 96% 92% 94%   Weight:       Height:           Intake and Output:   05/25 1901 - 05/27 0700  In: 240 [P.O.:240]  Out: 750 [Urine:750]       Physical Exam:          Constitutional:  the patient is well developed and in no acute distress on 2L  EENMT:  Sclera clear, pupils equal, oral mucosa moist  Respiratory:  Decreased BS   Cardiovascular:  RRR without M,G,R  Gastrointestinal: soft and non-tender; with positive bowel sounds. Musculoskeletal: warm without cyanosis. There is no lower leg edema. Skin:  no jaundice or rashes, no wounds   Neurologic: no gross neuro deficits     Psychiatric:  alert and oriented x 3    LINES:  peripheral    DRIPS:   none    CXR: improved      LAB  No results for input(s): GLUCPOC in the last 72 hours. No lab exists for component: GLPOC   Recent Labs      05/25/17   0410   WBC  9.6   HGB  11.3*   HCT  33.0*   PLT  709*     Recent Labs      05/25/17   0410   NA  138   K  4.0   CL  100   CO2  29   GLU  76   BUN  17   CREA  0.44*   CA  8.2*     No results for input(s): PH, PCO2, PO2, HCO3 in the last 72 hours. No results for input(s): LCAD, LAC in the last 72 hours. Bronch wash: (5/24): NRF, AFB negative; cytology negative    Assessment:  (Medical Decision Making)     Hospital Problems  Date Reviewed: 5/15/2017          Codes Class Noted POA    Thrombocytosis ([facility] [address] ICD-10-CM: D47.3  ICD-9-CM: 238.71  5/27/2017 Unknown    Needs follow up    Suspected sleep apnea ICD-10-CM: G47.30  ICD-9-CM: 780.57  5/27/2017 Unknown    Check CHUYITA. Ineffective airway clearance ICD-10-CM: R06.89  ICD-9-CM: 786.09  5/24/2017 Unknown        Parapneumonic effusion ICD-10-CM: J18.9, J91.8  ICD-9-CM: 511.89  5/22/2017 Unknown    Repeat CXR.     Pleural effusion, bilateral ICD-10-CM: J90  ICD-9-CM: 511.9  5/21/2017 Unknown    Follow    Candidiasis ICD-10-CM: B37.9  ICD-9-CM: 112.9  5/21/2017 Unknown    Diflucan    Immunosuppression (HCC) (Chronic) ICD-10-CM: D89.9  ICD-9-CM: 279.9  5/15/2017 Yes        * (Principal)CAP (community acquired pneumonia) ICD-10-CM: J18.9  ICD-9-CM: 478  5/15/2017 Yes    Clinically better. Sepsis [facility]) ICD-10-CM: A41.9  ICD-9-CM: 038.9, 995.91  5/15/2017 Unknown        DENIS (acute kidney injury) ([facility] ICD-10-CM: N17.9  ICD-9-CM: 584.9  5/15/2017 Unknown    Resolved    Hypoxemia ICD-10-CM: R09.02  ICD-9-CM: 799.02  5/15/2017 Unknown    Almost off oxygen. Ankylosing spondylitis ([facility] ICD-10-CM: M45.9  ICD-9-CM: 720.0  1/29/2015 Yes        Spondyloarthritis ([facility] (Chronic) ICD-10-CM: M46.90  ICD-9-CM: 721.90  9/17/2013 Yes                Plan:  (Medical Decision Making)     Wean oxygen. Check CHUYITA. Repeat CBC and CXR. Repeat PCT given rising plt- ? Closed space infection. EGD as OP. --    More than 50% of the time documented was spent in face-to-face contact with the patient and in the care of the patient on the floor/unit where the patient is located.     Hetal Ferreira MD

## 2017-05-27 NOTE — PROGRESS NOTES
[facility] Progress Note      Admit Date: 5/15/2017    CC: esophageal wall thickening    Subjective:     Patient feeling well, tolerated breakfast, soft diet. No nausea vomiting or dysphagia. No significant chest pain  Cough and breathing improved    Medications:  Current Facility-Administered Medications   Medication Dose Route Frequency    pantoprazole (PROTONIX) tablet 40 mg  40 mg Oral ACB&D    albuterol (PROVENTIL VENTOLIN) nebulizer solution 2.5 mg  2.5 mg Nebulization Q4H RT    morphine injection 2 mg  2 mg IntraVENous Q3H PRN    acetaminophen (TYLENOL) suppository 650 mg  650 mg Rectal Q4H PRN    HYDROcodone-acetaminophen (NORCO) 7.5-325 mg per tablet 1 Tab  1 Tab Oral Q4H PRN    potassium chloride (K-DUR, KLOR-CON) SR tablet 40 mEq  40 mEq Oral BID    magic mouthwash (GRACIA) oral suspension 5 mL  5 mL Oral D6I    methyl salicylate-menthol (BENGAY) 15-10 % cream   Topical QID PRN    fluconazole (DIFLUCAN) tablet 200 mg  200 mg Oral DAILY    magnesium hydroxide (MILK OF MAGNESIA) 400 mg/5 mL oral suspension 30 mL  30 mL Oral DAILY PRN    DULoxetine (CYMBALTA) capsule 30 mg  30 mg Oral DAILY    traMADol (ULTRAM) tablet 50 mg  50 mg Oral TID PRN    sodium chloride (NS) flush 5 mL  5 mL InterCATHeter Q8H    sodium chloride (NS) flush 5-10 mL  5-10 mL InterCATHeter PRN    enoxaparin (LOVENOX) injection 40 mg  40 mg SubCUTAneous Q24H       ROS: Otherwise negative in 10 systems except as noted above in Subjective. Objective:   Vitals:  Visit Vitals    /58 (BP 1 Location: Right arm, BP Patient Position: Sitting)    Pulse 93    Temp 97.9 °F (36.6 °C)    Resp 18    Ht 5' 7\" (1.702 m)    Wt 53.5 kg (118 lb)    SpO2 94%    BMI 18.48 kg/m2       Intake/Output:  05/27 0701 - 05/27 1900  In: 240 [P.O.:240]  Out: -   05/25 1901 - 05/27 0700  In: 240 [P.O.:240]  Out: 750 [Urine:750]    Exam:  General appearance: alert, no distress. Cachexia.   Some retractions with breathing. Lungs: improved air movement bilaterally  Heart: regular rate and rhythm  Abdomen: soft, non-distended, non-tender. Bowel sounds normal. No masses,  no organomegaly  Extremities: extremities normal, no cyanosis or edema    Data Review (Labs):    Recent Results (from the past 24 hour(s))   PROCALCITONIN    Collection Time: 05/27/17  8:53 AM   Result Value Ref Range    Procalcitonin 0.2 ng/mL   CBC WITH AUTOMATED DIFF    Collection Time: 05/27/17  8:53 AM   Result Value Ref Range    WBC 8.0 4.3 - 11.1 K/uL    RBC 3.70 (L) 4.23 - 5.67 M/uL    HGB 11.0 (L) 13.6 - 17.2 g/dL    HCT 34.1 (L) 41.1 - 50.3 %    MCV 92.2 79.6 - 97.8 FL    MCH 29.7 26.1 - 32.9 PG    MCHC 32.3 31.4 - 35.0 g/dL    RDW 13.1 11.9 - 14.6 %    PLATELET 657 (H) 344 - 450 K/uL    MPV 9.1 (L) 10.8 - 14.1 FL    DF AUTOMATED      NEUTROPHILS 79 (H) 43 - 78 %    LYMPHOCYTES 11 (L) 13 - 44 %    MONOCYTES 7 4.0 - 12.0 %    EOSINOPHILS 2 0.5 - 7.8 %    BASOPHILS 0 0.0 - 2.0 %    IMMATURE GRANULOCYTES 0.6 0.0 - 5.0 %    ABS. NEUTROPHILS 6.4 1.7 - 8.2 K/UL    ABS. LYMPHOCYTES 0.9 0.5 - 4.6 K/UL    ABS. MONOCYTES 0.6 0.1 - 1.3 K/UL    ABS. EOSINOPHILS 0.1 0.0 - 0.8 K/UL    ABS. BASOPHILS 0.0 0.0 - 0.2 K/UL    ABS. IMM. GRANS. 0.1 0.0 - 0.5 K/UL       Assessment:     Principal Problem:    CAP (community acquired pneumonia) (5/15/2017)    Active Problems:    Spondyloarthritis ([facility] (9/17/2013)      Ankylosing spondylitis ([facility] (1/29/2015)      Immunosuppression ([facility] (5/15/2017)      Sepsis ([address] (5/15/2017)      DENIS (acute kidney injury) ([facility] [address] (5/15/2017)      Hypoxemia (5/15/2017)      Pleural effusion, bilateral (5/21/2017)      Candidiasis (5/21/2017)      Parapneumonic effusion (5/22/2017)      Ineffective airway clearance (5/24/2017)      Thrombocytosis ([facility] [address] [facility] (5/27/2017)      Suspected sleep apnea (5/27/2017)        Plan:     1. Esophageal wall thickening on recent CT, and intermittent dysphasia and reflux  2.  Currently asymptomatic and tolerating a soft diet  3. Continue PPI therapy for possible peptic esophagitis  4. Avoid NSAIDs  5. Plan on outpatient EGD within one to 2 weeks  6. Office will contact him next week to arrange  7.  Please call if further problems or symptoms arise in the meantime    Rach Mancia MD  [facility]

## 2017-05-27 NOTE — PROGRESS NOTES
Critical Care Outreach Nurse Progress Report:    Subjective: In to assess pt secondary to  ICU outreach protocol. MEWS Score: 1 (05/26/17 2327)    Vitals:    05/26/17 1938 05/26/17 2040 05/26/17 2327 05/26/17 2345   BP: 101/54  116/68    Pulse: 98  98    Resp: 19  18    Temp: 98.8 °F (37.1 °C)  98.7 °F (37.1 °C)    SpO2: 95% 96% 96% 94%   Weight:       Height:            LAB DATA:    Recent Labs      05/25/17   0410 05/24/17   0705   NA  138  134*   K  4.0  3.7   CL  100  96*   CO2  29  27   AGAP  9  11   GLU  76  69   BUN  17  16   CREA  0.44*  0.49*   GFRAA  >60  >60   GFRNA  >60  >60   CA  8.2*  9.2        Recent Labs      05/25/17 0410 05/24/17   0705   WBC  9.6  14.4*   HGB  11.3*  13.0*   HCT  33.0*  37.9*   PLT  709*  672*        Objective:     Pain Intensity 1: 6 (05/26/17 2148)  Pain Location 1: Back  Pain Intervention(s) 1: Medication (see MAR)  Patient Stated Pain Goal: 0    Assessment: Patient alert/oriented, restful in bed on O2 via NC @ 1LPM.  Patient reports improvement in respiratory status, states he feels he is breathing better. RT had just completed a neb treatment. Patient O2 sat 95%, LS diminished at bases. HR 94. No audible wheeze. Patient in good spirits, denies any further needs at current. Plan: Continue to monitor per ICU outreach protocol.

## 2017-05-27 NOTE — PROGRESS NOTES
Pt is sitting up in recliner. Family is at the bedside. Respirations are even and unlabored. No signs or symptoms of distress are noted. No SOB or pain is reported at this time. Call light is within reach. Will continue to monitor.

## 2017-05-27 NOTE — PROGRESS NOTES
Date of Outreach Update:  Jenniffer Stern was seen and assessed. MEWS Score: 1 (05/27/17 0329)  Vitals:    05/27/17 0329 05/27/17 0418 05/27/17 0740 05/27/17 0746   BP: 107/64  108/58    Pulse: 99  93    Resp: 20  18    Temp: 98.8 °F (37.1 °C)  97.9 °F (36.6 °C)    SpO2: 96% 96% 92% 94%   Weight:       Height:             Pain Assessment  Pain Intensity 1: 0 (05/27/17 0746)  Pain Location 1: Back  Pain Intervention(s) 1: Medication (see MAR)  Patient Stated Pain Goal: 0      Previous Outreach assessment has been reviewed. There have been no significant clinical changes since the completion of the last dated Outreach assessment. Pt has no concerns, is alert and oriented and sitting in chair eating breakfast with resp even/unlabored. Will continue to follow up per outreach protocol.     Signed By:   Velasquez Noel RN    May 27, 2017 8:43 AM

## 2017-05-27 NOTE — PROGRESS NOTES
Bedside report received from night nurse Wil Vazquez. Assessment done as noted  Respiration even and unlabored 20/min; denies pain or nausea at present. Up in a chair. Wife at bedside. Encouraged to call with needs.

## 2017-05-27 NOTE — PROGRESS NOTES
Date of Outreach Update:  Jimmy Glover was seen and assessed. MEWS Score: 1 (05/27/17 0740)  Vitals:    05/27/17 1141 05/27/17 1147 05/27/17 1213 05/27/17 1703   BP: 108/45   109/58   Pulse: (!) 101   87   Resp: 20   20   Temp: 98.1 °F (36.7 °C)   98.6 °F (37 °C)   SpO2: 100% 98% 95% 93%   Weight:       Height:             Pain Assessment  Pain Intensity 1: 0 (05/27/17 1841)  Pain Location 1: Back  Pain Intervention(s) 1: Medication (see MAR)  Patient Stated Pain Goal: 0      Previous Outreach assessment has been reviewed. There have been no significant clinical changes since the completion of the last dated Outreach assessment. Will continue to follow up per outreach protocol.     Signed By:   Inez Church RN    May 27, 2017 7:18 PM

## 2017-05-27 NOTE — PROGRESS NOTES
Norco (7.5mg/oral) given for chronic pain in back at level of 6 out of 10. Spouse is at the bedside. Will continue to monitor.

## 2017-05-28 ENCOUNTER — APPOINTMENT (OUTPATIENT)
Dept: CT IMAGING | Age: 72
DRG: 853 | End: 2017-05-28
Attending: INTERNAL MEDICINE
Payer: MEDICARE

## 2017-05-28 LAB
BASOPHILS # BLD AUTO: 0.1 K/UL (ref 0–0.2)
BASOPHILS # BLD: 1 % (ref 0–2)
CREAT SERPL-MCNC: 0.66 MG/DL (ref 0.8–1.5)
CRP SERPL-MCNC: 10.8 MG/DL (ref 0–0.9)
DIFFERENTIAL METHOD BLD: ABNORMAL
EOSINOPHIL # BLD: 0.1 K/UL (ref 0–0.8)
EOSINOPHIL NFR BLD: 2 % (ref 0.5–7.8)
ERYTHROCYTE [DISTWIDTH] IN BLOOD BY AUTOMATED COUNT: 12.9 % (ref 11.9–14.6)
ERYTHROCYTE [SEDIMENTATION RATE] IN BLOOD: 105 MM/HR (ref 0–20)
FERRITIN SERPL-MCNC: 658 NG/ML (ref 8–388)
HCT VFR BLD AUTO: 35.3 % (ref 41.1–50.3)
HGB BLD-MCNC: 11.8 G/DL (ref 13.6–17.2)
IMM GRANULOCYTES # BLD: 0.1 K/UL (ref 0–0.5)
IMM GRANULOCYTES NFR BLD AUTO: 0.7 % (ref 0–5)
IRON SERPL-MCNC: 27 UG/DL (ref 35–150)
LYMPHOCYTES # BLD AUTO: 13 % (ref 13–44)
LYMPHOCYTES # BLD: 1.1 K/UL (ref 0.5–4.6)
MCH RBC QN AUTO: 30.6 PG (ref 26.1–32.9)
MCHC RBC AUTO-ENTMCNC: 33.4 G/DL (ref 31.4–35)
MCV RBC AUTO: 91.7 FL (ref 79.6–97.8)
MONOCYTES # BLD: 0.7 K/UL (ref 0.1–1.3)
MONOCYTES NFR BLD AUTO: 8 % (ref 4–12)
NEUTS SEG # BLD: 6.7 K/UL (ref 1.7–8.2)
NEUTS SEG NFR BLD AUTO: 75 % (ref 43–78)
PLATELET # BLD AUTO: 1110 K/UL (ref 150–450)
PMV BLD AUTO: 9.1 FL (ref 10.8–14.1)
RBC # BLD AUTO: 3.85 M/UL (ref 4.23–5.67)
WBC # BLD AUTO: 8.8 K/UL (ref 4.3–11.1)

## 2017-05-28 PROCEDURE — 82728 ASSAY OF FERRITIN: CPT | Performed by: INTERNAL MEDICINE

## 2017-05-28 PROCEDURE — 74011250637 HC RX REV CODE- 250/637: Performed by: INTERNAL MEDICINE

## 2017-05-28 PROCEDURE — 86140 C-REACTIVE PROTEIN: CPT | Performed by: INTERNAL MEDICINE

## 2017-05-28 PROCEDURE — 99232 SBSQ HOSP IP/OBS MODERATE 35: CPT | Performed by: INTERNAL MEDICINE

## 2017-05-28 PROCEDURE — 36415 COLL VENOUS BLD VENIPUNCTURE: CPT | Performed by: INTERNAL MEDICINE

## 2017-05-28 PROCEDURE — 94640 AIRWAY INHALATION TREATMENT: CPT

## 2017-05-28 PROCEDURE — 74011250636 HC RX REV CODE- 250/636: Performed by: INTERNAL MEDICINE

## 2017-05-28 PROCEDURE — 74011000250 HC RX REV CODE- 250: Performed by: INTERNAL MEDICINE

## 2017-05-28 PROCEDURE — 71260 CT THORAX DX C+: CPT

## 2017-05-28 PROCEDURE — 65270000029 HC RM PRIVATE

## 2017-05-28 PROCEDURE — 83540 ASSAY OF IRON: CPT | Performed by: INTERNAL MEDICINE

## 2017-05-28 PROCEDURE — 94760 N-INVAS EAR/PLS OXIMETRY 1: CPT

## 2017-05-28 PROCEDURE — 82565 ASSAY OF CREATININE: CPT | Performed by: INTERNAL MEDICINE

## 2017-05-28 PROCEDURE — 85652 RBC SED RATE AUTOMATED: CPT | Performed by: INTERNAL MEDICINE

## 2017-05-28 PROCEDURE — 74011636320 HC RX REV CODE- 636/320: Performed by: INTERNAL MEDICINE

## 2017-05-28 PROCEDURE — 74011000258 HC RX REV CODE- 258: Performed by: INTERNAL MEDICINE

## 2017-05-28 PROCEDURE — 85025 COMPLETE CBC W/AUTO DIFF WBC: CPT | Performed by: INTERNAL MEDICINE

## 2017-05-28 RX ORDER — GUAIFENESIN 100 MG/5ML
81 LIQUID (ML) ORAL DAILY
Status: DISCONTINUED | OUTPATIENT
Start: 2017-05-28 | End: 2017-05-31 | Stop reason: HOSPADM

## 2017-05-28 RX ORDER — SODIUM CHLORIDE 0.9 % (FLUSH) 0.9 %
10 SYRINGE (ML) INJECTION
Status: COMPLETED | OUTPATIENT
Start: 2017-05-28 | End: 2017-05-28

## 2017-05-28 RX ORDER — LANOLIN ALCOHOL/MO/W.PET/CERES
1 CREAM (GRAM) TOPICAL 2 TIMES DAILY WITH MEALS
Status: DISCONTINUED | OUTPATIENT
Start: 2017-05-28 | End: 2017-05-31 | Stop reason: HOSPADM

## 2017-05-28 RX ADMIN — ALBUTEROL SULFATE 2.5 MG: 2.5 SOLUTION RESPIRATORY (INHALATION) at 20:06

## 2017-05-28 RX ADMIN — HYDROCODONE BITARTRATE AND ACETAMINOPHEN 1 TABLET: 7.5; 325 TABLET ORAL at 12:54

## 2017-05-28 RX ADMIN — ALBUTEROL SULFATE 2.5 MG: 2.5 SOLUTION RESPIRATORY (INHALATION) at 07:53

## 2017-05-28 RX ADMIN — Medication 5 ML: at 17:54

## 2017-05-28 RX ADMIN — POTASSIUM CHLORIDE 40 MEQ: 20 TABLET, EXTENDED RELEASE ORAL at 08:56

## 2017-05-28 RX ADMIN — HYDROCODONE BITARTRATE AND ACETAMINOPHEN 1 TABLET: 7.5; 325 TABLET ORAL at 05:53

## 2017-05-28 RX ADMIN — PANTOPRAZOLE SODIUM 40 MG: 40 TABLET, DELAYED RELEASE ORAL at 05:08

## 2017-05-28 RX ADMIN — ALBUTEROL SULFATE 2.5 MG: 2.5 SOLUTION RESPIRATORY (INHALATION) at 15:57

## 2017-05-28 RX ADMIN — ALBUTEROL SULFATE 2.5 MG: 2.5 SOLUTION RESPIRATORY (INHALATION) at 23:14

## 2017-05-28 RX ADMIN — SODIUM CHLORIDE 100 ML: 900 INJECTION, SOLUTION INTRAVENOUS at 10:34

## 2017-05-28 RX ADMIN — Medication 5 ML: at 12:48

## 2017-05-28 RX ADMIN — Medication 10 ML: at 10:34

## 2017-05-28 RX ADMIN — Medication 5 ML: at 04:00

## 2017-05-28 RX ADMIN — HYDROCODONE BITARTRATE AND ACETAMINOPHEN 1 TABLET: 7.5; 325 TABLET ORAL at 17:53

## 2017-05-28 RX ADMIN — METHYLPREDNISOLONE SODIUM SUCCINATE 60 MG: 40 INJECTION, POWDER, FOR SOLUTION INTRAMUSCULAR; INTRAVENOUS at 12:47

## 2017-05-28 RX ADMIN — Medication 5 ML: at 13:35

## 2017-05-28 RX ADMIN — METHYLPREDNISOLONE SODIUM SUCCINATE 60 MG: 40 INJECTION, POWDER, FOR SOLUTION INTRAMUSCULAR; INTRAVENOUS at 22:13

## 2017-05-28 RX ADMIN — ALBUTEROL SULFATE 2.5 MG: 2.5 SOLUTION RESPIRATORY (INHALATION) at 11:46

## 2017-05-28 RX ADMIN — HYDROCODONE BITARTRATE AND ACETAMINOPHEN 1 TABLET: 7.5; 325 TABLET ORAL at 22:13

## 2017-05-28 RX ADMIN — ASPIRIN 81 MG 81 MG: 81 TABLET ORAL at 12:48

## 2017-05-28 RX ADMIN — FERROUS SULFATE TAB 325 MG (65 MG ELEMENTAL FE) 325 MG: 325 (65 FE) TAB at 17:53

## 2017-05-28 RX ADMIN — DULOXETINE HYDROCHLORIDE 30 MG: 30 CAPSULE, DELAYED RELEASE ORAL at 08:56

## 2017-05-28 RX ADMIN — PANTOPRAZOLE SODIUM 40 MG: 40 TABLET, DELAYED RELEASE ORAL at 17:54

## 2017-05-28 RX ADMIN — Medication 5 ML: at 05:08

## 2017-05-28 RX ADMIN — Medication 5 ML: at 22:16

## 2017-05-28 RX ADMIN — FLUCONAZOLE 200 MG: 100 TABLET ORAL at 08:56

## 2017-05-28 RX ADMIN — Medication 5 ML: at 09:03

## 2017-05-28 RX ADMIN — IOPAMIDOL 80 ML: 755 INJECTION, SOLUTION INTRAVENOUS at 10:34

## 2017-05-28 RX ADMIN — ENOXAPARIN SODIUM 40 MG: 40 INJECTION SUBCUTANEOUS at 22:15

## 2017-05-28 RX ADMIN — Medication 5 ML: at 22:15

## 2017-05-28 RX ADMIN — POTASSIUM CHLORIDE 40 MEQ: 20 TABLET, EXTENDED RELEASE ORAL at 17:53

## 2017-05-28 NOTE — PROGRESS NOTES
Iron level low at 27. Ferritin is high but it is an acute phase reactant and likely up due to underlying inflammatory process. Will start FeSo4 since iron deficiency could be contributing to high platelet count.     Jonatan Garibay MD

## 2017-05-28 NOTE — PROGRESS NOTES
Critical Care Outreach Nurse Progress Report:    Subjective: In to assess pt secondary to transfer from Unit. MEWS Score: 1 (05/27/17 1910)    Vitals:    05/27/17 1213 05/27/17 1703 05/27/17 1910 05/27/17 2045   BP:  109/58 105/61    Pulse:  87 88    Resp:  20 18    Temp:  98.6 °F (37 °C) 98.6 °F (37 °C)    SpO2: 95% 93% 92% 97%   Weight:       Height:            Objective: Pt found resting in bed with wife at bedside. Pain Intensity 1: 0 (05/27/17 1841)  Pain Location 1: Back  Pain Intervention(s) 1: Medication (see MAR)  Patient Stated Pain Goal: 0    Assessment: Pt is awake and alert. Oriented x 4. Pt denies any discomfort. R/A sat 100% HR=84. Pt wanting to go home. Plan: Program completed.

## 2017-05-28 NOTE — PROGRESS NOTES
Daily Progress Note: 5/28/2017    Domi Kirk   Admission Date: 5/15/2017         The patient's chart is reviewed and the patient is discussed with the staff. Patient is a 80 y.o.  male with a hx of anklelosing spondylitis on remicade who was in usual state of health until 5/13. He developed shaking chills and nausea. He has been sob and has had left side pleuritic chest and back pain. On 5/15 he was taken to Dr. Faheem Simon office and O2 sat was low so he was sent to the er and admitted. He was noted to have lactate of 3.5 and cxr showed LLL and RUL infiltrate. BP initially low but responded to fluids, admitted to 8th floor. Sputum culture with strep pneumo. CXR with pleural effusion. Had B thoracentesis 5/21 and developed worsening anxiety and pain afterward, transferred to ICU, no PTX on CXR. Also c/o L neck pain, found to have thrush  Overall he feels better but is still on 35% airvo. Not as anxious. Still some cough. 5/23 ultrasound revealed minimal effusion on left. Bronch done 5/24 and mucus plugs removed from left. He is better today with O2 sats in 90s off O2    Subjective:     Feels Ok. Minimal sputum. No fever. CHUYITA on RA with 1 severe desat to 75% which appeared to be due to signal loss- pulse wave also lost. CXR yesterday with increased L infiltrate. Platelet count continues to rise.     Current Facility-Administered Medications   Medication Dose Route Frequency    pantoprazole (PROTONIX) tablet 40 mg  40 mg Oral ACB&D    albuterol (PROVENTIL VENTOLIN) nebulizer solution 2.5 mg  2.5 mg Nebulization Q4H RT    morphine injection 2 mg  2 mg IntraVENous Q3H PRN    acetaminophen (TYLENOL) suppository 650 mg  650 mg Rectal Q4H PRN    HYDROcodone-acetaminophen (NORCO) 7.5-325 mg per tablet 1 Tab  1 Tab Oral Q4H PRN    potassium chloride (K-DUR, KLOR-CON) SR tablet 40 mEq  40 mEq Oral BID    magic mouthwash (GRACIA) oral suspension 5 mL  5 mL Oral E0S    methyl salicylate-menthol (BENGAY) 15-10 % cream   Topical QID PRN    fluconazole (DIFLUCAN) tablet 200 mg  200 mg Oral DAILY    magnesium hydroxide (MILK OF MAGNESIA) 400 mg/5 mL oral suspension 30 mL  30 mL Oral DAILY PRN    DULoxetine (CYMBALTA) capsule 30 mg  30 mg Oral DAILY    traMADol (ULTRAM) tablet 50 mg  50 mg Oral TID PRN    sodium chloride (NS) flush 5 mL  5 mL InterCATHeter Q8H    sodium chloride (NS) flush 5-10 mL  5-10 mL InterCATHeter PRN    enoxaparin (LOVENOX) injection 40 mg  40 mg SubCUTAneous Q24H       Review of Systems    Constitutional:  negative for fever, chills, sweats  Cardiovascular:  negative for chest pain, palpitations, syncope, edema  Gastrointestinal:  negative for dysphagia, reflux, vomiting, diarrhea, abdominal pain, or melena  Neurologic:  negative for focal weakness, numbness, headache      Objective:     Vitals:    05/27/17 1910 05/27/17 2045 05/27/17 2300 05/28/17 0349   BP: 105/61  99/61 103/58   Pulse: 88  81 81   Resp: 18  18 18   Temp: 98.6 °F (37 °C)  97.3 °F (36.3 °C) 98.2 °F (36.8 °C)   SpO2: 92% 97% 94% 94%   Weight:       Height:           Intake and Output:   05/26 1901 - 05/28 0700  In: 720 [P.O.:720]  Out: 700 [Urine:700]       Physical Exam:          Constitutional:  the patient is well developed and in no acute distress on RA  EENMT:  Sclera clear, pupils equal, oral mucosa moist  Respiratory:  Decreased BS with a few scattered crackles on L  Cardiovascular:  RRR without M,G,R  Gastrointestinal: soft and non-tender; with positive bowel sounds. Musculoskeletal: warm without cyanosis. There is no lower leg edema. Skin:  no jaundice or rashes, no wounds   Neurologic: no gross neuro deficits     Psychiatric:  alert and oriented x 3    LINES:  peripheral      CXR:     5/27:          5/25:      LAB  No results for input(s): GLUCPOC in the last 72 hours.     No lab exists for component: [place]   Recent Labs      05/27/17   0853   WBC  8.0   HGB  11.0*   HCT  34.1*   PLT  987*     No results for input(s): NA, K, CL, CO2, GLU, BUN, CREA, MG, PHOS, CA, TROIQ, ALB, TBIL, TBILI, GPT, ALT, SGOT, BNPP in the last 72 hours. No lab exists for component: TROIP  No results for input(s): PH, PCO2, PO2, HCO3 in the last 72 hours. No results for input(s): LCAD, LAC in the last 72 hours. Bronch wash: (5/24): NRF, AFB negative; cytology negative    Assessment:  (Medical Decision Making)     Hospital Problems  Date Reviewed: 5/15/2017          Codes Class Noted POA    Thrombocytosis ([facility] ICD-10-CM: D47.3  ICD-9-CM: 238.71  5/27/2017 Unknown    Worse    Suspected sleep apnea ICD-10-CM: G47.30  ICD-9-CM: 780.57  5/27/2017 Unknown    CHUYITA Ok    Ineffective airway clearance ICD-10-CM: R06.89  ICD-9-CM: 786.09  5/24/2017 Unknown        Parapneumonic effusion ICD-10-CM: J18.9, J91.8  ICD-9-CM: 511.89  5/22/2017 Unknown    Resolved    Pleural effusion, bilateral ICD-10-CM: J90  ICD-9-CM: 511.9  5/21/2017 Unknown    Resolved    Candidiasis ICD-10-CM: B37.9  ICD-9-CM: 112.9  5/21/2017 Unknown    Diflucan    Immunosuppression (HCC) (Chronic) ICD-10-CM: D89.9  ICD-9-CM: 279.9  5/15/2017 Yes        * (Principal)CAP (community acquired pneumonia) ICD-10-CM: J18.9  ICD-9-CM: 737  5/15/2017 Yes    Clinically better but CXR worse and platelets high. Confluent density L as well. Sepsis ([facility] ICD-10-CM: A41.9  ICD-9-CM: 038.9, 995.91  5/15/2017 Unknown        DENIS (acute kidney injury) ([facility] ICD-10-CM: N17.9  ICD-9-CM: 584.9  5/15/2017 Unknown    Resolved    Hypoxemia ICD-10-CM: R09.02  ICD-9-CM: 799.02  5/15/2017 Unknown    Now on RA    Ankylosing spondylitis ([facility] ICD-10-CM: M45.9  ICD-9-CM: 720.0  1/29/2015 Yes        Spondyloarthritis ([facility] (Chronic) ICD-10-CM: M46.90  ICD-9-CM: 721.90  9/17/2013 Yes                Plan:  (Medical Decision Making)     Check ESR, CRP, CBC. Chest CT with contrast.  May need steroids as infiltrate may be due to immune response to prior infection.      --    More than 50% of the time documented was spent in face-to-face contact with the patient and in the care of the patient on the floor/unit where the patient is located.     Brooklynn Rodriguez MD

## 2017-05-28 NOTE — PROGRESS NOTES
Follow up visit with wife of patient. She shared their long journey - 14 days so far. Asked for prayer for healing. Wife thankful as  assured her of our support.   Signed by chaplain Kavita

## 2017-05-28 NOTE — PROGRESS NOTES
CT of chest reviewed. Effusions better with dense consolidation of superior segment of LLL as below. No loculated effusion to suggest empyema. Tree in bud appearance to large portion of LLL and RLL. Pt now on steroids and ASA for high thrombocythemia. Will need close monitoring. Consider bronch with BAL in next couple of days if condition does not improve or worsens. Cannot exclude FRANCINE given T-I-B opacities but degree of parenchymal involvement would be unusual and 2 recent bronchs were negative for AFB.     Stephie Cervantes MD

## 2017-05-28 NOTE — PROGRESS NOTES
Verbal bedside report given to oncoming nurse Radha Ott. Patient's situation, background, assessment and recommendations provided. Opportunity for questions provided. No s/s of pain noted. No distress noted. Oncoming RN assumed care of patient.

## 2017-05-28 NOTE — PROGRESS NOTES
Pt is sitting up in bed. Multiple family members at the bedside. Respirations are even and unlabored. No signs or symptoms of distress are noted. No SOB or pain is reported at this time. Call light is within reach. Will continue to monitor.

## 2017-05-28 NOTE — PROGRESS NOTES
CRP over 10. Platelets now > 1 million. Will start steroids and ASA given severity of thrombocythemia. Repeat CBC in AM. CT pending.      Trevin Alex MD

## 2017-05-28 NOTE — PROGRESS NOTES
Report to be given to oncoming nurse. Pt and spouse had a pleasant and uneventful night. Call light is within reach. Will continue to monitor.

## 2017-05-28 NOTE — PROGRESS NOTES
Bedside report received from night nurse Darren Cordoba. Assessment done as noted  Respiration even and unlabored 20/min; denies pain or nausea at present. Up at the side of the bed. Wife at bedside. Encouraged to call with needs.

## 2017-05-29 PROCEDURE — 74011250637 HC RX REV CODE- 250/637: Performed by: INTERNAL MEDICINE

## 2017-05-29 PROCEDURE — 74011250636 HC RX REV CODE- 250/636: Performed by: INTERNAL MEDICINE

## 2017-05-29 PROCEDURE — 97530 THERAPEUTIC ACTIVITIES: CPT

## 2017-05-29 PROCEDURE — 74011000250 HC RX REV CODE- 250: Performed by: INTERNAL MEDICINE

## 2017-05-29 PROCEDURE — 99232 SBSQ HOSP IP/OBS MODERATE 35: CPT | Performed by: INTERNAL MEDICINE

## 2017-05-29 PROCEDURE — 97110 THERAPEUTIC EXERCISES: CPT

## 2017-05-29 PROCEDURE — 94760 N-INVAS EAR/PLS OXIMETRY 1: CPT

## 2017-05-29 PROCEDURE — 94640 AIRWAY INHALATION TREATMENT: CPT

## 2017-05-29 PROCEDURE — 65270000029 HC RM PRIVATE

## 2017-05-29 RX ADMIN — HYDROCODONE BITARTRATE AND ACETAMINOPHEN 1 TABLET: 7.5; 325 TABLET ORAL at 17:50

## 2017-05-29 RX ADMIN — FERROUS SULFATE TAB 325 MG (65 MG ELEMENTAL FE) 325 MG: 325 (65 FE) TAB at 09:30

## 2017-05-29 RX ADMIN — POTASSIUM CHLORIDE 40 MEQ: 20 TABLET, EXTENDED RELEASE ORAL at 17:49

## 2017-05-29 RX ADMIN — POTASSIUM CHLORIDE 40 MEQ: 20 TABLET, EXTENDED RELEASE ORAL at 09:30

## 2017-05-29 RX ADMIN — PANTOPRAZOLE SODIUM 40 MG: 40 TABLET, DELAYED RELEASE ORAL at 17:49

## 2017-05-29 RX ADMIN — Medication 5 ML: at 21:35

## 2017-05-29 RX ADMIN — ASPIRIN 81 MG 81 MG: 81 TABLET ORAL at 09:30

## 2017-05-29 RX ADMIN — METHYLPREDNISOLONE SODIUM SUCCINATE 60 MG: 40 INJECTION, POWDER, FOR SOLUTION INTRAMUSCULAR; INTRAVENOUS at 05:50

## 2017-05-29 RX ADMIN — ENOXAPARIN SODIUM 40 MG: 40 INJECTION SUBCUTANEOUS at 21:34

## 2017-05-29 RX ADMIN — FERROUS SULFATE TAB 325 MG (65 MG ELEMENTAL FE) 325 MG: 325 (65 FE) TAB at 17:49

## 2017-05-29 RX ADMIN — METHYLPREDNISOLONE SODIUM SUCCINATE 40 MG: 40 INJECTION, POWDER, FOR SOLUTION INTRAMUSCULAR; INTRAVENOUS at 21:34

## 2017-05-29 RX ADMIN — ALBUTEROL SULFATE 2.5 MG: 2.5 SOLUTION RESPIRATORY (INHALATION) at 07:22

## 2017-05-29 RX ADMIN — Medication 5 ML: at 21:34

## 2017-05-29 RX ADMIN — ALBUTEROL SULFATE 2.5 MG: 2.5 SOLUTION RESPIRATORY (INHALATION) at 20:12

## 2017-05-29 RX ADMIN — Medication 5 ML: at 17:50

## 2017-05-29 RX ADMIN — FLUCONAZOLE 200 MG: 100 TABLET ORAL at 09:30

## 2017-05-29 RX ADMIN — ALBUTEROL SULFATE 2.5 MG: 2.5 SOLUTION RESPIRATORY (INHALATION) at 16:10

## 2017-05-29 RX ADMIN — PANTOPRAZOLE SODIUM 40 MG: 40 TABLET, DELAYED RELEASE ORAL at 05:51

## 2017-05-29 RX ADMIN — Medication 5 ML: at 18:02

## 2017-05-29 RX ADMIN — Medication 5 ML: at 09:30

## 2017-05-29 RX ADMIN — DULOXETINE HYDROCHLORIDE 30 MG: 30 CAPSULE, DELAYED RELEASE ORAL at 09:30

## 2017-05-29 RX ADMIN — Medication 5 ML: at 05:51

## 2017-05-29 RX ADMIN — Medication 5 ML: at 12:32

## 2017-05-29 RX ADMIN — ALBUTEROL SULFATE 2.5 MG: 2.5 SOLUTION RESPIRATORY (INHALATION) at 11:31

## 2017-05-29 RX ADMIN — HYDROCODONE BITARTRATE AND ACETAMINOPHEN 1 TABLET: 7.5; 325 TABLET ORAL at 09:30

## 2017-05-29 RX ADMIN — ALBUTEROL SULFATE 2.5 MG: 2.5 SOLUTION RESPIRATORY (INHALATION) at 23:26

## 2017-05-29 NOTE — PROGRESS NOTES
Problem: Self Care Deficits Care Plan (Adult)  Goal: *Acute Goals and Plan of Care (Insert Text)  1. Patient will complete lower body bathing and dressing with independence and adaptive equipment as needed. 2. Patient will complete toileting with independence. 3. Patient will tolerate 23 minutes of therapeutic activity with less than 3 rest breaks to increase activity tolerance for ADLs. MET  4. Patient will complete 23 minutes of therapeutic exercise to increase strength for ADLs. 5. Patient will complete functional transfers with independence and adaptive equipment as needed. Timeframe: 7 visits     Comments:       OCCUPATIONAL THERAPY: Daily Note, Treatment Day: 2nd and PM 5/29/2017  INPATIENT: Hospital Day: 15  [place] / Product Type: Medicare /      NAME/AGE/GENDER: Mary Grace Wilburn is a 80 y.o. male       PRIMARY DIAGNOSIS:  Pleural effusion [J90]  Pleural effusion [J90]  Atelectasis [J98.11] CAP (community acquired pneumonia) CAP (community acquired pneumonia)  Procedure(s) (LRB):  BRONCHOSCOPY (N/A)  5 Days Post-Op  ICD-10: Treatment Diagnosis:        · Generalized Muscle Weakness (M62.81)   Precautions/Allergies:         Codeine       ASSESSMENT:      Mr. Serge Gonzalez is on room air upon contact, pt states they are weaning him off supplemental O2; he denies SOB and SaO2 above 90% throughout session. Pt is very agreeable and eager to participate in OT tx session. He performs household distance functional mobility, very slowly, requires SBA mostly, but CGA at times with R sided drift towards wall and miscellaneous obstacles in halls. Once in room, he performed various standing/sitting balance activities to improve safety with functional transfers and overall activity tolerance and strength. Mr. Serge Gonzalez gave maximal effort today and is progressing towards reaching above goals #3,4,5.   He will continue to benefit from skilled OT services to address functional deficits in order to improve independence with ADLs. Will follow. This section established at most recent assessment   PROBLEM LIST (Impairments causing functional limitations):  1. Decreased Strength  2. Decreased ADL/Functional Activities  3. Decreased Transfer Abilities  4. Decreased Ambulation Ability/Technique  5. Decreased Balance  6. Decreased Activity Tolerance  7. Decreased Pacing Skills  8. Decreased Work Simplification/Energy Conservation Techniques  9. Increased Fatigue  10. Increased Shortness of Breath    INTERVENTIONS PLANNED: (Benefits and precautions of occupational therapy have been discussed with the patient.)  1. Activities of daily living training  2. Adaptive equipment training  3. Group therapy  4. Theraputic activity  5. Theraputic exercise      TREATMENT PLAN: Frequency/Duration: Follow patient 3x/week to address above goals. Rehabilitation Potential For Stated Goals: GOOD      RECOMMENDED REHABILITATION/EQUIPMENT: (at time of discharge pending progress): Continue Skilled Therapy. OCCUPATIONAL PROFILE AND HISTORY:   History of Present Injury/Illness (Reason for Referral):  See H&P  Past Medical History/Comorbidities:   Mr. Caryle Hoot  has a past medical history of Arthritis; Elevated prostate specific antigen (PSA); HLA-B27 positive; Impotence of organic origin; Neck pain; Osteoarthritis, hand; Osteopenia; Osteoporosis; Other nonspecific abnormal finding of lung field; Polyarthritis; Polymyalgia rheumatica ([address] Prostate cancer ([address] Raynaud's disease; 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[address] and Thoracic spine pain. He also has no past medical history of Asthma; Autoimmune disease ([address] Chronic kidney disease; Chronic obstructive pulmonary disease ([address] Chronic pain; Diabetes ([address] Difficult intubation; GERD (gastroesophageal reflux disease); Heart abnormalities; Hypertension; Liver disease; Malignant hyperthermia due to anesthesia;  Nausea & vomiting; Neurological disorder; Other unknown and unspecified cause of morbidity or mortality; Pseudocholinesterase deficiency; Psychiatric disorder; PUD (peptic ulcer disease); Seizures ([facility] [address] Stroke [facility]); Thromboembolus ([facility] [address] Thyroid disease; or Unspecified adverse effect of anesthesia. Mr. Ingrid Xie  has a past surgical history that includes vasectomy (40+ yrs); knee arthroscopy (2009); urological; biopsy prostate; prostatectomy (2012); cataract removal (2012); and cataract removal (2013). Social History/Living Environment:   Home Environment: Private residence  # Steps to Enter: 2  One/Two Story Residence: One story  Living Alone: No  Support Systems: Spouse/Significant Other/Partner  Patient Expects to be Discharged to[de-identified] Private residence  Current DME Used/Available at Home: None  Tub or Shower Type: Tub/Shower combination  Prior Level of Function/Work/Activity:  Lives with spouse in [facility]. Is very active and independent at baseline with ADLs/IADLS. Drives. Personal Factors:          Sex:  male        Age:  80 y.o. Social Background:  Strong support from wife   Number of Personal Factors/Comorbidities that affect the Plan of Care: Expanded review of therapy/medical records (1-2):  MODERATE COMPLEXITY   ASSESSMENT OF OCCUPATIONAL PERFORMANCE[de-identified]   Activities of Daily Living:           Basic ADLs (From Assessment) Complex ADLs (From Assessment)   Basic ADL  Feeding: Supervision  Oral Facial Hygiene/Grooming: Supervision  Bathing: Stand-by assistance  Upper Body Dressing: Supervision  Lower Body Dressing: Stand-by assistance  Toileting: Stand by assistance Instrumental ADL  Meal Preparation: Minimum assistance  Homemaking:  Moderate assistance  Medication Management: Setup  Financial Management: Setup   Grooming/Bathing/Dressing Activities of Daily Living                                        Most Recent Physical Functioning:   Gross Assessment:  AROM: Within functional limits  Strength: Generally decreased, functional  Tone: Normal  Sensation: Intact               Posture:  Posture (WDL): Exceptions to WDL  Posture Assessment: Forward head  Balance:    Bed Mobility:     Wheelchair Mobility:     Transfers:                       Patient Vitals for the past 6 hrs:   BP SpO2 Pulse   17 1128 140/68 95 % 80   17 1131 - 97 % -        Mental Status  Neurologic State: Alert  Orientation Level: Oriented X4  Cognition: Appropriate for age attention/concentration  Perception: Appears intact  Perseveration: No perseveration noted  Safety/Judgement: Awareness of environment, Insight into deficits                               Physical Skills Involved:  1. Balance  2. Mobility  3. Strength  4. Endurance Cognitive Skills Affected (resulting in the inability to perform in a timely and safe manner): 1. none Psychosocial Skills Affected:  1. Routines and Behaviors   Number of elements that affect the Plan of Care: 3-5:  MODERATE COMPLEXITY   CLINICAL DECISION MAKIN[address] [place] [address] AM-PAC 6 Clicks   Basic Mobility Inpatient Short Form  How much help from another person does the patient currently need. .. Total A Lot A Little None   1. Putting on and taking off regular lower body clothing?   [ ] 1   [ ] 2   [X] 3   [ ] 4   2. Bathing (including washing, rinsing, drying)? [ ] 1   [ ] 2   [X] 3   [ ] 4   3. Toileting, which includes using toilet, bedpan or urinal?   [ ] 1   [ ] 2   [X] 3   [] 4   4. Putting on and taking off regular upper body clothing?   [ ] 1   [ ] 2   [X] 3   [] 4   5. Taking care of personal grooming such as brushing teeth? [ ] 1   [ ] 2   [X] 3   [] 4   6. Eating meals? [ ] 1   [ ] 2   [X] 3   [] 4   © , Trustees of [address] [place] [address], under license to [employer]. All rights reserved    Score:  Initial: 22 Most Recent: 18 (Date: 2017 )     Interpretation of Tool:  Represents activities that are increasingly more difficult (i.e. Bed mobility, Transfers, Gait).        Score 24 23 22-20 19-15 14-10 9-7 6       Modifier CH CI CJ CK CL CM CN         · Self Care:               - CURRENT STATUS:    CK - 40%-59% impaired, limited or restricted               - GOAL STATUS:           CI - 1%-19% impaired, limited or restricted               - D/C STATUS:                       ---------------To be determined---------------  [place] / Product Type: Medicare /       Medical Necessity:     · Patient demonstrates good rehab potential due to higher previous functional level. Reason for Services/Other Comments:  · Patient continues to demonstrate capacity to improve strength and activity tolerance which will increase independence. Use of outcome tool(s) and clinical judgement create a POC that gives a: MODERATE COMPLEXITY             TREATMENT:   (In addition to Assessment/Re-Assessment sessions the following treatments were rendered)      Pre-treatment Symptoms/Complaints:    Pain: Initial:   Pain Intensity 1: 0  Post Session:  Same; 0/10      Therapeutic Activity: (    18 minutes minutes): Therapeutic activities including Bed transfers, Chair transfers, sitting/standing balance, standing tolerance, and functional mobility on level ground with close supervision to improve mobility, strength, balance and coordination. Required minimal cueing   to promote dynamic balance in standing.       Date:  5/26 Date:   Date:     Activity/Exercise Parameters Parameters Parameters   Lat stretch L/R 10-15 seconds each     5 Sit to stands 30 seconds                                   Braces/Orthotics/Lines/Etc:   · Room air  Treatment/Session Assessment:    · Response to Treatment:  Tolerated well w/o complications or complaints  · Interdisciplinary Collaboration:  · Occupational Therapist  · Registered Nurse  · After treatment position/precautions:  · Supine in bed, Bed/Chair-wheels locked, Bed in low position, Call light within reach, RN notified and Side rails x 2  · Compliance with Program/Exercises: compliant all of the time today. · Recommendations/Intent for next treatment session: \"Next visit will focus on advancements to more challenging activities and reduction in assistance provided\".   Total Treatment Duration:  OT Patient Time In/Time Out  Time In: 1416  Time Out: 59 Emilio Lobato

## 2017-05-29 NOTE — PROGRESS NOTES
Verbal bedside report given to oncoming nurse Christine Cheng. Patient's situation, background, assessment and recommendations provided. Opportunity for questions provided. No s/s of pain noted. No distress noted. Oncoming RN assumed care of patient.

## 2017-05-29 NOTE — PROGRESS NOTES
Problem: Mobility Impaired (Adult and Pediatric)  Goal: *Acute Goals and Plan of Care (Insert Text)  UPDATED: 5/26/17  LTG:  (1.)Mr. David Dunn will move from supine to sit and sit to supine , scoot up and down and roll side to side with INDEPENDENCE within 7 day(s). (2.)Mr. David Dunn will transfer from bed to chair and chair to bed with MODIFIED INDEPENDENCE using the least restrictive device within 7 day(s). MET 5/29/17  (3.)Mr. David Dunn will ambulate with MODIFIED INDEPENDENCE for 500 feet with the least restrictive device within 7 day(s). (4.)Mr. David Dunn will participate in therapeutic activity/exerices x 20 minutes for increased activity tolerance within 7 days. All goals with O2 >90%         PHYSICAL THERAPY: Daily Note, Treatment Day: 1st and PM 5/29/2017  INPATIENT: Hospital Day: 15  [place] / Product Type: Medicare /      NAME/AGE/GENDER: Nohemi Ko is a 80 y.o. male       PRIMARY DIAGNOSIS: Pleural effusion [J90]  Pleural effusion [J90]  Atelectasis [J98.11] CAP (community acquired pneumonia) CAP (community acquired pneumonia)  Procedure(s) (LRB):  BRONCHOSCOPY (N/A)  5 Days Post-Op  ICD-10: Treatment Diagnosis:       · Generalized Muscle Weakness (M62.81)  · Difficulty in walking, Not elsewhere classified (R26.2)   Precaution/Allergies:  Codeine       ASSESSMENT:      Mr. David Dunn was in bed upon arrival and agreeable to PT. He reports that he has been up ambulating in halls. Pt performs transfers with modified independence and ambulates with supervision. He has fair (+) standing balance as he occasionally will use wall hand rail during ambulation. Pt SpO2 recorded at 94% before and during ambulation. He also participate in therapeutic exercise after returning to room. Pt noted to have HR in 120s during exercise but decreased after. He is making progress in ambulation and mobility. Will continue with POC.     This section established at most recent assessment PROBLEM LIST (Impairments causing functional limitations):  1. Decreased Strength  2. Decreased ADL/Functional Activities  3. Decreased Transfer Abilities  4. Decreased Ambulation Ability/Technique  5. Decreased Balance  6. Increased Pain  7. Decreased Activity Tolerance  8. Increased Fatigue  9. Increased Shortness of Breath  10. Decreased Tracy with Home Exercise Program    INTERVENTIONS PLANNED: (Benefits and precautions of physical therapy have been discussed with the patient.)  1. Balance Exercise  2. Bed Mobility  3. Family Education  4. Gait Training  5. Home Exercise Program (HEP)  6. Manual Therapy  7. Neuromuscular Re-education/Strengthening  8. Range of Motion (ROM)  9. Therapeutic Activites  10. Therapeutic Exercise/Strengthening  11. Transfer Training  12. Group Therapy      TREATMENT PLAN: Frequency/Duration: 3 times a week for 12 weeks  Rehabilitation Potential For Stated Goals: GOOD      RECOMMENDED REHABILITATION/EQUIPMENT: (at time of discharge pending progress): Continue Skilled Therapy. HISTORY:   History of Present Injury/Illness (Reason for Referral):See assessment  Past Medical History/Comorbidities:   Mr. Vince Guido  has a past medical history of Arthritis; Elevated prostate specific antigen (PSA); HLA-B27 positive; Impotence of organic origin; Neck pain; Osteoarthritis, hand; Osteopenia; Osteoporosis; Other nonspecific abnormal finding of lung field; Polyarthritis; Polymyalgia rheumatica ([address] Prostate cancer ([address] Raynaud's disease; 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[address] and Thoracic spine pain. He also has no past medical history of Asthma; Autoimmune disease ([address] Chronic kidney disease; Chronic obstructive pulmonary disease ([address] Chronic pain; Diabetes ([address] Difficult intubation; GERD (gastroesophageal reflux disease); Heart abnormalities; Hypertension; Liver disease; Malignant hyperthermia due to anesthesia; Nausea & vomiting; Neurological disorder;  Other unknown and unspecified cause of morbidity or mortality; Pseudocholinesterase deficiency; Psychiatric disorder; PUD (peptic ulcer disease); Seizures ([facility] [address] Stroke [facility]); Thromboembolus ([facility] [address] Thyroid disease; or Unspecified adverse effect of anesthesia. Mr. Dileep Stephen  has a past surgical history that includes vasectomy (40+ yrs); knee arthroscopy (2009); urological; biopsy prostate; prostatectomy (2012); cataract removal (2012); and cataract removal (2013). Social History/Living Environment:   Home Environment: Private residence  # Steps to Enter: 2  One/Two Story Residence: One story  Living Alone: No  Support Systems: Spouse/Significant Other/Partner  Patient Expects to be Discharged to[de-identified] Private residence  Current DME Used/Available at Home: None  Tub or Shower Type: Tub/Shower combination  Prior Level of Function/Work/Activity:  See assessment  Personal Factors:          Sex:  male        Age:  80 y.o. Number of Personal Factors/Comorbidities that affect the Plan of Care: 3+: HIGH COMPLEXITY   EXAMINATION:     Most Recent Physical Functioning:   Gross Assessment:  AROM: Within functional limits  Strength: Generally decreased, functional (B hip flexion and dorsiflexion 4-/5)               Posture:     Balance:  Sitting: Intact  Standing: Impaired  Standing - Static: Good  Standing - Dynamic : Fair ((+)) Bed Mobility:  Supine to Sit: Modified independent  Wheelchair Mobility:     Transfers:  Sit to Stand: Modified independent  Stand to Sit: Modified independent  Gait:     Base of Support: Center of gravity altered  Speed/Annelise: Slow  Step Length: Left shortened;Right shortened  Gait Abnormalities: Decreased step clearance  Distance (ft): 500 Feet (ft)  Assistive Device:  (wall handrail on occasion)  Ambulation - Level of Assistance: Supervision       Body Structures Involved:  1. Nerves  2. Lungs  3. Bones  4. Joints  5. Muscles Body Functions Affected:  1. Sensory/Pain  2. Respiratory  3. Neuromusculoskeletal  4.  Movement Related Activities and Participation Affected:  1. Mobility  2. Self Care   Number of elements that affect the Plan of Care: 4+: HIGH COMPLEXITY   CLINICAL PRESENTATION:   Presentation: Stable and uncomplicated: LOW COMPLEXITY   CLINICAL DECISION MAKIN [facility] [address] [address] AM-PAC 6 Clicks   Basic Mobility Inpatient Short Form  How much difficulty does the patient currently have. .. Unable A Lot A Little None   1. Turning over in bed (including adjusting bedclothes, sheets and blankets)? [ ] 1   [ ] 2   [ ] 3   [X] 4   2. Sitting down on and standing up from a chair with arms ( e.g., wheelchair, bedside commode, etc.)   [ ] 1   [ ] 2   [ ] 3   [X] 4   3. Moving from lying on back to sitting on the side of the bed? [ ] 1   [ ] 2   [ ] 3   [X] 4   How much help from another person does the patient currently need. .. Total A Lot A Little None   4. Moving to and from a bed to a chair (including a wheelchair)? [ ] 1   [ ] 2   [ ] 3   [X] 4   5. Need to walk in hospital room? [ ] 1   [ ] 2   [X] 3   [ ] 4   6. Climbing 3-5 steps with a railing? [ ] 1   [ ] 2   [X] 3   [ ] 4   © , Trustees of [address] [facility] [address] [address], under license to [employer]. All rights reserved    Score:  Initial: 22 Most Recent: 22 (Date: 17 )     Interpretation of Tool:  Represents activities that are increasingly more difficult (i.e. Bed mobility, Transfers, Gait).        Score 24 23 22-20 19-15 14-10 9-7 6       Modifier CH CI CJ CK CL CM CN         · Mobility - Walking and Moving Around:               - CURRENT STATUS:    CJ - 20%-39% impaired, limited or restricted               - GOAL STATUS:           CI - 1%-19% impaired, limited or restricted               - D/C STATUS:                       ---------------To be determined---------------  [place] / Product Type: Medicare /       Medical Necessity:     · Skilled intervention continues to be required due to decreased strength, decreased balance, decreased functional tolerance, decreased cardiopulmonary endurance affecting participation in basic ADLs and functional tasks. .  Reason for Services/Other Comments:  · Patient continues to require skilled intervention due to medical complications and patient unable to attend/participate in therapy as expected. Use of outcome tool(s) and clinical judgement create a POC that gives a: Clear prediction of patient's progress: LOW COMPLEXITY                 TREATMENT:      Pre-treatment Symptoms/Complaints: Ribcage pain bilaterally      Pain: Initial:   Pain Intensity 1: 0 (5-6 in ribcage with deep breaths)  Pain Location 1: Rib cage  Pain Orientation 1: Right, Left  Post Session:  0/10      Therapeutic Activity: (    10 minutes): Therapeutic activities including Bed transfers, Chair transfers and Ambulation on level ground to improve mobility, strength, balance and activity tolerance. Required minimal verbal cuing   to promote dynamic balance in standing. Therapeutic Exercise: (13 Minutes):  Exercises per grid below to improve mobility and strength. Required minimal visual and verbal cues to promote proper body alignment and promote proper body mechanics. Progressed repetitions as indicated.        Date:  5/29/17 Date:   Date:     ACTIVITY/EXERCISE AM PM AM PM AM PM   Seated LAQ  1 x 20 B  1 x 25 B       Seated marching  1 x 20 B  1 x 25 B       Seated heel taps  1 x 20 B  1 x 25 B       Seated hip abd/add  1 x 20 B  1 x 25 B       Sit to stand  2 x 5       Seated toe taps  1 x 20 B  1 x 25 B                B = bilateral; AA = active assistive; A = active; P = passive                 Braces/Orthotics/Lines/Etc:   · Room air  Treatment/Session Assessment:    · Response to Treatment: See above  · Interdisciplinary Collaboration:  · Physical Therapist and Registered Nurse  · After treatment position/precautions:  · Up in chair, Call light within reach and RN notified  · Compliance with Program/Exercises: Will assess as treatment progresses. · Recommendations/Intent for next treatment session: \"Next visit will focus on advancements to more challenging activities and reduction in assistance provided\".   Total Treatment Duration:  PT Patient Time In/Time Out  Time In: 1439  Time Out: 9485 [facility] Kemp Litten, PT, DPT

## 2017-05-29 NOTE — PROGRESS NOTES
Shared a moment of humor with patient while he was eating. Appears in good spirits.   Signed by chaplain Dada

## 2017-05-29 NOTE — PROGRESS NOTES
Daily Progress Note: 5/29/2017    Jenniffer Stern   Admission Date: 5/15/2017         The patient's chart is reviewed and the patient is discussed with the staff. Patient is a 80 y.o.  male with a hx of anklelosing spondylitis on remicade who was in usual state of health until 5/13. He developed shaking chills and nausea. He has been sob and has had left side pleuritic chest and back pain. On 5/15 he was taken to Dr. Yared Shi office and O2 sat was low so he was sent to the er and admitted. He was noted to have lactate of 3.5 and cxr showed LLL and RUL infiltrate. BP initially low but responded to fluids, admitted to 8th floor. Sputum culture with strep pneumo. CXR with pleural effusion. Had B thoracentesis 5/21 and developed worsening anxiety and pain afterward, transferred to ICU, no PTX on CXR. Also c/o L neck pain, found to have thrush  Overall he feels better but is still on 35% airvo. Not as anxious. Still some cough. 5/23 ultrasound revealed minimal effusion on left. Bronch done 5/24 and mucus plugs removed from left. He is better today with O2 sats in 90s off O2    Subjective:     Feels Ok. Minimal sputum. No fever.    On RA    Current Facility-Administered Medications   Medication Dose Route Frequency    methylPREDNISolone (PF) (SOLU-MEDROL) injection 60 mg  60 mg IntraVENous Q8H    aspirin chewable tablet 81 mg  81 mg Oral DAILY    ferrous sulfate tablet 325 mg  1 Tab Oral BID WITH MEALS    pantoprazole (PROTONIX) tablet 40 mg  40 mg Oral ACB&D    albuterol (PROVENTIL VENTOLIN) nebulizer solution 2.5 mg  2.5 mg Nebulization Q4H RT    morphine injection 2 mg  2 mg IntraVENous Q3H PRN    acetaminophen (TYLENOL) suppository 650 mg  650 mg Rectal Q4H PRN    HYDROcodone-acetaminophen (NORCO) 7.5-325 mg per tablet 1 Tab  1 Tab Oral Q4H PRN    potassium chloride (K-DUR, KLOR-CON) SR tablet 40 mEq  40 mEq Oral BID    magic mouthwash (GRACIA) oral suspension 5 mL  5 mL Oral Q4H    methyl salicylate-menthol (BENGAY) 15-10 % cream   Topical QID PRN    fluconazole (DIFLUCAN) tablet 200 mg  200 mg Oral DAILY    magnesium hydroxide (MILK OF MAGNESIA) 400 mg/5 mL oral suspension 30 mL  30 mL Oral DAILY PRN    DULoxetine (CYMBALTA) capsule 30 mg  30 mg Oral DAILY    traMADol (ULTRAM) tablet 50 mg  50 mg Oral TID PRN    sodium chloride (NS) flush 5 mL  5 mL InterCATHeter Q8H    sodium chloride (NS) flush 5-10 mL  5-10 mL InterCATHeter PRN    enoxaparin (LOVENOX) injection 40 mg  40 mg SubCUTAneous Q24H       Review of Systems    Constitutional:  negative for fever, chills, sweats  Cardiovascular:  negative for chest pain, palpitations, syncope, edema  Gastrointestinal:  negative for dysphagia, reflux, vomiting, diarrhea, abdominal pain, or melena  Neurologic:  negative for focal weakness, numbness, headache      Objective:     Vitals:    05/29/17 0722 05/29/17 0744 05/29/17 1128 05/29/17 1131   BP:  130/68 140/68    Pulse:  90 80    Resp:  18 18    Temp:  97.6 °F (36.4 °C) 97.5 °F (36.4 °C)    SpO2: 97% 98% 95% 97%   Weight:       Height:           Intake and Output:   05/27 1901 - 05/29 0700  In: -   Out: 1150 [Urine:1150]       Physical Exam:          Constitutional:  the patient is well developed and in no acute distress on RA  EENMT:  Sclera clear, pupils equal, oral mucosa moist  Respiratory:  Decreased BS with a few scattered crackles on L  Cardiovascular:  RRR without M,G,R  Gastrointestinal: soft and non-tender; with positive bowel sounds. Musculoskeletal: warm without cyanosis. There is no lower leg edema. Skin:  no jaundice or rashes, no wounds   Neurologic: no gross neuro deficits     Psychiatric:  alert and oriented x 3    LINES:  peripheral      CXR:     5/27:          5/25:        Chest CT :            LAB  No results for input(s): GLUCPOC in the last 72 hours.     No lab exists for component: [place]   Recent Labs      05/28/17   0900  05/27/17   0853   WBC  8.8  8.0   HGB  11.8* 11.0*   HCT  35.3*  34.1*   PLT  1110*  987*     Recent Labs      05/28/17   0900   CREA  0.66*     No results for input(s): PH, PCO2, PO2, HCO3 in the last 72 hours. No results for input(s): LCAD, LAC in the last 72 hours. Bronch wash: (5/24): NRF, AFB negative; cytology negative    Assessment:  (Medical Decision Making)     Hospital Problems  Date Reviewed: 5/15/2017          Codes Class Noted POA    Thrombocytosis ([facility] ICD-10-CM: D47.3  ICD-9-CM: 238.71  5/27/2017 Unknown    Worse    Suspected sleep apnea ICD-10-CM: G47.30  ICD-9-CM: 780.57  5/27/2017 Unknown    CHUYITA Ok    Ineffective airway clearance ICD-10-CM: R06.89  ICD-9-CM: 786.09  5/24/2017 Unknown        Parapneumonic effusion ICD-10-CM: J18.9, J91.8  ICD-9-CM: 511.89  5/22/2017 Unknown    Resolved    Pleural effusion, bilateral ICD-10-CM: J90  ICD-9-CM: 511.9  5/21/2017 Unknown    Resolved    Candidiasis ICD-10-CM: B37.9  ICD-9-CM: 112.9  5/21/2017 Unknown    Diflucan    Immunosuppression (HCC) (Chronic) ICD-10-CM: D89.9  ICD-9-CM: 279.9  5/15/2017 Yes        * (Principal)CAP (community acquired pneumonia) ICD-10-CM: J18.9  ICD-9-CM: 039  5/15/2017 Yes    Clinically better but CXR worse and platelets high. Sepsis ([facility] ICD-10-CM: A41.9  ICD-9-CM: 038.9, 995.91  5/15/2017 Unknown        DENIS (acute kidney injury) ([facility] ICD-10-CM: N17.9  ICD-9-CM: 584.9  5/15/2017 Unknown    Resolved    Hypoxemia ICD-10-CM: R09.02  ICD-9-CM: 799.02  5/15/2017 Unknown    Now on RA    Ankylosing spondylitis ([facility] ICD-10-CM: M45.9  ICD-9-CM: 720.0  1/29/2015 Yes        Spondyloarthritis ([facility] (Chronic) ICD-10-CM: M46.90  ICD-9-CM: 721.90  9/17/2013 Yes                Plan:  (Medical Decision Making)     Cont. ASA and wean steroids and Fe SO4  AM labs  mobilize    More than 50% of the time documented was spent in face-to-face contact with the patient and in the care of the patient on the floor/unit where the patient is located.     Omer Monroy MD

## 2017-05-29 NOTE — PROGRESS NOTES
Problem: Nutrition Deficit  Goal: *Optimize nutritional status  Nutrition F/U day 14  Assessment  Diet order(s): regular 5/15, Mechanical soft 5/22, NPO 5/22, Mechanical soft/nectar 5/25  Food,Nutrition, and Pertinent History: Patient is s/p thoracentesis 5/23 and bronch 5/24 with plans for EGD d/t esophageal wall thickening as an out-patient. Patient had MBS 5/25 revealing need for modified diet. Patient reports that his appetite is improving slowly. He expresses frustration that is does take longer for him to eat now and that his food will get cold. He is drinking 2-3 ensure enlive per day. He reports that his thrush has improved as well and denies much pain with swallowing. Anthropometrics: Height: 5' 7.5\" (171.5 cm),  Weight: 53.5 kg (5/25-source unknown)  Macronutrient Needs:  · EER: 7334-2067 kcal /day (25-30 kcal/kg listed BW)  · EPR: 55-66 grams protein/day (1-1.2 grams/kg listed BW)(GFR >60)  Intake/Comparative Standards: Average intake for past 7 day(s)/5 recorded meal(s): 75%. This potentially meets ~100% of kcal and ~100% of protein needs. Intervention:  Meals and snacks: Continue current diet.   Supplementation: Continue magic cup BID, ensure enlive TID      Siobhan Davis, MS, RD, LD, [phone]

## 2017-05-30 ENCOUNTER — APPOINTMENT (OUTPATIENT)
Dept: GENERAL RADIOLOGY | Age: 72
DRG: 853 | End: 2017-05-30
Attending: INTERNAL MEDICINE
Payer: MEDICARE

## 2017-05-30 ENCOUNTER — HOME HEALTH ADMISSION (OUTPATIENT)
Dept: HOME HEALTH SERVICES | Facility: HOME HEALTH | Age: 72
End: 2017-05-30

## 2017-05-30 PROBLEM — J93.9 PNEUMOTHORAX ON LEFT: Status: ACTIVE | Noted: 2017-05-30

## 2017-05-30 PROBLEM — E61.1 IRON DEFICIENCY: Status: ACTIVE | Noted: 2017-05-30

## 2017-05-30 LAB
BASOPHILS # BLD AUTO: 0 K/UL (ref 0–0.2)
BASOPHILS # BLD: 0 % (ref 0–2)
DIFFERENTIAL METHOD BLD: ABNORMAL
EOSINOPHIL # BLD: 0 K/UL (ref 0–0.8)
EOSINOPHIL NFR BLD: 0 % (ref 0.5–7.8)
ERYTHROCYTE [DISTWIDTH] IN BLOOD BY AUTOMATED COUNT: 13 % (ref 11.9–14.6)
HCT VFR BLD AUTO: 32.7 % (ref 41.1–50.3)
HGB BLD-MCNC: 10.7 G/DL (ref 13.6–17.2)
IMM GRANULOCYTES # BLD: 0.1 K/UL (ref 0–0.5)
IMM GRANULOCYTES NFR BLD AUTO: 0.5 % (ref 0–5)
LYMPHOCYTES # BLD AUTO: 5 % (ref 13–44)
LYMPHOCYTES # BLD: 0.5 K/UL (ref 0.5–4.6)
MCH RBC QN AUTO: 29.6 PG (ref 26.1–32.9)
MCHC RBC AUTO-ENTMCNC: 32.7 G/DL (ref 31.4–35)
MCV RBC AUTO: 90.6 FL (ref 79.6–97.8)
MONOCYTES # BLD: 0.3 K/UL (ref 0.1–1.3)
MONOCYTES NFR BLD AUTO: 3 % (ref 4–12)
NEUTS SEG # BLD: 9.1 K/UL (ref 1.7–8.2)
NEUTS SEG NFR BLD AUTO: 92 % (ref 43–78)
PLATELET # BLD AUTO: 1084 K/UL (ref 150–450)
PMV BLD AUTO: 9.1 FL (ref 10.8–14.1)
RBC # BLD AUTO: 3.61 M/UL (ref 4.23–5.67)
WBC # BLD AUTO: 9.9 K/UL (ref 4.3–11.1)

## 2017-05-30 PROCEDURE — 74011250636 HC RX REV CODE- 250/636: Performed by: INTERNAL MEDICINE

## 2017-05-30 PROCEDURE — 85025 COMPLETE CBC W/AUTO DIFF WBC: CPT | Performed by: INTERNAL MEDICINE

## 2017-05-30 PROCEDURE — 92526 ORAL FUNCTION THERAPY: CPT

## 2017-05-30 PROCEDURE — 99232 SBSQ HOSP IP/OBS MODERATE 35: CPT | Performed by: INTERNAL MEDICINE

## 2017-05-30 PROCEDURE — 71020 XR CHEST PA LAT: CPT

## 2017-05-30 PROCEDURE — 36415 COLL VENOUS BLD VENIPUNCTURE: CPT | Performed by: INTERNAL MEDICINE

## 2017-05-30 PROCEDURE — 74011250637 HC RX REV CODE- 250/637: Performed by: INTERNAL MEDICINE

## 2017-05-30 PROCEDURE — 74011000250 HC RX REV CODE- 250: Performed by: INTERNAL MEDICINE

## 2017-05-30 PROCEDURE — 65270000029 HC RM PRIVATE

## 2017-05-30 PROCEDURE — 94760 N-INVAS EAR/PLS OXIMETRY 1: CPT

## 2017-05-30 PROCEDURE — 94640 AIRWAY INHALATION TREATMENT: CPT

## 2017-05-30 PROCEDURE — 71010 XR CHEST PORT: CPT

## 2017-05-30 RX ADMIN — PANTOPRAZOLE SODIUM 40 MG: 40 TABLET, DELAYED RELEASE ORAL at 05:23

## 2017-05-30 RX ADMIN — ALBUTEROL SULFATE 2.5 MG: 2.5 SOLUTION RESPIRATORY (INHALATION) at 23:56

## 2017-05-30 RX ADMIN — ALBUTEROL SULFATE 2.5 MG: 2.5 SOLUTION RESPIRATORY (INHALATION) at 20:35

## 2017-05-30 RX ADMIN — Medication 5 ML: at 21:49

## 2017-05-30 RX ADMIN — FERROUS SULFATE TAB 325 MG (65 MG ELEMENTAL FE) 325 MG: 325 (65 FE) TAB at 16:23

## 2017-05-30 RX ADMIN — Medication 5 ML: at 21:48

## 2017-05-30 RX ADMIN — Medication 5 ML: at 16:30

## 2017-05-30 RX ADMIN — Medication 5 ML: at 16:25

## 2017-05-30 RX ADMIN — METHYLPREDNISOLONE SODIUM SUCCINATE 40 MG: 40 INJECTION, POWDER, FOR SOLUTION INTRAMUSCULAR; INTRAVENOUS at 08:24

## 2017-05-30 RX ADMIN — FLUCONAZOLE 200 MG: 100 TABLET ORAL at 08:25

## 2017-05-30 RX ADMIN — METHYLPREDNISOLONE SODIUM SUCCINATE 40 MG: 40 INJECTION, POWDER, FOR SOLUTION INTRAMUSCULAR; INTRAVENOUS at 16:23

## 2017-05-30 RX ADMIN — ENOXAPARIN SODIUM 40 MG: 40 INJECTION SUBCUTANEOUS at 21:48

## 2017-05-30 RX ADMIN — ALBUTEROL SULFATE 2.5 MG: 2.5 SOLUTION RESPIRATORY (INHALATION) at 03:44

## 2017-05-30 RX ADMIN — Medication 5 ML: at 12:01

## 2017-05-30 RX ADMIN — ASPIRIN 81 MG 81 MG: 81 TABLET ORAL at 08:25

## 2017-05-30 RX ADMIN — ALBUTEROL SULFATE 2.5 MG: 2.5 SOLUTION RESPIRATORY (INHALATION) at 07:45

## 2017-05-30 RX ADMIN — Medication 5 ML: at 05:23

## 2017-05-30 RX ADMIN — Medication 5 ML: at 08:25

## 2017-05-30 RX ADMIN — ALBUTEROL SULFATE 2.5 MG: 2.5 SOLUTION RESPIRATORY (INHALATION) at 15:18

## 2017-05-30 RX ADMIN — Medication 5 ML: at 04:00

## 2017-05-30 RX ADMIN — POTASSIUM CHLORIDE 40 MEQ: 20 TABLET, EXTENDED RELEASE ORAL at 16:23

## 2017-05-30 RX ADMIN — FERROUS SULFATE TAB 325 MG (65 MG ELEMENTAL FE) 325 MG: 325 (65 FE) TAB at 08:25

## 2017-05-30 RX ADMIN — POTASSIUM CHLORIDE 40 MEQ: 20 TABLET, EXTENDED RELEASE ORAL at 08:25

## 2017-05-30 RX ADMIN — DULOXETINE HYDROCHLORIDE 30 MG: 30 CAPSULE, DELAYED RELEASE ORAL at 08:25

## 2017-05-30 RX ADMIN — ALBUTEROL SULFATE 2.5 MG: 2.5 SOLUTION RESPIRATORY (INHALATION) at 12:06

## 2017-05-30 RX ADMIN — PANTOPRAZOLE SODIUM 40 MG: 40 TABLET, DELAYED RELEASE ORAL at 16:23

## 2017-05-30 NOTE — PROGRESS NOTES
Occupational Therapy Note:  Attempted 3 x to see pt today, Pt off floor for testing at 1100, then eating lunch, then again off floor at 1400 for further testing. Dr Anu Simon reported pt with small pneumothorax and should not really be out of the bed much today. Will check on pt tomorrow.   Cr Herrera MS, OTR/L

## 2017-05-30 NOTE — PROGRESS NOTES
Jackie Corado currently reveals a small L apical pneumothorax. Will place on oxygen for N2 washout and repeat CXR in AM. Pt cautioned to call if dyspnea worsens before then. Hope to avoid chest tube. L infiltrate does appear less so may be responding better to steroids as well.      Kam Mccall MD

## 2017-05-30 NOTE — PROGRESS NOTES
Pt resting in bed with daughter at bedside. Pt remains on 4 L NC at this time. Pt increase in SOB and no distress noted at this time. Call light in reach, door open will monitor.

## 2017-05-30 NOTE — PROGRESS NOTES
Daily Progress Note: 5/30/2017    Fanny Crawford   Admission Date: 5/15/2017         The patient's chart is reviewed and the patient is discussed with the staff. Patient is a 80 y.o.  male with a hx of anklelosing spondylitis on remicade who was in usual state of health until 5/13. He developed shaking chills and nausea. He has been sob and has had left side pleuritic chest and back pain. On 5/15 he was taken to Dr. Alexander Richards office and O2 sat was low so he was sent to the er and admitted. He was noted to have lactate of 3.5 and cxr showed LLL and RUL infiltrate. BP initially low but responded to fluids, admitted to 8th floor. Sputum culture with strep pneumo. CXR with pleural effusion. Had B thoracentesis 5/21 and developed worsening anxiety and pain afterward, transferred to ICU, no PTX on CXR. Also c/o L neck pain, found to have thrush  Overall he feels better but is still on 35% airvo. Not as anxious. Still some cough. 5/23 ultrasound revealed minimal effusion on left. Bronch done 5/24 and mucus plugs removed from left. He is better today with O2 sats in 90s off O2    Subjective:     Remains on RA. Feels better on steroids. Minimal sputum. CBC pending.     Current Facility-Administered Medications   Medication Dose Route Frequency    methylPREDNISolone (PF) (SOLU-MEDROL) injection 40 mg  40 mg IntraVENous Q12H    aspirin chewable tablet 81 mg  81 mg Oral DAILY    ferrous sulfate tablet 325 mg  1 Tab Oral BID WITH MEALS    pantoprazole (PROTONIX) tablet 40 mg  40 mg Oral ACB&D    albuterol (PROVENTIL VENTOLIN) nebulizer solution 2.5 mg  2.5 mg Nebulization Q4H RT    morphine injection 2 mg  2 mg IntraVENous Q3H PRN    acetaminophen (TYLENOL) suppository 650 mg  650 mg Rectal Q4H PRN    HYDROcodone-acetaminophen (NORCO) 7.5-325 mg per tablet 1 Tab  1 Tab Oral Q4H PRN    potassium chloride (K-DUR, KLOR-CON) SR tablet 40 mEq  40 mEq Oral BID    magic mouthwash (GRACIA) oral suspension 5 mL  5 mL Oral Z8Q    methyl salicylate-menthol (BENGAY) 15-10 % cream   Topical QID PRN    fluconazole (DIFLUCAN) tablet 200 mg  200 mg Oral DAILY    magnesium hydroxide (MILK OF MAGNESIA) 400 mg/5 mL oral suspension 30 mL  30 mL Oral DAILY PRN    DULoxetine (CYMBALTA) capsule 30 mg  30 mg Oral DAILY    traMADol (ULTRAM) tablet 50 mg  50 mg Oral TID PRN    sodium chloride (NS) flush 5 mL  5 mL InterCATHeter Q8H    sodium chloride (NS) flush 5-10 mL  5-10 mL InterCATHeter PRN    enoxaparin (LOVENOX) injection 40 mg  40 mg SubCUTAneous Q24H       Review of Systems    Constitutional:  negative for fever, chills, sweats  Cardiovascular:  negative for chest pain, palpitations, syncope, edema  Gastrointestinal:  negative for dysphagia, reflux, vomiting, diarrhea, abdominal pain, or melena  Neurologic:  negative for focal weakness, numbness, headache      Objective:     Vitals:    05/29/17 2329 05/30/17 0319 05/30/17 0344 05/30/17 0735   BP: 116/67 122/63  134/78   Pulse: 90 92  98   Resp: 18 18  18   Temp: 97.6 °F (36.4 °C) 97.4 °F (36.3 °C)  97.6 °F (36.4 °C)   SpO2: 100% 99% 98% 96%   Weight:       Height:           Intake and Output:   05/28 1901 - 05/30 0700  In: -   Out: 780 [Urine:780]       Physical Exam:          Constitutional:  the patient is well developed and in no acute distress on RA  EENMT:  Sclera clear, pupils equal, oral mucosa moist  Respiratory:  Decreased BS with a few scattered crackles on L  Cardiovascular:  RRR without M,G,R  Gastrointestinal: soft and non-tender; with positive bowel sounds. Musculoskeletal: warm without cyanosis. There is no lower leg edema. Skin:  no jaundice or rashes, no wounds   Neurologic: no gross neuro deficits     Psychiatric:  alert and oriented x 3    LINES:  peripheral      CXR:     5/27:          5/25:        Chest CT :            LAB  No results for input(s): GLUCPOC in the last 72 hours.     No lab exists for component: [address] 05/28/17   0900  05/27/17   0853   WBC  8.8  8.0   HGB  11.8*  11.0*   HCT  35.3*  34.1*   PLT  1110*  987*     Recent Labs      05/28/17   0900   CREA  0.66*     No results for input(s): PH, PCO2, PO2, HCO3 in the last 72 hours. No results for input(s): LCAD, LAC in the last 72 hours. Bronch wash: (5/24): NRF, AFB negative; cytology negative    Assessment:  (Medical Decision Making)     Hospital Problems  Date Reviewed: 5/15/2017          Codes Class Noted POA    Iron deficiency ICD-10-CM: E61.1  ICD-9-CM: 280.9  5/30/2017 Unknown    On supplementation; may be contributing to thromobocythemia    Thrombocytosis (HCC) ICD-10-CM: D47.3  ICD-9-CM: 238.71  5/27/2017 Unknown        Suspected sleep apnea ICD-10-CM: G47.30  ICD-9-CM: 780.57  5/27/2017 Unknown    Eval as OP    Ineffective airway clearance ICD-10-CM: R06.89  ICD-9-CM: 786.09  5/24/2017 Unknown        Parapneumonic effusion ICD-10-CM: J18.9, J91.8  ICD-9-CM: 511.89  5/22/2017 Unknown    Drained    Pleural effusion, bilateral ICD-10-CM: J90  ICD-9-CM: 511.9  5/21/2017 Unknown    Minimal on CT    Candidiasis ICD-10-CM: B37.9  ICD-9-CM: 112.9  5/21/2017 Unknown        Immunosuppression ([facility] (Chronic) ICD-10-CM: D89.9  ICD-9-CM: 279.9  5/15/2017 Yes        * (Principal)CAP (community acquired pneumonia) ICD-10-CM: J18.9  ICD-9-CM: 601  5/15/2017 Yes    L infiltrate worsened- suspect BOOP/inflammation    Sepsis ([facility] ICD-10-CM: A41.9  ICD-9-CM: 038.9, 995.91  5/15/2017 Unknown        DENIS (acute kidney injury) ([facility] ICD-10-CM: N17.9  ICD-9-CM: 584.9  5/15/2017 Unknown    Resolved    Hypoxemia ICD-10-CM: R09.02  ICD-9-CM: 799.02  5/15/2017 Unknown    Resolved    Ankylosing spondylitis ([facility] ICD-10-CM: M45.9  ICD-9-CM: 720.0  1/29/2015 Yes        Spondyloarthritis ([facility] (Chronic) ICD-10-CM: M46.90  ICD-9-CM: 721.90  9/17/2013 Yes                  Plan:  (Medical Decision Making)     Cont. ASA and Fe SO4  Await AM labs  CXR.   Home later today if CXR better and platelets down. More than 50% of the time documented was spent in face-to-face contact with the patient and in the care of the patient on the floor/unit where the patient is located.     Pamela Mercado MD

## 2017-05-30 NOTE — PROGRESS NOTES
Uneventful shift. Pt resting quietly in bed. No signs of acute distress. Resp even and unlabored. Call light within reach.

## 2017-05-30 NOTE — PROGRESS NOTES
Called by radiologist with concern for possible small PTX on L. There appears to be no progression on CXR and platelets are down. Will recheck CXR in 3 hrs to see if PTX present.     Alleen Lennox., MD

## 2017-05-30 NOTE — PROGRESS NOTES
Problem: Dysphagia (Adult)  Goal: *Speech Goal: (INSERT TEXT)  STG: Pt will participate with modified barium swallow study (MBS) x1 when respiratory status allows. Goal met 5/25/17  STG: Pt will swallow mechanical soft diet/nectar thick liquids without overt signs/sx of aspiration with 100% accuracy. Goal updated 5/25/17  STG: Patient will perform laryngeal strengthening exercises x10 each with 80% accuracy. Goal added 5/25/17  LTG: Pt will tolerate the least restrictive diet at discharge without respiratory compromise     Speech language pathology: bedside swallow note: Daily Note 2    NAME/AGE/GENDER: Jenniffer Stern is a 80 y.o. male  DATE: 5/30/2017  PRIMARY DIAGNOSIS: Pleural effusion [J90]  Pleural effusion [J90]  Atelectasis [J98.11]  Procedure(s) (LRB):  BRONCHOSCOPY (N/A) 6 Days Post-Op  ICD-10: Treatment Diagnosis: dysphagia; oropharyngeal R13.12  INTERDISCIPLINARY COLLABORATION: Registered Nurse  PRECAUTIONS/ALLERGIES: Codeine ASSESSMENT:Patient completed laryngeal/pharyngeal strengthening exercises outlined below x10 with 80% accuracy overall with the exception of the mendelsohn maneuver which he completed 2/10. Additional nectar thick packets were provided for use; reviewed 1 packet per 4oz ratio. Recommend continue mechanical soft/nectar thick liquids. No mixed consistencies. Alternate solids/liquids with double swallow. Encouraged patient to complete written exercises independently in addition to therapy sessions. Recommend OP speech therapy at discharge. ?????? ? ? This section established at most recent assessment??????????  PROBLEM LIST (Impairments causing functional limitations):  1. Dysphagia  2. Odynophagia  3. dysphonia  REHABILITATION POTENTIAL FOR STATED GOALS: Good  PLAN OF CARE:   Patient will benefit from skilled intervention to address the following impairments.   RECOMMENDATIONS AND PLANNED INTERVENTIONS (Benefits and precautions of therapy have been discussed with the patient.):  · PO: Mechanical soft with chopped meat and vegetables  · Liquids: nectar  MEDICATIONS:  · Crushed in puree  COMPENSATORY STRATEGIES/MODIFICATIONS INCLUDING:  · Alternate liquids/solids  · Double swallow  · Small sips and bites  · Upright for all PO  · Remain upright 20-30 min after PO  OTHER RECOMMENDATIONS (including follow up treatment recommendations): · Family training/education  · Patient education  · Laryngeal exercises  RECOMMENDED DIET MODIFICATIONS DISCUSSED WITH:  · Nursing  · Family  · Patient  FREQUENCY/DURATION: Continue to follow patient 3x a week or until short term goals are met to address above goals. RECOMMENDED REHABILITATION/EQUIPMENT: (at time of discharge pending progress):   Continue Skilled Therapy. SUBJECTIVE:   Patient is very pleasant and works hard. History of Present Injury/Illness: Mr. Jose Wong  has a past medical history of Arthritis; Elevated prostate specific antigen (PSA); HLA-B27 positive; Impotence of organic origin; Neck pain; Osteoarthritis, hand; Osteopenia; Osteoporosis; Other nonspecific abnormal finding of lung field; Polyarthritis; Polymyalgia rheumatica ([address] Prostate cancer ([address] Raynaud's disease; 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[address] and Thoracic spine pain. He also has no past medical history of Asthma; Autoimmune disease ([address] Chronic kidney disease; Chronic obstructive pulmonary disease ([address] Chronic pain; Diabetes ([address] Difficult intubation; GERD (gastroesophageal reflux disease); Heart abnormalities; Hypertension; Liver disease; Malignant hyperthermia due to anesthesia; Nausea & vomiting; Neurological disorder; Other unknown and unspecified cause of morbidity or mortality; Pseudocholinesterase deficiency; Psychiatric disorder; PUD (peptic ulcer disease); Seizures ([address] Stroke [facility]); Thromboembolus ([address] Thyroid disease; or Unspecified adverse effect of anesthesia. Natalio Ruggiero   He also  has a past surgical history that includes vasectomy (40+ yrs); knee arthroscopy (2009); urological; biopsy prostate; prostatectomy (2012); cataract removal (2012); and cataract removal (2013). Present Symptoms: dysphagia  Pain Intensity 1: 0  Pain Location 1: Back  Pain Orientation 1: Lower  Pain Intervention(s) 1: Medication (see MAR)  Current Medications:   No current facility-administered medications on file prior to encounter. Current Outpatient Prescriptions on File Prior to Encounter   Medication Sig Dispense Refill    DULoxetine (CYMBALTA) 30 mg capsule Take 1 Cap by mouth daily. 90 Cap 1    traMADol (ULTRAM) 50 mg tablet Take 1 Tab by mouth three (3) times daily as needed for Pain. Max Daily Amount: 150 mg. 90 Tab 2    omeprazole (PRILOSEC) 40 mg capsule Take 1 Cap by mouth daily. 30 Cap 5    diclofenac (VOLTAREN) 1 % topical gel Apply 4 g to affected area four (4) times daily. 100 g 2    INFLIXIMAB (REMICADE IV) by IntraVENous route. Every 6 weeks.  calcium-vitamin D (OYSTER SHELL) 500 mg(1,250mg) -200 unit per tablet Take 1 Tab by mouth daily.  cholecalciferol, vitamin D3, (VITAMIN D3) 2,000 unit Tab Take  by mouth. Taking 1 1/2 tabs of 2000 international units once daily       Current Dietary Status:  Mechanical soft; nectar liquids      History of reflux:  yes   Reflux medication: prilosec  Social History/Home Situation: home with family  Home Environment: Private residence  # Steps to Enter: 2  One/Two Story Residence: One story  Living Alone: No  Support Systems: Spouse/Significant Other/Partner  Patient Expects to be Discharged to[de-identified] Private residence  Current DME Used/Available at Home: None  Tub or Shower Type: Tub/Shower combination  OBJECTIVE:   Respiratory Status:  Room air     CXR Results: There is small left pleural effusion with associated atelectasis. There is  patchy density at the right upper lung, similar to prior exam. No pneumothorax. No evidence of pulmonary edema.  Cardiac mediastinal contour and the surrounding  bones are stable  MRI/CT Results: n/a    Cognitive and Communication Status:                      BEDSIDE SWALLOW EVALUATION  Oral Assessment:     P.O. Trials: The patient was given the following:           ORAL PHASE:                   PHARYNGEAL PHASE:                            OTHER OBSERVATIONS:  Rate/bite size: Impaired   Endurance:  Impaired     Tool Used: Dysphagia Outcome and Severity Scale (LION)    Score Comments   Normal Diet  [] 7 With no strategies or extra time needed   Functional Swallow  [] 6 May have mild oral or pharyngeal delay       Mild Dysphagia    [] 5 Which may require one diet consistency restricted (those who demonstrate penetration which is entirely cleared on MBS would be included)   Mild-Moderate Dysphagia  [] 4 With 1-2 diet consistencies restricted       Moderate Dysphagia  [x] 3 With 2 or more diet consistencies restricted       Moderately Severe Dysphagia  [] 2 With partial PO strategies (trials with ST only)       Severe Dysphagia  [] 1 With inability to tolerate any PO safely          Score:  Initial: 2 Most Recent: 5/25/17 (Date: -- )   Interpretation of Tool: The Dysphagia Outcome and Severity Scale (LION) is a simple, easy-to-use, 7-point scale developed to systematically rate the functional severity of dysphagia based on objective assessment and make recommendations for diet level, independence level, and type of nutrition. Score 7 6 5 4 3 2 1   Modifier CH CI CJ CK CL CM CN   ?  Swallowing:     - CURRENT STATUS: CL - 60%-79% impaired, limited or restricted    - GOAL STATUS:  CJ - 20%-39% impaired, limited or restricted    - D/C STATUS:  ---------------To be determined---------------  [place] / [place] / Product Type: Medicare /     TREATMENT:    (In addition to Assessment/Re-Assessment sessions the following treatments were rendered)  Dysphagia Activities: Activities/Procedures listed utilized to improve progress in swallow strength, swallow function, diet tolerance and swallow safety. Required no cueing to decrease aspiration risk. MODALITIES:                                                                    ORAL MOTOR  EXERCISES:                                                                                                                                                                      LARYNGEAL / PHARYNGEAL EXERCISES:           Effortful Swallow: Yes  Reps : 10  Sets : 1                       Look Up at Ceiling/Gargle: Yes  Reps : 10  Sets : 1  Chichi: Yes  Reps : 10  Sets : 1  Mendelsohn Maneuver: Yes  Reps : 10  Sets : 1 (completed with less than 50% acc)                   Sing \"EEE\": Yes  Reps : 10  Sets : 1                                         __________________________________________________________________________________________________  Safety:   After treatment position/precautions:  · Up in chair  · Family at bedside  Progression/Medical Necessity:   · Skilled intervention continues to be required due to patient still consuming a modified diet. Compliance with Program/Exercises: Will assess as treatment progresses. Reason for Continuation of Services/Other Comments:  · Patient continues to require skilled intervention due to modified diet recommendation. Recommendations/Intent for next treatment session: \"Treatment next visit will focus on laryngeal exercises\".     Total Treatment Duration:  Time In: 1330  Time Out: [facility] MS, CCC-SLP

## 2017-05-30 NOTE — PROGRESS NOTES
Shift assessment complete. Pt resting quietly in bed. Multiple visitors at bedside. No signs of acute distress. Resp even and unlabored. Call light within reach. Pt instructed to call for assistance.

## 2017-05-30 NOTE — PROGRESS NOTES
Shift assessment complete. Alert and oriented x4. No signs of acute distress. Resp even and unlabored. Wife at bedside. Call light within reach.

## 2017-05-30 NOTE — PROGRESS NOTES
Problem: Mobility Impaired (Adult and Pediatric)  Goal: *Acute Goals and Plan of Care (Insert Text)  UPDATED: 5/26/17  LTG:  (1.)Mr. Phoebe Richardson will move from supine to sit and sit to supine , scoot up and down and roll side to side with INDEPENDENCE within 7 day(s). (2.)Mr. Phoebe Richardson will transfer from bed to chair and chair to bed with MODIFIED INDEPENDENCE using the least restrictive device within 7 day(s). (3.)Mr. Phoebe Richardson will ambulate with MODIFIED INDEPENDENCE for 500 feet with the least restrictive device within 7 day(s). (4.)Mr. Phoebe Richardson will participate in therapeutic activity/exerices x 20 minutes for increased activity tolerance within 7 days. All goals with O2 >90%    Dr. Shilo Zambrano asked that we hold Mr. Phoebe Richardson for walking today due to his L apical pneumothorax. Will check back tomorrow.      Selam Harvey PTA

## 2017-05-30 NOTE — PROGRESS NOTES
Pt sitting up in bed. Pt alert oriented times 3 at this time. Pt denies pain or distress at this time. Pt on RA at this time. Pt has SCDs in place. Pt encouraged to call for assistance if needed call light in reach, door open will monitor.

## 2017-05-31 ENCOUNTER — APPOINTMENT (OUTPATIENT)
Dept: GENERAL RADIOLOGY | Age: 72
DRG: 853 | End: 2017-05-31
Attending: INTERNAL MEDICINE
Payer: MEDICARE

## 2017-05-31 VITALS
TEMPERATURE: 97.7 F | SYSTOLIC BLOOD PRESSURE: 136 MMHG | BODY MASS INDEX: 18.52 KG/M2 | HEIGHT: 67 IN | RESPIRATION RATE: 18 BRPM | HEART RATE: 78 BPM | WEIGHT: 118 LBS | OXYGEN SATURATION: 100 % | DIASTOLIC BLOOD PRESSURE: 70 MMHG

## 2017-05-31 PROBLEM — R13.10 DYSPHAGIA: Status: ACTIVE | Noted: 2017-05-31

## 2017-05-31 LAB
BASOPHILS # BLD AUTO: 0 K/UL (ref 0–0.2)
BASOPHILS # BLD: 0 % (ref 0–2)
DIFFERENTIAL METHOD BLD: ABNORMAL
EOSINOPHIL # BLD: 0 K/UL (ref 0–0.8)
EOSINOPHIL NFR BLD: 0 % (ref 0.5–7.8)
ERYTHROCYTE [DISTWIDTH] IN BLOOD BY AUTOMATED COUNT: 13.1 % (ref 11.9–14.6)
HCT VFR BLD AUTO: 31.6 % (ref 41.1–50.3)
HGB BLD-MCNC: 10.4 G/DL (ref 13.6–17.2)
IMM GRANULOCYTES # BLD: 0.1 K/UL (ref 0–0.5)
IMM GRANULOCYTES NFR BLD AUTO: 0.6 % (ref 0–5)
LYMPHOCYTES # BLD AUTO: 7 % (ref 13–44)
LYMPHOCYTES # BLD: 0.7 K/UL (ref 0.5–4.6)
MCH RBC QN AUTO: 29.6 PG (ref 26.1–32.9)
MCHC RBC AUTO-ENTMCNC: 32.9 G/DL (ref 31.4–35)
MCV RBC AUTO: 90 FL (ref 79.6–97.8)
MONOCYTES # BLD: 0.6 K/UL (ref 0.1–1.3)
MONOCYTES NFR BLD AUTO: 6 % (ref 4–12)
NEUTS SEG # BLD: 8.7 K/UL (ref 1.7–8.2)
NEUTS SEG NFR BLD AUTO: 86 % (ref 43–78)
PLATELET # BLD AUTO: 879 K/UL (ref 150–450)
PMV BLD AUTO: 9 FL (ref 10.8–14.1)
RBC # BLD AUTO: 3.51 M/UL (ref 4.23–5.67)
WBC # BLD AUTO: 10.1 K/UL (ref 4.3–11.1)

## 2017-05-31 PROCEDURE — 94640 AIRWAY INHALATION TREATMENT: CPT

## 2017-05-31 PROCEDURE — 74011250637 HC RX REV CODE- 250/637: Performed by: INTERNAL MEDICINE

## 2017-05-31 PROCEDURE — 74011250636 HC RX REV CODE- 250/636: Performed by: INTERNAL MEDICINE

## 2017-05-31 PROCEDURE — 94760 N-INVAS EAR/PLS OXIMETRY 1: CPT

## 2017-05-31 PROCEDURE — 36415 COLL VENOUS BLD VENIPUNCTURE: CPT | Performed by: INTERNAL MEDICINE

## 2017-05-31 PROCEDURE — 74011000250 HC RX REV CODE- 250: Performed by: INTERNAL MEDICINE

## 2017-05-31 PROCEDURE — 77010033678 HC OXYGEN DAILY

## 2017-05-31 PROCEDURE — 94761 N-INVAS EAR/PLS OXIMETRY MLT: CPT

## 2017-05-31 PROCEDURE — 99239 HOSP IP/OBS DSCHRG MGMT >30: CPT | Performed by: INTERNAL MEDICINE

## 2017-05-31 PROCEDURE — 71010 XR CHEST SNGL V: CPT

## 2017-05-31 PROCEDURE — 85025 COMPLETE CBC W/AUTO DIFF WBC: CPT | Performed by: INTERNAL MEDICINE

## 2017-05-31 RX ORDER — POTASSIUM CHLORIDE 20 MEQ/1
40 TABLET, EXTENDED RELEASE ORAL DAILY
Qty: 30 TAB | Refills: 1 | Status: SHIPPED | OUTPATIENT
Start: 2017-05-31 | End: 2017-07-26

## 2017-05-31 RX ORDER — GUAIFENESIN 100 MG/5ML
81 LIQUID (ML) ORAL DAILY
Qty: 30 TAB | Refills: 1 | Status: SHIPPED | OUTPATIENT
Start: 2017-05-31 | End: 2019-05-02

## 2017-05-31 RX ORDER — PREDNISONE 20 MG/1
TABLET ORAL
Qty: 40 TAB | Refills: 0 | Status: SHIPPED | OUTPATIENT
Start: 2017-05-31 | End: 2017-07-26 | Stop reason: ALTCHOICE

## 2017-05-31 RX ORDER — LANOLIN ALCOHOL/MO/W.PET/CERES
325 CREAM (GRAM) TOPICAL 2 TIMES DAILY WITH MEALS
Qty: 60 TAB | Refills: 1 | Status: SHIPPED | OUTPATIENT
Start: 2017-05-31 | End: 2017-12-19

## 2017-05-31 RX ADMIN — Medication 5 ML: at 05:40

## 2017-05-31 RX ADMIN — POTASSIUM CHLORIDE 40 MEQ: 20 TABLET, EXTENDED RELEASE ORAL at 09:27

## 2017-05-31 RX ADMIN — ASPIRIN 81 MG 81 MG: 81 TABLET ORAL at 09:27

## 2017-05-31 RX ADMIN — Medication 5 ML: at 09:27

## 2017-05-31 RX ADMIN — ALBUTEROL SULFATE 2.5 MG: 2.5 SOLUTION RESPIRATORY (INHALATION) at 04:36

## 2017-05-31 RX ADMIN — FERROUS SULFATE TAB 325 MG (65 MG ELEMENTAL FE) 325 MG: 325 (65 FE) TAB at 09:27

## 2017-05-31 RX ADMIN — PANTOPRAZOLE SODIUM 40 MG: 40 TABLET, DELAYED RELEASE ORAL at 05:40

## 2017-05-31 RX ADMIN — METHYLPREDNISOLONE SODIUM SUCCINATE 40 MG: 40 INJECTION, POWDER, FOR SOLUTION INTRAMUSCULAR; INTRAVENOUS at 09:27

## 2017-05-31 RX ADMIN — FLUCONAZOLE 200 MG: 100 TABLET ORAL at 09:27

## 2017-05-31 RX ADMIN — ALBUTEROL SULFATE 2.5 MG: 2.5 SOLUTION RESPIRATORY (INHALATION) at 07:43

## 2017-05-31 RX ADMIN — DULOXETINE HYDROCHLORIDE 30 MG: 30 CAPSULE, DELAYED RELEASE ORAL at 09:27

## 2017-05-31 NOTE — PROGRESS NOTES
Discharge instructions, follow up information, medication list, prescriptions, medication side effects sheet, sepsis booklet, and thermometer provided and explained to the pt. IVs removed from right and left AC. Opportunity for questions provided. PCT notified patient is ready to leave.

## 2017-05-31 NOTE — DISCHARGE INSTRUCTIONS
DISCHARGE SUMMARY from Nurse    The following personal items are in your possession at time of discharge:    Dental Appliances: Partials  Visual Aid: Glasses     Home Medications: None  Jewelry: Ring, Watch, With patient  Clothing: Pants, Shirt, Footwear  Other Valuables: None             PATIENT INSTRUCTIONS:    After general anesthesia or intravenous sedation, for 24 hours or while taking prescription Narcotics:  · Limit your activities  · Do not drive and operate hazardous machinery  · Do not make important personal or business decisions  · Do  not drink alcoholic beverages  · If you have not urinated within 8 hours after discharge, please contact your surgeon on call. Report the following to your surgeon:  · Excessive pain, swelling, redness or odor of or around the surgical area  · Temperature over 100.5  · Nausea and vomiting lasting longer than 4 hours or if unable to take medications  · Any signs of decreased circulation or nerve impairment to extremity: change in color, persistent  numbness, tingling, coldness or increase pain  · Any questions        What to do at Home:  Recommended activity: Activity as tolerated. If you experience any of the following symptoms shortness of breath, chest pain, or a fever of 100.4 or greater, please follow up with your primary care provider. *  Please give a list of your current medications to your Primary Care Provider. *  Please update this list whenever your medications are discontinued, doses are      changed, or new medications (including over-the-counter products) are added. *  Please carry medication information at all times in case of emergency situations. These are general instructions for a healthy lifestyle:    No smoking/ No tobacco products/ Avoid exposure to second hand smoke    Surgeon General's Warning:  Quitting smoking now greatly reduces serious risk to your health.     Obesity, smoking, and sedentary lifestyle greatly increases your risk for illness    A healthy diet, regular physical exercise & weight monitoring are important for maintaining a healthy lifestyle    You may be retaining fluid if you have a history of heart failure or if you experience any of the following symptoms:  Weight gain of 3 pounds or more overnight or 5 pounds in a week, increased swelling in our hands or feet or shortness of breath while lying flat in bed. Please call your doctor as soon as you notice any of these symptoms; do not wait until your next office visit. Recognize signs and symptoms of STROKE:    F-face looks uneven    A-arms unable to move or move unevenly    S-speech slurred or non-existent    T-time-call 911 as soon as signs and symptoms begin-DO NOT go       Back to bed or wait to see if you get better-TIME IS BRAIN. Warning Signs of HEART ATTACK     Call 911 if you have these symptoms:   Chest discomfort. Most heart attacks involve discomfort in the center of the chest that lasts more than a few minutes, or that goes away and comes back. It can feel like uncomfortable pressure, squeezing, fullness, or pain.  Discomfort in other areas of the upper body. Symptoms can include pain or discomfort in one or both arms, the back, neck, jaw, or stomach.  Shortness of breath with or without chest discomfort.  Other signs may include breaking out in a cold sweat, nausea, or lightheadedness. Don't wait more than five minutes to call 911 - MINUTES MATTER! Fast action can save your life. Calling 911 is almost always the fastest way to get lifesaving treatment. Emergency Medical Services staff can begin treatment when they arrive -- up to an hour sooner than if someone gets to the hospital by car. The discharge information has been reviewed with the patient. The patient verbalized understanding.     Discharge medications reviewed with the patient and appropriate educational materials and side effects teaching were provided. Pneumonia: Care Instructions  Your Care Instructions    Pneumonia is an infection of the lungs. Most cases are caused by infections from bacteria or viruses. Pneumonia may be mild or very severe. If it is caused by bacteria, you will be treated with antibiotics. It may take a few weeks to a few months to recover fully from pneumonia, depending on how sick you were and whether your overall health is good. Follow-up care is a key part of your treatment and safety. Be sure to make and go to all appointments, and call your doctor if you are having problems. Its also a good idea to know your test results and keep a list of the medicines you take. How can you care for yourself at home? · Take your antibiotics exactly as directed. Do not stop taking the medicine just because you are feeling better. You need to take the full course of antibiotics. · Take your medicines exactly as prescribed. Call your doctor if you think you are having a problem with your medicine. · Get plenty of rest and sleep. You may feel weak and tired for a while, but your energy level will improve with time. · To prevent dehydration, drink plenty of fluids, enough so that your urine is light yellow or clear like water. Choose water and other caffeine-free clear liquids until you feel better. If you have kidney, heart, or liver disease and have to limit fluids, talk with your doctor before you increase the amount of fluids you drink. · Take care of your cough so you can rest. A cough that brings up mucus from your lungs is common with pneumonia. It is one way your body gets rid of the infection. But if coughing keeps you from resting or causes severe fatigue and chest-wall pain, talk to your doctor. He or she may suggest that you take a medicine to reduce the cough. · Use a vaporizer or humidifier to add moisture to your bedroom. Follow the directions for cleaning the machine.   · Do not smoke or allow others to smoke around you. Smoke will make your cough last longer. If you need help quitting, talk to your doctor about stop-smoking programs and medicines. These can increase your chances of quitting for good. · Take an over-the-counter pain medicine, such as acetaminophen (Tylenol), ibuprofen (Advil, Motrin), or naproxen (Aleve). Read and follow all instructions on the label. · Do not take two or more pain medicines at the same time unless the doctor told you to. Many pain medicines have acetaminophen, which is Tylenol. Too much acetaminophen (Tylenol) can be harmful. · If you were given a spirometer to measure how well your lungs are working, use it as instructed. This can help your doctor tell how your recovery is going. · To prevent pneumonia in the future, talk to your doctor about getting a flu vaccine (once a year) and a pneumococcal vaccine (one time only for most people). When should you call for help? Call 911 anytime you think you may need emergency care. For example, call if:  · You have severe trouble breathing. Call your doctor now or seek immediate medical care if:  · You cough up dark brown or bloody mucus (sputum). · You have new or worse trouble breathing. · You are dizzy or lightheaded, or you feel like you may faint. Watch closely for changes in your health, and be sure to contact your doctor if:  · You have a new or higher fever. · You are coughing more deeply or more often. · You are not getting better after 2 days (48 hours). · You do not get better as expected. Where can you learn more? Go to http://bobo-gorge.info/. Enter [id number] in the search box to learn more about \"Pneumonia: Care Instructions. \"  Current as of: May 23, 2016  Content Version: 11.2  © 5763-8798 [employer]. Care instructions adapted under license by [employer] (which disclaims liability or warranty for this information).  If you have questions about a medical condition or this instruction, always ask your healthcare professional. [address] any warranty or liability for your use of this information. Sepsis: Care Instructions  Your Care Instructions  Sepsis is an infection that has spread throughout your body. It is a life-threatening condition and often causes extremely low blood pressure. This can lead to problems with many different organs. The cause of sepsis is not always clear, but it can happen as part of a long-term or sudden illness. Sometimes even a mild illness can lead to sepsis. Follow-up care is a key part of your treatment and safety. Be sure to make and go to all appointments, and call your doctor if you are having problems. Its also a good idea to know your test results and keep a list of the medicines you take. How can you care for yourself at home? · If your doctor prescribed antibiotics, take them as directed. Do not stop taking them just because you feel better. You need to take the full course of antibiotics. · Drink plenty of fluids, enough so that your urine is light yellow or clear like water. Choose water or caffeine-free clear liquids until you feel better. If you have kidney, heart, or liver disease and have to limit fluids, talk with your doctor before you increase your fluid intake. You can try rehydration drinks, such as Gatorade or Powerade. · Do not drink alcohol. · Eat a healthy diet. Include fruits, vegetables, and whole grains in your diet every day. · Walking is an easy way to get exercise. Gradually increase the amount you walk every day. Make sure your doctor knows that you are starting an exercise program.  · Do not smoke or use other tobacco products. If you need help quitting, talk to your doctor about stop-smoking programs and medicines. These can increase your chances of quitting for good. When should you call for help? Call 911 anytime you think you may need emergency care.  For example, call if:  · You passed out (lost consciousness). Call your doctor now or seek immediate medical care if:  · You have a fever or chills. · You have cool, pale, or clammy skin. · You are dizzy or lightheaded, or you feel like you may faint. · You have any new symptoms, such as a cough, pain in one part of your body, or urinary problems. Watch closely for changes in your health, and be sure to contact your doctor if:  · You do not get better as expected. Where can you learn more? Go to http://bobo-gorge.info/. Enter X707 in the search box to learn more about \"Sepsis: Care Instructions. \"  Current as of: May 27, 2016  Content Version: 11.2  © 8690-0113 [employer]. Care instructions adapted under license by [employer] (which disclaims liability or warranty for this information). If you have questions about a medical condition or this instruction, always ask your healthcare professional. [place] [address] any warranty or liability for your use of this information. Collapsed Lung: Care Instructions  Your Care Instructions    A collapsed lung (pneumothorax) is a buildup of air in the space between the lung and the chest wall. As more air builds up in this space, the pressure against the lung makes the lung collapse. This causes shortness of breath and chest pain because your lung cannot fully expand. A collapsed lung is usually caused by an injury to the chest, but it may also occur suddenly without an injury because of a lung illness, such as emphysema or lung fibrosis. Your lung may collapse after lung surgery or another medical procedure. Sometimes it happens for no known reason in an otherwise healthy person (spontaneous pneumothorax). Treatment depends on the cause of the collapse. It may heal with rest, although your doctor will want to keep track of your progress. It can take several days for the lung to expand again.  Your doctor may have drained the air with a needle or tube inserted into the space between your chest and the collapsed lung. If you have a chest tube, be sure to follow your doctor's instructions about how to care for the tube. You may need further treatment if you are not getting better. Surgery is sometimes needed to keep the lung inflated. The doctor will want to keep track of your progress, so you will need a follow-up exam within a few days. The doctor has checked you carefully, but problems can develop later. If you notice any problems or new symptoms, get medical treatment right away. Follow-up care is a key part of your treatment and safety. Be sure to make and go to all appointments, and call your doctor if you are having problems. It's also a good idea to know your test results and keep a list of the medicines you take. How can you care for yourself at home? · Get plenty of rest and sleep. You may feel weak and tired for a while, but your energy level will improve with time. · Hold a pillow against your chest when you cough or take deep breaths. This will support your chest and decrease your pain. · Take pain medicines exactly as directed. ¨ If the doctor gave you a prescription medicine for pain, take it as prescribed. ¨ If you are not taking a prescription pain medicine, ask your doctor if you can take an over-the-counter medicine. · If your doctor prescribed antibiotics, take them as directed. Do not stop taking them just because you feel better. You need to take the full course of antibiotics. · If you have a bandage over your chest tube, or the place where the chest tube was inserted, keep it clean and dry. Follow your doctor's instructions on bandage care. · If you go home with a tube in place, follow the doctor's directions. Do not adjust the tube in any way. This could break the seal or cause other problems. Keep the tube dry.   · Avoid any movements that require your muscles, especially your chest muscles, to strain. Such movements include laughing hard, bearing down to have a bowel movement, and heavy lifting. Try not to cough. · Do not fly in an airplane until your doctor tells you it is okay. Avoid any situations where there is increased air pressure. · Do not smoke or allow others to smoke around you. If you need help quitting, talk to your doctor about stop-smoking programs and medicines. These can increase your chances of quitting for good. When should you call for help? Call 911 anytime you think you may need emergency care. For example, call if:  · You have severe trouble breathing. · You passed out (lost consciousness). Call your doctor now or seek immediate medical care if:  · You have new or worse trouble breathing. · You have new pain or your pain gets worse. · You have a fever. · You cough up blood. · Your chest tube starts to come out or falls out. · You are bleeding through the bandage where the tube was put in. Watch closely for changes in your health, and be sure to contact your doctor if:  · The skin around the place where the chest tube was put in is red or irritated. · You do not get better as expected. Where can you learn more? Go to http://bobo-gorge.info/. Enter W909 in the search box to learn more about \"Collapsed Lung: Care Instructions. \"  Current as of: May 23, 2016  Content Version: 11.2  © 8110-2539 [employer]. Care instructions adapted under license by [employer] (which disclaims liability or warranty for this information). If you have questions about a medical condition or this instruction, always ask your healthcare professional. [address] any warranty or liability for your use of this information.

## 2017-05-31 NOTE — PROGRESS NOTES
Oxygen Qualifier       Room air: SpO2 with O2 and liter flow   Resting SpO2  99%  100% on 4L   Ambulating SpO2  91%        Completed by:    Leticia Amaral, RT

## 2017-05-31 NOTE — PROGRESS NOTES
SW scheduled appts for outpt PT 6/7/17 at 10:15 am and ST on 6/14/17 at 1:00 pm ([facility]). SW did not identify any other dc needs.

## 2017-05-31 NOTE — DISCHARGE SUMMARY
DISCHARGE NOTE    Maria G Navarrete  Admission date:  5/15/2017  Discharge date:  5/31/2017    Admitting Diagnosis:  Pleural effusion [J90]; Pleural effusion [J90]; Atelectasis [*    Discharge Diagnoses:    Hospital Problems  Date Reviewed: 5/15/2017          Codes Class Noted POA    Iron deficiency ICD-10-CM: E61.1  ICD-9-CM: 280.9  5/30/2017 Unknown    Supplementing    Pneumothorax on left ICD-10-CM: J93.9  ICD-9-CM: 512.89  5/30/2017 Unknown    Resolved at discharge    Thrombocytosis ([facility] ICD-10-CM: D47.3  ICD-9-CM: 238.71  5/27/2017 Unknown    Improved with iron supplementation and treatment for inflammation of lung    Suspected sleep apnea ICD-10-CM: G47.30  ICD-9-CM: 780.57  5/27/2017 Unknown    Evaluation after discharge    Ineffective airway clearance ICD-10-CM: R06.89  ICD-9-CM: 786.09  5/24/2017 Unknown        Parapneumonic effusion ICD-10-CM: J18.9, J91.8  ICD-9-CM: 511.89  5/22/2017 Unknown    Resolved    Pleural effusion, bilateral ICD-10-CM: J90  ICD-9-CM: 511.9  5/21/2017 Unknown        Candidiasis ICD-10-CM: B37.9  ICD-9-CM: 112.9  5/21/2017 Unknown    Rx'd with diflucan    Immunosuppression ([facility] (Chronic) ICD-10-CM: D89.9  ICD-9-CM: 279.9  5/15/2017 Yes        * (Principal)CAP (community acquired pneumonia) ICD-10-CM: J18.9  ICD-9-CM: 843  5/15/2017 Yes        Sepsis ([facility] ICD-10-CM: A41.9  ICD-9-CM: 038.9, 995.91  5/15/2017 Unknown        DENIS (acute kidney injury) ([facility] ICD-10-CM: N17.9  ICD-9-CM: 584.9  5/15/2017 Unknown    Resolved    Hypoxemia ICD-10-CM: R09.02  ICD-9-CM: 799.02  5/15/2017 Unknown    Resolved    Ankylosing spondylitis ([facility] ICD-10-CM: M45.9  ICD-9-CM: 720.0  1/29/2015 Yes        Spondyloarthritis ([facility] (Chronic) ICD-10-CM: M46.90  ICD-9-CM: 721.90  9/17/2013 Yes            Dysphagia- for OP speech Rx and modified diet    Consultants: GI, ST    Studies/Procedures: Chest CT, Bronch x 2      Condition on Discharge:  Improved and stable    Disposition:  Home      Presenting Illness:     Patient is a 80 y.o.  male With a hx of ankylosing spondelitis on Remicade was in usual state of health until Saturday 2 days ago when he developed shaking chills and nausea. He has been sob and has had left side pleuritic chest And back pain. Today he was taken to Dr. Sindhu Woodson office and O2 sat was low so he was sent to the er. He was noted to have lactate of 3.5 and cxr showed LLL and RUL infiltrate. Hospital course: The pt was admitted to the floor and treated with Rocephin and Zithromax with limited improvement. BP initially low but responded to fluids. Sputum culture with strep pneumo. CXR with pleural effusion. Had B thoracentesis 5/21 and developed worsening anxiety and pain afterward, transferred to ICU, no PTX on CXR. Also c/o L neck pain, found to have thrush. Gradually improved and moved to the floor. Had issues with mucus clearance and required therapeutic bronchoscopy x 2 to clear secretions. Cultures unremarkable and AFB was negative x 2. PCT improved but a follow up CXR several days ago with worsening LLL infiltrate. CRP high and PCT nearly normal. Felt to have inflammatory state after a CT of chest revealed no abscess. Pt also developed thrombocythemia and this was felt secondary to a post infectious inflammatory state and iron deficiency which was noted on labs. His CXR and platelets improved on steroids and iron supplementation. A CXR the day before discharge suggested a small L apical PTX. He was treated with oxygen for N2 washout with resolution of the PTX. He was seen by speech Rx and felt to require a modified diet and OP speech follow up. His hx per his wife also suggested JOSE but an CHUYITA was unremarkable. He did not require oxygen at discharge but his ambulating sats dropped to 91% from 99% on RA. This will need to be followed closely. He will be discharged on a modified diet per ST with follow up with them as an OP.       He will follow up in the office and will require complete PFTs (due to some apical traction evident on CXR) and a CXR. CPFTs need to be done after ongoing resolution of the PTX is documented at his follow up appt. He will need a CBC, BMP, and iron panel at this appt. Physical Exam:   Constitution:  the patient is well developed and in no acute distress  EENMT:  Sclera clear, pupils equal, oral mucosa moist  Respiratory: mildly decreased BS   Cardiovascular:  RRR without M,G,R  Gastrointestinal: soft and non-tender; with positive bowel sounds. Musculoskeletal: warm without cyanosis. There is no lower leg edema. Skin:  no jaundice or rashes  Neurologic: no gross neuro deficits     Psychiatric:  alert and oriented x 3      LAB  Recent Labs      05/31/17   0638  05/30/17   0731  05/28/17   0900   WBC  10.1  9.9  8.8   HGB  10.4*  10.7*  11.8*   HCT  31.6*  32.7*  35.3*   PLT  879*  1084*  1110*     Recent Labs      05/28/17   0900   CREA  0.66*     No results for input(s): PH, PCO2, PO2, HCO3 in the last 72 hours. Discharge Medications:     Current Discharge Medication List      START taking these medications    Details   aspirin 81 mg chewable tablet Take 1 Tab by mouth daily. Qty: 30 Tab, Refills: 1      ferrous sulfate 325 mg (65 mg iron) tablet Take 1 Tab by mouth two (2) times daily (with meals). Qty: 60 Tab, Refills: 1      predniSONE (DELTASONE) 20 mg tablet Take 2 tablets daily for 1 week then 1 tablet daily until seen in the office for follow up  Qty: 40 Tab, Refills: 0      potassium chloride (K-DUR, KLOR-CON) 20 mEq tablet Take 2 Tabs by mouth daily. Qty: 30 Tab, Refills: 1         CONTINUE these medications which have NOT CHANGED    Details   DULoxetine (CYMBALTA) 30 mg capsule Take 1 Cap by mouth daily. Qty: 90 Cap, Refills: 1    Associated Diagnoses: Thoracic spine pain      traMADol (ULTRAM) 50 mg tablet Take 1 Tab by mouth three (3) times daily as needed for Pain.  Max Daily Amount: 150 mg.  Qty: 90 Tab, Refills: 2    Associated Diagnoses: Primary osteoarthritis of both hands; Thoracic spine pain      omeprazole (PRILOSEC) 40 mg capsule Take 1 Cap by mouth daily. Qty: 30 Cap, Refills: 5      diclofenac (VOLTAREN) 1 % topical gel Apply 4 g to affected area four (4) times daily. Qty: 100 g, Refills: 2      calcium-vitamin D (OYSTER SHELL) 500 mg(1,250mg) -200 unit per tablet Take 1 Tab by mouth daily. cholecalciferol, vitamin D3, (VITAMIN D3) 2,000 unit Tab Take  by mouth. Taking 1 1/2 tabs of 2000 international units once daily         STOP taking these medications       INFLIXIMAB (REMICADE IV) Comments:   Reason for Stopping:                 Condition on Discharge:  Improved and stable      Followup/Outpt Studies:    --Follow up appointment with [facility] in 2-3 weeks with a CXR, CBC, BMP, iron, and ferritin.  --F/U with speech therapy  --Total discharge greater than 30 minutes in duration. More than 50% of the time documented was spent in face-to-face contact with the patient and in the care of the patient on the floor/unit where the patient is located.     Shannon Shaikh MD

## 2017-05-31 NOTE — PROGRESS NOTES
Shift assessment completed. Pt. Alert and oriented x4. 4l of O2 in place. Pt. States every once in a while he coughs up clear sputum. No acute distress noted. Family at bedside. SCD's at bedside. Not currently on pt. Call light in reach. Instructed to call for assistance.

## 2017-06-01 ENCOUNTER — PATIENT OUTREACH (OUTPATIENT)
Dept: CASE MANAGEMENT | Age: 72
End: 2017-06-01

## 2017-06-02 LAB
FUNGUS CULTURE, RFCO2T: POSITIVE
FUNGUS ID 1, (DID), RFIM1T: NORMAL
FUNGUS SMEAR, RFCO1T: ABNORMAL
FUNGUS SPEC CULT: NORMAL
FUNGUS STAIN, 188244: NORMAL
REFLEX TO ID, RFCO3T: ABNORMAL
SPECIMEN SOURCE: ABNORMAL
SPECIMEN SOURCE: NORMAL
SPECIMEN SOURCE: NORMAL

## 2017-06-07 ENCOUNTER — HOSPITAL ENCOUNTER (OUTPATIENT)
Dept: PHYSICAL THERAPY | Age: 72
Discharge: HOME | End: 2017-06-07
Payer: MEDICARE

## 2017-06-07 PROCEDURE — G8978 MOBILITY CURRENT STATUS: HCPCS

## 2017-06-07 PROCEDURE — 97162 PT EVAL MOD COMPLEX 30 MIN: CPT

## 2017-06-07 PROCEDURE — 97110 THERAPEUTIC EXERCISES: CPT

## 2017-06-07 PROCEDURE — G8979 MOBILITY GOAL STATUS: HCPCS

## 2017-06-07 NOTE — PROGRESS NOTES
Madeline Page  : 1945 [facility] [address] [place] [address]  [phone]   [phone]   OUTPATIENT ORTHOPAEDIC PHYSICAL THERAPY    NAME/AGE/GENDER: Madeline Page is a 80 y.o. male. DATE: 2017                         Ambulatory/Rehab Services H2 Model Falls Risk Assessment    Risk Factor Pts. ·   Confusion/Disorientation/Impulsivity  []      4 ·   Symptomatic Depression  []     2 ·   Altered Elimination  []     1 ·   Dizziness/Vertigo  []     1 ·   Gender (Male)  [x]     1 ·   Any administered antiepileptics (anticonvulsants):  []     2 ·   Any administered benzodiazepines:  []     1 ·   Visual Impairment (specify):  []     1 ·   Portable Oxygen Use  []     1 ·   Orthostatic ? BP  []     1 ·   History of Recent Falls (within 3 mos.)  []     5     Ability to Rise from Chair (choose one) Pts. ·   Ability to rise in a single movement  []     0 ·   Pushes up, successful in one attempt  [x]     1 ·   Multiple attempts, but unsuccessful  []     3 ·   Unable to rise without assistance  []     4   Total: (5 or greater = High Risk) 2     Falls Prevention Plan:   []                  Physical Limitations to Exercise (specify):   []                  Mobility Assistance Device (type):   []                  Exercise/Equipment Adaptation (specify):    © [employer] [address] [place] [address] Patent #[id number].  Federal Law prohibits the replication, distribution or use without written permission from [employer], O[employer], Feliciano Leon, [employer]IG[employer]

## 2017-06-08 ENCOUNTER — PATIENT OUTREACH (OUTPATIENT)
Dept: CASE MANAGEMENT | Age: 72
End: 2017-06-08

## 2017-06-08 ENCOUNTER — HOSPITAL ENCOUNTER (OUTPATIENT)
Dept: PHYSICAL THERAPY | Age: 72
Discharge: HOME | End: 2017-06-08
Payer: MEDICARE

## 2017-06-08 VITALS — SYSTOLIC BLOOD PRESSURE: 88 MMHG | DIASTOLIC BLOOD PRESSURE: 62 MMHG | OXYGEN SATURATION: 94 % | HEART RATE: 84 BPM

## 2017-06-08 PROCEDURE — 97110 THERAPEUTIC EXERCISES: CPT

## 2017-06-08 NOTE — PROGRESS NOTES
Eduar Reeder  : 1945 [facility]  [address] [place] [address]  [phone]   [phone]       OUTPATIENT PHYSICAL THERAPY:Daily Note 2017    ICD-10: Treatment Diagnosis: Ankylosing Spondylitis of thoracic region (M45.4); Pain in thoracic spine (M54.5); muscle weakness, generalized (M62.81); difficulty walking, not elsewhere classified (R26.2)  Precautions/Allergies:  Codeine   Fall Risk Score: 2 (? 5 = High Risk)  MD Orders: Eval and treat MEDICAL/REFERRING DIAGNOSIS: Thoracic back pain; post pneumonia with debility  DATE OF ONSET: 17  REFERRING PHYSICIAN: Yanique Browne MD  RETURN PHYSICIAN APPOINTMENT: as needed     INITIAL ASSESSMENT (60/[id number]):  Mr. Johnathan Gu presents with general debility with overall strength and functional mobility deficits due to prolonged hospitalization due to pneumonia/sepsis; he also complains of thoracic back pain exacerbated due to decreased activity with decreased mobility and strength through the trunk due to worsening ankylosing spondylitis. PROBLEM LIST (Impacting functional limitations): Increased pain through thoracic spine/costal cage limiting tolerance of ADLs and difficulty sleeping  Decreased activity tolerance for basic and instrumental ADLs due to generalized weakness B LE/UE.   Decreased ADL/functional activities specificially with any endurance activity related to decreased overall strength and limited pulmonary capacity post-pneumonia  Outcome measure score of 23 seconds (WNL - 7 seconds) on Timed Up and Go (TUG) test with severe effect of decreased strength/endurance on patient's ability to manage every day life activities  Decreased strength through B LE/UE/trunk limiting all functional mobility INTERVENTIONS PLANNED:  Patient education including pathophysiology of activity tolerance and progression  Manual therapy including soft tissue mobilizations, functional joint mobilizations and neuromuscular re-education to thoracic region  Neuromuscular re-education with focus on initiation/strength and endurance and motor control of B UE/LE/trunk  Therapeutic exercises including strengthening and endurance related tasks  Gait training with focus on correcting deficits, sequencing and honorio  Balance exercise - focused on standing dynamic balance with greatest focus on dynamic center of gravity control  Therapeutic activities with focus on strength and endurance  Therapeutic modalities including pain modalities for thoracic spine  Instructions of home exercise program (HEP)   TREATMENT PLAN:  Effective Dates: 06/07/17 TO 09/07/17  Frequency/Duration: 3 times a week for 6 weeks followed by 1 time a week for 6 weeks  GOALS: (Goals have been discussed and agreed upon with patient.)  Short-Term Functional Goals: Time Frame: 3 weeks  1. Patient will be able to ambulate greater than 250 ft to get to therapy clinic without the use of a wheelchair (goal ongoing). 2. Patient will be able to perform walking on treadmill at home for 5 minutes without O2 saturations below 86% (goal ongoing). 3. Patient will be independent with sit-stand without requiring hand support/assistance (goal ongoing). Discharge Goals: Time Frame: 12 weeks  1. Patient will be independent will all functional mobility at home without difficulty or thoracic back pain (goal ongoing). 2. Patient will be able to walk 10,000 steps without difficulty (goal ongoing). 3. Patient will have achieved prior level of function for all ADLs without difficulty or thoracic back pain (goal ongoing). 4. Patient will score less than 12 seconds on TUG and be WNLs (goal ongoing). Rehabilitation Potential For Stated Goals: Excellent              The information in this section was collected on 06/07/17 (except where otherwise noted).   HISTORY:   History of Present Injury/Illness (Reason for Referral):  Patient reports he was not feeling well from about 05/13-15/17 and was then admitted to hospital for pneumonia/sepsis with a 3-day hospital stay in ICU with complications that included a L lower lobe pneumonia. He was discharged to home on 05/27/17 and referred to outpatient physical therapy for post-hospital physical therapy. Past Medical History/Comorbidities:   Mr. Anna Marie Banuelos  has a past medical history of Arthritis; Elevated prostate specific antigen (PSA); History of prostate surgery (3/12/2015); HLA-B27 positive; Neck pain; Osteoarthritis, hand; Osteopenia; Osteoporosis; Polyarthritis; Polymyalgia rheumatica ([address] Prostate cancer ([facility] [address] Raynaud's disease; 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[address] Ankylosing Spondylitis and thoracic spine pain. Mr. Anna Marie Banuelos  has a past surgical history that includes vasectomy (40+ yrs); knee arthroscopy (2009); urological; biopsy prostate; prostatectomy (2012); cataract removal (2012); cataract removal (2013); and colonoscopy (02/2010). Social History/Living Environment: Patient lives with spouse. Patient denies and physical barriers at home and has a tub/shower combo. Prior Level of Function/Work/Activity: Patient is retired. Prior to hospitalization, patient reports he walked 5 days/week for 30-50 minutes. .  Dominant Side:  RIGHT  Current Medications:     Current Outpatient Prescriptions:     aspirin 81 mg chewable tablet, Take 1 Tab by mouth daily. , Disp: 30 Tab, Rfl: 1    ferrous sulfate 325 mg (65 mg iron) tablet, Take 1 Tab by mouth two (2) times daily (with meals). , Disp: 60 Tab, Rfl: 1    predniSONE (DELTASONE) 20 mg tablet, Take 2 tablets daily for 1 week then 1 tablet daily until seen in the office for follow up, Disp: 40 Tab, Rfl: 0    potassium chloride (K-DUR, KLOR-CON) 20 mEq tablet, Take 2 Tabs by mouth daily. , Disp: 30 Tab, Rfl: 1    DULoxetine (CYMBALTA) 30 mg capsule, Take 1 Cap by mouth daily. , Disp: 90 Cap, Rfl: 1    traMADol (ULTRAM) 50 mg tablet, Take 1 Tab by mouth three (3) times daily as needed for Pain.  Max Daily Amount: 150 mg., Disp: 90 Tab, Rfl: 2    omeprazole (PRILOSEC) 40 mg capsule, Take 1 Cap by mouth daily. , Disp: 30 Cap, Rfl: 5    diclofenac (VOLTAREN) 1 % topical gel, Apply 4 g to affected area four (4) times daily. , Disp: 100 g, Rfl: 2    calcium-vitamin D (OYSTER SHELL) 500 mg(1,250mg) -200 unit per tablet, Take 1 Tab by mouth daily. , Disp: , Rfl:     cholecalciferol, vitamin D3, (VITAMIN D3) 2,000 unit Tab, Take  by mouth. Taking 1 1/2 tabs of 2000 international units once daily, Disp: , Rfl:    Date Last Reviewed:  6/8/2017   Number of Personal Factors/Comorbidities that affect the Plan of Care: 1-2: MODERATE COMPLEXITY   EXAMINATION:   Observation/Orthostatic Postural Assessment: HR - 84; Os sats - 94% on room air. Cachetic body with significant weight loss from hospitalization. Posture - kyphotic with an anterior rotated rib cage that is posterior positioned over a posterior tilted pelvis. Atrophy noted through all LE and UE musculature, most notable gluteals. ROM:  AROM B UE WNL with shoulder abduction palm down 140 degrees B. Cervical rotation 70% B and pain free. B LE AROM WNL. Passive trunk rotation less than 50% B. Breathing assessment indicated decreased mobility of L lower rib cage with inhalation. Strength:  B UE grossly good except 4+/5 for R shoulder ER and B abduction. B LE grossly fair with 4+/5 for gluts, quadriceps, hamstrings and hip flexors. Neurological Screen:        Myotomes:  Good except for strength deficits noted above. Dermatomes: WNL        Reflexes:  2+ and WNL B UE/LE  Functional Mobility:         Gait/Ambulation:  Independent with slowed honorio with narrowed stance and limited arm swing B.  Endurance with gait - able to tolerate 2 minutes on treadmill before O2 saturation significantly drops to below 86%        Transfers:  Hand assist for sit-stand and min A for floor-stand        Bed Mobility:  Independent but slowed movement due to thoracic back pain        Stairs: 2-hand assist with 2 ft/step  Balance:          Static standing B LE greater than 60 sec; single leg less than 10 sec B. Mental Status:          Alert and oriented x 4   Body Structures Involved:  1. Thoracic Cage  2. Joints  3. Muscles Body Functions Affected:  1. Mental  2. Sensory/Pain  3. Cardio  4. Neuromusculoskeletal  5. Movement Related Activities and Participation Affected:  1. Mobility  2. Self Care  3. Domestic Life  4. Community, Social and [place]   Number of elements (examined above) that affect the Plan of Care: 4+: HIGH COMPLEXITY   CLINICAL PRESENTATION:   Presentation: Evolving clinical presentation with changing clinical characteristics: MODERATE COMPLEXITY   CLINICAL DECISION MAKING:   Outcome Measure: Tool Used: Modified Oswestry Low Back Pain Questionnaire  Score:  Initial: 22/50 (06/07/17) Most Recent: X/50 (Date: -- )   Interpretation of Score: Each section is scored on a 0-5 scale, 5 representing the greatest disability. The scores of each section are added together for a total score of 50. Score 0 1-10 11-20 21-30 31-40 41-49 50   Modifier CH CI CJ CK CL CM CN     Tool Used: Timed Up and Go (TUG)  Score:  Initial: 23 seconds (06/07/17) Most Recent: X seconds (Date: -- )   Interpretation of Score: The test measures, in seconds, the time taken by an individual to stand up from a standard arm chair (seat height 46 cm [18 in], arm height 65 cm [25.6 in]), walk a distance of 3 meters (118 in, approx 10 ft), turn, walk back to the chair and sit down. If the individual takes longer than 14 seconds to complete TUG, this indicates risk for falls. Score 7 7.5-10.5 11-14 14.5-17.5 18-21 21.5-24.5 25+   Modifier CH CI CJ CK CL CM CN     ?  Mobility - Walking and Moving Around:     - CURRENT STATUS: CM - 80%-99% impaired, limited or restricted    - GOAL STATUS: CI - 1%-19% impaired, limited or restricted    - D/C STATUS: ---------------To be determined---------------    Medical Necessity:   · Patient is expected to demonstrate progress in strength, balance and endurance and functional mobility to increase independence with ADLs, improve safety during daily activities and return to prior functional level. · Patient demonstrates excellent rehab potential due to higher previous functional level. Reason for Services/Other Comments:  · Patient continues to require skilled intervention due to general debility with overall strength and functional mobility deficits due to prolonged hospitalization due to pneumonia/sepsis; he also complains of thoracic back pain exacerbated due to decreased activity with decreased mobility and strength through the trunk due to worsening ankylosing spondylitis. Use of outcome tool(s) and clinical judgement create a POC that gives a: Questionable prediction of patient's progress: MODERATE COMPLEXITY            TREATMENT:   (In addition to Assessment/Re-Assessment sessions the following treatments were rendered)  Pre-treatment Symptoms/Complaints: Patient reports he slept very well after evaluation yesterday and is very optimistic of getting better. He reports his endurance and overall fatigue are his greatest complaint and inability to perform regular daily tasks without needing assistance of spouse. He reports his thoracic back pain, that is chronic in nature due to ankylosing spondylitis, has worsened due to his inactivity and hospitalization and exacerbated with immobility and alleviated with warm showers and movement. Pain: Initial:   Pain Intensity 1: 6  Pain Location 1: Spine, thoracic  Pain Orientation 1: Posterior, Left  Pain Intervention(s) 1: Heat applied  Post Session:  5/10     Therapeutic Exercise: (40 Minutes):  Exercises per grid below to improve strength and endurance. Required minimal verbal cues to promote proper body alignment and promote proper body breathing techniques.      Date:  06/07/17 Date: Date:     Activity/Exercise      Treadmill at home 1. 5mph x 5 min (goal)     Sit-stand  10 x 1     Step ups 6\" 10 x 1 ea     Heelraises  20 x 1     B UE bicep curls 3 lb 1 min     B UE deltoid/abduction (short lever) 3 lb 1 min     R UE rows Red 2 min     Unilateral ER Red 1 min ea     UE/LE ergometer (seat at 9) 4 mins           Home Exercise Program (HEP): Treadmill progression    Treatment/Session Assessment:    · Response to Treatment:  All activity limited by O2 saturations dropping after every 2 minutes of activity. · Compliance with Program/Exercises: Very compliant  · Recommendations/Intent for next treatment session: \"Next visit will focus on advancements to more challenging activities and progressing to more strengthening and endurance activities\".   Total Treatment Duration: (40 minutes of treatment)  PT Patient Time In/Time Out  Time In: 1415  Time Out: 1455    Parry Dance, PT

## 2017-06-09 ENCOUNTER — APPOINTMENT (OUTPATIENT)
Dept: PHYSICAL THERAPY | Age: 72
End: 2017-06-09
Payer: MEDICARE

## 2017-06-13 ENCOUNTER — ANESTHESIA EVENT (OUTPATIENT)
Dept: ENDOSCOPY | Age: 72
End: 2017-06-13
Payer: MEDICARE

## 2017-06-13 ENCOUNTER — HOSPITAL ENCOUNTER (OUTPATIENT)
Dept: PHYSICAL THERAPY | Age: 72
Discharge: HOME | End: 2017-06-13
Payer: MEDICARE

## 2017-06-13 VITALS — HEART RATE: 76 BPM | OXYGEN SATURATION: 97 %

## 2017-06-13 PROCEDURE — 97110 THERAPEUTIC EXERCISES: CPT

## 2017-06-13 RX ORDER — SODIUM CHLORIDE 0.9 % (FLUSH) 0.9 %
5-10 SYRINGE (ML) INJECTION AS NEEDED
Status: CANCELLED | OUTPATIENT
Start: 2017-06-13

## 2017-06-13 RX ORDER — SODIUM CHLORIDE, SODIUM LACTATE, POTASSIUM CHLORIDE, CALCIUM CHLORIDE 600; 310; 30; 20 MG/100ML; MG/100ML; MG/100ML; MG/100ML
100 INJECTION, SOLUTION INTRAVENOUS CONTINUOUS
Status: CANCELLED | OUTPATIENT
Start: 2017-06-13

## 2017-06-13 NOTE — PROGRESS NOTES
Pamela Sheehan  : 1945 [facility]  [address] [place] [address]  [phone]   [phone]       OUTPATIENT PHYSICAL THERAPY:Daily Note 2017    ICD-10: Treatment Diagnosis: Ankylosing Spondylitis of thoracic region (M45.4); Pain in thoracic spine (M54.5); muscle weakness, generalized (M62.81); difficulty walking, not elsewhere classified (R26.2)  Precautions/Allergies:  Codeine   Fall Risk Score: 2 (? 5 = High Risk)  MD Orders: Eval and treat MEDICAL/REFERRING DIAGNOSIS: Thoracic back pain; post pneumonia with debility  DATE OF ONSET: 17  REFERRING PHYSICIAN: Yamile Milan MD  RETURN PHYSICIAN APPOINTMENT: as needed     INITIAL ASSESSMENT ([id number]):  Mr. Kimberli Steward presents with general debility with overall strength and functional mobility deficits due to prolonged hospitalization due to pneumonia/sepsis; he also complains of thoracic back pain exacerbated due to decreased activity with decreased mobility and strength through the trunk due to worsening ankylosing spondylitis. PROBLEM LIST (Impacting functional limitations): Increased pain through thoracic spine/costal cage limiting tolerance of ADLs and difficulty sleeping  Decreased activity tolerance for basic and instrumental ADLs due to generalized weakness B LE/UE.   Decreased ADL/functional activities specificially with any endurance activity related to decreased overall strength and limited pulmonary capacity post-pneumonia  Outcome measure score of 23 seconds (WNL - 7 seconds) on Timed Up and Go (TUG) test with severe effect of decreased strength/endurance on patient's ability to manage every day life activities  Decreased strength through B LE/UE/trunk limiting all functional mobility INTERVENTIONS PLANNED:  Patient education including pathophysiology of activity tolerance and progression  Manual therapy including soft tissue mobilizations, functional joint mobilizations and neuromuscular re-education to thoracic region  Neuromuscular re-education with focus on initiation/strength and endurance and motor control of B UE/LE/trunk  Therapeutic exercises including strengthening and endurance related tasks  Gait training with focus on correcting deficits, sequencing and honorio  Balance exercise - focused on standing dynamic balance with greatest focus on dynamic center of gravity control  Therapeutic activities with focus on strength and endurance  Therapeutic modalities including pain modalities for thoracic spine  Instructions of home exercise program (HEP)   TREATMENT PLAN:  Effective Dates: 06/07/17 TO 09/07/17  Frequency/Duration: 3 times a week for 6 weeks followed by 1 time a week for 6 weeks  GOALS: (Goals have been discussed and agreed upon with patient.)  Short-Term Functional Goals: Goals achieved; please refer to discharge goals  1. Patient will be able to ambulate greater than 250 ft to get to therapy clinic without the use of a wheelchair (goal achieved). 2. Patient will be able to perform walking on treadmill at home for 5 minutes without O2 saturations below 86% (goal achieved). 3. Patient will be independent with sit-stand without requiring hand support/assistance (goal achieved). Discharge Goals: Time Frame: 12 weeks  1. Patient will be independent will all functional mobility at home without difficulty or thoracic back pain (goal ongoing). 2. Patient will be able to walk 10,000 steps without difficulty (goal ongoing). 3. Patient will have achieved prior level of function for all ADLs without difficulty or thoracic back pain (goal ongoing). 4. Patient will score less than 12 seconds on TUG and be WNLs (goal ongoing). Rehabilitation Potential For Stated Goals: Excellent              The information in this section was collected on 06/07/17 (except where otherwise noted).   HISTORY:   History of Present Injury/Illness (Reason for Referral):  Patient reports he was not feeling well from about 05/13-15/17 and was then admitted to hospital for pneumonia/sepsis with a 3-day hospital stay in ICU with complications that included a L lower lobe pneumonia. He was discharged to home on 05/27/17 and referred to outpatient physical therapy for post-hospital physical therapy. Past Medical History/Comorbidities:   Mr. Serge Gonzalez  has a past medical history of Arthritis; Elevated prostate specific antigen (PSA); History of prostate surgery (3/12/2015); HLA-B27 positive; Neck pain; Osteoarthritis, hand; Osteopenia; Osteoporosis; Polyarthritis; Polymyalgia rheumatica ([facility] [address] Prostate cancer ([facility] [address] Raynaud's disease; 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[facility] [address] Ankylosing Spondylitis and thoracic spine pain. Mr. Serge Gonzalez  has a past surgical history that includes vasectomy (40+ yrs); knee arthroscopy (2009); urological; biopsy prostate; prostatectomy (2012); cataract removal (2012); cataract removal (2013); and colonoscopy (02/2010). Social History/Living Environment: Patient lives with spouse. Patient denies and physical barriers at home and has a tub/shower combo. Prior Level of Function/Work/Activity: Patient is retired. Prior to hospitalization, patient reports he walked 5 days/week for 30-50 minutes. .  Dominant Side:  RIGHT  Current Medications:     Current Outpatient Prescriptions:     aspirin 81 mg chewable tablet, Take 1 Tab by mouth daily. , Disp: 30 Tab, Rfl: 1    ferrous sulfate 325 mg (65 mg iron) tablet, Take 1 Tab by mouth two (2) times daily (with meals). , Disp: 60 Tab, Rfl: 1    predniSONE (DELTASONE) 20 mg tablet, Take 2 tablets daily for 1 week then 1 tablet daily until seen in the office for follow up, Disp: 40 Tab, Rfl: 0    potassium chloride (K-DUR, KLOR-CON) 20 mEq tablet, Take 2 Tabs by mouth daily. , Disp: 30 Tab, Rfl: 1    DULoxetine (CYMBALTA) 30 mg capsule, Take 1 Cap by mouth daily. , Disp: 90 Cap, Rfl: 1    traMADol (ULTRAM) 50 mg tablet, Take 1 Tab by mouth three (3) times daily as needed for Pain. Max Daily Amount: 150 mg., Disp: 90 Tab, Rfl: 2    omeprazole (PRILOSEC) 40 mg capsule, Take 1 Cap by mouth daily. , Disp: 30 Cap, Rfl: 5    diclofenac (VOLTAREN) 1 % topical gel, Apply 4 g to affected area four (4) times daily. , Disp: 100 g, Rfl: 2    calcium-vitamin D (OYSTER SHELL) 500 mg(1,250mg) -200 unit per tablet, Take 1 Tab by mouth daily. , Disp: , Rfl:     cholecalciferol, vitamin D3, (VITAMIN D3) 2,000 unit Tab, Take  by mouth. Taking 1 1/2 tabs of 2000 international units once daily, Disp: , Rfl:    Date Last Reviewed:  6/13/2017   Number of Personal Factors/Comorbidities that affect the Plan of Care: 1-2: MODERATE COMPLEXITY   EXAMINATION:   Observation/Orthostatic Postural Assessment: HR - 84; Os sats - 94% on room air. Cachetic body with significant weight loss from hospitalization. Posture - kyphotic with an anterior rotated rib cage that is posterior positioned over a posterior tilted pelvis. Atrophy noted through all LE and UE musculature, most notable gluteals. ROM:  AROM B UE WNL with shoulder abduction palm down 140 degrees B. Cervical rotation 70% B and pain free. B LE AROM WNL. Passive trunk rotation less than 50% B. Breathing assessment indicated decreased mobility of L lower rib cage with inhalation. Strength:  B UE grossly good except 4+/5 for R shoulder ER and B abduction. B LE grossly fair with 4+/5 for gluts, quadriceps, hamstrings and hip flexors. Neurological Screen:        Myotomes:  Good except for strength deficits noted above. Dermatomes: WNL        Reflexes:  2+ and WNL B UE/LE  Functional Mobility:         Gait/Ambulation:  Independent with slowed honorio with narrowed stance and limited arm swing B.  Endurance with gait - able to tolerate 2 minutes on treadmill before O2 saturation significantly drops to below 86%        Transfers:  Hand assist for sit-stand and min A for floor-stand        Bed Mobility:  Independent but slowed movement due to thoracic back pain        Stairs:  2-hand assist with 2 ft/step  Balance:          Static standing B LE greater than 60 sec; single leg less than 10 sec B. Mental Status:          Alert and oriented x 4   Body Structures Involved:  1. Thoracic Cage  2. Joints  3. Muscles Body Functions Affected:  1. Mental  2. Sensory/Pain  3. Cardio  4. Neuromusculoskeletal  5. Movement Related Activities and Participation Affected:  1. Mobility  2. Self Care  3. Domestic Life  4. Community, Social and [place]   Number of elements (examined above) that affect the Plan of Care: 4+: HIGH COMPLEXITY   CLINICAL PRESENTATION:   Presentation: Evolving clinical presentation with changing clinical characteristics: MODERATE COMPLEXITY   CLINICAL DECISION MAKING:   Outcome Measure: Tool Used: Modified Oswestry Low Back Pain Questionnaire  Score:  Initial: 22/50 (06/07/17) Most Recent: X/50 (Date: -- )   Interpretation of Score: Each section is scored on a 0-5 scale, 5 representing the greatest disability. The scores of each section are added together for a total score of 50. Score 0 1-10 11-20 21-30 31-40 41-49 50   Modifier CH CI CJ CK CL CM CN     Tool Used: Timed Up and Go (TUG)  Score:  Initial: 23 seconds (06/07/17) Most Recent: X seconds (Date: -- )   Interpretation of Score: The test measures, in seconds, the time taken by an individual to stand up from a standard arm chair (seat height 46 cm [18 in], arm height 65 cm [25.6 in]), walk a distance of 3 meters (118 in, approx 10 ft), turn, walk back to the chair and sit down. If the individual takes longer than 14 seconds to complete TUG, this indicates risk for falls. Score 7 7.5-10.5 11-14 14.5-17.5 18-21 21.5-24.5 25+   Modifier CH CI CJ CK CL CM CN     ?  Mobility - Walking and Moving Around:     - CURRENT STATUS: CM - 80%-99% impaired, limited or restricted    - GOAL STATUS: CI - 1%-19% impaired, limited or restricted    - D/C STATUS: ---------------To be determined---------------    Medical Necessity:   · Patient is expected to demonstrate progress in strength, balance and endurance and functional mobility to increase independence with ADLs, improve safety during daily activities and return to prior functional level. · Patient demonstrates excellent rehab potential due to higher previous functional level. Reason for Services/Other Comments:  · Patient continues to require skilled intervention due to general debility with overall strength and functional mobility deficits due to prolonged hospitalization due to pneumonia/sepsis; he also complains of thoracic back pain exacerbated due to decreased activity with decreased mobility and strength through the trunk due to worsening ankylosing spondylitis. Use of outcome tool(s) and clinical judgement create a POC that gives a: Questionable prediction of patient's progress: MODERATE COMPLEXITY            TREATMENT:   (In addition to Assessment/Re-Assessment sessions the following treatments were rendered)  Pre-treatment Symptoms/Complaints: Patient reports he is doing much better and has been able to do 5 min on the treadmill without difficulty, shortness of breath or drop in O2 sats. He reports his thoracic back pain, that is chronic in nature due to ankylosing spondylitis, has worsened due to his inactivity and hospitalization and exacerbated with immobility and alleviated with warm showers and movement.   Pain: Initial:   Pain Intensity 1: 4  Pain Location 1: Spine, thoracic  Pain Orientation 1: Posterior, Left  Post Session:  2/10   Manual Therapy (    Soft Tissue Mobilization Duration  Duration: 5 Minutes): (Used abbreviations: MET - muscle energy technique; PNF - proprioceptive neuromuscular facilitation; NMR - neuromuscular re-education; a/p - anterior to posterior; p/a - posterior to anterior) R side lie, bony contours through lateral L rib cage followed by basking seal for closing between ribs 5/6/7 with exhalation. Therapeutic Exercise: (45 Minutes):  Exercises per grid below to improve strength and endurance. Required minimal verbal cues to promote proper body alignment and promote proper body breathing techniques. Rests between all exercises to reestablish O2 sats over 95%   Date:  06/07/17 Date:  06/13/17 Date:     Activity/Exercise      Treadmill at home 1. 5mph x 5 min (goal) 2.0 mph x 7 min x 2    Sit-stand  10 x 1 2 min    Step ups 6\" 10 x 1 ea 6\" 1 min ea    Heelraises  20 x 1 2 min    B UE bicep curls 3 lb 1 min 4 lb 2 min    B UE deltoid/abduction (short lever) 3 lb 1 min 4 lb 2 min    R UE rows Red 2 min Red 2 min    Unilateral ER Red 1 min ea Red 2 min    UE/LE ergometer (seat at 9) 4 mins 6 mins          Home Exercise Program (HEP): Treadmill progression    Treatment/Session Assessment:    · Response to Treatment:  Improvement with ability to perform all strengthening and cardio exercises longer before rest.  · Compliance with Program/Exercises: Very compliant  · Recommendations/Intent for next treatment session: Next visit will focus on advancements to more challenging activities and progressing to more strengthening and endurance activities.   Total Treatment Duration: (40 minutes of treatment)  PT Patient Time In/Time Out  Time In: 0900  Time Out: 0950    Vania Howard, PT

## 2017-06-14 ENCOUNTER — HOSPITAL ENCOUNTER (OUTPATIENT)
Age: 72
Setting detail: OUTPATIENT SURGERY
Discharge: HOME | End: 2017-06-14
Attending: INTERNAL MEDICINE | Admitting: INTERNAL MEDICINE
Payer: MEDICARE

## 2017-06-14 ENCOUNTER — ANESTHESIA (OUTPATIENT)
Dept: ENDOSCOPY | Age: 72
End: 2017-06-14
Payer: MEDICARE

## 2017-06-14 ENCOUNTER — HOSPITAL ENCOUNTER (OUTPATIENT)
Dept: PHYSICAL THERAPY | Age: 72
End: 2017-06-14
Payer: MEDICARE

## 2017-06-14 VITALS
DIASTOLIC BLOOD PRESSURE: 70 MMHG | RESPIRATION RATE: 18 BRPM | TEMPERATURE: 96.8 F | OXYGEN SATURATION: 98 % | HEART RATE: 77 BPM | SYSTOLIC BLOOD PRESSURE: 142 MMHG

## 2017-06-14 PROCEDURE — 76040000025: Performed by: INTERNAL MEDICINE

## 2017-06-14 PROCEDURE — 74011000250 HC RX REV CODE- 250

## 2017-06-14 PROCEDURE — 74011250636 HC RX REV CODE- 250/636: Performed by: ANESTHESIOLOGY

## 2017-06-14 PROCEDURE — 74011000250 HC RX REV CODE- 250: Performed by: ANESTHESIOLOGY

## 2017-06-14 PROCEDURE — 76060000031 HC ANESTHESIA FIRST 0.5 HR: Performed by: INTERNAL MEDICINE

## 2017-06-14 PROCEDURE — 74011250636 HC RX REV CODE- 250/636

## 2017-06-14 RX ORDER — FAMOTIDINE 20 MG/1
20 TABLET, FILM COATED ORAL AS NEEDED
Status: DISCONTINUED | OUTPATIENT
Start: 2017-06-14 | End: 2017-06-14

## 2017-06-14 RX ORDER — SODIUM CHLORIDE, SODIUM LACTATE, POTASSIUM CHLORIDE, CALCIUM CHLORIDE 600; 310; 30; 20 MG/100ML; MG/100ML; MG/100ML; MG/100ML
100 INJECTION, SOLUTION INTRAVENOUS CONTINUOUS
Status: DISCONTINUED | OUTPATIENT
Start: 2017-06-14 | End: 2017-06-14 | Stop reason: HOSPADM

## 2017-06-14 RX ORDER — LIDOCAINE HYDROCHLORIDE 20 MG/ML
INJECTION, SOLUTION EPIDURAL; INFILTRATION; INTRACAUDAL; PERINEURAL AS NEEDED
Status: DISCONTINUED | OUTPATIENT
Start: 2017-06-14 | End: 2017-06-14 | Stop reason: HOSPADM

## 2017-06-14 RX ORDER — PROPOFOL 10 MG/ML
INJECTION, EMULSION INTRAVENOUS AS NEEDED
Status: DISCONTINUED | OUTPATIENT
Start: 2017-06-14 | End: 2017-06-14 | Stop reason: HOSPADM

## 2017-06-14 RX ADMIN — PROPOFOL 20 MG: 10 INJECTION, EMULSION INTRAVENOUS at 13:27

## 2017-06-14 RX ADMIN — FAMOTIDINE 20 MG: 10 INJECTION, SOLUTION INTRAVENOUS at 13:13

## 2017-06-14 RX ADMIN — LIDOCAINE HYDROCHLORIDE 40 MG: 20 INJECTION, SOLUTION EPIDURAL; INFILTRATION; INTRACAUDAL; PERINEURAL at 13:22

## 2017-06-14 RX ADMIN — PROPOFOL 100 MG: 10 INJECTION, EMULSION INTRAVENOUS at 13:22

## 2017-06-14 RX ADMIN — SODIUM CHLORIDE, SODIUM LACTATE, POTASSIUM CHLORIDE, AND CALCIUM CHLORIDE 100 ML/HR: 600; 310; 30; 20 INJECTION, SOLUTION INTRAVENOUS at 13:11

## 2017-06-14 NOTE — IP AVS SNAPSHOT
Karuna Lozano 
 
 
 [address] [facility] 
[phone] Patient: Amanda Vora MRN: [id number] HEQ:[id number] You are allergic to the following Allergen Reactions Codeine Nausea and Vomiting Recent Documentation Smoking Status Former Smoker Emergency Contacts Name Discharge Info Relation Home Work Mobile [address] CAREGIVER [3] Spouse [3] [phone] About your hospitalization You were admitted on:  June 14, 2017 You last received care in the:  [facility] You were discharged on:  June 14, 2017 Unit phone number:  [phone] Why you were hospitalized Your primary diagnosis was:  Not on File Providers Seen During Your Hospitalizations Provider Role Specialty Primary office phone Nia Kennedy MD Attending Provider Gastroenterology [phone] Your Primary Care Physician (PCP) Primary Care Physician Office Phone Office Fax Via [address] [phone] Follow-up Information None Your Appointments Wednesday June 14, 2017  7:00 PM EDT  
SC  INITIAL VISIT with TAMIKA Tate ([address] [place] [address]) [place] [address], 
[address] [place] [phone] Please arrive 10-15 minutes prior to your appointment time. Wear comfortable clothing. Please bring your insurance cards with you. Please bring a list of your medications including herbal supplements. Please bring a list of your allergies including food allergies. [address], [address] [facility], One [facility] Thursday Danay 15, 2017  1:00 PM EDT  
SC PT RECUR VISIT 1 HOUR with Nish Fernández, PT  
[facility] ([address]) [address] [facility] [phone] [phone] [address], [address] [place] [address] Friday June 16, 2017  9:00 AM EDT  
SC PT RECUR VISIT 1 HOUR with Tae Long, PT  
[facility] ([address]) [address] [place] [phone]  
[phone]  
  
    
[address] [place] [address] Friday June 16, 2017  2:45 PM EDT  
SC ST INITIAL VISIT with TAMIKA Wright ([address] [place] [address]) [facility], 
[address] [place] [phone] [phone] Please arrive 10-15 minutes prior to your appointment time. Wear comfortable clothing. Please bring your insurance cards with you. Please bring a list of your medications including herbal supplements. Please bring a list of your allergies including food allergies. [address], [address] [facility], One [facility] Monday June 19, 2017  8:30 AM EDT  
LAB with [facility] LAB RESOURCE [address] [place] [address] ([facility] 10[address] [facility]) 101 [facility] [place] [address]  
[phone] Monday June 19, 2017 11:30 AM EDT  
SC PT RECUR VISIT 1 HOUR with Imelda Sleight, PT  
[facility] [address] [place] [address] [phone]  
  
    
[address] [place] [address] Tuesday June 20, 2017  8:30 AM EDT  
SC PT RECUR VISIT 1 HOUR with Imelda Sleight, PT  
[facility] ([facility]) [address] [place] [phone]  
  
    
[address] [place] [address] Wednesday June 21, 2017 12:00 PM EDT HOSPITAL with Nicole Wang MD  
[facility] SPECIALTY [facility] ([facility]) [address] [place] [address] 5601 [facility]  
[phone] Thursday June 22, 2017  3:00 PM EDT  
SC PT RECUR VISIT 1 HOUR with Imelda Dan, PT  
[facility] ([facility] [address] [place] [address] [address] [place] [phone]  
[phone]  
  
    
[address] [place] [address] Monday June 26, 2017  2:00 PM EDT  
SC PT RECUR VISIT 1 HOUR with Nasra Hall, PT  
[facility] ([address]) [address] [place] [address]  
[phone]  
  
    
[address] [place] [address] Wednesday June 28, 2017  9:30 AM EDT  
SC PT RECUR VISIT 1 HOUR with Nasra Hall, PT  
[facility] ([address]) [address] [place] [phone]  
  
    
[address] [place] [address] Wednesday July 05, 2017 10:00 AM EDT Office Visit with Oralia Mendoza NP [address] [facility] ([facility] 1051 [facility]) [address] [place] [address]  
[phone] Wednesday July 26, 2017 10:00 AM EDT  
infusion with [facility] NURSE ONLY  
[facility] OSTEOPOROSIS AND ARTHRITIS ([address] [facility] [address]) [address] [place] [phone] Wednesday July 26, 2017 10:40 AM EDT Office Visit with Ranjeet Busby MD  
[facility] OSTEOPOROSIS [facility] ARTHRITIS ([address] [facility] [address]) [address] [place] [phone] Friday July 28, 2017  8:05 AM EDT  
BLOOD DRAW with [facility] HT (U [facility]OLOG[facility]) [address] [place] [address] Sergo Garcia  
[phone] Current Discharge Medication List  
  
ASK your doctor about these medications Dose & Instructions Dispensing Information Comments Morning Noon Evening Bedtime  
 aspirin 81 mg chewable tablet Your last dose was: Your next dose is:    
   
   
 Dose:  81 mg Take 1 Tab by mouth daily. Quantity:  30 Tab Refills:  1  
     
   
   
   
  
 calcium-vitamin D 500 mg(1,250mg) -200 unit per tablet Commonly known as:  OYSTER SHELL Your last dose was: Your next dose is:    
   
   
 Dose:  1 Tab Take 1 Tab by mouth daily. Refills:  0  
     
   
   
   
  
 diclofenac 1 % Gel Commonly known as:  VOLTAREN Your last dose was: Your next dose is:    
   
   
 Dose:  4 g Apply 4 g to affected area four (4) times daily. Quantity:  100 g Refills:  2 DULoxetine 30 mg capsule Commonly known as:  CYMBALTA Your last dose was: Your next dose is:    
   
   
 Dose:  30 mg Take 1 Cap by mouth daily. Quantity:  90 Cap Refills:  1  
     
   
   
   
  
 ferrous sulfate 325 mg (65 mg iron) tablet Your last dose was: Your next dose is:    
   
   
 Dose:  325 mg Take 1 Tab by mouth two (2) times daily (with meals). Quantity:  60 Tab Refills:  1  
     
   
   
   
  
 omeprazole 40 mg capsule Commonly known as:  PRILOSEC Your last dose was: Your next dose is:    
   
   
 Dose:  40 mg Take 1 Cap by mouth daily. Quantity:  30 Cap Refills:  5  
     
   
   
   
  
 potassium chloride 20 mEq tablet Commonly known as:  K-DUR, KLOR-CON Your last dose was: Your next dose is:    
   
   
 Dose:  40 mEq Take 2 Tabs by mouth daily. Quantity:  30 Tab Refills:  1  
     
   
   
   
  
 predniSONE 20 mg tablet Commonly known as:  Byron Hug Your last dose was: Your next dose is: Take 2 tablets daily for 1 week then 1 tablet daily until seen in the office for follow up Quantity:  40 Tab Refills:  0  
     
   
   
   
  
 traMADol 50 mg tablet Commonly known as:  ULTRAM  
   
Your last dose was: Your next dose is:    
   
   
 Dose:  50 mg Take 1 Tab by mouth three (3) times daily as needed for Pain. Max Daily Amount: 150 mg.  
 Quantity:  90 Tab Refills:  2 VITAMIN D3 2,000 unit Tab Generic drug:  cholecalciferol (vitamin D3) Your last dose was: Your next dose is: Take  by mouth. Taking 1 1/2 tabs of 2000 international units once daily Refills:  0 Discharge Instructions Gastrointestinal Esophagogastroduodenoscopy (EGD) - Upper Exam Discharge Instructions 1. Call Dr. Kristi Huerta at [phone] for any problems or questions. 2. Contact the doctor's office for follow up appointment as directed. 3. Medication may cause drowsiness for several hours, therefore, do not drive or                  operate machinery for remainder of the day. 4. No alcohol today. 5. Ordinarily, you may resume regular diet and activity after exam unless otherwise              specified by your physician. 6. For mild soreness in your throat you may use Cepacol throat lozenges or warm               salt-water gargles as needed. Any additional instructions: Follow up as needed with Dr. Kristi Huerta. Continue your current diet of thickened liquids until cleared by Speech Therapist. 
  
Instructions given to Lesly Cevallos and wife. Instructions given by:  Aislinn Tran RN Indigestion (Dyspepsia or Heartburn): Care Instructions Your Care Instructions Sometimes it can be hard to pinpoint the cause of indigestion (dyspepsia or heartburn). Most cases of an upset stomach with bloating, burning, burping, and nausea are minor and go away within several hours. Home treatment and over-the-counter medicine often are able to control symptoms. But if you take medicine to relieve your indigestion without making diet and lifestyle changes, your symptoms are likely to return again and again. If you get indigestion often, it may be a sign of a more serious medical problem. Be sure to follow up with your doctor, who may want to do tests to be sure of the cause of your indigestion. Follow-up care is a key part of your treatment and safety. Be sure to make and go to all appointments, and call your doctor if you are having problems. It's also a good idea to know your test results and keep a list of the medicines you take. How can you care for yourself at home? · Your doctor may recommend over-the-counter medicine. For mild or occasional indigestion, antacids such as Tums, Gaviscon, Mylanta, or Maalox may help. Be careful when you take over-the-counter antacid medicines. Many of these medicines have aspirin in them. Read the label to make sure that you are not taking more than the recommended dose. Too much aspirin can be harmful. · Your doctor also may recommend over-the-counter acid reducers, such as Pepcid AC, Tagamet HB, Zantac 75, or Prilosec. Read and follow all instructions on the label. If you use these medicines often, talk with your doctor. · Change your eating habits. ¨ It's best to eat several small meals instead of two or three large meals. ¨ After you eat, wait 2 to 3 hours before you lie down. ¨ Chocolate, mint, and alcohol can make GERD worse. ¨ Spicy foods, foods that have a lot of acid (like tomatoes and oranges), and coffee can make GERD symptoms worse in some people. If your symptoms are worse after you eat a certain food, you may want to stop eating that food to see if your symptoms get better. · Do not smoke or chew tobacco. Smoking can make GERD worse. If you need help quitting, talk to your doctor about stop-smoking programs and medicines. These can increase your chances of quitting for good. · If you have GERD symptoms at night, raise the head of your bed 6 to 8 inches by putting the frame on blocks or placing a foam wedge under the head of your mattress. (Adding extra pillows does not work.) · Do not wear tight clothing around your middle. · Lose weight if you need to. Losing just 5 to 10 pounds can help. · Do not take anti-inflammatory medicines, such as aspirin, ibuprofen (Advil, Motrin), or naproxen (Aleve). These can irritate the stomach. If you need a pain medicine, try acetaminophen (Tylenol), which does not cause stomach upset. When should you call for help? Call 911 anytime you think you may need emergency care. For example, call if: 
· You passed out (lost consciousness). · You vomit blood or what looks like coffee grounds. · You pass maroon or very bloody stools. · You have chest pain or pressure. This may occur with: ¨ Sweating. ¨ Shortness of breath. ¨ Nausea or vomiting. ¨ Pain that spreads from the chest to the neck, jaw, or one or both shoulders or arms. ¨ Feeling dizzy or lightheaded. ¨ A fast or uneven pulse. After calling 911, chew 1 adult-strength aspirin. Wait for an ambulance. Do not try to drive yourself. Call your doctor now or seek immediate medical care if: 
· You have severe belly pain. · Your stools are black and tarlike or have streaks of blood. · You have trouble swallowing. · You are losing weight and do not know why. Watch closely for changes in your health, and be sure to contact your doctor if: 
· You do not get better as expected. Where can you learn more? Go to http://bobo-gorge.info/. Enter W216 in the search box to learn more about \"Indigestion (Dyspepsia or Heartburn): Care Instructions. \" Current as of: November 16, 2016 Content Version: 11.2 © 7283-3723 [employer]. Care instructions adapted under license by [employer] (which disclaims liability or warranty for this information). If you have questions about a medical condition or this instruction, always ask your healthcare professional. [place] [address] any warranty or liability for your use of this information. Discharge Orders None ACO Transitions of Care Introducing [employer] offers a voluntary care coordination program to provide high quality service and care to [facility] fee-for-service beneficiaries. Romero López was designed to help you enhance your health and well-being through the following services: ? Transitions of Care  support for individuals who are transitioning from one care setting to another (example: Hospital to home). ? Chronic and Complex Care Coordination  support for individuals and caregivers of those with serious or chronic illnesses or with more than one chronic (ongoing) condition and those who take a number of different medications. If you meet specific medical criteria, a [address] [place] [address] Rd may call you directly to coordinate your care with your primary care physician and your other care providers. For questions about the [facility] programs, please, contact your physicians office. For general questions or additional information about Accountable Care Organizations: 
Please visit www.medicare.gov/acos. html or call 1-800-MEDICARE (9-777.837.9214) TTY users should call 8-527.655.1502. Introducing [facility] & HEALTH SERVICES! Dear Leobardo Ellison: 
Thank you for requesting a [employer] account. Our records indicate that you already have an active [employer] account. You can access your account anytime at [email or web address]. Flash Auto Detailing/Angry Citizen Did you know that you can access your hospital and ER discharge instructions at any time in [employer]? You can also review all of your test results from your hospital stay or ER visit. Additional Information If you have questions, please visit the Frequently Asked Questions section of the [employer] website at [email or web address]. Flash Auto Detailing/Critical Mediat/. Remember, [employer] is NOT to be used for urgent needs. For medical emergencies, dial 911. Now available from your iPhone and Android! General Information Please provide this summary of care documentation to your next provider. Patient Signature:  ____________________________________________________________ Date:  ____________________________________________________________  
  
Cathlean Abu Provider Signature:  ____________________________________________________________ Date:  ____________________________________________________________

## 2017-06-14 NOTE — PROCEDURES
EGD Procedure Note    PreOp Diagnosis:   Abnormal CT  Esoph wall thickening  GERD  Mild dysphagia    PostOp Diagnosis:  Hiatal hernia  Schatzki's ring    Medications:  Monitored Anesthesia    Procedure:  EGD   Instrument:   After informed consent was obtained, the patient was sedated and the endoscope was inserted  in the mouth and advanced into the duodenum without difficulty. The scope was slowly withdrawn while the mucosa was carefully inspected, including a retroflexed view of the cardia and fundus. Findings:   Esophagus: The proximal and mid esophagus appeared normal.  In the distal esophagus, There was a moderate Schatzki's ring present. There was no abnormal esophageal wall thickening. Karishma Hylan esophageal dilation was performed over a guidewire in standard fashion with a 45 and 48 Western Genoveva dilator. There was moderate resistance and  no evidence of complications. Stomach: Normal, without ulcers, erosions, or polyps.   2 cm hiatal hernia in the proximal stomach  Duodenum:   Normal    Recommendations:  GERD diet  RV PRN      Bruce Lund MD

## 2017-06-14 NOTE — DISCHARGE INSTRUCTIONS
Gastrointestinal Esophagogastroduodenoscopy (EGD) - Upper Exam Discharge Instructions    1. Call Dr. Ria Savage at [phone] for any problems or questions. 2. Contact the doctor's office for follow up appointment as directed. 3. Medication may cause drowsiness for several hours, therefore, do not drive or                  operate machinery for remainder of the day. 4. No alcohol today. 5. Ordinarily, you may resume regular diet and activity after exam unless otherwise              specified by your physician. 6. For mild soreness in your throat you may use Cepacol throat lozenges or warm               salt-water gargles as needed. Any additional instructions: Follow up as needed with Dr. Ria Savage. Continue your current diet of thickened liquids until cleared by Speech Therapist.     Instructions given to Jazzmine Trinidad and wife. Instructions given by:  Gabby Mishra RN        Indigestion (Dyspepsia or Heartburn): Care Instructions  Your Care Instructions  Sometimes it can be hard to pinpoint the cause of indigestion (dyspepsia or heartburn). Most cases of an upset stomach with bloating, burning, burping, and nausea are minor and go away within several hours. Home treatment and over-the-counter medicine often are able to control symptoms. But if you take medicine to relieve your indigestion without making diet and lifestyle changes, your symptoms are likely to return again and again. If you get indigestion often, it may be a sign of a more serious medical problem. Be sure to follow up with your doctor, who may want to do tests to be sure of the cause of your indigestion. Follow-up care is a key part of your treatment and safety. Be sure to make and go to all appointments, and call your doctor if you are having problems. It's also a good idea to know your test results and keep a list of the medicines you take. How can you care for yourself at home?   · Your doctor may recommend over-the-counter medicine. For mild or occasional indigestion, antacids such as Tums, Gaviscon, Mylanta, or Maalox may help. Be careful when you take over-the-counter antacid medicines. Many of these medicines have aspirin in them. Read the label to make sure that you are not taking more than the recommended dose. Too much aspirin can be harmful. · Your doctor also may recommend over-the-counter acid reducers, such as Pepcid AC, Tagamet HB, Zantac 75, or Prilosec. Read and follow all instructions on the label. If you use these medicines often, talk with your doctor. · Change your eating habits. ¨ It's best to eat several small meals instead of two or three large meals. ¨ After you eat, wait 2 to 3 hours before you lie down. ¨ Chocolate, mint, and alcohol can make GERD worse. ¨ Spicy foods, foods that have a lot of acid (like tomatoes and oranges), and coffee can make GERD symptoms worse in some people. If your symptoms are worse after you eat a certain food, you may want to stop eating that food to see if your symptoms get better. · Do not smoke or chew tobacco. Smoking can make GERD worse. If you need help quitting, talk to your doctor about stop-smoking programs and medicines. These can increase your chances of quitting for good. · If you have GERD symptoms at night, raise the head of your bed 6 to 8 inches by putting the frame on blocks or placing a foam wedge under the head of your mattress. (Adding extra pillows does not work.)  · Do not wear tight clothing around your middle. · Lose weight if you need to. Losing just 5 to 10 pounds can help. · Do not take anti-inflammatory medicines, such as aspirin, ibuprofen (Advil, Motrin), or naproxen (Aleve). These can irritate the stomach. If you need a pain medicine, try acetaminophen (Tylenol), which does not cause stomach upset. When should you call for help? Call 911 anytime you think you may need emergency care.  For example, call if:  · You passed out (lost consciousness). · You vomit blood or what looks like coffee grounds. · You pass maroon or very bloody stools. · You have chest pain or pressure. This may occur with:  ¨ Sweating. ¨ Shortness of breath. ¨ Nausea or vomiting. ¨ Pain that spreads from the chest to the neck, jaw, or one or both shoulders or arms. ¨ Feeling dizzy or lightheaded. ¨ A fast or uneven pulse. After calling 911, chew 1 adult-strength aspirin. Wait for an ambulance. Do not try to drive yourself. Call your doctor now or seek immediate medical care if:  · You have severe belly pain. · Your stools are black and tarlike or have streaks of blood. · You have trouble swallowing. · You are losing weight and do not know why. Watch closely for changes in your health, and be sure to contact your doctor if:  · You do not get better as expected. Where can you learn more? Go to http://bobo-gorge.info/. Enter T260 in the search box to learn more about \"Indigestion (Dyspepsia or Heartburn): Care Instructions. \"  Current as of: November 16, 2016  Content Version: 11.2  © 3361-3767 [employer]. Care instructions adapted under license by [employer] (which disclaims liability or warranty for this information). If you have questions about a medical condition or this instruction, always ask your healthcare professional. [place] [address] any warranty or liability for your use of this information.

## 2017-06-14 NOTE — ANESTHESIA POSTPROCEDURE EVALUATION
Post-Anesthesia Evaluation and Assessment    Patient: Marques Cheung MRN: [id number]  SSN: [id number]    YOB: 1945  Age: 80 y.o. Sex: male       Cardiovascular Function/Vital Signs  Visit Vitals    /70    Pulse 77    Temp 36 °C (96.8 °F)    Resp 18    SpO2 98%       Patient is status post total IV anesthesia anesthesia for Procedure(s):  ESOPHAGOGASTRODUODENOSCOPY (EGD) / BMI 18  ESOPHAGEAL DILATION. Nausea/Vomiting: None    Postoperative hydration reviewed and adequate. Pain:  Pain Scale 1: Numeric (0 - 10) (06/14/17 1403)  Pain Intensity 1: 0 (06/14/17 1413)   Managed    Neurological Status: At baseline    Mental Status and Level of Consciousness: Arousable    Pulmonary Status:   O2 Device: Room air (06/14/17 1413)   Adequate oxygenation and airway patent    Complications related to anesthesia: None    Post-anesthesia assessment completed.  No concerns    Signed By: Edna Menard MD     June 14, 2017

## 2017-06-14 NOTE — PROGRESS NOTES
Patient passing flatus, tolerating po fluids well. Patient escorted to car via wheelchair  by [facility]  for discharge home with wife. Discharge instructions reviewed. Dr. Bernard Villalobos spoke with pt and family.

## 2017-06-14 NOTE — H&P
Gastroenterology Outpatient History and Physical    Patient: Fannylavern Crawford    Physician: Escobar Rider MD    Vital Signs: Temperature 99.1 °F (37.3 °C). Allergies:    Allergies   Allergen Reactions    Codeine Nausea and Vomiting       Chief Complaint: Abnormal Xray    History of Present Illness: GERD    Justification for Procedure: As above    History:  Past Medical History:   Diagnosis Date    Arthritis     Elevated prostate specific antigen (PSA)     History of prostate surgery 3/12/2015    HLA-B27 positive     Impotence of organic origin     Neck pain     Osteoarthritis, hand     Osteopenia     Osteoporosis     Other nonspecific abnormal finding of lung field     Polyarthritis     [address]ymyalgia rheumatica ([address]     Prostate cancer ([address]     Raynaud's disease     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[address]     Thoracic spine pain       Past Surgical History:   Procedure Laterality Date    BIOPSY PROSTATE      HX CATARACT REMOVAL  2012    left     HX CATARACT REMOVAL  2013    right    HX COLONOSCOPY  02/2010    repeat in 7 years    HX KNEE ARTHROSCOPY  2009    left    HX PROSTATECTOMY  2012    HX UROLOGICAL      prostate ultrasound and biopsy    HX VASECTOMY  40+ yrs      Social History     Social History    Marital status:      Spouse name: N/A    Number of children: N/A    Years of education: N/A     Social History Main Topics    Smoking status: Former Smoker     Packs/day: 1.00     Years: 15.00    Smokeless tobacco: Never Used      Comment: quit 1980    Alcohol use No    Drug use: No    Sexual activity: Not Asked     Other Topics Concern    None     Social History Narrative      Family History   Problem Relation Age of Onset    Stroke Mother     Other Mother      cirrhosis    Stroke Father     Cancer Other      grandmother    Heart Disease Other      grandfather    Diabetes Other      grandfather    Heart Disease Brother        Medications:   Prior to Admission medications Medication Sig Start Date End Date Taking? Authorizing Provider   aspirin 81 mg chewable tablet Take 1 Tab by mouth daily. 5/31/17  Yes Danica Kwon MD   ferrous sulfate 325 mg (65 mg iron) tablet Take 1 Tab by mouth two (2) times daily (with meals). 5/31/17  Yes Danica Kwon MD   predniSONE (DELTASONE) 20 mg tablet Take 2 tablets daily for 1 week then 1 tablet daily until seen in the office for follow up 5/31/17  Yes Danica Kwon MD   potassium chloride (K-DUR, KLOR-CON) 20 mEq tablet Take 2 Tabs by mouth daily. 5/31/17  Yes Danica Kwon MD   DULoxetine (CYMBALTA) 30 mg capsule Take 1 Cap by mouth daily. 5/3/17  Yes Radu Ram MD   traMADol (ULTRAM) 50 mg tablet Take 1 Tab by mouth three (3) times daily as needed for Pain. Max Daily Amount: 150 mg. 5/3/17  Yes Radu Ram MD   omeprazole (PRILOSEC) 40 mg capsule Take 1 Cap by mouth daily. 1/5/17  Yes Alisa Thomas MD   diclofenac (VOLTAREN) 1 % topical gel Apply 4 g to affected area four (4) times daily. 9/18/15  Yes Radu Ram MD   calcium-vitamin D (OYSTER SHELL) 500 mg(1,250mg) -200 unit per tablet Take 1 Tab by mouth daily. Yes Historical Provider   cholecalciferol, vitamin D3, (VITAMIN D3) 2,000 unit Tab Take  by mouth.  Taking 1 1/2 tabs of 2000 international units once daily   Yes Historical Provider       Physical Exam:   General: alert      Heart: regular rate and rhythm   Lungs: no tachypnea, retractions or cyanosis, Heart exam - S1, S2 normal, no murmur, no gallop, rate regular   Abdominal: Bowel sounds are normal, soft, non distended             Plan of Care/Planned Procedure: EGD    Signed:  Kristan Guerra MD 6/14/2017

## 2017-06-15 ENCOUNTER — HOSPITAL ENCOUNTER (OUTPATIENT)
Dept: PHYSICAL THERAPY | Age: 72
Discharge: HOME | End: 2017-06-15
Payer: MEDICARE

## 2017-06-15 VITALS — OXYGEN SATURATION: 94 % | HEART RATE: 87 BPM

## 2017-06-15 PROCEDURE — 97110 THERAPEUTIC EXERCISES: CPT

## 2017-06-15 NOTE — PROGRESS NOTES
Amanda Vora  : 1945 [facility]  [address] [place] [address]  [phone]   [phone]       OUTPATIENT PHYSICAL THERAPY:Daily Note 6/15/2017    ICD-10: Treatment Diagnosis: Ankylosing Spondylitis of thoracic region (M45.4); Pain in thoracic spine (M54.5); muscle weakness, generalized (M62.81); difficulty walking, not elsewhere classified (R26.2)  Precautions/Allergies:  Codeine   Fall Risk Score: 2 (? 5 = High Risk)  MD Orders: Eval and treat MEDICAL/REFERRING DIAGNOSIS: Thoracic back pain; post pneumonia with debility  DATE OF ONSET: 17  REFERRING PHYSICIAN: Lisset Rizzo MD  RETURN PHYSICIAN APPOINTMENT: as needed     INITIAL ASSESSMENT (62865):  Mr. Vince Guido presents with general debility with overall strength and functional mobility deficits due to prolonged hospitalization due to pneumonia/sepsis; he also complains of thoracic back pain exacerbated due to decreased activity with decreased mobility and strength through the trunk due to worsening ankylosing spondylitis. PROBLEM LIST (Impacting functional limitations): Increased pain through thoracic spine/costal cage limiting tolerance of ADLs and difficulty sleeping  Decreased activity tolerance for basic and instrumental ADLs due to generalized weakness B LE/UE.   Decreased ADL/functional activities specificially with any endurance activity related to decreased overall strength and limited pulmonary capacity post-pneumonia  Outcome measure score of 23 seconds (WNL - 7 seconds) on Timed Up and Go (TUG) test with severe effect of decreased strength/endurance on patient's ability to manage every day life activities  Decreased strength through B LE/UE/trunk limiting all functional mobility INTERVENTIONS PLANNED:  Patient education including pathophysiology of activity tolerance and progression  Manual therapy including soft tissue mobilizations, functional joint mobilizations and neuromuscular re-education to thoracic region  Neuromuscular re-education with focus on initiation/strength and endurance and motor control of B UE/LE/trunk  Therapeutic exercises including strengthening and endurance related tasks  Gait training with focus on correcting deficits, sequencing and honorio  Balance exercise - focused on standing dynamic balance with greatest focus on dynamic center of gravity control  Therapeutic activities with focus on strength and endurance  Therapeutic modalities including pain modalities for thoracic spine  Instructions of home exercise program (HEP)   TREATMENT PLAN:  Effective Dates: 06/07/17 TO 09/07/17  Frequency/Duration: 3 times a week for 5 weeks followed by 1 time a week for 6 weeks  GOALS: (Goals have been discussed and agreed upon with patient.)  Short-Term Functional Goals: Goals achieved; please refer to discharge goals  1. Patient will be able to ambulate greater than 250 ft to get to therapy clinic without the use of a wheelchair (goal achieved). 2. Patient will be able to perform walking on treadmill at home for 5 minutes without O2 saturations below 86% (goal achieved). 3. Patient will be independent with sit-stand without requiring hand support/assistance (goal achieved). Discharge Goals: Time Frame: 12 weeks  1. Patient will be independent will all functional mobility at home without difficulty or thoracic back pain (goal ongoing). 2. Patient will be able to walk 10,000 steps without difficulty (goal ongoing). 3. Patient will have achieved prior level of function for all ADLs without difficulty or thoracic back pain (goal ongoing). 4. Patient will score less than 12 seconds on TUG and be WNLs (goal ongoing). Rehabilitation Potential For Stated Goals: Excellent              The information in this section was collected on 06/07/17 (except where otherwise noted).   HISTORY:   History of Present Injury/Illness (Reason for Referral):  Patient reports he was not feeling well from about 05/13-15/17 and was then admitted to hospital for pneumonia/sepsis with a 3-day hospital stay in ICU with complications that included a L lower lobe pneumonia. He was discharged to home on 05/27/17 and referred to outpatient physical therapy for post-hospital physical therapy. Past Medical History/Comorbidities:   Mr. 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 Mr. Jennifer Fine  has a past surgical history that includes vasectomy (40+ yrs); knee arthroscopy (2009); urological; biopsy prostate; prostatectomy (2012); cataract removal (2012); cataract removal (2013); and colonoscopy (02/2010). Social History/Living Environment: Patient lives with spouse. Patient denies and physical barriers at home and has a tub/shower combo. Prior Level of Function/Work/Activity: Patient is retired. Prior to hospitalization, patient reports he walked 5 days/week for 30-50 minutes. .  Dominant Side:  RIGHT  Current Medications:     Current Outpatient Prescriptions:     aspirin 81 mg chewable tablet, Take 1 Tab by mouth daily. , Disp: 30 Tab, Rfl: 1    ferrous sulfate 325 mg (65 mg iron) tablet, Take 1 Tab by mouth two (2) times daily (with meals). , Disp: 60 Tab, Rfl: 1    predniSONE (DELTASONE) 20 mg tablet, Take 2 tablets daily for 1 week then 1 tablet daily until seen in the office for follow up, Disp: 40 Tab, Rfl: 0    potassium chloride (K-DUR, KLOR-CON) 20 mEq tablet, Take 2 Tabs by mouth daily. , Disp: 30 Tab, Rfl: 1    DULoxetine (CYMBALTA) 30 mg capsule, Take 1 Cap by mouth daily. , Disp: 90 Cap, Rfl: 1    traMADol (ULTRAM) 50 mg tablet, Take 1 Tab by mouth three (3) times daily as needed for Pain. Max Daily Amount: 150 mg., Disp: 90 Tab, Rfl: 2    omeprazole (PRILOSEC) 40 mg capsule, Take 1 Cap by mouth daily. , Disp: 30 Cap, Rfl: 5    diclofenac (VOLTAREN) 1 % topical gel, Apply 4 g to affected area four (4) times daily. , Disp: 100 g, Rfl: 2    calcium-vitamin D (OYSTER SHELL) 500 mg(1,250mg) -200 unit per tablet, Take 1 Tab by mouth daily. , Disp: , Rfl:     cholecalciferol, vitamin D3, (VITAMIN D3) 2,000 unit Tab, Take  by mouth. Taking 1 1/2 tabs of 2000 international units once daily, Disp: , Rfl:    Date Last Reviewed:  6/15/2017   Number of Personal Factors/Comorbidities that affect the Plan of Care: 1-2: MODERATE COMPLEXITY   EXAMINATION:   Observation/Orthostatic Postural Assessment: HR - 84; Os sats - 94% on room air. Cachetic body with significant weight loss from hospitalization. Posture - kyphotic with an anterior rotated rib cage that is posterior positioned over a posterior tilted pelvis. Atrophy noted through all LE and UE musculature, most notable gluteals. ROM:  AROM B UE WNL with shoulder abduction palm down 140 degrees B. Cervical rotation 70% B and pain free. B LE AROM WNL. Passive trunk rotation less than 50% B. Breathing assessment indicated decreased mobility of L lower rib cage with inhalation. Strength:  B UE grossly good except 4+/5 for R shoulder ER and B abduction. B LE grossly fair with 4+/5 for gluts, quadriceps, hamstrings and hip flexors. Neurological Screen:        Myotomes:  Good except for strength deficits noted above. Dermatomes: WNL        Reflexes:  2+ and WNL B UE/LE  Functional Mobility:         Gait/Ambulation:  Independent with slowed honorio with narrowed stance and limited arm swing B.  Endurance with gait - able to tolerate 2 minutes on treadmill before O2 saturation significantly drops to below 86%        Transfers:  Hand assist for sit-stand and min A for floor-stand        Bed Mobility:  Independent but slowed movement due to thoracic back pain        Stairs:  2-hand assist with 2 ft/step  Balance:          Static standing B LE greater than 60 sec; single leg less than 10 sec B. Mental Status:          Alert and oriented x 4   Body Structures Involved:  1. Thoracic Cage  2. Joints  3. Muscles Body Functions Affected:  1. Mental  2. Sensory/Pain  3. Cardio  4. Neuromusculoskeletal  5. Movement Related Activities and Participation Affected:  1. Mobility  2. Self Care  3. Domestic Life  4. Community, Social and [place]   Number of elements (examined above) that affect the Plan of Care: 4+: HIGH COMPLEXITY   CLINICAL PRESENTATION:   Presentation: Evolving clinical presentation with changing clinical characteristics: MODERATE COMPLEXITY   CLINICAL DECISION MAKING:   Outcome Measure: Tool Used: Modified Oswestry Low Back Pain Questionnaire  Score:  Initial: 22/50 (06/07/17) Most Recent: X/50 (Date: -- )   Interpretation of Score: Each section is scored on a 0-5 scale, 5 representing the greatest disability. The scores of each section are added together for a total score of 50. Score 0 1-10 11-20 21-30 31-40 41-49 50   Modifier CH CI CJ CK CL CM CN     Tool Used: Timed Up and Go (TUG)  Score:  Initial: 23 seconds (06/07/17) Most Recent: X seconds (Date: -- )   Interpretation of Score: The test measures, in seconds, the time taken by an individual to stand up from a standard arm chair (seat height 46 cm [18 in], arm height 65 cm [25.6 in]), walk a distance of 3 meters (118 in, approx 10 ft), turn, walk back to the chair and sit down. If the individual takes longer than 14 seconds to complete TUG, this indicates risk for falls. Score 7 7.5-10.5 11-14 14.5-17.5 18-21 21.5-24.5 25+   Modifier CH CI CJ CK CL CM CN     ?  Mobility - Walking and Moving Around:     - CURRENT STATUS: CM - 80%-99% impaired, limited or restricted    - GOAL STATUS: CI - 1%-19% impaired, limited or restricted    - D/C STATUS: ---------------To be determined---------------    Medical Necessity:   · Patient is expected to demonstrate progress in strength, balance and endurance and functional mobility to increase independence with ADLs, improve safety during daily activities and return to prior functional level. · Patient demonstrates excellent rehab potential due to higher previous functional level. Reason for Services/Other Comments:  · Patient continues to require skilled intervention due to general debility with overall strength and functional mobility deficits due to prolonged hospitalization due to pneumonia/sepsis; he also complains of thoracic back pain exacerbated due to decreased activity with decreased mobility and strength through the trunk due to worsening ankylosing spondylitis. Use of outcome tool(s) and clinical judgement create a POC that gives a: Questionable prediction of patient's progress: MODERATE COMPLEXITY            TREATMENT:   (In addition to Assessment/Re-Assessment sessions the following treatments were rendered)  Pre-treatment Symptoms/Complaints: Patient reports had endoscopy yesterday and results were normal but has not felt as good the last day since the anesthesia. He reports his thoracic back pain has been mostly resolved since last visit. Pain: Initial:   Pain Intensity 1: 0  Pain Location 1: Spine, thoracic  Pain Orientation 1: Posterior, Left  Post Session:  0/10   Manual Therapy (     ): (Used abbreviations: MET - muscle energy technique; PNF - proprioceptive neuromuscular facilitation; NMR - neuromuscular re-education; a/p - anterior to posterior; p/a - posterior to anterior) Gentle sitting resisted breathing with inhalations, mostly lateral costal - 5 breaths x 3 times during treatment (2 min)  Therapeutic Exercise: (60 Minutes):  Exercises per grid below to improve strength and endurance. Required minimal verbal cues to promote proper body alignment and promote proper body breathing techniques.   Rests between all exercises to reestablish O2 sats over 95% with noticeable quicker drops in 0s sats with exercise today and several more rests required between UE exercises. Attempt to resume LE strengthening next day. Date:  06/07/17 Date:  06/13/17 Date:  06/15/17   Activity/Exercise      Treadmill at home 1. 5mph x 5 min (goal) 2.0 mph x 7 min x 2 2.0mph x 3min/ rest x 5 min   Sit-stand  10 x 1 2 min -   Step ups 6\" 10 x 1 ea 6\" 1 min ea -   Heelraises  20 x 1 2 min -   B UE bicep curls 3 lb 1 min 4 lb 2 min 4 lb 2 min with 1 rest   B UE deltoid/abduction (short lever) 3 lb 1 min 4 lb 2 min 4 lb 2 min   R UE rows Red 2 min Red 2 min Red 2 min with 1 rest   Unilateral ER Red 1 min ea Red 2 min Red 2 min with 1 rest   UE/LE ergometer (seat at 9) 4 mins 6 mins 3 min/ rest/ 3 min         Home Exercise Program (HEP): Treadmill progression  Treatment/Session Assessment:    · Response to Treatment:  More rests required but still able to complete all tasks. Patient more tired and chose to no perform LE exercises today. · Compliance with Program/Exercises: Very compliant  · Recommendations/Intent for next treatment session: Next visit will focus on advancements to more challenging activities and progressing to more strengthening and endurance activities.   Total Treatment Duration: (60 minutes of treatment)  PT Patient Time In/Time Out  Time In: 1300  Time Out: 1400    Pedro Canchola PT

## 2017-06-16 ENCOUNTER — HOSPITAL ENCOUNTER (OUTPATIENT)
Dept: PHYSICAL THERAPY | Age: 72
Discharge: HOME | End: 2017-06-16
Payer: MEDICARE

## 2017-06-16 ENCOUNTER — APPOINTMENT (OUTPATIENT)
Dept: PHYSICAL THERAPY | Age: 72
End: 2017-06-16
Payer: MEDICARE

## 2017-06-16 VITALS — HEART RATE: 104 BPM | OXYGEN SATURATION: 97 %

## 2017-06-16 PROCEDURE — G8997 SWALLOW GOAL STATUS: HCPCS

## 2017-06-16 PROCEDURE — 97110 THERAPEUTIC EXERCISES: CPT

## 2017-06-16 PROCEDURE — G8996 SWALLOW CURRENT STATUS: HCPCS

## 2017-06-16 PROCEDURE — 92610 EVALUATE SWALLOWING FUNCTION: CPT

## 2017-06-16 NOTE — PROGRESS NOTES
Jennifer Bailey  : 1945 [facility] [address], [address] [place] [address], [address]  [phone]   [phone]        OUTPATIENT SPEECH LANGUAGE PATHOLOGY: Initial Assessment  ICD-10: Treatment Diagnosis: dysphagia, oropharyngeal 13.12  REFERRING PHYSICIAN: Bossman Bonilla MD MD Orders: speech therapy  PAST MEDICAL HISTORY:    Mr. Bambi Murillo is a 80 y.o. male who  has a past medical history of Arthritis; Elevated prostate specific antigen (PSA); History of prostate surgery (3/12/2015); HLA-B27 positive; Impotence of organic origin; Neck pain; Osteoarthritis, hand; Osteopenia; Osteoporosis; Other nonspecific abnormal finding of lung field; Polyarthritis; Polymyalgia rheumatica ([address] Prostate cancer ([address] Raynaud's disease; 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[address] and Thoracic spine pain. He also has no past medical history of Asthma; Autoimmune disease ([address] Chronic kidney disease; Chronic obstructive pulmonary disease ([address] Chronic pain; Diabetes ([address] Difficult intubation; GERD (gastroesophageal reflux disease); Heart abnormalities; Hypertension; Liver disease; Malignant hyperthermia due to anesthesia; Nausea & vomiting; Neurological disorder; Other unknown and unspecified cause of morbidity or mortality; Pseudocholinesterase deficiency; Psychiatric disorder; PUD (peptic ulcer disease); Seizures ([address] Stroke [facility]); Thromboembolus ([address] Thyroid disease; or Unspecified adverse effect of anesthesia. He also  has a past surgical history that includes vasectomy (40+ yrs); knee arthroscopy (); urological; biopsy prostate; prostatectomy (); cataract removal (); cataract removal (); and colonoscopy (2010). MEDICAL/REFERRING DIAGNOSIS: Dysphagia [R13.10]  DATE OF ONSET:   PRIOR LEVEL OF FUNCTION: residing with spouse  PRECAUTIONS/ALLERGIES: Codeine     ASSESSMENT:  Mr. Bambi Murillo is a 81 y/o male referred to  due to dysphagia.   He was hospitalized in May of this year due to pna. He had a modified barium swallow study (MBS) completed during hospitalization with recommendations for a [facility] soft diet with nectar thick liquids. It was recommended for him to use a double swallow and alternate liquids/solids due to moderate pharyngeal residue. An assessment of the esophageal phase of swallowing was recommended due to distal esophageal thickening at C5-7. The pt reported this date he was having problems swallowing prior to pna as foods felt stuck. He reported he saw his PCP and was given Omeprazole which helped some. He reported he is seeing Solange Cheung PT to help with his breathing and due to ankylosing spondylitis. He is using thickener all the time in his beverages which is \"getting better\". His spouse reported he had an endoscopy on Wednesday and was told he has a schatzki's ring which was stretched. She also reported that it takes the pt \"forever\" to eat now. He reported he has been consuming a regular diet vs chopped foods and asked if that was ok. Based on the objective data described below, the patient presents with dysphagia. Pt given trials thin/nectar thick liquids, mixed consistency and solids. Increased mastication time observed with chewable textures. Delayed throat clears observed with solids only. Recommend continuing with nectar thick liquids. Informed pt he can continue to eat regular textures as long as he masticates well. Discussed avoiding mixed consistencies and provided a handout re: examples of mixed consistencies. Also discussed the [employer] protocol. A handout was provided re: conditions for water without thickener with verbal understanding expressed. Patient will benefit from skilled intervention to address the below impairments. ?????? ? ? This section established at most recent assessment??????????  PROBLEM LIST (Impairments causing functional limitations):  1.  Dysphagia   GOALS: (Goals have been discussed and agreed upon with patient.)  SHORT-TERM FUNCTIONAL GOALS: Time Frame: 3 months  Pt will complete laryngeal exercises with 80% accuracy. Pt will state conditions for [facility] Protocol with min cues. Pt will complete exercises a minimum of 5 days weekly at home. DISCHARGE GOALS: Time Frame: 4-5 months  1. Pt will tolerate least restrictive diet without signs/sx aspiration 100% for safe swallow function. REHABILITATION POTENTIAL FOR STATED GOALS: GoodPLAN OF CARE:  Patient will benefit from skilled intervention to address the following impairments. RECOMMENDATIONS AND PLANNED INTERVENTIONS (Benefits and precautions of therapy have been discussed with the patient.):  · PO:  Regular  · Liquids:  nectar  · no mixed consistencies  MEDICATIONS:  · with thickened liquids  COMPENSATORY STRATEGIES/MODIFICATIONS INCLUDING:  · Alternate liquids/solids  · Double swallow  OTHER RECOMMENDATIONS (including follow up treatment recommendations):   · Laryngeal exercises  RECOMMENDED DIET MODIFICATIONS DISCUSSED WITH:  · Family  · Patient  TREATMENT PLAN EFFECTIVE DATES: 6/16/17 TO 9/16/17  FREQUENCY/DURATION: Continue to follow patient 2 times a week for 12 weeks to address above goals. Regarding Torey Del Castillo Adithya's therapy, I certify that the treatment plan above will be carried out by a therapist or under their direction. Thank you for this referral,  Millicent Spatz, [facility], [facility], [address] [facility]                    Referring Physician Signature: Cortez Justice MD    Date      SUBJECTIVE:  Pt cooperative. Pt's spouse present. Present Symptoms: dysphagia       Current Medications:   Current Outpatient Prescriptions:     aspirin 81 mg chewable tablet, Take 1 Tab by mouth daily. , Disp: 30 Tab, Rfl: 1    ferrous sulfate 325 mg (65 mg iron) tablet, Take 1 Tab by mouth two (2) times daily (with meals). , Disp: 60 Tab, Rfl: 1    predniSONE (DELTASONE) 20 mg tablet, Take 2 tablets daily for 1 week then 1 tablet daily until seen in the office for follow up, Disp: 40 Tab, Rfl: 0    potassium chloride (K-DUR, KLOR-CON) 20 mEq tablet, Take 2 Tabs by mouth daily. , Disp: 30 Tab, Rfl: 1    DULoxetine (CYMBALTA) 30 mg capsule, Take 1 Cap by mouth daily. , Disp: 90 Cap, Rfl: 1    traMADol (ULTRAM) 50 mg tablet, Take 1 Tab by mouth three (3) times daily as needed for Pain. Max Daily Amount: 150 mg., Disp: 90 Tab, Rfl: 2    omeprazole (PRILOSEC) 40 mg capsule, Take 1 Cap by mouth daily. , Disp: 30 Cap, Rfl: 5    diclofenac (VOLTAREN) 1 % topical gel, Apply 4 g to affected area four (4) times daily. , Disp: 100 g, Rfl: 2    calcium-vitamin D (OYSTER SHELL) 500 mg(1,250mg) -200 unit per tablet, Take 1 Tab by mouth daily. , Disp: , Rfl:     cholecalciferol, vitamin D3, (VITAMIN D3) 2,000 unit Tab, Take  by mouth. Taking 1 1/2 tabs of 2000 international units once daily, Disp: , Rfl:    Date Last Reviewed: 6/16/17  Current Dietary Status:  regular/nectar       History of reflux:  YES    Reflux medication: Omeprazole  Social History/Home Situation: residing with spouse      Work/Activity History: retired     OBJECTIVE:  Objective Measure: Tool Used: National Outcomes Measurement System: Functional Communication Measures: SWALLOWING  Score:  Initial: 5 Most Recent: X (Date: -- )   Interpretation of Tool: This measure describes the change in functional communication status subsequent to speech-language pathology treatment of patients with dysphagia.  o Level 1:  Individual is not able to swallow anything safely by mouth. All nutrition and hydration is received through non-oral means (e.g., nasogastric tube, PEG). o Level 2: Individual is not able to swallow safely by mouth for nutrition and hydration, but may take some consistency with consistent maximal cues in therapy only. Alternative method of feeding required.   o Level 3:  Alternative method of feeding required as individual takes less than 50% of nutrition and hydration by mouth, and/or swallowing is safe with consistent use of moderate cues to use compensatory strategies and/or requires maximum diet restriction. o Level 4:  Swallowing is safe, but usually requires moderate cues to use compensatory strategies, and/or the individual has moderate diet restrictions and/or still requires tube feeding and/or oral supplements. o Level 5:  Swallowing is safe with minimal diet restriction and/or occasionally requires minimal cueing to use compensatory strategies. The individual may occasionally self-cue. All nutrition and hydration needs are met by mouth at mealtime. o Level 6:  Swallowing is safe, and the individual eats and drinks independently and may rarely require minimal cueing. The individual usually self-cues when difficulty occurs. May need to avoid specific food items (e.g., popcorn and nuts), or require additional time (due to dysphagia). o Level 7: The individuals ability to eat independently is not limited by swallow function. Swallowing would be safe and efficient for all consistencies. Compensatory strategies are effectively used when needed. Score Level 7 Level 6 Level 5 Level 4 Level 3 Level 2 Level 1   Modifier CH CI CJ CK CL CM CN   ? Swallowing:     - CURRENT STATUS: CJ - 20%-39% impaired, limited or restricted    - GOAL STATUS:  CI - 1%-19% impaired, limited or restricted    - D/C STATUS:  ---------------To be determined---------------      Oral Motor Structure/Speech:  Oral-Motor Structure/Motor Speech  Labial: No impairment  Dentition: Partials (comment)  Oral Hygiene: upright in bed  Lingual: No impairment    Cognitive and Communication Status:  Neurologic State: Alert    BEDSIDE SWALLOW EVALUATION  Oral Assessment:  Oral Assessment  Labial: No impairment  Dentition: Partials (comment)  Oral Hygiene: upright in bed  Lingual: No impairment  P.O.  Trials:  Patient Position: upright in chair    The patient was given teaspoon to cup amounts of the following:   Consistency Presented: Mixed consistency; Nectar thick liquid; Solid; Thin liquid  How Presented: Self-fed/presented;Cup/sip;Spoon    ORAL PHASE:     Bolus Formation/Control: Impaired  Propulsion: No impairment  Type of Impairment: Mastication  Oral Residue: None    PHARYNGEAL PHASE:  Initiation of Swallow: No impairment  Laryngeal Elevation: Functional  Aspiration Signs/Symptoms: Delayed cough/throat clear (throat clears with solids)  Vocal Quality: No impairment                OTHER OBSERVATIONS:  Rate/bite size: Impaired   Endurance: WNL      TREATMENT:    (In addition to Assessment/Re-Assessment sessions the following treatments were rendered)  Assessment/Reassessment only, no treatment provided today     MODALITIES:                                                                    ORAL MOTOR  EXERCISES:                                                                                                                                                                      LARYNGEAL / PHARYNGEAL EXERCISES:                                                                                                                                     __________________________________________________________________________________________________  Treatment Assessment:    Progression/Medical Necessity:   · Skilled intervention continues to be required due to patient still consuming a modified diet. Compliance with Program/Exercises: Will assess as treatment progresses. Reason for Continuation of Services/Other Comments:  · Patient continues to require skilled intervention due to dysphagia. Recommendations/Intent for next treatment session: \"Treatment next visit will focus on laryngeal exercises\".    Total Treatment Duration:  Time In: 1450  Time Out: [address] Ariza, CCC-SLP

## 2017-06-16 NOTE — PROGRESS NOTES
Ricardo Nino  : 1945 [facility]  [address] [place] [address]  [phone]   [phone]       OUTPATIENT PHYSICAL THERAPY:Daily Note 2017    ICD-10: Treatment Diagnosis: Ankylosing Spondylitis of thoracic region (M45.4); Pain in thoracic spine (M54.5); muscle weakness, generalized (M62.81); difficulty walking, not elsewhere classified (R26.2)  Precautions/Allergies:  Codeine   Fall Risk Score: 2 (? 5 = High Risk)  MD Orders: Eval and treat MEDICAL/REFERRING DIAGNOSIS: Thoracic back pain; post pneumonia with debility  DATE OF ONSET: 17  REFERRING PHYSICIAN: aB Howell MD  RETURN PHYSICIAN APPOINTMENT: as needed     INITIAL ASSESSMENT (55):  Mr. Radha Enriquez presents with general debility with overall strength and functional mobility deficits due to prolonged hospitalization due to pneumonia/sepsis; he also complains of thoracic back pain exacerbated due to decreased activity with decreased mobility and strength through the trunk due to worsening ankylosing spondylitis. PROBLEM LIST (Impacting functional limitations): Increased pain through thoracic spine/costal cage limiting tolerance of ADLs and difficulty sleeping  Decreased activity tolerance for basic and instrumental ADLs due to generalized weakness B LE/UE.   Decreased ADL/functional activities specificially with any endurance activity related to decreased overall strength and limited pulmonary capacity post-pneumonia  Outcome measure score of 23 seconds (WNL - 7 seconds) on Timed Up and Go (TUG) test with severe effect of decreased strength/endurance on patient's ability to manage every day life activities  Decreased strength through B LE/UE/trunk limiting all functional mobility INTERVENTIONS PLANNED:  Patient education including pathophysiology of activity tolerance and progression  Manual therapy including soft tissue mobilizations, functional joint mobilizations and neuromuscular re-education to thoracic region  Neuromuscular re-education with focus on initiation/strength and endurance and motor control of B UE/LE/trunk  Therapeutic exercises including strengthening and endurance related tasks  Gait training with focus on correcting deficits, sequencing and honorio  Balance exercise - focused on standing dynamic balance with greatest focus on dynamic center of gravity control  Therapeutic activities with focus on strength and endurance  Therapeutic modalities including pain modalities for thoracic spine  Instructions of home exercise program (HEP)   TREATMENT PLAN:  Effective Dates: 06/07/17 TO 09/07/17  Frequency/Duration: 3 times a week for 5 weeks followed by 1 time a week for 6 weeks  GOALS: (Goals have been discussed and agreed upon with patient.)  Short-Term Functional Goals: Goals achieved; please refer to discharge goals  1. Patient will be able to ambulate greater than 250 ft to get to therapy clinic without the use of a wheelchair (goal achieved). 2. Patient will be able to perform walking on treadmill at home for 5 minutes without O2 saturations below 86% (goal achieved). 3. Patient will be independent with sit-stand without requiring hand support/assistance (goal achieved). Discharge Goals: Time Frame: 12 weeks  1. Patient will be independent will all functional mobility at home without difficulty or thoracic back pain (goal ongoing). 2. Patient will be able to walk 10,000 steps without difficulty (goal ongoing). 3. Patient will have achieved prior level of function for all ADLs without difficulty or thoracic back pain (goal ongoing). 4. Patient will score less than 12 seconds on TUG and be WNLs (goal ongoing). Rehabilitation Potential For Stated Goals: Excellent              The information in this section was collected on 06/07/17 (except where otherwise noted).   HISTORY:   History of Present Injury/Illness (Reason for Referral):  Patient reports he was not feeling well from about 05/13-15/17 and was then admitted to hospital for pneumonia/sepsis with a 3-day hospital stay in ICU with complications that included a L lower lobe pneumonia. He was discharged to home on 05/27/17 and referred to outpatient physical therapy for post-hospital physical therapy. Past Medical History/Comorbidities:   Mr. Mukesh Deluca  has a past medical history of Arthritis; Elevated prostate specific antigen (PSA); History of prostate surgery (3/12/2015); HLA-B27 positive; Neck pain; Osteoarthritis, hand; Osteopenia; Osteoporosis; Polyarthritis; Polymyalgia rheumatica ([address] Prostate cancer ([address] Raynaud's disease; 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[address] Ankylosing Spondylitis and thoracic spine pain. Mr. Mukesh Deluca  has a past surgical history that includes vasectomy (40+ yrs); knee arthroscopy (2009); urological; biopsy prostate; prostatectomy (2012); cataract removal (2012); cataract removal (2013); and colonoscopy (02/2010). Social History/Living Environment: Patient lives with spouse. Patient denies and physical barriers at home and has a tub/shower combo. Prior Level of Function/Work/Activity: Patient is retired. Prior to hospitalization, patient reports he walked 5 days/week for 30-50 minutes. .  Dominant Side:  RIGHT  Current Medications:     Current Outpatient Prescriptions:     aspirin 81 mg chewable tablet, Take 1 Tab by mouth daily. , Disp: 30 Tab, Rfl: 1    ferrous sulfate 325 mg (65 mg iron) tablet, Take 1 Tab by mouth two (2) times daily (with meals). , Disp: 60 Tab, Rfl: 1    predniSONE (DELTASONE) 20 mg tablet, Take 2 tablets daily for 1 week then 1 tablet daily until seen in the office for follow up, Disp: 40 Tab, Rfl: 0    potassium chloride (K-DUR, KLOR-CON) 20 mEq tablet, Take 2 Tabs by mouth daily. , Disp: 30 Tab, Rfl: 1    DULoxetine (CYMBALTA) 30 mg capsule, Take 1 Cap by mouth daily. , Disp: 90 Cap, Rfl: 1    traMADol (ULTRAM) 50 mg tablet, Take 1 Tab by mouth three (3) times daily as needed for Pain. Max Daily Amount: 150 mg., Disp: 90 Tab, Rfl: 2    omeprazole (PRILOSEC) 40 mg capsule, Take 1 Cap by mouth daily. , Disp: 30 Cap, Rfl: 5    diclofenac (VOLTAREN) 1 % topical gel, Apply 4 g to affected area four (4) times daily. , Disp: 100 g, Rfl: 2    calcium-vitamin D (OYSTER SHELL) 500 mg(1,250mg) -200 unit per tablet, Take 1 Tab by mouth daily. , Disp: , Rfl:     cholecalciferol, vitamin D3, (VITAMIN D3) 2,000 unit Tab, Take  by mouth. Taking 1 1/2 tabs of 2000 international units once daily, Disp: , Rfl:    Date Last Reviewed:  6/16/2017   Number of Personal Factors/Comorbidities that affect the Plan of Care: 1-2: MODERATE COMPLEXITY   EXAMINATION:   Observation/Orthostatic Postural Assessment: HR - 84; Os sats - 94% on room air. Cachetic body with significant weight loss from hospitalization. Posture - kyphotic with an anterior rotated rib cage that is posterior positioned over a posterior tilted pelvis. Atrophy noted through all LE and UE musculature, most notable gluteals. ROM:  AROM B UE WNL with shoulder abduction palm down 140 degrees B. Cervical rotation 70% B and pain free. B LE AROM WNL. Passive trunk rotation less than 50% B. Breathing assessment indicated decreased mobility of L lower rib cage with inhalation. Strength:  B UE grossly good except 4+/5 for R shoulder ER and B abduction. B LE grossly fair with 4+/5 for gluts, quadriceps, hamstrings and hip flexors. Neurological Screen:        Myotomes:  Good except for strength deficits noted above. Dermatomes: WNL        Reflexes:  2+ and WNL B UE/LE  Functional Mobility:         Gait/Ambulation:  Independent with slowed honorio with narrowed stance and limited arm swing B.  Endurance with gait - able to tolerate 2 minutes on treadmill before O2 saturation significantly drops to below 86%        Transfers:  Hand assist for sit-stand and min A for floor-stand        Bed Mobility:  Independent but slowed movement due to thoracic back pain        Stairs:  2-hand assist with 2 ft/step  Balance:          Static standing B LE greater than 60 sec; single leg less than 10 sec B. Mental Status:          Alert and oriented x 4   Body Structures Involved:  1. Thoracic Cage  2. Joints  3. Muscles Body Functions Affected:  1. Mental  2. Sensory/Pain  3. Cardio  4. Neuromusculoskeletal  5. Movement Related Activities and Participation Affected:  1. Mobility  2. Self Care  3. Domestic Life  4. Community, Social and [place]   Number of elements (examined above) that affect the Plan of Care: 4+: HIGH COMPLEXITY   CLINICAL PRESENTATION:   Presentation: Evolving clinical presentation with changing clinical characteristics: MODERATE COMPLEXITY   CLINICAL DECISION MAKING:   Outcome Measure: Tool Used: Modified Oswestry Low Back Pain Questionnaire  Score:  Initial: 22/50 (06/07/17) Most Recent: X/50 (Date: -- )   Interpretation of Score: Each section is scored on a 0-5 scale, 5 representing the greatest disability. The scores of each section are added together for a total score of 50. Score 0 1-10 11-20 21-30 31-40 41-49 50   Modifier CH CI CJ CK CL CM CN     Tool Used: Timed Up and Go (TUG)  Score:  Initial: 23 seconds (06/07/17) Most Recent: X seconds (Date: -- )   Interpretation of Score: The test measures, in seconds, the time taken by an individual to stand up from a standard arm chair (seat height 46 cm [18 in], arm height 65 cm [25.6 in]), walk a distance of 3 meters (118 in, approx 10 ft), turn, walk back to the chair and sit down. If the individual takes longer than 14 seconds to complete TUG, this indicates risk for falls. Score 7 7.5-10.5 11-14 14.5-17.5 18-21 21.5-24.5 25+   Modifier CH CI CJ CK CL CM CN     ?  Mobility - Walking and Moving Around:     - CURRENT STATUS: CM - 80%-99% impaired, limited or restricted    - GOAL STATUS: CI - 1%-19% impaired, limited or restricted    - D/C STATUS: ---------------To be determined---------------    Medical Necessity:   · Patient is expected to demonstrate progress in strength, balance and endurance and functional mobility to increase independence with ADLs, improve safety during daily activities and return to prior functional level. · Patient demonstrates excellent rehab potential due to higher previous functional level. Reason for Services/Other Comments:  · Patient continues to require skilled intervention due to general debility with overall strength and functional mobility deficits due to prolonged hospitalization due to pneumonia/sepsis; he also complains of thoracic back pain exacerbated due to decreased activity with decreased mobility and strength through the trunk due to worsening ankylosing spondylitis. Use of outcome tool(s) and clinical judgement create a POC that gives a: Questionable prediction of patient's progress: MODERATE COMPLEXITY            TREATMENT:   (In addition to Assessment/Re-Assessment sessions the following treatments were rendered)  Pre-treatment Symptoms/Complaints: Patient reports feeling much better than yesterday this morning. Pain: Initial:   Pain Intensity 1: 0  Post Session:  0/10   Manual Therapy (     ): (Used abbreviations: MET - muscle energy technique; PNF - proprioceptive neuromuscular facilitation; NMR - neuromuscular re-education; a/p - anterior to posterior; p/a - posterior to anterior) Gentle sitting resisted breathing with inhalations, mostly lateral costal - 5 breaths x 3 times during treatment (2 min)  Therapeutic Exercise: (50 Minutes):  Exercises per grid below to improve strength and endurance. Required minimal verbal cues to promote proper body alignment and promote proper body breathing techniques. Rests between all exercises to reestablish O2 sats over 95% with noticeable quicker drops in 0s sats with exercise today and several more rests required between UE exercises. Attempt to resume LE strengthening next day. Date:  06/07/17 Date:  06/13/17 Date:  06/15/17 Date:  6/16/17   Activity/Exercise       Treadmill at home 1. 5mph x 5 min (goal) 2.0 mph x 7 min x 2 2.0mph x 3min/ rest x 5 min 2.0mph x 5min/ rest/then 3 more min   Sit-stand  10 x 1 2 min - 2 min   Step ups 6\" 10 x 1 ea 6\" 1 min ea - 6\" 1 min ea   Heelraises  20 x 1 2 min - 2 min   B UE bicep curls 3 lb 1 min 4 lb 2 min 4 lb 2 min with 1 rest 4 lb 2 min    B UE deltoid/abduction (short lever) 3 lb 1 min 4 lb 2 min 4 lb 2 min 4 lb 2 min   R UE rows Red 2 min Red 2 min Red 2 min with 1 rest Red 2 min   Unilateral ER Red 1 min ea Red 2 min Red 2 min with 1 rest Red 2 min   UE/LE ergometer (seat at 9) 4 mins 6 mins 3 min/ rest/ 3 min 6 min          Home Exercise Program (HEP): Treadmill progression  Treatment/Session Assessment:    · Response to Treatment:  No rests required today and patient help O2 sats well with increased training. Continue to progress home walking. · Compliance with Program/Exercises: Very compliant  · Recommendations/Intent for next treatment session: Next visit will focus on advancements to more challenging activities and progressing to more strengthening and endurance activities.   Total Treatment Duration: (50 minutes of treatment)  PT Patient Time In/Time Out  Time In: 0858  Time Out: [address] [facility], PT

## 2017-06-19 ENCOUNTER — HOSPITAL ENCOUNTER (OUTPATIENT)
Dept: PHYSICAL THERAPY | Age: 72
Discharge: HOME | End: 2017-06-19
Payer: MEDICARE

## 2017-06-19 VITALS — OXYGEN SATURATION: 97 % | HEART RATE: 84 BPM

## 2017-06-19 PROCEDURE — 97110 THERAPEUTIC EXERCISES: CPT

## 2017-06-19 NOTE — PROGRESS NOTES
Jamie Khan  : 1945 [facility]  [address] [place] [address]  [phone]   [phone]       OUTPATIENT PHYSICAL THERAPY:Daily Note 2017    ICD-10: Treatment Diagnosis: Ankylosing Spondylitis of thoracic region (M45.4); Pain in thoracic spine (M54.5); muscle weakness, generalized (M62.81); difficulty walking, not elsewhere classified (R26.2)  Precautions/Allergies:  Codeine   Fall Risk Score: 2 (? 5 = High Risk)  MD Orders: Eval and treat MEDICAL/REFERRING DIAGNOSIS: Thoracic back pain; post pneumonia with debility  DATE OF ONSET: 17  REFERRING PHYSICIAN: Martita Dumont MD  RETURN PHYSICIAN APPOINTMENT: as needed     INITIAL ASSESSMENT ([id number]):  Mr. Migdalia Dunn presents with general debility with overall strength and functional mobility deficits due to prolonged hospitalization due to pneumonia/sepsis; he also complains of thoracic back pain exacerbated due to decreased activity with decreased mobility and strength through the trunk due to worsening ankylosing spondylitis. PROBLEM LIST (Impacting functional limitations): Increased pain through thoracic spine/costal cage limiting tolerance of ADLs and difficulty sleeping  Decreased activity tolerance for basic and instrumental ADLs due to generalized weakness B LE/UE.   Decreased ADL/functional activities specificially with any endurance activity related to decreased overall strength and limited pulmonary capacity post-pneumonia  Outcome measure score of 23 seconds (WNL - 7 seconds) on Timed Up and Go (TUG) test with severe effect of decreased strength/endurance on patient's ability to manage every day life activities  Decreased strength through B LE/UE/trunk limiting all functional mobility INTERVENTIONS PLANNED:  Patient education including pathophysiology of activity tolerance and progression  Manual therapy including soft tissue mobilizations, functional joint mobilizations and neuromuscular re-education to thoracic region  Neuromuscular re-education with focus on initiation/strength and endurance and motor control of B UE/LE/trunk  Therapeutic exercises including strengthening and endurance related tasks  Gait training with focus on correcting deficits, sequencing and honorio  Balance exercise - focused on standing dynamic balance with greatest focus on dynamic center of gravity control  Therapeutic activities with focus on strength and endurance  Therapeutic modalities including pain modalities for thoracic spine  Instructions of home exercise program (HEP)   TREATMENT PLAN:  Effective Dates: 06/07/17 TO 09/07/17  Frequency/Duration: 3 times a week for 5 weeks followed by 1 time a week for 6 weeks  GOALS: (Goals have been discussed and agreed upon with patient.)  Short-Term Functional Goals: Goals achieved; please refer to discharge goals  1. Patient will be able to ambulate greater than 250 ft to get to therapy clinic without the use of a wheelchair (goal achieved). 2. Patient will be able to perform walking on treadmill at home for 5 minutes without O2 saturations below 86% (goal achieved). 3. Patient will be independent with sit-stand without requiring hand support/assistance (goal achieved). Discharge Goals: Time Frame: 12 weeks  1. Patient will be independent will all functional mobility at home without difficulty or thoracic back pain (goal ongoing). 2. Patient will be able to walk 10,000 steps without difficulty (goal ongoing). 3. Patient will have achieved prior level of function for all ADLs without difficulty or thoracic back pain (goal ongoing). 4. Patient will score less than 12 seconds on TUG and be WNLs (goal ongoing). Rehabilitation Potential For Stated Goals: Excellent              The information in this section was collected on 06/07/17 (except where otherwise noted).   HISTORY:   History of Present Injury/Illness (Reason for Referral):  Patient reports he was not feeling well from about 05/13-15/17 and was then admitted to hospital for pneumonia/sepsis with a 3-day hospital stay in ICU with complications that included a L lower lobe pneumonia. He was discharged to home on 05/27/17 and referred to outpatient physical therapy for post-hospital physical therapy. Past Medical History/Comorbidities:   Mr. Gosia Gold  has a past medical history of Arthritis; Elevated prostate specific antigen (PSA); History of prostate surgery (3/12/2015); HLA-B27 positive; Neck pain; Osteoarthritis, hand; Osteopenia; Osteoporosis; Polyarthritis; Polymyalgia rheumatica ([address] Prostate cancer ([address] Raynaud's disease; 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[address] Ankylosing Spondylitis and thoracic spine pain. Mr. Gosia Gold  has a past surgical history that includes vasectomy (40+ yrs); knee arthroscopy (2009); urological; biopsy prostate; prostatectomy (2012); cataract removal (2012); cataract removal (2013); and colonoscopy (02/2010). Social History/Living Environment: Patient lives with spouse. Patient denies and physical barriers at home and has a tub/shower combo. Prior Level of Function/Work/Activity: Patient is retired. Prior to hospitalization, patient reports he walked 5 days/week for 30-50 minutes. .  Dominant Side:  RIGHT  Current Medications:     Current Outpatient Prescriptions:     aspirin 81 mg chewable tablet, Take 1 Tab by mouth daily. , Disp: 30 Tab, Rfl: 1    ferrous sulfate 325 mg (65 mg iron) tablet, Take 1 Tab by mouth two (2) times daily (with meals). , Disp: 60 Tab, Rfl: 1    predniSONE (DELTASONE) 20 mg tablet, Take 2 tablets daily for 1 week then 1 tablet daily until seen in the office for follow up, Disp: 40 Tab, Rfl: 0    potassium chloride (K-DUR, KLOR-CON) 20 mEq tablet, Take 2 Tabs by mouth daily. , Disp: 30 Tab, Rfl: 1    DULoxetine (CYMBALTA) 30 mg capsule, Take 1 Cap by mouth daily. , Disp: 90 Cap, Rfl: 1    traMADol (ULTRAM) 50 mg tablet, Take 1 Tab by mouth three (3) times daily as needed for Pain. Max Daily Amount: 150 mg., Disp: 90 Tab, Rfl: 2    omeprazole (PRILOSEC) 40 mg capsule, Take 1 Cap by mouth daily. , Disp: 30 Cap, Rfl: 5    diclofenac (VOLTAREN) 1 % topical gel, Apply 4 g to affected area four (4) times daily. , Disp: 100 g, Rfl: 2    calcium-vitamin D (OYSTER SHELL) 500 mg(1,250mg) -200 unit per tablet, Take 1 Tab by mouth daily. , Disp: , Rfl:     cholecalciferol, vitamin D3, (VITAMIN D3) 2,000 unit Tab, Take  by mouth. Taking 1 1/2 tabs of 2000 international units once daily, Disp: , Rfl:    Date Last Reviewed:  6/19/2017   Number of Personal Factors/Comorbidities that affect the Plan of Care: 1-2: MODERATE COMPLEXITY   EXAMINATION:   Observation/Orthostatic Postural Assessment: HR - 84; Os sats - 97% on room air. Cachetic body with significant weight loss from hospitalization. Posture - kyphotic with an anterior rotated rib cage that is posterior positioned over a posterior tilted pelvis. Atrophy noted through all LE and UE musculature, most notable gluteals. ROM:  AROM B UE WNL with shoulder abduction palm down 140 degrees B. Cervical rotation 70% B and pain free. B LE AROM WNL. Passive trunk rotation less than 50% B. Breathing assessment indicated decreased mobility of L lower rib cage with inhalation. Strength:  B UE grossly good except 4+/5 for R shoulder ER and B abduction. B LE grossly fair with 4+/5 for gluts, quadriceps, hamstrings and hip flexors. Neurological Screen:        Myotomes:  Good except for strength deficits noted above. Dermatomes: WNL        Reflexes:  2+ and WNL B UE/LE  Functional Mobility:         Gait/Ambulation:  Independent with slowed honorio with narrowed stance and limited arm swing B.  Endurance with gait - able to tolerate 2 minutes on treadmill before O2 saturation significantly drops to below 86%        Transfers:  Hand assist for sit-stand and min A for floor-stand        Bed Mobility:  Independent but slowed movement due to thoracic back pain        Stairs:  2-hand assist with 2 ft/step  Balance:          Static standing B LE greater than 60 sec; single leg less than 10 sec B. Mental Status:          Alert and oriented x 4   Body Structures Involved:  1. Thoracic Cage  2. Joints  3. Muscles Body Functions Affected:  1. Mental  2. Sensory/Pain  3. Cardio  4. Neuromusculoskeletal  5. Movement Related Activities and Participation Affected:  1. Mobility  2. Self Care  3. Domestic Life  4. Community, Social and [place]   Number of elements (examined above) that affect the Plan of Care: 4+: HIGH COMPLEXITY   CLINICAL PRESENTATION:   Presentation: Evolving clinical presentation with changing clinical characteristics: MODERATE COMPLEXITY   CLINICAL DECISION MAKING:   Outcome Measure: Tool Used: Modified Oswestry Low Back Pain Questionnaire  Score:  Initial: 22/50 (06/07/17) Most Recent: X/50 (Date: -- )   Interpretation of Score: Each section is scored on a 0-5 scale, 5 representing the greatest disability. The scores of each section are added together for a total score of 50. Score 0 1-10 11-20 21-30 31-40 41-49 50   Modifier CH CI CJ CK CL CM CN     Tool Used: Timed Up and Go (TUG)  Score:  Initial: 23 seconds (06/07/17) Most Recent: X seconds (Date: -- )   Interpretation of Score: The test measures, in seconds, the time taken by an individual to stand up from a standard arm chair (seat height 46 cm [18 in], arm height 65 cm [25.6 in]), walk a distance of 3 meters (118 in, approx 10 ft), turn, walk back to the chair and sit down. If the individual takes longer than 14 seconds to complete TUG, this indicates risk for falls. Score 7 7.5-10.5 11-14 14.5-17.5 18-21 21.5-24.5 25+   Modifier CH CI CJ CK CL CM CN     ?  Mobility - Walking and Moving Around:     - CURRENT STATUS: CM - 80%-99% impaired, limited or restricted    - GOAL STATUS: CI - 1%-19% impaired, limited or restricted    - D/C STATUS: ---------------To be determined---------------    Medical Necessity:   · Patient is expected to demonstrate progress in strength, balance and endurance and functional mobility to increase independence with ADLs, improve safety during daily activities and return to prior functional level. · Patient demonstrates excellent rehab potential due to higher previous functional level. Reason for Services/Other Comments:  · Patient continues to require skilled intervention due to general debility with overall strength and functional mobility deficits due to prolonged hospitalization due to pneumonia/sepsis; he also complains of thoracic back pain exacerbated due to decreased activity with decreased mobility and strength through the trunk due to worsening ankylosing spondylitis. Use of outcome tool(s) and clinical judgement create a POC that gives a: Questionable prediction of patient's progress: MODERATE COMPLEXITY            TREATMENT:   (In addition to Assessment/Re-Assessment sessions the following treatments were rendered)  Pre-treatment Symptoms/Complaints: Patient reports he had a very good weekend and was able to do his treadmill for 10 minutes as well as walk 12 time up/down the steps   Pain: Initial:   Pain Intensity 1: 2  Pain Location 1: Spine, thoracic  Pain Orientation 1: Posterior, Left  Post Session:  0/10   Manual Therapy (    Soft Tissue Mobilization Duration  Duration: 5 Minutes): (Used abbreviations: MET - muscle energy technique; PNF - proprioceptive neuromuscular facilitation; NMR - neuromuscular re-education; a/p - anterior to posterior; p/a - posterior to anterior) R side lie, bony contours through lateral L rib cage followed by basking seal for opening/closing between ribs 5/6/7 with inhalation/exhalation. Gentle sitting resisted breathing with inhalations, mostly lateral costal - 5 breaths x 3 times during treatment. Therapeutic Exercise: (55 Minutes):  Exercises per grid below to improve strength and endurance. Required minimal verbal cues to promote proper body alignment and promote proper body breathing techniques. No rests between strengthening exercises today and maintained O2 sats over 95%. Rests only between cardio/endurance activities and strengthening activities. Date:  06/07/17 Date:  06/13/17 Date:  06/15/17 Date:  6/16/17 Date:   06/19/17   Activity/Exercise        Treadmill at home 1. 5mph x 5 min (goal) 2.0 mph x 7 min x 2 2.0mph x 3min/ rest x 5 min 2.0mph x 5min/ rest/then 3 more min 2.2 mph x 11 mins   Sit-stand  10 x 1 2 min - 2 min 2 min   Stairs - heelraises B     5/step x 10   Stairs - double step ups     5 x 2   Stairs - side step up/down     10 steps x 2 ea   B UE bicep curls 3 lb 1 min 4 lb 2 min 4 lb 2 min with 1 rest 4 lb 2 min  4 lb x 2 min   B UE deltoid/abduction (short lever) 3 lb 1 min 4 lb 2 min 4 lb 2 min 4 lb 2 min 4 lb x 2 min    R UE rows Red 2 min Red 2 min Red 2 min with 1 rest Red 2 min Red x 2 min   Unilateral ER Red 1 min ea Red 2 min Red 2 min with 1 rest Red 2 min Red x 2 min   UE/LE ergometer (seat at 9) 4 mins 6 mins 3 min/ rest/ 3 min 6 min 2 min           Home Exercise Program (HEP): Treadmill progression and step exercises as noted above/  Treatment/Session Assessment:    · Response to Treatment: Excellent progress with endurance with minimal rests break mostly for hydration. Continue to progress home walking and strengthening added to home today. · Compliance with Program/Exercises: Very compliant  · Recommendations/Intent for next treatment session: Next visit will focus on advancements to more challenging activities and progressing to more strengthening and endurance activities.   Total Treatment Duration: (60 minutes of treatment)  PT Patient Time In/Time Out  Time In: 1130  Time Out: 1230    Mely Pugh PT

## 2017-06-20 ENCOUNTER — HOSPITAL ENCOUNTER (OUTPATIENT)
Dept: PHYSICAL THERAPY | Age: 72
Discharge: HOME | End: 2017-06-20
Payer: MEDICARE

## 2017-06-20 VITALS — OXYGEN SATURATION: 98 % | HEART RATE: 78 BPM

## 2017-06-20 LAB
FUNGUS CULTURE, RFCO2T: NEGATIVE
FUNGUS CULTURE, RFCO2T: NEGATIVE
FUNGUS SMEAR, RFCO1T: NORMAL
FUNGUS SMEAR, RFCO1T: NORMAL
FUNGUS SPEC CULT: NORMAL
FUNGUS SPEC CULT: NORMAL
FUNGUS STAIN, 188244: NORMAL
FUNGUS STAIN, 188244: NORMAL
REFLEX TO ID, RFCO3T: NORMAL
REFLEX TO ID, RFCO3T: NORMAL
SPECIMEN SOURCE: NORMAL

## 2017-06-20 PROCEDURE — 97110 THERAPEUTIC EXERCISES: CPT

## 2017-06-20 NOTE — PROGRESS NOTES
Doreen Marker  : 1945 [facility] at [facility]  [address] [place] [address]  [phone]   [phone]       OUTPATIENT PHYSICAL THERAPY:Daily Note 2017    ICD-10: Treatment Diagnosis: Ankylosing Spondylitis of thoracic region (M45.4); Pain in thoracic spine (M54.5); muscle weakness, generalized (M62.81); difficulty walking, not elsewhere classified (R26.2)  Precautions/Allergies:  Codeine   Fall Risk Score: 2 (? 5 = High Risk)  MD Orders: Eval and treat MEDICAL/REFERRING DIAGNOSIS: Thoracic back pain; post pneumonia with debility  DATE OF ONSET: 17  REFERRING PHYSICIAN: Ruperto Spurling., MD  RETURN PHYSICIAN APPOINTMENT: as needed     INITIAL ASSESSMENT ([id number]):  Mr. Phoebe Richardson presents with general debility with overall strength and functional mobility deficits due to prolonged hospitalization due to pneumonia/sepsis; he also complains of thoracic back pain exacerbated due to decreased activity with decreased mobility and strength through the trunk due to worsening ankylosing spondylitis. PROBLEM LIST (Impacting functional limitations): Increased pain through thoracic spine/costal cage limiting tolerance of ADLs and difficulty sleeping  Decreased activity tolerance for basic and instrumental ADLs due to generalized weakness B LE/UE.   Decreased ADL/functional activities specificially with any endurance activity related to decreased overall strength and limited pulmonary capacity post-pneumonia  Outcome measure score of 23 seconds (WNL - 7 seconds) on Timed Up and Go (TUG) test with severe effect of decreased strength/endurance on patient's ability to manage every day life activities  Decreased strength through B LE/UE/trunk limiting all functional mobility INTERVENTIONS PLANNED:  Patient education including pathophysiology of activity tolerance and progression  Manual therapy including soft tissue mobilizations, functional joint mobilizations and neuromuscular re-education to thoracic region  Neuromuscular re-education with focus on initiation/strength and endurance and motor control of B UE/LE/trunk  Therapeutic exercises including strengthening and endurance related tasks  Gait training with focus on correcting deficits, sequencing and honorio  Balance exercise - focused on standing dynamic balance with greatest focus on dynamic center of gravity control  Therapeutic activities with focus on strength and endurance  Therapeutic modalities including pain modalities for thoracic spine  Instructions of home exercise program (HEP)   TREATMENT PLAN:  Effective Dates: 06/07/17 TO 09/07/17  Frequency/Duration: 3 times a week for 4 weeks followed by 1 time a week for 6 weeks  GOALS: (Goals have been discussed and agreed upon with patient.)  Short-Term Functional Goals: Goals achieved; please refer to discharge goals  1. Patient will be able to ambulate greater than 250 ft to get to therapy clinic without the use of a wheelchair (goal achieved). 2. Patient will be able to perform walking on treadmill at home for 5 minutes without O2 saturations below 86% (goal achieved). 3. Patient will be independent with sit-stand without requiring hand support/assistance (goal achieved). Discharge Goals: Time Frame: 12 weeks  1. Patient will be independent will all functional mobility at home without difficulty or thoracic back pain (goal ongoing). 2. Patient will be able to walk 10,000 steps without difficulty (goal ongoing). 3. Patient will have achieved prior level of function for all ADLs without difficulty or thoracic back pain (goal ongoing). 4. Patient will score less than 12 seconds on TUG and be WNLs (goal ongoing). Rehabilitation Potential For Stated Goals: Excellent              The information in this section was collected on 06/07/17 (except where otherwise noted).   HISTORY:   History of Present Injury/Illness (Reason for Referral):  Patient reports he was not feeling well from about 05/13-15/17 and was then admitted to hospital for pneumonia/sepsis with a 3-day hospital stay in ICU with complications that included a L lower lobe pneumonia. He was discharged to home on 05/27/17 and referred to outpatient physical therapy for post-hospital physical therapy. Past Medical History/Comorbidities:   Mr. Kimberli Steward  has a past medical history of Arthritis; Elevated prostate specific antigen (PSA); History of prostate surgery (3/12/2015); HLA-B27 positive; Neck pain; Osteoarthritis, hand; Osteopenia; Osteoporosis; Polyarthritis; Polymyalgia rheumatica ([address] Prostate cancer ([address] Raynaud's disease; 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[address] Ankylosing Spondylitis and thoracic spine pain. Mr. Kimberli Steward  has a past surgical history that includes vasectomy (40+ yrs); knee arthroscopy (2009); urological; biopsy prostate; prostatectomy (2012); cataract removal (2012); cataract removal (2013); and colonoscopy (02/2010). Social History/Living Environment: Patient lives with spouse. Patient denies and physical barriers at home and has a tub/shower combo. Prior Level of Function/Work/Activity: Patient is retired. Prior to hospitalization, patient reports he walked 5 days/week for 30-50 minutes. .  Dominant Side:  RIGHT  Current Medications:     Current Outpatient Prescriptions:     aspirin 81 mg chewable tablet, Take 1 Tab by mouth daily. , Disp: 30 Tab, Rfl: 1    ferrous sulfate 325 mg (65 mg iron) tablet, Take 1 Tab by mouth two (2) times daily (with meals). , Disp: 60 Tab, Rfl: 1    predniSONE (DELTASONE) 20 mg tablet, Take 2 tablets daily for 1 week then 1 tablet daily until seen in the office for follow up, Disp: 40 Tab, Rfl: 0    potassium chloride (K-DUR, KLOR-CON) 20 mEq tablet, Take 2 Tabs by mouth daily. , Disp: 30 Tab, Rfl: 1    DULoxetine (CYMBALTA) 30 mg capsule, Take 1 Cap by mouth daily. , Disp: 90 Cap, Rfl: 1    traMADol (ULTRAM) 50 mg tablet, Take 1 Tab by mouth three (3) times daily as needed for Pain. Max Daily Amount: 150 mg., Disp: 90 Tab, Rfl: 2    omeprazole (PRILOSEC) 40 mg capsule, Take 1 Cap by mouth daily. , Disp: 30 Cap, Rfl: 5    diclofenac (VOLTAREN) 1 % topical gel, Apply 4 g to affected area four (4) times daily. , Disp: 100 g, Rfl: 2    calcium-vitamin D (OYSTER SHELL) 500 mg(1,250mg) -200 unit per tablet, Take 1 Tab by mouth daily. , Disp: , Rfl:     cholecalciferol, vitamin D3, (VITAMIN D3) 2,000 unit Tab, Take  by mouth. Taking 1 1/2 tabs of 2000 international units once daily, Disp: , Rfl:    Date Last Reviewed:  6/20/2017   Number of Personal Factors/Comorbidities that affect the Plan of Care: 1-2: MODERATE COMPLEXITY   EXAMINATION:   Observation/Orthostatic Postural Assessment: HR - 84; Os sats - 97% on room air. Cachetic body with significant weight loss from hospitalization. Posture - kyphotic with an anterior rotated rib cage that is posterior positioned over a posterior tilted pelvis. Atrophy noted through all LE and UE musculature, most notable gluteals. ROM:  AROM B UE WNL with shoulder abduction palm down 140 degrees B. Cervical rotation 70% B and pain free. B LE AROM WNL. Passive trunk rotation less than 50% B. Breathing assessment indicated decreased mobility of L lower rib cage with inhalation. Strength:  B UE grossly good except 4+/5 for R shoulder ER and B abduction. B LE grossly fair with 4+/5 for gluts, quadriceps, hamstrings and hip flexors. Neurological Screen:        Myotomes:  Good except for strength deficits noted above. Dermatomes: WNL        Reflexes:  2+ and WNL B UE/LE  Functional Mobility:         Gait/Ambulation:  Independent with slowed honorio with narrowed stance and limited arm swing B.  Endurance with gait - able to tolerate 2 minutes on treadmill before O2 saturation significantly drops to below 86%        Transfers:  Hand assist for sit-stand and min A for floor-stand        Bed Mobility:  Independent but slowed movement due to thoracic back pain        Stairs:  2-hand assist with 2 ft/step  Balance:          Static standing B LE greater than 60 sec; single leg less than 10 sec B. Mental Status:          Alert and oriented x 4   Body Structures Involved:  1. Thoracic Cage  2. Joints  3. Muscles Body Functions Affected:  1. Mental  2. Sensory/Pain  3. Cardio  4. Neuromusculoskeletal  5. Movement Related Activities and Participation Affected:  1. Mobility  2. Self Care  3. Domestic Life  4. Community, Social and [place]   Number of elements (examined above) that affect the Plan of Care: 4+: HIGH COMPLEXITY   CLINICAL PRESENTATION:   Presentation: Evolving clinical presentation with changing clinical characteristics: MODERATE COMPLEXITY   CLINICAL DECISION MAKING:   Outcome Measure: Tool Used: Modified Oswestry Low Back Pain Questionnaire  Score:  Initial: 22/50 (06/07/17) Most Recent: X/50 (Date: -- )   Interpretation of Score: Each section is scored on a 0-5 scale, 5 representing the greatest disability. The scores of each section are added together for a total score of 50. Score 0 1-10 11-20 21-30 31-40 41-49 50   Modifier CH CI CJ CK CL CM CN     Tool Used: Timed Up and Go (TUG)  Score:  Initial: 23 seconds (06/07/17) Most Recent: X seconds (Date: -- )   Interpretation of Score: The test measures, in seconds, the time taken by an individual to stand up from a standard arm chair (seat height 46 cm [18 in], arm height 65 cm [25.6 in]), walk a distance of 3 meters (118 in, approx 10 ft), turn, walk back to the chair and sit down. If the individual takes longer than 14 seconds to complete TUG, this indicates risk for falls. Score 7 7.5-10.5 11-14 14.5-17.5 18-21 21.5-24.5 25+   Modifier CH CI CJ CK CL CM CN     ?  Mobility - Walking and Moving Around:     - CURRENT STATUS: CM - 80%-99% impaired, limited or restricted    - GOAL STATUS: CI - 1%-19% impaired, limited or restricted    - D/C STATUS: ---------------To be determined---------------  Medical Necessity:   · Patient is expected to demonstrate progress in strength, balance and endurance and functional mobility to increase independence with ADLs, improve safety during daily activities and return to prior functional level. · Patient demonstrates excellent rehab potential due to higher previous functional level. Reason for Services/Other Comments:  · Patient continues to require skilled intervention due to general debility with overall strength and functional mobility deficits due to prolonged hospitalization due to pneumonia/sepsis; he also complains of thoracic back pain exacerbated due to decreased activity with decreased mobility and strength through the trunk due to worsening ankylosing spondylitis. Use of outcome tool(s) and clinical judgement create a POC that gives a: Questionable prediction of patient's progress: MODERATE COMPLEXITY            TREATMENT:   (In addition to Assessment/Re-Assessment sessions the following treatments were rendered)  Pre-treatment Symptoms/Complaints: Patient reports he had a good night sleep and feels good about his progress. Reports he returns to see Dr. Lisa Tomas for follow up tomorrow. Pain: Initial:   Pain Intensity 1: 0  Pain Location 1: Spine, thoracic  Pain Orientation 1: Posterior, Left  Post Session:  0/10   Manual Therapy (     ): (Used abbreviations: MET - muscle energy technique; PNF - proprioceptive neuromuscular facilitation; NMR - neuromuscular re-education; a/p - anterior to posterior; p/a - posterior to anterior) Gentle sitting resisted breathing with inhalations, mostly lateral costal - 5 breaths x 3 times during treatment (2 min). Therapeutic Exercise: (60 Minutes):  Exercises per grid below to improve strength and endurance. Required minimal verbal cues to promote proper body alignment and promote proper body breathing techniques. No rests between strengthening exercises today and maintained O2 sats over 95%. Rests only between cardio/endurance activities and strengthening activities. Increased resistance with strengthening exercises and cardio time today. Date:  06/07/17 Date:  06/13/17 Date:  06/15/17 Date:  6/16/17 Date:   06/19/17 Date:  06/20/17   Activity/Exercise         Treadmill at home 1. 5mph x 5 min (goal) 2.0 mph x 7 min x 2 2.0mph x 3min/ rest x 5 min 2.0mph x 5min/ rest/then 3 more min 2.2 mph x 11 mins 2.2 mph x 13 min   Sit-stand  10 x 1 2 min - 2 min 2 min 2 min   Stairs - heelraises B     5/step x 10 Edge of step x 1 min   Calf stretch      1 min   Stairs - double step ups     5 x 2 HEP   Stairs - side step up/down     10 steps x 2 ea HEP   B UE bicep curls 3 lb 1 min 4 lb 2 min 4 lb 2 min with 1 rest 4 lb 2 min  4 lb x 2 min 5 lb x 2 min   B UE deltoid/abduction (short lever) 3 lb 1 min 4 lb 2 min 4 lb 2 min 4 lb 2 min 4 lb x 2 min  5 lb x 2 min   R UE rows Red 2 min Red 2 min Red 2 min with 1 rest Red 2 min Red x 2 min Green x 2 min   Unilateral ER Red 1 min ea Red 2 min Red 2 min with 1 rest Red 2 min Red x 2 min Red x 2 min   Triceps dips      1 min   UE/LE ergometer (seat at 9) 4 mins 6 mins 3 min/ rest/ 3 min 6 min 2 min 5 min            Home Exercise Program (HEP): Treadmill progression and step exercises as noted above  Treatment/Session Assessment:    · Response to Treatment: O2 Sats never dropped below 87% today and HR maintain below 110 with all exercise. Excellent progress with endurance with minimal rests break mostly for hydration. Continue to progress home walking and strengthening added to home today. · Compliance with Program/Exercises: Very compliant  · Recommendations/Intent for next treatment session: Next visit will focus on advancements to more challenging activities and progressing to more strengthening and endurance activities.   Total Treatment Duration: (60 minutes of treatment)  PT Patient Time In/Time Out  Time In: 0908  Time Out: 1035    Parry Dance, PT

## 2017-06-21 ENCOUNTER — HOSPITAL ENCOUNTER (OUTPATIENT)
Dept: GENERAL RADIOLOGY | Age: 72
Discharge: HOME | End: 2017-06-21
Payer: MEDICARE

## 2017-06-21 DIAGNOSIS — J18.9 PARAPNEUMONIC EFFUSION: ICD-10-CM

## 2017-06-21 DIAGNOSIS — J18.9 CAP (COMMUNITY ACQUIRED PNEUMONIA): ICD-10-CM

## 2017-06-21 DIAGNOSIS — J91.8 PARAPNEUMONIC EFFUSION: ICD-10-CM

## 2017-06-21 DIAGNOSIS — J90 PLEURAL EFFUSION, BILATERAL: ICD-10-CM

## 2017-06-21 DIAGNOSIS — J93.9 PNEUMOTHORAX ON LEFT: ICD-10-CM

## 2017-06-21 PROCEDURE — 71020 XR CHEST PA LAT: CPT

## 2017-06-21 NOTE — PROGRESS NOTES
Speech Pathology  Patient did not receive ST due to facility cancellation as therapist was out with a sick child.     [address], [address] [facility]-SLP

## 2017-06-21 NOTE — PROGRESS NOTES
Speech Pathology  Patient did not receive ST due to facility cancellation as therapist was out with a sick child.     [address] CCC-SLP

## 2017-06-22 ENCOUNTER — HOSPITAL ENCOUNTER (OUTPATIENT)
Dept: PHYSICAL THERAPY | Age: 72
Discharge: HOME | End: 2017-06-22
Payer: MEDICARE

## 2017-06-22 VITALS — HEART RATE: 74 BPM | OXYGEN SATURATION: 98 %

## 2017-06-22 LAB
AMIKACIN ISLT MIC: ABNORMAL
CIPROFLOXACIN ISLT MIC: ABNORMAL
CLARITHRO ISLT MIC: ABNORMAL
ETHAMBUTOL ISLT MIC: ABNORMAL
LINEZOLID ISLT MIC: ABNORMAL
M AVIUM CMPLX RRNA SPEC QL PROBE: POSITIVE
M GORDONAE RRNA SPEC QL PROBE: ABNORMAL
M KANSASII RRNA SPEC QL PROBE: ABNORMAL
M TB CMPLX RRNA SPEC QL PROBE: NEGATIVE
MICROORGANISM/AGENT SPEC: ABNORMAL
MOXIFLOXACIN ISLT MIC: ABNORMAL
OTHER, RAFBI6: ABNORMAL
PLEASE NOTE, AFR16: ABNORMAL
RIFAMPIN ISLT MIC: ABNORMAL
SPECIMEN SOURCE: ABNORMAL
SPECIMEN SOURCE: ABNORMAL
STREPTOMYCIN ISLT MIC: ABNORMAL
SUSCEPT TESTING, RAFBI7: ABNORMAL

## 2017-06-22 PROCEDURE — 97110 THERAPEUTIC EXERCISES: CPT

## 2017-06-22 PROCEDURE — 92526 ORAL FUNCTION THERAPY: CPT

## 2017-06-22 NOTE — PROGRESS NOTES
River Khalil  : 1945 Therapy Center at [facility]  [address] [facility], [address] [place] [address], [address]  [phone]   [phone]        OUTPATIENT SPEECH LANGUAGE PATHOLOGY: Daily Note: 1  ICD-10: Treatment Diagnosis: dysphagia, oropharyngeal 13.12  REFERRING PHYSICIAN: Octavio Flores.MD CARVALHO Orders: speech therapy  PAST MEDICAL HISTORY:    Mr. Osbaldo Taylor is a 80 y.o. male who  has a past medical history of Arthritis; Elevated prostate specific antigen (PSA); History of prostate surgery (3/12/2015); HLA-B27 positive; Impotence of organic origin; Neck pain; Osteoarthritis, hand; Osteopenia; Osteoporosis; Other nonspecific abnormal finding of lung field; Polyarthritis; Polymyalgia rheumatica ([address] Prostate cancer ([address] Raynaud's disease; 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[address] and Thoracic spine pain. He also has no past medical history of Asthma; Autoimmune disease ([address] Chronic kidney disease; Chronic obstructive pulmonary disease ([address] Chronic pain; Diabetes ([address] Difficult intubation; GERD (gastroesophageal reflux disease); Heart abnormalities; Hypertension; Liver disease; Malignant hyperthermia due to anesthesia; Nausea & vomiting; Neurological disorder; Other unknown and unspecified cause of morbidity or mortality; Pseudocholinesterase deficiency; Psychiatric disorder; PUD (peptic ulcer disease); Seizures ([address] Stroke [facility]); Thromboembolus ([address] Thyroid disease; or Unspecified adverse effect of anesthesia. He also  has a past surgical history that includes vasectomy (40+ yrs); knee arthroscopy (); urological; biopsy prostate; prostatectomy (); cataract removal (); cataract removal (); and colonoscopy (2010). MEDICAL/REFERRING DIAGNOSIS: Dysphagia [R13.10]  DATE OF ONSET:   PRIOR LEVEL OF FUNCTION: residing with spouse  PRECAUTIONS/ALLERGIES: Codeine     ASSESSMENT:  Pt asked if he can have Ensure and Boost without thickener.   Informed him they are considered nectar thick if chilled. He reported that's what he was thinking but his wife wanted him to clarify to be sure. He reported he's been practicing his exercises 2x daily. He reported he doesn't think he's been completing the [facility]-ZE correctly. However, he completed it with 70-80 accuracy this date. He did well with other exercises. Patient will benefit from skilled intervention to address the below impairments. ?????? ? ? This section established at most recent assessment??????????  PROBLEM LIST (Impairments causing functional limitations):  1. Dysphagia   GOALS: (Goals have been discussed and agreed upon with patient.)  SHORT-TERM FUNCTIONAL GOALS: Time Frame: 3 months  Pt will complete laryngeal exercises with 80% accuracy. Pt will state conditions for [facility] Protocol with min cues. Pt will complete exercises a minimum of 5 days weekly at home. DISCHARGE GOALS: Time Frame: 4-5 months  1. Pt will tolerate least restrictive diet without signs/sx aspiration 100% for safe swallow function. REHABILITATION POTENTIAL FOR STATED GOALS: GoodPLAN OF CARE:  Patient will benefit from skilled intervention to address the following impairments. RECOMMENDATIONS AND PLANNED INTERVENTIONS (Benefits and precautions of therapy have been discussed with the patient.):  · PO:  Regular  · Liquids:  nectar  · no mixed consistencies  MEDICATIONS:  · with thickened liquids  COMPENSATORY STRATEGIES/MODIFICATIONS INCLUDING:  · Alternate liquids/solids  · Double swallow  OTHER RECOMMENDATIONS (including follow up treatment recommendations):   · Laryngeal exercises  RECOMMENDED DIET MODIFICATIONS DISCUSSED WITH:  · Family  · Patient  TREATMENT PLAN EFFECTIVE DATES: 6/16/17 TO 9/16/17  FREQUENCY/DURATION: Continue to follow patient 2 times a week for 12 weeks to address above goals.   Regarding Heather Haji's therapy, I certify that the treatment plan above will be carried out by a therapist or under their direction. Thank you for this referral,  Susie Fernández, [facility], [facility], [address]                    Referring Physician Signature: Yamila Chamberlain MD    Date      SUBJECTIVE:  Pt cooperative. Pt's spouse present. Present Symptoms: dysphagia       Current Medications:   Current Outpatient Prescriptions:     aspirin 81 mg chewable tablet, Take 1 Tab by mouth daily. , Disp: 30 Tab, Rfl: 1    ferrous sulfate 325 mg (65 mg iron) tablet, Take 1 Tab by mouth two (2) times daily (with meals). , Disp: 60 Tab, Rfl: 1    predniSONE (DELTASONE) 20 mg tablet, Take 2 tablets daily for 1 week then 1 tablet daily until seen in the office for follow up, Disp: 40 Tab, Rfl: 0    potassium chloride (K-DUR, KLOR-CON) 20 mEq tablet, Take 2 Tabs by mouth daily. , Disp: 30 Tab, Rfl: 1    DULoxetine (CYMBALTA) 30 mg capsule, Take 1 Cap by mouth daily. , Disp: 90 Cap, Rfl: 1    traMADol (ULTRAM) 50 mg tablet, Take 1 Tab by mouth three (3) times daily as needed for Pain. Max Daily Amount: 150 mg., Disp: 90 Tab, Rfl: 2    omeprazole (PRILOSEC) 40 mg capsule, Take 1 Cap by mouth daily. , Disp: 30 Cap, Rfl: 5    diclofenac (VOLTAREN) 1 % topical gel, Apply 4 g to affected area four (4) times daily. , Disp: 100 g, Rfl: 2    calcium-vitamin D (OYSTER SHELL) 500 mg(1,250mg) -200 unit per tablet, Take 1 Tab by mouth daily. , Disp: , Rfl:     cholecalciferol, vitamin D3, (VITAMIN D3) 2,000 unit Tab, Take  by mouth. Taking 1 1/2 tabs of 2000 international units once daily, Disp: , Rfl:    Date Last Reviewed: 6/16/17  Current Dietary Status:  regular/nectar       History of reflux:  YES    Reflux medication: Omeprazole  Social History/Home Situation: residing with spouse      Work/Activity History: retired     OBJECTIVE:  Objective Measure:   Tool Used: National Outcomes Measurement System: Functional Communication Measures: SWALLOWING  Score:  Initial: 5 Most Recent: X (Date: -- )   Interpretation of Tool: This measure describes the change in functional communication status subsequent to speech-language pathology treatment of patients with dysphagia.  o Level 1:  Individual is not able to swallow anything safely by mouth. All nutrition and hydration is received through non-oral means (e.g., nasogastric tube, PEG). o Level 2: Individual is not able to swallow safely by mouth for nutrition and hydration, but may take some consistency with consistent maximal cues in therapy only. Alternative method of feeding required. o Level 3:  Alternative method of feeding required as individual takes less than 50% of nutrition and hydration by mouth, and/or swallowing is safe with consistent use of moderate cues to use compensatory strategies and/or requires maximum diet restriction. o Level 4:  Swallowing is safe, but usually requires moderate cues to use compensatory strategies, and/or the individual has moderate diet restrictions and/or still requires tube feeding and/or oral supplements. o Level 5:  Swallowing is safe with minimal diet restriction and/or occasionally requires minimal cueing to use compensatory strategies. The individual may occasionally self-cue. All nutrition and hydration needs are met by mouth at mealtime. o Level 6:  Swallowing is safe, and the individual eats and drinks independently and may rarely require minimal cueing. The individual usually self-cues when difficulty occurs. May need to avoid specific food items (e.g., popcorn and nuts), or require additional time (due to dysphagia). o Level 7: The individuals ability to eat independently is not limited by swallow function. Swallowing would be safe and efficient for all consistencies. Compensatory strategies are effectively used when needed. Score Level 7 Level 6 Level 5 Level 4 Level 3 Level 2 Level 1   Modifier CH CI CJ CK CL CM CN   ?  Swallowing:     - CURRENT STATUS: CJ - 20%-39% impaired, limited or restricted    - GOAL STATUS:  CI - 1%-19% impaired, limited or restricted    - D/C STATUS:  ---------------To be determined---------------    Cognitive and Communication Status:  Mental status: alert       TREATMENT:    (In addition to Assessment/Re-Assessment sessions the following treatments were rendered)  Dysphagia Activities: Activities/Procedures listed utilized to improve progress in swallow function and swallow safety. Required minimal cueing to improve swallow safety. LARYNGEAL / PHARYNGEAL EXERCISES:         Effortful Swallow: Yes  Reps : 10  Sets : 2  Hard Glottal Attack: Yes  Reps : 10  Sets : 1                       Chichi: Yes  Reps : 10  Sets : 2  Mendelsohn Maneuver: Yes  Reps : 10 (80%; 70%)  Sets : 2          Shaker: Yes  Reps :  (3 sets of 30 seconds; 3 sets of 15 repetitions)  Sets : 1  Sing \"EEE\": Yes  Reps : 10  Sets : 2                    Sustained \"ah\": Yes  Sets : 2  Reps : 10              __________________________________________________________________________________________________  Treatment Assessment:    Progression/Medical Necessity:   · Skilled intervention continues to be required due to patient still consuming a modified diet. Compliance with Program/Exercises: Will assess as treatment progresses. Reason for Continuation of Services/Other Comments:  · Patient continues to require skilled intervention due to dysphagia. Recommendations/Intent for next treatment session: \"Treatment next visit will focus on laryngeal exercises\".    Total Treatment Duration:  Time In: 1300  Time Out: [address], MSP, CCC-SLP

## 2017-06-22 NOTE — PROGRESS NOTES
Jo Ann Sosa  : 1945 [facility]  [address] [place] [address]  [phone]   [phone]       OUTPATIENT PHYSICAL THERAPY:Daily Note 2017    ICD-10: Treatment Diagnosis: Ankylosing Spondylitis of thoracic region (M45.4); Pain in thoracic spine (M54.5); muscle weakness, generalized (M62.81); difficulty walking, not elsewhere classified (R26.2)  Precautions/Allergies:  Codeine   Fall Risk Score: 2 (? 5 = High Risk)  MD Orders: Eval and treat MEDICAL/REFERRING DIAGNOSIS: Thoracic back pain; post pneumonia with debility  DATE OF ONSET: 17  REFERRING PHYSICIAN: Dillan Ornelas MD  RETURN PHYSICIAN APPOINTMENT: 2017     INITIAL ASSESSMENT ([id number]):  Mr. Ingrid Xie presents with general debility with overall strength and functional mobility deficits due to prolonged hospitalization due to pneumonia/sepsis; he also complains of thoracic back pain exacerbated due to decreased activity with decreased mobility and strength through the trunk due to worsening ankylosing spondylitis. PROBLEM LIST (Impacting functional limitations): Increased pain through thoracic spine/costal cage limiting tolerance of ADLs and difficulty sleeping  Decreased activity tolerance for basic and instrumental ADLs due to generalized weakness B LE/UE.   Decreased ADL/functional activities specificially with any endurance activity related to decreased overall strength and limited pulmonary capacity post-pneumonia  Outcome measure score of 23 seconds (WNL - 7 seconds) on Timed Up and Go (TUG) test with severe effect of decreased strength/endurance on patient's ability to manage every day life activities  Decreased strength through B LE/UE/trunk limiting all functional mobility INTERVENTIONS PLANNED:  Patient education including pathophysiology of activity tolerance and progression  Manual therapy including soft tissue mobilizations, functional joint mobilizations and neuromuscular re-education to thoracic region  Neuromuscular re-education with focus on initiation/strength and endurance and motor control of B UE/LE/trunk  Therapeutic exercises including strengthening and endurance related tasks  Gait training with focus on correcting deficits, sequencing and honorio  Balance exercise - focused on standing dynamic balance with greatest focus on dynamic center of gravity control  Therapeutic activities with focus on strength and endurance  Therapeutic modalities including pain modalities for thoracic spine  Instructions of home exercise program (HEP)   TREATMENT PLAN:  Effective Dates: 06/07/17 TO 09/07/17  Frequency/Duration: 3 times a week for 4 weeks followed by 1 time a week for 6 weeks  GOALS: (Goals have been discussed and agreed upon with patient.)  Short-Term Functional Goals: Goals achieved; please refer to discharge goals  1. Patient will be able to ambulate greater than 250 ft to get to therapy clinic without the use of a wheelchair (goal achieved). 2. Patient will be able to perform walking on treadmill at home for 5 minutes without O2 saturations below 86% (goal achieved). 3. Patient will be independent with sit-stand without requiring hand support/assistance (goal achieved). Discharge Goals: Time Frame: 12 weeks  1. Patient will be independent will all functional mobility at home without difficulty or thoracic back pain (goal ongoing). 2. Patient will be able to walk 10,000 steps without difficulty (goal ongoing). 3. Patient will have achieved prior level of function for all ADLs without difficulty or thoracic back pain (goal ongoing). 4. Patient will score less than 12 seconds on TUG and be WNLs (goal ongoing). Rehabilitation Potential For Stated Goals: Excellent              The information in this section was collected on 06/07/17 (except where otherwise noted).   HISTORY:   History of Present Injury/Illness (Reason for Referral):  Patient reports he was not feeling well from about 05/13-15/17 and was then admitted to hospital for pneumonia/sepsis with a 3-day hospital stay in ICU with complications that included a L lower lobe pneumonia. He was discharged to home on 05/27/17 and referred to outpatient physical therapy for post-hospital physical therapy. Past Medical History/Comorbidities:   Mr. Jc Del Rio  has a past medical history of Arthritis; Elevated prostate specific antigen (PSA); History of prostate surgery (3/12/2015); HLA-B27 positive; Neck pain; Osteoarthritis, hand; Osteopenia; Osteoporosis; Polyarthritis; Polymyalgia rheumatica ([address] Prostate cancer ([address] Raynaud's disease; 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[address] Ankylosing Spondylitis and thoracic spine pain. Mr. Jc Del Rio  has a past surgical history that includes vasectomy (40+ yrs); knee arthroscopy (2009); urological; biopsy prostate; prostatectomy (2012); cataract removal (2012); cataract removal (2013); and colonoscopy (02/2010). Social History/Living Environment: Patient lives with spouse. Patient denies and physical barriers at home and has a tub/shower combo. Prior Level of Function/Work/Activity: Patient is retired. Prior to hospitalization, patient reports he walked 5 days/week for 30-50 minutes. .  Dominant Side:  RIGHT  Current Medications:     Current Outpatient Prescriptions:     aspirin 81 mg chewable tablet, Take 1 Tab by mouth daily. , Disp: 30 Tab, Rfl: 1    ferrous sulfate 325 mg (65 mg iron) tablet, Take 1 Tab by mouth two (2) times daily (with meals). , Disp: 60 Tab, Rfl: 1    predniSONE (DELTASONE) 20 mg tablet, Take 2 tablets daily for 1 week then 1 tablet daily until seen in the office for follow up, Disp: 40 Tab, Rfl: 0    potassium chloride (K-DUR, KLOR-CON) 20 mEq tablet, Take 2 Tabs by mouth daily. , Disp: 30 Tab, Rfl: 1    DULoxetine (CYMBALTA) 30 mg capsule, Take 1 Cap by mouth daily. , Disp: 90 Cap, Rfl: 1    traMADol (ULTRAM) 50 mg tablet, Take 1 Tab by mouth three (3) times daily as needed for Pain. Max Daily Amount: 150 mg., Disp: 90 Tab, Rfl: 2    omeprazole (PRILOSEC) 40 mg capsule, Take 1 Cap by mouth daily. , Disp: 30 Cap, Rfl: 5    diclofenac (VOLTAREN) 1 % topical gel, Apply 4 g to affected area four (4) times daily. , Disp: 100 g, Rfl: 2    calcium-vitamin D (OYSTER SHELL) 500 mg(1,250mg) -200 unit per tablet, Take 1 Tab by mouth daily. , Disp: , Rfl:     cholecalciferol, vitamin D3, (VITAMIN D3) 2,000 unit Tab, Take  by mouth. Taking 1 1/2 tabs of 2000 international units once daily, Disp: , Rfl:    Date Last Reviewed:  6/22/2017   Number of Personal Factors/Comorbidities that affect the Plan of Care: 1-2: MODERATE COMPLEXITY   EXAMINATION:   Observation/Orthostatic Postural Assessment: HR - 84; Os sats - 97% on room air. Cachetic body with significant weight loss from hospitalization. Posture - kyphotic with an anterior rotated rib cage that is posterior positioned over a posterior tilted pelvis. Atrophy noted through all LE and UE musculature, most notable gluteals. ROM:  AROM B UE WNL with shoulder abduction palm down 140 degrees B. Cervical rotation 70% B and pain free. B LE AROM WNL. Passive trunk rotation less than 50% B. Breathing assessment indicated decreased mobility of L lower rib cage with inhalation. Strength:  B UE grossly good except 4+/5 for R shoulder ER and B abduction. B LE grossly fair with 4+/5 for gluts, quadriceps, hamstrings and hip flexors. Neurological Screen:        Myotomes:  Good except for strength deficits noted above. Dermatomes: WNL        Reflexes:  2+ and WNL B UE/LE  Functional Mobility:         Gait/Ambulation:  Independent with slowed honorio with narrowed stance and limited arm swing B.  Endurance with gait - able to tolerate 2 minutes on treadmill before O2 saturation significantly drops to below 86%        Transfers:  Hand assist for sit-stand and min A for floor-stand        Bed Mobility:  Independent but slowed movement due to thoracic back pain        Stairs:  2-hand assist with 2 ft/step  Balance:          Static standing B LE greater than 60 sec; single leg less than 10 sec B. Mental Status:          Alert and oriented x 4   Body Structures Involved:  1. Thoracic Cage  2. Joints  3. Muscles Body Functions Affected:  1. Mental  2. Sensory/Pain  3. Cardio  4. Neuromusculoskeletal  5. Movement Related Activities and Participation Affected:  1. Mobility  2. Self Care  3. Domestic Life  4. Community, Social and [employer]   Number of elements (examined above) that affect the Plan of Care: 4+: HIGH COMPLEXITY   CLINICAL PRESENTATION:   Presentation: Evolving clinical presentation with changing clinical characteristics: MODERATE COMPLEXITY   CLINICAL DECISION MAKING:   Outcome Measure: Tool Used: Modified Oswestry Low Back Pain Questionnaire  Score:  Initial: 22/50 (06/07/17) Most Recent: X/50 (Date: -- )   Interpretation of Score: Each section is scored on a 0-5 scale, 5 representing the greatest disability. The scores of each section are added together for a total score of 50. Score 0 1-10 11-20 21-30 31-40 41-49 50   Modifier CH CI CJ CK CL CM CN     Tool Used: Timed Up and Go (TUG)  Score:  Initial: 23 seconds (06/07/17) Most Recent: X seconds (Date: -- )   Interpretation of Score: The test measures, in seconds, the time taken by an individual to stand up from a standard arm chair (seat height 46 cm [18 in], arm height 65 cm [25.6 in]), walk a distance of 3 meters (118 in, approx 10 ft), turn, walk back to the chair and sit down. If the individual takes longer than 14 seconds to complete TUG, this indicates risk for falls. Score 7 7.5-10.5 11-14 14.5-17.5 18-21 21.5-24.5 25+   Modifier CH CI CJ CK CL CM CN     ?  Mobility - Walking and Moving Around:     - CURRENT STATUS: CM - 80%-99% impaired, limited or restricted    - GOAL STATUS: CI - 1%-19% impaired, limited or restricted    - D/C STATUS: ---------------To be determined---------------  Medical Necessity:   · Patient is expected to demonstrate progress in strength, balance and endurance and functional mobility to increase independence with ADLs, improve safety during daily activities and return to prior functional level. · Patient demonstrates excellent rehab potential due to higher previous functional level. Reason for Services/Other Comments:  · Patient continues to require skilled intervention due to general debility with overall strength and functional mobility deficits due to prolonged hospitalization due to pneumonia/sepsis; he also complains of thoracic back pain exacerbated due to decreased activity with decreased mobility and strength through the trunk due to worsening ankylosing spondylitis. Use of outcome tool(s) and clinical judgement create a POC that gives a: Questionable prediction of patient's progress: MODERATE COMPLEXITY            TREATMENT:   (In addition to Assessment/Re-Assessment sessions the following treatments were rendered)  Pre-treatment Symptoms/Complaints: Patient reports Dr. Lamar appointment went well and he does not need to return until 09/2017. He is happy with his progress and recommended to continue therapy. Pain: Initial:   Pain Intensity 1: 0  Post Session:  0/10   Manual Therapy (     ): (Used abbreviations: MET - muscle energy technique; PNF - proprioceptive neuromuscular facilitation; NMR - neuromuscular re-education; a/p - anterior to posterior; p/a - posterior to anterior) R side lie resisted breathing with inhalations, mostly lateral costal - 5 breaths x 3 times during treatment (2 min)  Therapeutic Exercise: (65 Minutes):  Exercises per grid below to improve strength and endurance. Required minimal verbal cues to promote proper body alignment and promote proper body breathing techniques. Maintained O2 sats over 90% with no rests between exercises. Increased resistance with strengthening exercises and cardio time today. Date:  06/07/17 Date:  06/13/17 Date:  06/15/17 Date:  6/16/17 Date:   06/19/17 Date:  06/20/17 Date:  06/22/17   Activity/Exercise          Treadmill at home 1. 5mph x 5 min (goal) 2.0 mph x 7 min x 2 2.0mph x 3min/ rest x 5 min 2.0mph x 5min/ rest/then 3 more min 2.2 mph x 11 mins 2.2 mph x 13 min 2.2 mph x 12 min   Sit-stand  10 x 1 2 min - 2 min 2 min 2 min 2 min   Stairs - heelraises B     5/step x 10 Edge of step x 1 min HEP   Calf stretch      1 min HEP   Stairs - double step ups     5 x 2 HEP HEP   Stairs - side step up/down     10 steps x 2 ea HEP HEP   B UE bicep curls 3 lb 1 min 4 lb 2 min 4 lb 2 min with 1 rest 4 lb 2 min  4 lb x 2 min 5 lb x 2 min 5 lb x 2 min   B UE deltoid/abduction (short lever) 3 lb 1 min 4 lb 2 min 4 lb 2 min 4 lb 2 min 4 lb x 2 min  5 lb x 2 min 5 lb x 2 min   R UE rows Red 2 min Red 2 min Red 2 min with 1 rest Red 2 min Red x 2 min Green x 2 min Green x 2 min   Unilateral ER Red 1 min ea Red 2 min Red 2 min with 1 rest Red 2 min Red x 2 min Red x 2 min Red x 2 min   Triceps dips      1 min 1 min   UE/LE ergometer (seat at 9) 4 mins 6 mins 3 min/ rest/ 3 min 6 min 2 min 5 min 8 min - level 3   Leg press (incline with B LE)       75 lb 5 x 2   Home Exercise Program (HEP): Treadmill progression and step exercises as noted above  Treatment/Session Assessment:    · Response to Treatment: O2 Sats never dropped below 90% today and HR maintain below 110 with all exercise. Excellent progress with endurance with minimal rests break mostly for hydration. Continue to progress home walking and strengthening added to home today. · Compliance with Program/Exercises: Very compliant  · Recommendations/Intent for next treatment session: Next visit will focus on advancements to more challenging activities and progressing to more strengthening and endurance activities.   Total Treatment Duration: (60 minutes of treatment)  PT Patient Time In/Time Out  Time In: 1510  Time Out: [address] [facility], PT

## 2017-06-23 ENCOUNTER — APPOINTMENT (OUTPATIENT)
Dept: PHYSICAL THERAPY | Age: 72
End: 2017-06-23
Payer: MEDICARE

## 2017-06-26 ENCOUNTER — HOSPITAL ENCOUNTER (OUTPATIENT)
Dept: PHYSICAL THERAPY | Age: 72
Discharge: HOME | End: 2017-06-26
Payer: MEDICARE

## 2017-06-26 VITALS — OXYGEN SATURATION: 98 % | HEART RATE: 74 BPM

## 2017-06-26 PROCEDURE — 97110 THERAPEUTIC EXERCISES: CPT

## 2017-06-26 NOTE — PROGRESS NOTES
Domi Kirk  : 1945 [facility]  [address] [place] [address]  [phone]   [phone]       OUTPATIENT PHYSICAL THERAPY:Daily Note 2017    ICD-10: Treatment Diagnosis: Ankylosing Spondylitis of thoracic region (M45.4); Pain in thoracic spine (M54.5); muscle weakness, generalized (M62.81); difficulty walking, not elsewhere classified (R26.2)  Precautions/Allergies:  Codeine   Fall Risk Score: 2 (? 5 = High Risk)  MD Orders: Eval and treat MEDICAL/REFERRING DIAGNOSIS: Thoracic back pain; post pneumonia with debility  DATE OF ONSET: 17  REFERRING PHYSICIAN: Avis Ruff MD  RETURN PHYSICIAN APPOINTMENT: 2017     INITIAL ASSESSMENT ([id number]):  Mr. Wheeler Galeazzi presents with general debility with overall strength and functional mobility deficits due to prolonged hospitalization due to pneumonia/sepsis; he also complains of thoracic back pain exacerbated due to decreased activity with decreased mobility and strength through the trunk due to worsening ankylosing spondylitis. PROBLEM LIST (Impacting functional limitations): Increased pain through thoracic spine/costal cage limiting tolerance of ADLs and difficulty sleeping  Decreased activity tolerance for basic and instrumental ADLs due to generalized weakness B LE/UE.   Decreased ADL/functional activities specificially with any endurance activity related to decreased overall strength and limited pulmonary capacity post-pneumonia  Outcome measure score of 23 seconds (WNL - 7 seconds) on Timed Up and Go (TUG) test with severe effect of decreased strength/endurance on patient's ability to manage every day life activities  Decreased strength through B LE/UE/trunk limiting all functional mobility INTERVENTIONS PLANNED:  Patient education including pathophysiology of activity tolerance and progression  Manual therapy including soft tissue mobilizations, functional joint mobilizations and neuromuscular re-education to thoracic region  Neuromuscular re-education with focus on initiation/strength and endurance and motor control of B UE/LE/trunk  Therapeutic exercises including strengthening and endurance related tasks  Gait training with focus on correcting deficits, sequencing and honorio  Balance exercise - focused on standing dynamic balance with greatest focus on dynamic center of gravity control  Therapeutic activities with focus on strength and endurance  Therapeutic modalities including pain modalities for thoracic spine  Instructions of home exercise program (HEP)   TREATMENT PLAN:  Effective Dates: 06/07/17 TO 09/07/17  Frequency/Duration: 3 times a week for 3 weeks followed by 1 time a week for 6 weeks  GOALS: (Goals have been discussed and agreed upon with patient.)  Short-Term Functional Goals: Goals achieved; please refer to discharge goals  1. Patient will be able to ambulate greater than 250 ft to get to therapy clinic without the use of a wheelchair (goal achieved). 2. Patient will be able to perform walking on treadmill at home for 5 minutes without O2 saturations below 86% (goal achieved). 3. Patient will be independent with sit-stand without requiring hand support/assistance (goal achieved). Discharge Goals: Time Frame: 12 weeks  1. Patient will be independent will all functional mobility at home without difficulty or thoracic back pain (goal ongoing). 2. Patient will be able to walk 10,000 steps without difficulty (goal ongoing). 3. Patient will have achieved prior level of function for all ADLs without difficulty or thoracic back pain (goal ongoing). 4. Patient will score less than 12 seconds on TUG and be WNLs (goal ongoing). Rehabilitation Potential For Stated Goals: Excellent              The information in this section was collected on 06/26/17 (except where otherwise noted).   HISTORY:   History of Present Injury/Illness (Reason for Referral):  Patient reports he was not feeling well from about 05/13-15/17 and was then admitted to hospital for pneumonia/sepsis with a 3-day hospital stay in ICU with complications that included a L lower lobe pneumonia. He was discharged to home on 05/27/17 and referred to outpatient physical therapy for post-hospital physical therapy. Past Medical History/Comorbidities:   Mr. Kingsley Carreon  has a past medical history of Arthritis; Elevated prostate specific antigen (PSA); History of prostate surgery (3/12/2015); HLA-B27 positive; Neck pain; Osteoarthritis, hand; Osteopenia; Osteoporosis; Polyarthritis; Polymyalgia rheumatica ([address] Prostate cancer ([address] Raynaud's disease; 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[address] Ankylosing Spondylitis and thoracic spine pain. Mr. Kingsley Carreon  has a past surgical history that includes vasectomy (40+ yrs); knee arthroscopy (2009); urological; biopsy prostate; prostatectomy (2012); cataract removal (2012); cataract removal (2013); and colonoscopy (02/2010). Social History/Living Environment: Patient lives with spouse. Patient denies and physical barriers at home and has a tub/shower combo. Prior Level of Function/Work/Activity: Patient is retired. Prior to hospitalization, patient reports he walked 5 days/week for 30-50 minutes. .  Dominant Side:  RIGHT  Current Medications:     Current Outpatient Prescriptions:     aspirin 81 mg chewable tablet, Take 1 Tab by mouth daily. , Disp: 30 Tab, Rfl: 1    ferrous sulfate 325 mg (65 mg iron) tablet, Take 1 Tab by mouth two (2) times daily (with meals). , Disp: 60 Tab, Rfl: 1    predniSONE (DELTASONE) 20 mg tablet, Take 2 tablets daily for 1 week then 1 tablet daily until seen in the office for follow up, Disp: 40 Tab, Rfl: 0    potassium chloride (K-DUR, KLOR-CON) 20 mEq tablet, Take 2 Tabs by mouth daily. , Disp: 30 Tab, Rfl: 1    DULoxetine (CYMBALTA) 30 mg capsule, Take 1 Cap by mouth daily. , Disp: 90 Cap, Rfl: 1    traMADol (ULTRAM) 50 mg tablet, Take 1 Tab by mouth three (3) times daily as needed for Pain. Max Daily Amount: 150 mg., Disp: 90 Tab, Rfl: 2    omeprazole (PRILOSEC) 40 mg capsule, Take 1 Cap by mouth daily. , Disp: 30 Cap, Rfl: 5    diclofenac (VOLTAREN) 1 % topical gel, Apply 4 g to affected area four (4) times daily. , Disp: 100 g, Rfl: 2    calcium-vitamin D (OYSTER SHELL) 500 mg(1,250mg) -200 unit per tablet, Take 1 Tab by mouth daily. , Disp: , Rfl:     cholecalciferol, vitamin D3, (VITAMIN D3) 2,000 unit Tab, Take  by mouth. Taking 1 1/2 tabs of 2000 international units once daily, Disp: , Rfl:    Date Last Reviewed:  6/26/2017   Number of Personal Factors/Comorbidities that affect the Plan of Care: 1-2: MODERATE COMPLEXITY   EXAMINATION:   Observation/Orthostatic Postural Assessment: Cachetic body with significant weight loss from hospitalization. Posture - kyphotic with an anterior rotated rib cage that is posterior positioned over a posterior tilted pelvis. Atrophy noted through all LE and UE musculature, most notable gluteals. ROM:  AROM B UE WNL with shoulder abduction palm down 140 degrees B. Cervical rotation 70% B and pain free. B LE AROM WNL. Passive trunk rotation less than 50% B. Breathing assessment indicated decreased mobility of L lower rib cage with inhalation. Strength:  B UE grossly good except 4+/5 for R shoulder ER and B abduction. B LE grossly fair with 4+/5 for gluts, quadriceps, hamstrings and hip flexors. Neurological Screen:        Myotomes:  Good except for strength deficits noted above. Dermatomes: WNL        Reflexes:  2+ and WNL B UE/LE  Functional Mobility:         Gait/Ambulation:  Independent with narrowed stance and limited arm swing B. Endurance with gait - good-        Transfers:  Independent        Bed Mobility:  Independent        Stairs: Alternating steps with 1 hand for balance only  Balance:          Static standing B LE greater than 60 sec; single leg less than 15 sec B.   Mental Status:          Alert and oriented x 4   Body Structures Involved:  1. Thoracic Cage  2. Joints  3. Muscles Body Functions Affected:  1. Cardio  2. Neuromusculoskeletal  3. Movement Related Activities and Participation Affected:  1. Mobility  2. Self Care  3. Domestic Life  4. Community, Social and [place]   Number of elements (examined above) that affect the Plan of Care: 4+: HIGH COMPLEXITY   CLINICAL PRESENTATION:   Presentation: Evolving clinical presentation with changing clinical characteristics: MODERATE COMPLEXITY   CLINICAL DECISION MAKING:   Outcome Measure: Tool Used: Modified Oswestry Low Back Pain Questionnaire  Score:  Initial: 22/50 (06/07/17) Most Recent: X/50 (Date: -- )   Interpretation of Score: Each section is scored on a 0-5 scale, 5 representing the greatest disability. The scores of each section are added together for a total score of 50. Score 0 1-10 11-20 21-30 31-40 41-49 50   Modifier  CI CJ CK CL CM CN     Tool Used: Timed Up and Go (TUG)  Score:  Initial: 23 seconds (06/07/17) Most Recent: 12 seconds (Date: 06/26/17 )   Interpretation of Score: The test measures, in seconds, the time taken by an individual to stand up from a standard arm chair (seat height 46 cm [18 in], arm height 65 cm [25.6 in]), walk a distance of 3 meters (118 in, approx 10 ft), turn, walk back to the chair and sit down. If the individual takes longer than 14 seconds to complete TUG, this indicates risk for falls. Score 7 7.5-10.5 11-14 14.5-17.5 18-21 21.5-24.5 25+   Modifier  CI CJ CK CL CM CN     ?  Mobility - Walking and Moving Around:     - CURRENT STATUS: CM - 80%-99% impaired, limited or restricted    - GOAL STATUS: CI - 1%-19% impaired, limited or restricted    - D/C STATUS: ---------------To be determined---------------  Medical Necessity:   · Patient is expected to demonstrate progress in strength, balance and endurance and functional mobility to increase independence with ADLs, improve safety during daily activities and return to prior functional level. · Patient demonstrates excellent rehab potential due to higher previous functional level. Reason for Services/Other Comments:  · Patient continues to require skilled intervention due to general debility with overall strength and functional mobility deficits due to prolonged hospitalization due to pneumonia/sepsis; he also complains of thoracic back pain exacerbated due to decreased activity with decreased mobility and strength through the trunk due to worsening ankylosing spondylitis. Use of outcome tool(s) and clinical judgement create a POC that gives a: Questionable prediction of patient's progress: MODERATE COMPLEXITY            TREATMENT:   (In addition to Assessment/Re-Assessment sessions the following treatments were rendered)  Pre-treatment Symptoms/Complaints: Patient reports he had an uneventful weekend and walked up to 6000 steps yesterday with breaks. Pain: Initial:   Pain Intensity 1: 0  Post Session:  0/10   Manual Therapy (     ): (Used abbreviations: MET - muscle energy technique; PNF - proprioceptive neuromuscular facilitation; NMR - neuromuscular re-education; a/p - anterior to posterior; p/a - posterior to anterior) None  Therapeutic Exercise: (65 Minutes):  Exercises per grid below to improve strength and endurance. Required minimal verbal cues to promote proper body alignment and promote proper body breathing techniques. Maintained O2 sats over 90% with no rests between exercises. Increased resistance with strengthening exercises and cardio time today. Date:  06/15/17 Date:  6/16/17 Date:   06/19/17 Date:  06/20/17 Date:  06/22/17 Date:  06/26/17   Activity/Exercise         Treadmill at home 2.0mph x 3min/ rest x 5 min 2.0mph x 5min/ rest/then 3 more min 2.2 mph x 11 mins 2.2 mph x 13 min 2.2 mph x 12 min 2. 4mph 4% grade x 10 min   Sit-stand  - 2 min 2 min 2 min 2 min 2 min   Stairs - heelraises B   5/step x 10 Edge of step x 1 min HEP 50 x 1   Calf stretch    1 min HEP 1 min   Stairs - double step ups   5 x 2 HEP HEP HEP   Stairs - side step up/down   10 steps x 2 ea HEP HEP HEP   B UE bicep curls 4 lb 2 min with 1 rest 4 lb 2 min  4 lb x 2 min 5 lb x 2 min 5 lb x 2 min 5 lb x 2 min   B UE deltoid/abduction (short lever) 4 lb 2 min 4 lb 2 min 4 lb x 2 min  5 lb x 2 min 5 lb x 2 min 5 lb x 2 min   R UE rows Red 2 min with 1 rest Red 2 min Red x 2 min Green x 2 min Green x 2 min Green x 2 min   Unilateral ER Red 2 min with 1 rest Red 2 min Red x 2 min Red x 2 min Red x 2 min Red x 2 2 min   Triceps dips    1 min 1 min 1 min   UE/LE ergometer (seat at 9) 3 min/ rest/ 3 min 6 min 2 min 5 min 8 min - level 3 8 min - level 3. .5   Leg press (incline with B LE)     75 lb 5 x 2 75 lb s5 x 3   Home Exercise Program (HEP): Treadmill progression and stair/step exercises as noted above  Treatment/Session Assessment:    · Response to Treatment: O2 Sats never dropped below 90%. Excellent progress with endurance with minimal rests break mostly for hydration. Continue to progress home walking and strengthening added to home today. · Compliance with Program/Exercises: Very compliant  · Recommendations/Intent for next treatment session: Next visit will focus on advancements to more challenging activities and progressing to more strengthening and endurance activities.   Total Treatment Duration: (65 minutes of treatment)  PT Patient Time In/Time Out  Time In: 1400  Time Out: 1505    Philip Maravilla, PT

## 2017-06-27 NOTE — PROGRESS NOTES
Hyacinth Jose Luis  : 1945 [facility] at [facility]  [address] [facility], [address] [place] [address], [address]  [phone]   [phone]        OUTPATIENT SPEECH LANGUAGE PATHOLOGY: Daily Note: 2  ICD-10: Treatment Diagnosis: dysphagia, oropharyngeal 13.12  REFERRING PHYSICIAN: Aubrie Nesbitt MD MD Orders: speech therapy  PAST MEDICAL HISTORY:    Mr. Jordyn Baumann is a 80 y.o. male who  has a past medical history of Arthritis; Elevated prostate specific antigen (PSA); History of prostate surgery (3/12/2015); HLA-B27 positive; Impotence of organic origin; Neck pain; Osteoarthritis, hand; Osteopenia; Osteoporosis; Other nonspecific abnormal finding of lung field; Polyarthritis; Polymyalgia rheumatica ([address] Prostate cancer ([address] Raynaud's disease; 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[address] and Thoracic spine pain. He also has no past medical history of Asthma; Autoimmune disease ([address] Chronic kidney disease; Chronic obstructive pulmonary disease ([address] Chronic pain; Diabetes ([address] Difficult intubation; GERD (gastroesophageal reflux disease); Heart abnormalities; Hypertension; Liver disease; Malignant hyperthermia due to anesthesia; Nausea & vomiting; Neurological disorder; Other unknown and unspecified cause of morbidity or mortality; Pseudocholinesterase deficiency; Psychiatric disorder; PUD (peptic ulcer disease); Seizures ([address] Stroke [facility]); Thromboembolus ([address] Thyroid disease; or Unspecified adverse effect of anesthesia. He also  has a past surgical history that includes vasectomy (40+ yrs); knee arthroscopy (); urological; biopsy prostate; prostatectomy (); cataract removal (); cataract removal (); and colonoscopy (2010). MEDICAL/REFERRING DIAGNOSIS: Dysphagia [R13.10]  DATE OF ONSET:   PRIOR LEVEL OF FUNCTION: residing with spouse  PRECAUTIONS/ALLERGIES: Codeine     ASSESSMENT:  Pt reported Figure 1 Protocol is going well.   He reported he is drinking as much water as he did before if not more. He did well with exercises though cues needed to keep his shoulders flat when completing the Shaker. Patient will benefit from skilled intervention to address the below impairments. ?????? ? ? This section established at most recent assessment??????????  PROBLEM LIST (Impairments causing functional limitations):  1. Dysphagia   GOALS: (Goals have been discussed and agreed upon with patient.)  SHORT-TERM FUNCTIONAL GOALS: Time Frame: 3 months  Pt will complete laryngeal exercises with 80% accuracy. Pt will state conditions for [facility] Protocol with min cues. Pt will complete exercises a minimum of 5 days weekly at home. DISCHARGE GOALS: Time Frame: 4-5 months  1. Pt will tolerate least restrictive diet without signs/sx aspiration 100% for safe swallow function. REHABILITATION POTENTIAL FOR STATED GOALS: GoodPLAN OF CARE:  Patient will benefit from skilled intervention to address the following impairments. RECOMMENDATIONS AND PLANNED INTERVENTIONS (Benefits and precautions of therapy have been discussed with the patient.):  · PO:  Regular  · Liquids:  nectar  · no mixed consistencies  MEDICATIONS:  · with thickened liquids  COMPENSATORY STRATEGIES/MODIFICATIONS INCLUDING:  · Alternate liquids/solids  · Double swallow  OTHER RECOMMENDATIONS (including follow up treatment recommendations):   · Laryngeal exercises  RECOMMENDED DIET MODIFICATIONS DISCUSSED WITH:  · Family  · Patient  TREATMENT PLAN EFFECTIVE DATES: 6/16/17 TO 9/16/17  FREQUENCY/DURATION: Continue to follow patient 2 times a week for 12 weeks to address above goals. Regarding Franklin Alpers Perry's therapy, I certify that the treatment plan above will be carried out by a therapist or under their direction. Thank you for this referral,  Katty Ji, [facility], [facility] [place], [address] [facility]                    Referring Physician Signature: Carlos Mcnally MD    Date      SUBJECTIVE:  Pt cooperative.    Present Symptoms: dysphagia       Current Medications:   Current Outpatient Prescriptions:     aspirin 81 mg chewable tablet, Take 1 Tab by mouth daily. , Disp: 30 Tab, Rfl: 1    ferrous sulfate 325 mg (65 mg iron) tablet, Take 1 Tab by mouth two (2) times daily (with meals). , Disp: 60 Tab, Rfl: 1    predniSONE (DELTASONE) 20 mg tablet, Take 2 tablets daily for 1 week then 1 tablet daily until seen in the office for follow up, Disp: 40 Tab, Rfl: 0    potassium chloride (K-DUR, KLOR-CON) 20 mEq tablet, Take 2 Tabs by mouth daily. , Disp: 30 Tab, Rfl: 1    DULoxetine (CYMBALTA) 30 mg capsule, Take 1 Cap by mouth daily. , Disp: 90 Cap, Rfl: 1    traMADol (ULTRAM) 50 mg tablet, Take 1 Tab by mouth three (3) times daily as needed for Pain. Max Daily Amount: 150 mg., Disp: 90 Tab, Rfl: 2    omeprazole (PRILOSEC) 40 mg capsule, Take 1 Cap by mouth daily. , Disp: 30 Cap, Rfl: 5    diclofenac (VOLTAREN) 1 % topical gel, Apply 4 g to affected area four (4) times daily. , Disp: 100 g, Rfl: 2    calcium-vitamin D (OYSTER SHELL) 500 mg(1,250mg) -200 unit per tablet, Take 1 Tab by mouth daily. , Disp: , Rfl:     cholecalciferol, vitamin D3, (VITAMIN D3) 2,000 unit Tab, Take  by mouth. Taking 1 1/2 tabs of 2000 international units once daily, Disp: , Rfl:    Date Last Reviewed: 6/28/17  Current Dietary Status:  regular/nectar       History of reflux:  YES    Reflux medication: Omeprazole  Social History/Home Situation: residing with spouse      Work/Activity History: retired     OBJECTIVE:  Objective Measure: Tool Used: National Outcomes Measurement System: Functional Communication Measures: SWALLOWING  Score:  Initial: 5 Most Recent: X (Date: -- )   Interpretation of Tool: This measure describes the change in functional communication status subsequent to speech-language pathology treatment of patients with dysphagia.  o Level 1:  Individual is not able to swallow anything safely by mouth.  All nutrition and hydration is received through non-oral means (e.g., nasogastric tube, PEG). o Level 2: Individual is not able to swallow safely by mouth for nutrition and hydration, but may take some consistency with consistent maximal cues in therapy only. Alternative method of feeding required. o Level 3:  Alternative method of feeding required as individual takes less than 50% of nutrition and hydration by mouth, and/or swallowing is safe with consistent use of moderate cues to use compensatory strategies and/or requires maximum diet restriction. o Level 4:  Swallowing is safe, but usually requires moderate cues to use compensatory strategies, and/or the individual has moderate diet restrictions and/or still requires tube feeding and/or oral supplements. o Level 5:  Swallowing is safe with minimal diet restriction and/or occasionally requires minimal cueing to use compensatory strategies. The individual may occasionally self-cue. All nutrition and hydration needs are met by mouth at mealtime. o Level 6:  Swallowing is safe, and the individual eats and drinks independently and may rarely require minimal cueing. The individual usually self-cues when difficulty occurs. May need to avoid specific food items (e.g., popcorn and nuts), or require additional time (due to dysphagia). o Level 7: The individuals ability to eat independently is not limited by swallow function. Swallowing would be safe and efficient for all consistencies. Compensatory strategies are effectively used when needed. Score Level 7 Level 6 Level 5 Level 4 Level 3 Level 2 Level 1   Modifier CH CI CJ CK CL CM CN   ?  Swallowing:     - CURRENT STATUS: CJ - 20%-39% impaired, limited or restricted    - GOAL STATUS:  CI - 1%-19% impaired, limited or restricted    - D/C STATUS:  ---------------To be determined---------------    Cognitive and Communication Status:  Mental status: alert       TREATMENT:    (In addition to Assessment/Re-Assessment sessions the following treatments were rendered)  Dysphagia Activities: Activities/Procedures listed utilized to improve progress in swallow function and swallow safety. Required minimal cueing to improve swallow safety. LARYNGEAL / PHARYNGEAL EXERCISES:         Effortful Swallow: Yes  Reps : 15  Sets : 2  Hard Glottal Attack: Yes  Reps : 15  Sets : 1                       Chichi: Yes  Reps : 15  Sets : 2  Mendelsohn Maneuver: Yes  Reps : 15  Sets : 2          Shaker: Yes  Reps :  (3 sets of 30 seconds; 3 sets of 15 repetitions)  Sets : 1  Sing \"EEE\": Yes  Reps : 15  Sets : 1                    Sustained \"ah\": Yes  Sets : 1  Reps : 15              __________________________________________________________________________________________________  Treatment Assessment:    Progression/Medical Necessity:   · Skilled intervention continues to be required due to patient still consuming a modified diet. Compliance with Program/Exercises: Will assess as treatment progresses. Reason for Continuation of Services/Other Comments:  · Patient continues to require skilled intervention due to dysphagia. Recommendations/Intent for next treatment session: \"Treatment next visit will focus on laryngeal exercises\".    Total Treatment Duration:  Time In: 1430  Time Out: [address] [facility], ALISHA, CCC-SLP

## 2017-06-28 ENCOUNTER — HOSPITAL ENCOUNTER (OUTPATIENT)
Dept: PHYSICAL THERAPY | Age: 72
Discharge: HOME | End: 2017-06-28
Payer: MEDICARE

## 2017-06-28 VITALS — HEART RATE: 75 BPM | OXYGEN SATURATION: 96 %

## 2017-06-28 PROCEDURE — 92526 ORAL FUNCTION THERAPY: CPT

## 2017-06-28 PROCEDURE — G8978 MOBILITY CURRENT STATUS: HCPCS

## 2017-06-28 PROCEDURE — 97110 THERAPEUTIC EXERCISES: CPT

## 2017-06-28 PROCEDURE — G8979 MOBILITY GOAL STATUS: HCPCS

## 2017-06-28 NOTE — PROGRESS NOTES
David Malone  : 1945 [facility] at [facility]  [address] [place] [address]  [phone]   [phone]       OUTPATIENT PHYSICAL THERAPY:Daily Note and Recertification     ICD-10: Treatment Diagnosis: Ankylosing Spondylitis of thoracic region (M45.4); Pain in thoracic spine (M54.5); muscle weakness, generalized (M62.81); difficulty walking, not elsewhere classified (R26.2)  Precautions/Allergies:  Codeine   Fall Risk Score: 2 (? 5 = High Risk)  MD Orders: Eval and treat MEDICAL/REFERRING DIAGNOSIS: Thoracic back pain; post pneumonia with debility  DATE OF ONSET: 17  REFERRING PHYSICIAN: Minnie Fiore MD  RETURN PHYSICIAN APPOINTMENT:    RE-CERTIFICATION (): Mr. Usman Farah is showing excellent progress both with strength and endurance. He is now independent with transfers and basic ADLs and has resumed driving to come to therapy. He is up to walking 15 minutes without significant decrease in vital signs and is able to tolerate a full hour of therapy with very minimal rests of less than 2 minutes. He has achieved up to 6000 steps in a single day at this point. TUG score has improved from 23 to 14 seconds. He reports intermittent thoracic back pain related to more prolonged static postures and eased with activity. INITIAL ASSESSMENT ([id number]):  Mr. Usman Farah presents with general debility with overall strength and functional mobility deficits due to prolonged hospitalization due to pneumonia/sepsis; he also complains of thoracic back pain exacerbated due to decreased activity with decreased mobility and strength through the trunk due to worsening ankylosing spondylitis. PROBLEM LIST (Impacting functional limitations): Increased pain through thoracic spine/costal cage limiting tolerance of ADLs and difficulty sleeping  Decreased activity tolerance for basic and instrumental ADLs due to generalized weakness B LE/UE.   Decreased ADL/functional activities specificially with any endurance activity related to decreased overall strength and limited pulmonary capacity post-pneumonia  Outcome measure score of 23 seconds (WNL - 7 seconds) on Timed Up and Go (TUG) test with severe effect of decreased strength/endurance on patient's ability to manage every day life activities  Decreased strength through B LE/UE/trunk limiting all functional mobility INTERVENTIONS PLANNED:  Patient education including pathophysiology of activity tolerance and progression  Manual therapy including soft tissue mobilizations, functional joint mobilizations and neuromuscular re-education to thoracic region  Neuromuscular re-education with focus on initiation/strength and endurance and motor control of B UE/LE/trunk  Therapeutic exercises including strengthening and endurance related tasks  Gait training with focus on correcting deficits, sequencing and honorio  Balance exercise - focused on standing dynamic balance with greatest focus on dynamic center of gravity control  Therapeutic activities with focus on strength and endurance  Therapeutic modalities including pain modalities for thoracic spine  Instructions of home exercise program (HEP)   TREATMENT PLAN:  Effective Dates: 06/28/17 TO 09/28/17  Frequency/Duration: 2 times a week for 6 weeks followed by 1 time a week for 6 weeks  GOALS: (Goals have been discussed and agreed upon with patient.)  Short-Term Functional Goals: Goals achieved; please refer to discharge goals  1. Patient will be able to ambulate greater than 250 ft to get to therapy clinic without the use of a wheelchair (goal achieved). 2. Patient will be able to perform walking on treadmill at home for 5 minutes without O2 saturations below 86% (goal achieved). 3. Patient will be independent with sit-stand without requiring hand support/assistance (goal achieved). Discharge Goals: Time Frame: 12 weeks  1.  Patient will be independent will all functional mobility at home without difficulty or thoracic back pain (goal ongoing). 2. Patient will be able to walk 10,000 steps without difficulty (goal ongoing). 3. Patient will have achieved prior level of function for all ADLs without difficulty or thoracic back pain (goal ongoing). 4. Patient will score less than 12 seconds on TUG and be WNLs (goal ongoing). Rehabilitation Potential For Stated Goals: Excellent    Regarding Vanda Haji's therapy, I certify that the treatment plan above will be carried out by a therapist or under their direction. Thank you for this referral,  Sienna Hopson PT, OCS, RYT, CKTP, CFMT    Referring Physician Signature: Lorelei Huerta MD      Date:                  The information in this section was collected on 6/28/2017 (except where otherwise noted). HISTORY:   History of Present Injury/Illness (Reason for Referral):  Patient reports he was not feeling well from about 05/13-15/17 and was then admitted to hospital for pneumonia/sepsis with a 3-day hospital stay in ICU with complications that included a L lower lobe pneumonia. He was discharged to home on 05/27/17 and referred to outpatient physical therapy for post-hospital physical therapy. Past Medical History/Comorbidities:   Mr. Janice Stringer  has a past medical history of Arthritis; Elevated prostate specific antigen (PSA); History of prostate surgery (3/12/2015); HLA-B27 positive; Neck pain; Osteoarthritis, hand; Osteopenia; Osteoporosis; Polyarthritis; Polymyalgia rheumatica ([address] Prostate cancer ([address] Raynaud's disease; 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[address] Ankylosing Spondylitis and thoracic spine pain. Mr. Janice Stringer  has a past surgical history that includes vasectomy (40+ yrs); knee arthroscopy (2009); urological; biopsy prostate; prostatectomy (2012); cataract removal (2012); cataract removal (2013); and colonoscopy (02/2010). Social History/Living Environment: Patient lives with spouse.  Patient denies and physical barriers at home and has a tub/shower combo. Prior Level of Function/Work/Activity: Patient is retired. Prior to hospitalization, patient reports he walked 5 days/week for 30-50 minutes. .  Dominant Side:  RIGHT  Current Medications:     Current Outpatient Prescriptions:     aspirin 81 mg chewable tablet, Take 1 Tab by mouth daily. , Disp: 30 Tab, Rfl: 1    ferrous sulfate 325 mg (65 mg iron) tablet, Take 1 Tab by mouth two (2) times daily (with meals). , Disp: 60 Tab, Rfl: 1    predniSONE (DELTASONE) 20 mg tablet, Take 2 tablets daily for 1 week then 1 tablet daily until seen in the office for follow up, Disp: 40 Tab, Rfl: 0    potassium chloride (K-DUR, KLOR-CON) 20 mEq tablet, Take 2 Tabs by mouth daily. , Disp: 30 Tab, Rfl: 1    DULoxetine (CYMBALTA) 30 mg capsule, Take 1 Cap by mouth daily. , Disp: 90 Cap, Rfl: 1    traMADol (ULTRAM) 50 mg tablet, Take 1 Tab by mouth three (3) times daily as needed for Pain. Max Daily Amount: 150 mg., Disp: 90 Tab, Rfl: 2    omeprazole (PRILOSEC) 40 mg capsule, Take 1 Cap by mouth daily. , Disp: 30 Cap, Rfl: 5    diclofenac (VOLTAREN) 1 % topical gel, Apply 4 g to affected area four (4) times daily. , Disp: 100 g, Rfl: 2    calcium-vitamin D (OYSTER SHELL) 500 mg(1,250mg) -200 unit per tablet, Take 1 Tab by mouth daily. , Disp: , Rfl:     cholecalciferol, vitamin D3, (VITAMIN D3) 2,000 unit Tab, Take  by mouth. Taking 1 1/2 tabs of 2000 international units once daily, Disp: , Rfl:    Date Last Reviewed:  6/28/2017   Number of Personal Factors/Comorbidities that affect the Plan of Care: 1-2: MODERATE COMPLEXITY   EXAMINATION:   Observation/Orthostatic Postural Assessment: Cachetic body with significant weight loss from hospitalization. Posture - kyphotic with an anterior rotated rib cage that is posterior positioned over a posterior tilted pelvis. Atrophy noted through all LE and UE musculature, most notable gluteals.   ROM:  AROM B UE WNL with shoulder abduction palm down 140 degrees B. Cervical rotation 70% B and pain free. B LE AROM WNL. Passive trunk rotation less than 50% B. Breathing assessment indicated decreased mobility of L lower rib cage with inhalation between ribs 5-8. Strength:  B UE grossly good except 4+/5 for R shoulder ER and B abduction. B LE grossly fair with 4+/5 for gluts, quadriceps, hamstrings and hip flexors. Neurological Screen:        Myotomes:  Good except for strength deficits noted above. Dermatomes: WNL        Reflexes:  2+ and WNL B UE/LE  Functional Mobility:         Gait/Ambulation:  Independent with narrowed stance and limited arm swing B. Endurance with gait - good-        Transfers:  Independent        Bed Mobility:  Independent        Stairs: Alternating steps with 1 hand for balance only  Balance:          Static standing B LE greater than 60 sec; single leg less than 15 sec B. Mental Status:          Alert and oriented x 4   Body Structures Involved:  1. Thoracic Cage  2. Joints  3. Muscles Body Functions Affected:  1. Cardio  2. Neuromusculoskeletal  3. Movement Related Activities and Participation Affected:  1. Mobility  2. Self Care  3. Domestic Life  4. Community, Social and [place]   Number of elements (examined above) that affect the Plan of Care: 4+: HIGH COMPLEXITY   CLINICAL PRESENTATION:   Presentation: Evolving clinical presentation with changing clinical characteristics: MODERATE COMPLEXITY   CLINICAL DECISION MAKING:   Outcome Measure: Tool Used: Modified Oswestry Low Back Pain Questionnaire  Score:  Initial: 22/50 (06/07/17) Most Recent: X/50 (Date: -- )   Interpretation of Score: Each section is scored on a 0-5 scale, 5 representing the greatest disability. The scores of each section are added together for a total score of 50.     Score 0 1-10 11-20 21-30 31-40 41-49 50   Modifier CH CI CJ CK CL CM CN     Tool Used: Timed Up and Go (TUG)  Score:  Initial: 23 seconds (06/07/17) Most Recent: 14 seconds (Date: 06/28/17 ) Interpretation of Score: The test measures, in seconds, the time taken by an individual to stand up from a standard arm chair (seat height 46 cm [18 in], arm height 65 cm [25.6 in]), walk a distance of 3 meters (118 in, approx 10 ft), turn, walk back to the chair and sit down. If the individual takes longer than 14 seconds to complete TUG, this indicates risk for falls. Score 7 7.5-10.5 11-14 14.5-17.5 18-21 21.5-24.5 25+   Modifier CH CI CJ CK CL CM CN     ? Mobility - Walking and Moving Around:     - CURRENT STATUS: CJ - 20%-39% impaired, limited or restricted    - GOAL STATUS: CI - 1%-19% impaired, limited or restricted    - D/C STATUS: ---------------To be determined---------------  Medical Necessity:   · Patient is expected to demonstrate progress in strength, balance and endurance and functional mobility to increase independence with ADLs, improve safety during daily activities and return to prior functional level. · Patient demonstrates excellent rehab potential due to higher previous functional level. Reason for Services/Other Comments:  · Patient continues to require skilled intervention due to general debility with overall strength and functional mobility deficits due to prolonged hospitalization due to pneumonia/sepsis; he also complains of thoracic back pain exacerbated due to decreased activity with decreased mobility and strength through the trunk due to worsening ankylosing spondylitis. Use of outcome tool(s) and clinical judgement create a POC that gives a: Questionable prediction of patient's progress: MODERATE COMPLEXITY            TREATMENT:   (In addition to Assessment/Re-Assessment sessions the following treatments were rendered)  Pre-treatment Symptoms/Complaints: Patient reports he had an uneventful weekend and walked up to 6000 steps yesterday with breaks.   Pain: Initial:   Pain Intensity 1: 0  Post Session:  0/10   Manual Therapy (     ): (Used abbreviations: MET - muscle energy technique; PNF - proprioceptive neuromuscular facilitation; NMR - neuromuscular re-education; a/p - anterior to posterior; p/a - posterior to anterior) None  Therapeutic Exercise: (60 Minutes):  Exercises per grid below to improve strength and endurance. Required minimal verbal cues to promote proper body alignment and promote proper body breathing techniques. Maintained O2 sats over 90% with no rests between exercises. Increased resistance with strengthening exercises and cardio time today. Date:  06/15/17 Date:  6/16/17 Date:   06/19/17 Date:  06/20/17 Date:  06/22/17 Date:  06/26/17 Date:  06/28/17   Activity/Exercise          Treadmill at home 2.0mph x 3min/ rest x 5 min 2.0mph x 5min/ rest/then 3 more min 2.2 mph x 11 mins 2.2 mph x 13 min 2.2 mph x 12 min 2. 4mph 4% grade x 10 min 2.4 mph 4% grade x 12 min   Sit-stand  - 2 min 2 min 2 min 2 min 2 min 2 min   Stairs - heelraises B   5/step x 10 Edge of step x 1 min HEP 50 x 1 50 x 1   Calf stretch    1 min HEP 1 min 1 min   Stairs - double step ups   5 x 2 HEP HEP HEP HEP   Stairs - side step up/down   10 steps x 2 ea HEP HEP HEP HEP   B UE bicep curls 4 lb 2 min with 1 rest 4 lb 2 min  4 lb x 2 min 5 lb x 2 min 5 lb x 2 min 5 lb x 2 min HEP   B UE deltoid/abduction (short lever) 4 lb 2 min 4 lb 2 min 4 lb x 2 min  5 lb x 2 min 5 lb x 2 min 5 lb x 2 min HEP   R UE rows Red 2 min with 1 rest Red 2 min Red x 2 min Green x 2 min Green x 2 min Green x 2 min Blue x 2 min   Unilateral ER Red 2 min with 1 rest Red 2 min Red x 2 min Red x 2 min Red x 2 min Red x 2 min HEP   Triceps dips    1 min 1 min 1 min 1 min   UE/LE ergometer (seat at 9) 3 min/ rest/ 3 min 6 min 2 min 5 min 8 min - level 3 8 min - level 3. .5 10 min - level 3   Leg press (incline with B LE)     75 lb 5 x 2 75 lb s5 x 3 75 lb 10 x 3   Home Exercise Program (HEP): Treadmill progression and stair/step exercises as noted above  Treatment/Session Assessment:    · Response to Treatment: O2 Sats never dropped below 92%. Excellent progress with endurance with minimal rests break mostly for hydration. Continue to progress home walking and strengthening added to home today. · Compliance with Program/Exercises: Very compliant  · Recommendations/Intent for next treatment session: Next visit will focus on advancements to more challenging activities and progressing to more strengthening and endurance activities.   Total Treatment Duration: (60 minutes of treatment)  PT Patient Time In/Time Out  Time In: 0930  Time Out: 1030    Georgina Aschoff, PT

## 2017-06-29 ENCOUNTER — HOSPITAL ENCOUNTER (OUTPATIENT)
Dept: PHYSICAL THERAPY | Age: 72
Discharge: HOME | End: 2017-06-29
Payer: MEDICARE

## 2017-06-29 PROCEDURE — 92526 ORAL FUNCTION THERAPY: CPT

## 2017-06-29 NOTE — PROGRESS NOTES
Leslie Palacios  : 1945 [facility] at [facility], [address], [address]  [phone]   [phone]        OUTPATIENT SPEECH LANGUAGE PATHOLOGY: Daily Note: 3  ICD-10: Treatment Diagnosis: dysphagia, oropharyngeal 13.12  REFERRING PHYSICIAN: Clay Villa MD MD Orders: speech therapy  PAST MEDICAL HISTORY:    Mr. Mode Mason is a 80 y.o. male who  has a past medical history of Arthritis; Elevated prostate specific antigen (PSA); History of prostate surgery (3/12/2015); HLA-B27 positive; Impotence of organic origin; Neck pain; Osteoarthritis, hand; Osteopenia; Osteoporosis; Other nonspecific abnormal finding of lung field; Polyarthritis; Polymyalgia rheumatica ([address] Prostate cancer ([address] Raynaud's disease; 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[address] and Thoracic spine pain. He also has no past medical history of Asthma; Autoimmune disease ([address] Chronic kidney disease; Chronic obstructive pulmonary disease ([address] Chronic pain; Diabetes ([address] Difficult intubation; GERD (gastroesophageal reflux disease); Heart abnormalities; Hypertension; Liver disease; Malignant hyperthermia due to anesthesia; Nausea & vomiting; Neurological disorder; Other unknown and unspecified cause of morbidity or mortality; Pseudocholinesterase deficiency; Psychiatric disorder; PUD (peptic ulcer disease); Seizures ([address] Stroke [facility]); Thromboembolus ([address] Thyroid disease; or Unspecified adverse effect of anesthesia. He also  has a past surgical history that includes vasectomy (40+ yrs); knee arthroscopy (); urological; biopsy prostate; prostatectomy (); cataract removal (); cataract removal (); and colonoscopy (2010). MEDICAL/REFERRING DIAGNOSIS: Dysphagia [R13.10]  DATE OF ONSET:   PRIOR LEVEL OF FUNCTION: residing with spouse  PRECAUTIONS/ALLERGIES: Codeine     ASSESSMENT:  Pt was elevating shoulders along with his neck during completion of the Shaker.   However, after tactile cues he was able to complete it without difficulty. He does well with other exercises. Patient will benefit from skilled intervention to address the below impairments. ?????? ? ? This section established at most recent assessment??????????  PROBLEM LIST (Impairments causing functional limitations):  1. Dysphagia   GOALS: (Goals have been discussed and agreed upon with patient.)  SHORT-TERM FUNCTIONAL GOALS: Time Frame: 3 months  Pt will complete laryngeal exercises with 80% accuracy. Pt will state conditions for [facility] Protocol with min cues. Pt will complete exercises a minimum of 5 days weekly at home. DISCHARGE GOALS: Time Frame: 4-5 months  1. Pt will tolerate least restrictive diet without signs/sx aspiration 100% for safe swallow function. REHABILITATION POTENTIAL FOR STATED GOALS: GoodPLAN OF CARE:  Patient will benefit from skilled intervention to address the following impairments. RECOMMENDATIONS AND PLANNED INTERVENTIONS (Benefits and precautions of therapy have been discussed with the patient.):  · PO:  Regular  · Liquids:  nectar  · no mixed consistencies  MEDICATIONS:  · with thickened liquids  COMPENSATORY STRATEGIES/MODIFICATIONS INCLUDING:  · Alternate liquids/solids  · Double swallow  OTHER RECOMMENDATIONS (including follow up treatment recommendations):   · Laryngeal exercises  RECOMMENDED DIET MODIFICATIONS DISCUSSED WITH:  · Family  · Patient  TREATMENT PLAN EFFECTIVE DATES: 6/16/17 TO 9/16/17  FREQUENCY/DURATION: Continue to follow patient 2 times a week for 12 weeks to address above goals. Regarding Jason Haji's therapy, I certify that the treatment plan above will be carried out by a therapist or under their direction. Thank you for this referral,  Nataly Shea  [address] [address] [facility]                    Referring Physician Signature: Brendan Sinha MD    Date      SUBJECTIVE:  Pt cooperative. Pt's spouse present.   Present Symptoms: dysphagia Current Medications:   Current Outpatient Prescriptions:     aspirin 81 mg chewable tablet, Take 1 Tab by mouth daily. , Disp: 30 Tab, Rfl: 1    ferrous sulfate 325 mg (65 mg iron) tablet, Take 1 Tab by mouth two (2) times daily (with meals). , Disp: 60 Tab, Rfl: 1    predniSONE (DELTASONE) 20 mg tablet, Take 2 tablets daily for 1 week then 1 tablet daily until seen in the office for follow up, Disp: 40 Tab, Rfl: 0    potassium chloride (K-DUR, KLOR-CON) 20 mEq tablet, Take 2 Tabs by mouth daily. , Disp: 30 Tab, Rfl: 1    DULoxetine (CYMBALTA) 30 mg capsule, Take 1 Cap by mouth daily. , Disp: 90 Cap, Rfl: 1    traMADol (ULTRAM) 50 mg tablet, Take 1 Tab by mouth three (3) times daily as needed for Pain. Max Daily Amount: 150 mg., Disp: 90 Tab, Rfl: 2    omeprazole (PRILOSEC) 40 mg capsule, Take 1 Cap by mouth daily. , Disp: 30 Cap, Rfl: 5    diclofenac (VOLTAREN) 1 % topical gel, Apply 4 g to affected area four (4) times daily. , Disp: 100 g, Rfl: 2    calcium-vitamin D (OYSTER SHELL) 500 mg(1,250mg) -200 unit per tablet, Take 1 Tab by mouth daily. , Disp: , Rfl:     cholecalciferol, vitamin D3, (VITAMIN D3) 2,000 unit Tab, Take  by mouth. Taking 1 1/2 tabs of 2000 international units once daily, Disp: , Rfl:    Date Last Reviewed: 6/16/17  Current Dietary Status:  regular/nectar       History of reflux:  YES    Reflux medication: Omeprazole  Social History/Home Situation: residing with spouse      Work/Activity History: retired     OBJECTIVE:  Objective Measure: Tool Used: National Outcomes Measurement System: Functional Communication Measures: SWALLOWING  Score:  Initial: 5 Most Recent: X (Date: -- )   Interpretation of Tool: This measure describes the change in functional communication status subsequent to speech-language pathology treatment of patients with dysphagia.  o Level 1:  Individual is not able to swallow anything safely by mouth.  All nutrition and hydration is received through non-oral means (e.g., nasogastric tube, PEG). o Level 2: Individual is not able to swallow safely by mouth for nutrition and hydration, but may take some consistency with consistent maximal cues in therapy only. Alternative method of feeding required. o Level 3:  Alternative method of feeding required as individual takes less than 50% of nutrition and hydration by mouth, and/or swallowing is safe with consistent use of moderate cues to use compensatory strategies and/or requires maximum diet restriction. o Level 4:  Swallowing is safe, but usually requires moderate cues to use compensatory strategies, and/or the individual has moderate diet restrictions and/or still requires tube feeding and/or oral supplements. o Level 5:  Swallowing is safe with minimal diet restriction and/or occasionally requires minimal cueing to use compensatory strategies. The individual may occasionally self-cue. All nutrition and hydration needs are met by mouth at mealtime. o Level 6:  Swallowing is safe, and the individual eats and drinks independently and may rarely require minimal cueing. The individual usually self-cues when difficulty occurs. May need to avoid specific food items (e.g., popcorn and nuts), or require additional time (due to dysphagia). o Level 7: The individuals ability to eat independently is not limited by swallow function. Swallowing would be safe and efficient for all consistencies. Compensatory strategies are effectively used when needed. Score Level 7 Level 6 Level 5 Level 4 Level 3 Level 2 Level 1   Modifier CH CI CJ CK CL CM CN   ?  Swallowing:     - CURRENT STATUS: CJ - 20%-39% impaired, limited or restricted    - GOAL STATUS:  CI - 1%-19% impaired, limited or restricted    - D/C STATUS:  ---------------To be determined---------------    Cognitive and Communication Status:  Mental status: alert       TREATMENT:    (In addition to Assessment/Re-Assessment sessions the following treatments were rendered)  Dysphagia Activities: Activities/Procedures listed utilized to improve progress in swallow function and swallow safety. Required minimal cueing to improve swallow safety. LARYNGEAL / PHARYNGEAL EXERCISES:         Effortful Swallow: Yes  Reps : 15  Sets : 2  Hard Glottal Attack: Yes  Reps : 15  Sets : 1                       Chichi: Yes  Reps : 15  Sets : 2  Mendelsohn Maneuver: Yes  Reps : 15  Sets : 2          Shaker: Yes  Reps :  (3 sets of 30 seconds; 1 set of 20; 2 sets of 15 repetitions)  Sets : 1  Sing \"EEE\": Yes  Reps : 15  Sets : 1                    Sustained \"ah\": Yes  Sets : 2  Reps : 15              __________________________________________________________________________________________________  Treatment Assessment:    Progression/Medical Necessity:   · Skilled intervention continues to be required due to patient still consuming a modified diet. Compliance with Program/Exercises: Will assess as treatment progresses. Reason for Continuation of Services/Other Comments:  · Patient continues to require skilled intervention due to dysphagia. Recommendations/Intent for next treatment session: \"Treatment next visit will focus on laryngeal exercises\".    Total Treatment Duration:  Time In: 1350  Time Out: Harish 36, MSP, CCC-SLP

## 2017-06-30 ENCOUNTER — APPOINTMENT (OUTPATIENT)
Dept: PHYSICAL THERAPY | Age: 72
End: 2017-06-30
Payer: MEDICARE

## 2017-06-30 LAB
ACID FAST STN SPEC: NEGATIVE
MYCOBACTERIUM SPEC QL CULT: POSITIVE
SPECIMEN PREPARATION: ABNORMAL
SPECIMEN SOURCE: ABNORMAL

## 2017-07-05 ENCOUNTER — HOSPITAL ENCOUNTER (OUTPATIENT)
Dept: PHYSICAL THERAPY | Age: 72
Discharge: HOME | End: 2017-07-05
Payer: MEDICARE

## 2017-07-05 VITALS — HEART RATE: 77 BPM | OXYGEN SATURATION: 98 %

## 2017-07-05 LAB
ACID FAST STN SPEC: NEGATIVE
ACID FAST STN SPEC: NEGATIVE
MYCOBACTERIUM SPEC QL CULT: NEGATIVE
MYCOBACTERIUM SPEC QL CULT: NEGATIVE
SPECIMEN PREPARATION: NORMAL
SPECIMEN PREPARATION: NORMAL
SPECIMEN SOURCE: NORMAL
SPECIMEN SOURCE: NORMAL

## 2017-07-05 PROCEDURE — 92526 ORAL FUNCTION THERAPY: CPT

## 2017-07-05 PROCEDURE — 97110 THERAPEUTIC EXERCISES: CPT

## 2017-07-05 NOTE — PROGRESS NOTES
[facility]  : 1945 Therapy Center at [facility]  [address] [place] [address]  [phone]   [phone]       OUTPATIENT PHYSICAL THERAPY:Daily Note 2017    ICD-10: Treatment Diagnosis: Ankylosing Spondylitis of thoracic region (M45.4); Pain in thoracic spine (M54.5); muscle weakness, generalized (M62.81); difficulty walking, not elsewhere classified (R26.2)  Precautions/Allergies:  Codeine   Fall Risk Score: 2 (? 5 = High Risk)  MD Orders: Eval and treat MEDICAL/REFERRING DIAGNOSIS: Thoracic back pain; post pneumonia with debility  DATE OF ONSET: 17  REFERRING PHYSICIAN: Arsenio Jolley MD  RETURN PHYSICIAN APPOINTMENT:    RE-CERTIFICATION (): Mr. Romero Scott is showing excellent progress both with strength and endurance. He is now independent with transfers and basic ADLs and has resumed driving to come to therapy. He is up to walking 15 minutes without significant decrease in vital signs and is able to tolerate a full hour of therapy with very minimal rests of less than 2 minutes. He has achieved up to 6000 steps in a single day at this point. TUG score has improved from 23 to 14 seconds. He reports intermittent thoracic back pain related to more prolonged static postures and eased with activity. INITIAL ASSESSMENT ([id number]/[id number]):  Mr. Romero Scott presents with general debility with overall strength and functional mobility deficits due to prolonged hospitalization due to pneumonia/sepsis; he also complains of thoracic back pain exacerbated due to decreased activity with decreased mobility and strength through the trunk due to worsening ankylosing spondylitis. PROBLEM LIST (Impacting functional limitations): Increased pain through thoracic spine/costal cage limiting tolerance of ADLs and difficulty sleeping  Decreased activity tolerance for basic and instrumental ADLs due to generalized weakness B LE/UE.   Decreased ADL/functional activities specificially with any endurance activity related to decreased overall strength and limited pulmonary capacity post-pneumonia  Outcome measure score of 23 seconds (WNL - 7 seconds) on Timed Up and Go (TUG) test with severe effect of decreased strength/endurance on patient's ability to manage every day life activities  Decreased strength through B LE/UE/trunk limiting all functional mobility INTERVENTIONS PLANNED:  Patient education including pathophysiology of activity tolerance and progression  Manual therapy including soft tissue mobilizations, functional joint mobilizations and neuromuscular re-education to thoracic region  Neuromuscular re-education with focus on initiation/strength and endurance and motor control of B UE/LE/trunk  Therapeutic exercises including strengthening and endurance related tasks  Gait training with focus on correcting deficits, sequencing and honorio  Balance exercise - focused on standing dynamic balance with greatest focus on dynamic center of gravity control  Therapeutic activities with focus on strength and endurance  Therapeutic modalities including pain modalities for thoracic spine  Instructions of home exercise program (HEP)   TREATMENT PLAN:  Effective Dates: 06/28/17 TO 09/28/17  Frequency/Duration: 2 times a week for 6 weeks followed by 1 time a week for 6 weeks  GOALS: (Goals have been discussed and agreed upon with patient.)  Short-Term Functional Goals: Goals achieved; please refer to discharge goals  1. Patient will be able to ambulate greater than 250 ft to get to therapy clinic without the use of a wheelchair (goal achieved). 2. Patient will be able to perform walking on treadmill at home for 5 minutes without O2 saturations below 86% (goal achieved). 3. Patient will be independent with sit-stand without requiring hand support/assistance (goal achieved). Discharge Goals: Time Frame: 12 weeks  1.  Patient will be independent will all functional mobility at home without difficulty or thoracic back pain (goal ongoing). 2. Patient will be able to walk 10,000 steps without difficulty (goal ongoing). 3. Patient will have achieved prior level of function for all ADLs without difficulty or thoracic back pain (goal ongoing). 4. Patient will score less than 12 seconds on TUG and be WNLs (goal ongoing). Rehabilitation Potential For Stated Goals: Excellent    Regarding Joey Haji's therapy, I certify that the treatment plan above will be carried out by a therapist or under their direction. Thank you for this referral,  Nelly Koyanagi PT, OCS, RYT, CKTP, CFMT    Referring Physician Signature: Nahun Mena MD      Date:                  The information in this section was collected on 7/5/2017 (except where otherwise noted). HISTORY:   History of Present Injury/Illness (Reason for Referral):  Patient reports he was not feeling well from about 05/13-15/17 and was then admitted to hospital for pneumonia/sepsis with a 3-day hospital stay in ICU with complications that included a L lower lobe pneumonia. He was discharged to home on 05/27/17 and referred to outpatient physical therapy for post-hospital physical therapy. Past Medical History/Comorbidities:   Mr. Kwame Holloway  has a past medical history of Arthritis; Elevated prostate specific antigen (PSA); History of prostate surgery (3/12/2015); HLA-B27 positive; Neck pain; Osteoarthritis, hand; Osteopenia; Osteoporosis; Polyarthritis; Polymyalgia rheumatica ([address] Prostate cancer ([address] Raynaud's disease; 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[address] Ankylosing Spondylitis and thoracic spine pain. Mr. Kwame Holloway  has a past surgical history that includes vasectomy (40+ yrs); knee arthroscopy (2009); urological; biopsy prostate; prostatectomy (2012); cataract removal (2012); cataract removal (2013); and colonoscopy (02/2010). Social History/Living Environment: Patient lives with spouse.  Patient denies and physical barriers at home and has a tub/shower combo.  Prior Level of Function/Work/Activity: Patient is retired. Prior to hospitalization, patient reports he walked 5 days/week for 30-50 minutes. .  Dominant Side:  RIGHT  Current Medications:     Current Outpatient Prescriptions:     aspirin 81 mg chewable tablet, Take 1 Tab by mouth daily. , Disp: 30 Tab, Rfl: 1    ferrous sulfate 325 mg (65 mg iron) tablet, Take 1 Tab by mouth two (2) times daily (with meals). , Disp: 60 Tab, Rfl: 1    predniSONE (DELTASONE) 20 mg tablet, Take 2 tablets daily for 1 week then 1 tablet daily until seen in the office for follow up, Disp: 40 Tab, Rfl: 0    potassium chloride (K-DUR, KLOR-CON) 20 mEq tablet, Take 2 Tabs by mouth daily. , Disp: 30 Tab, Rfl: 1    DULoxetine (CYMBALTA) 30 mg capsule, Take 1 Cap by mouth daily. , Disp: 90 Cap, Rfl: 1    traMADol (ULTRAM) 50 mg tablet, Take 1 Tab by mouth three (3) times daily as needed for Pain. Max Daily Amount: 150 mg., Disp: 90 Tab, Rfl: 2    omeprazole (PRILOSEC) 40 mg capsule, Take 1 Cap by mouth daily. , Disp: 30 Cap, Rfl: 5    diclofenac (VOLTAREN) 1 % topical gel, Apply 4 g to affected area four (4) times daily. , Disp: 100 g, Rfl: 2    calcium-vitamin D (OYSTER SHELL) 500 mg(1,250mg) -200 unit per tablet, Take 1 Tab by mouth daily. , Disp: , Rfl:     cholecalciferol, vitamin D3, (VITAMIN D3) 2,000 unit Tab, Take  by mouth. Taking 1 1/2 tabs of 2000 international units once daily, Disp: , Rfl:    Date Last Reviewed:  7/5/2017   Number of Personal Factors/Comorbidities that affect the Plan of Care: 1-2: MODERATE COMPLEXITY   EXAMINATION:   Observation/Orthostatic Postural Assessment: Cachetic body with significant weight loss from hospitalization. Posture - kyphotic with an anterior rotated rib cage that is posterior positioned over a posterior tilted pelvis. Atrophy noted through all LE and UE musculature, most notable gluteals. ROM:  AROM B UE WNL with shoulder abduction palm down 140 degrees B.  Cervical rotation 70% B and pain free. B LE AROM WNL. Passive trunk rotation less than 50% B. Breathing assessment indicated decreased mobility of L lower rib cage with inhalation between ribs 5-8. Strength:  B UE grossly good except 4+/5 for R shoulder ER and B abduction. B LE grossly fair with 4+/5 for gluts, quadriceps, hamstrings and hip flexors. Neurological Screen:        Myotomes:  Good except for strength deficits noted above. Dermatomes: WNL        Reflexes:  2+ and WNL B UE/LE  Functional Mobility:         Gait/Ambulation:  Independent with narrowed stance and limited arm swing B. Endurance with gait - good-        Transfers:  Independent        Bed Mobility:  Independent        Stairs: Alternating steps with 1 hand for balance only  Balance:          Static standing B LE greater than 60 sec; single leg less than 15 sec B. Mental Status:          Alert and oriented x 4   Body Structures Involved:  1. Thoracic Cage  2. Joints  3. Muscles Body Functions Affected:  1. Cardio  2. Neuromusculoskeletal  3. Movement Related Activities and Participation Affected:  1. Mobility  2. Self Care  3. Domestic Life  4. Community, Social and [place]   Number of elements (examined above) that affect the Plan of Care: 4+: HIGH COMPLEXITY   CLINICAL PRESENTATION:   Presentation: Evolving clinical presentation with changing clinical characteristics: MODERATE COMPLEXITY   CLINICAL DECISION MAKING:   Outcome Measure: Tool Used: Modified Oswestry Low Back Pain Questionnaire  Score:  Initial: 22/50 (06/07/17) Most Recent: X/50 (Date: -- )   Interpretation of Score: Each section is scored on a 0-5 scale, 5 representing the greatest disability. The scores of each section are added together for a total score of 50. Score 0 1-10 11-20 21-30 31-40 41-49 50   Modifier CH CI CJ CK CL CM CN     Tool Used: Timed Up and Go (TUG)  Score:  Initial: 23 seconds (06/07/17) Most Recent: 14 seconds (Date: 06/28/17 )   Interpretation of Score:  The test measures, in seconds, the time taken by an individual to stand up from a standard arm chair (seat height 46 cm [18 in], arm height 65 cm [25.6 in]), walk a distance of 3 meters (118 in, approx 10 ft), turn, walk back to the chair and sit down. If the individual takes longer than 14 seconds to complete TUG, this indicates risk for falls. Score 7 7.5-10.5 11-14 14.5-17.5 18-21 21.5-24.5 25+   Modifier CH CI CJ CK CL CM CN     ? Mobility - Walking and Moving Around:     - CURRENT STATUS: CJ - 20%-39% impaired, limited or restricted    - GOAL STATUS: CI - 1%-19% impaired, limited or restricted    - D/C STATUS: ---------------To be determined---------------  Medical Necessity:   · Patient is expected to demonstrate progress in strength, balance and endurance and functional mobility to increase independence with ADLs, improve safety during daily activities and return to prior functional level. · Patient demonstrates excellent rehab potential due to higher previous functional level. Reason for Services/Other Comments:  · Patient continues to require skilled intervention due to general debility with overall strength and functional mobility deficits due to prolonged hospitalization due to pneumonia/sepsis; he also complains of thoracic back pain exacerbated due to decreased activity with decreased mobility and strength through the trunk due to worsening ankylosing spondylitis. Use of outcome tool(s) and clinical judgement create a POC that gives a: Questionable prediction of patient's progress: MODERATE COMPLEXITY            TREATMENT:   (In addition to Assessment/Re-Assessment sessions the following treatments were rendered)  Pre-treatment Symptoms/Complaints: Patient reports having a fall up the stairs this weekend, with no damage done and no pains. His endurance is improving.   Pain: Initial:   Pain Intensity 1: 0  Post Session:  0/10   Manual Therapy (     ): (Used abbreviations: MET - muscle energy technique; PNF - proprioceptive neuromuscular facilitation; NMR - neuromuscular re-education; a/p - anterior to posterior; p/a - posterior to anterior) None  Therapeutic Exercise: (54 Minutes):  Exercises per grid below to improve strength and endurance. Required minimal verbal cues to promote proper body alignment and promote proper body breathing techniques. Maintained O2 sats over 90% with no rests between exercises. Increased resistance with strengthening exercises and cardio time today. Date:  6/16/17 Date:   06/19/17 Date:  06/20/17 Date:  06/22/17 Date:  06/26/17 Date:  06/28/17 Date:  7/5/17   Activity/Exercise          Treadmill at home 2.0mph x 5min/ rest/then 3 more min 2.2 mph x 11 mins 2.2 mph x 13 min 2.2 mph x 12 min 2. 4mph 4% grade x 10 min 2.4 mph 4% grade x 12 min 2.4 mph 4% grade x 12 min   Sit-stand  2 min 2 min 2 min 2 min 2 min 2 min 2 min   Stairs - heelraises B  5/step x 10 Edge of step x 1 min HEP 50 x 1 50 x 1 50 x 1   Calf stretch   1 min HEP 1 min 1 min 1 min   Stairs - double step ups  5 x 2 HEP HEP HEP HEP 5 x 2   Stairs - side step up/down  10 steps x 2 ea HEP HEP HEP HEP    B UE bicep curls 4 lb 2 min  4 lb x 2 min 5 lb x 2 min 5 lb x 2 min 5 lb x 2 min HEP    B UE deltoid/abduction (short lever) 4 lb 2 min 4 lb x 2 min  5 lb x 2 min 5 lb x 2 min 5 lb x 2 min HEP    R UE rows Red 2 min Red x 2 min Green x 2 min Green x 2 min Green x 2 min Blue x 2 min Blue x 2 min   Unilateral ER Red 2 min Red x 2 min Red x 2 min Red x 2 min Red x 2 min HEP    Triceps dips   1 min 1 min 1 min 1 min    UE/LE ergometer (seat at 9) 6 min 2 min 5 min 8 min - level 3 8 min - level 3. .5 10 min - level 3 10 min - level 3   Leg press (incline with B LE)    75 lb 5 x 2 75 lb s5 x 3 75 lb 10 x 3 75 lb 10 x 3  80 lb x 10   Home Exercise Program (HEP): Treadmill progression and stair/step exercises as noted above  Treatment/Session Assessment:    · Response to Treatment: O2 Sats never dropped below 92%.  Not as many breaks needed as last time. Continue to progress home walking and strengthening added to home today. · Compliance with Program/Exercises: Very compliant  · Recommendations/Intent for next treatment session: Next visit will focus on advancements to more challenging activities and progressing to more strengthening and endurance activities.   Total Treatment Duration: (60 minutes of treatment)  PT Patient Time In/Time Out  Time In: 1030  Time Out: Eloy 68, PT

## 2017-07-05 NOTE — PROGRESS NOTES
Alana Tomlinson  : 1945 Therapy Center at [facility], [facility], [address]  [phone]   [phone]        OUTPATIENT SPEECH LANGUAGE PATHOLOGY: Daily Note: 4  ICD-10: Treatment Diagnosis: dysphagia, oropharyngeal 13.12  REFERRING PHYSICIAN: Brenda Martinez MD MD Orders: speech therapy  PAST MEDICAL HISTORY:    Mr. Nemesio Frederick is a 80 y.o. male who  has a past medical history of Arthritis; Elevated prostate specific antigen (PSA); History of prostate surgery (3/12/2015); HLA-B27 positive; Impotence of organic origin; Neck pain; Osteoarthritis, hand; Osteopenia; Osteoporosis; Other nonspecific abnormal finding of lung field; Polyarthritis; Polymyalgia rheumatica ([address] Prostate cancer ([address] Raynaud's disease; 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[address] and Thoracic spine pain. He also has no past medical history of Asthma; Autoimmune disease ([address] Chronic kidney disease; Chronic obstructive pulmonary disease ([address] Chronic pain; Diabetes ([address] Difficult intubation; GERD (gastroesophageal reflux disease); Heart abnormalities; Hypertension; Liver disease; Malignant hyperthermia due to anesthesia; Nausea & vomiting; Neurological disorder; Other unknown and unspecified cause of morbidity or mortality; Pseudocholinesterase deficiency; Psychiatric disorder; PUD (peptic ulcer disease); Seizures ([address] Stroke [facility]); Thromboembolus ([address] Thyroid disease; or Unspecified adverse effect of anesthesia. He also  has a past surgical history that includes vasectomy (40+ yrs); knee arthroscopy (); urological; biopsy prostate; prostatectomy (); cataract removal (); cataract removal (); and colonoscopy (2010). MEDICAL/REFERRING DIAGNOSIS: Dysphagia [R13.10]  DATE OF ONSET:   PRIOR LEVEL OF FUNCTION: residing with spouse  PRECAUTIONS/ALLERGIES: Codeine     ASSESSMENT:  Pt only required one cue this date to keep shoulders depressed for the shaker.   Mild cues to use force with hard \"ahs\". Did well with other exercises. Patient will benefit from skilled intervention to address the below impairments. ?????? ? ? This section established at most recent assessment??????????  PROBLEM LIST (Impairments causing functional limitations):  1. Dysphagia   GOALS: (Goals have been discussed and agreed upon with patient.)  SHORT-TERM FUNCTIONAL GOALS: Time Frame: 3 months  Pt will complete laryngeal exercises with 80% accuracy. Pt will state conditions for [facility] Protocol with min cues. Pt will complete exercises a minimum of 5 days weekly at home. DISCHARGE GOALS: Time Frame: 4-5 months  1. Pt will tolerate least restrictive diet without signs/sx aspiration 100% for safe swallow function. REHABILITATION POTENTIAL FOR STATED GOALS: GoodPLAN OF CARE:  Patient will benefit from skilled intervention to address the following impairments. RECOMMENDATIONS AND PLANNED INTERVENTIONS (Benefits and precautions of therapy have been discussed with the patient.):  · PO:  Regular  · Liquids:  nectar  · no mixed consistencies  MEDICATIONS:  · with thickened liquids  COMPENSATORY STRATEGIES/MODIFICATIONS INCLUDING:  · Alternate liquids/solids  · Double swallow  OTHER RECOMMENDATIONS (including follow up treatment recommendations):   · Laryngeal exercises  RECOMMENDED DIET MODIFICATIONS DISCUSSED WITH:  · Family  · Patient  TREATMENT PLAN EFFECTIVE DATES: 6/16/17 TO 9/16/17  FREQUENCY/DURATION: Continue to follow patient 2 times a week for 12 weeks to address above goals. Regarding Rachel Haji's therapy, I certify that the treatment plan above will be carried out by a therapist or under their direction. Thank you for this referral,  Tommy Ventura, [facility], [facility] [place], [address] [facility]                    Referring Physician Signature: Armond Friedman MD    Date      SUBJECTIVE:  Pt apologized for being late. He reported he had a f/u appointment with his PCP and he was running behind.   Present Symptoms: dysphagia       Current Medications:   Current Outpatient Prescriptions:     aspirin 81 mg chewable tablet, Take 1 Tab by mouth daily. , Disp: 30 Tab, Rfl: 1    ferrous sulfate 325 mg (65 mg iron) tablet, Take 1 Tab by mouth two (2) times daily (with meals). , Disp: 60 Tab, Rfl: 1    predniSONE (DELTASONE) 20 mg tablet, Take 2 tablets daily for 1 week then 1 tablet daily until seen in the office for follow up, Disp: 40 Tab, Rfl: 0    potassium chloride (K-DUR, KLOR-CON) 20 mEq tablet, Take 2 Tabs by mouth daily. , Disp: 30 Tab, Rfl: 1    DULoxetine (CYMBALTA) 30 mg capsule, Take 1 Cap by mouth daily. , Disp: 90 Cap, Rfl: 1    traMADol (ULTRAM) 50 mg tablet, Take 1 Tab by mouth three (3) times daily as needed for Pain. Max Daily Amount: 150 mg., Disp: 90 Tab, Rfl: 2    omeprazole (PRILOSEC) 40 mg capsule, Take 1 Cap by mouth daily. , Disp: 30 Cap, Rfl: 5    diclofenac (VOLTAREN) 1 % topical gel, Apply 4 g to affected area four (4) times daily. , Disp: 100 g, Rfl: 2    calcium-vitamin D (OYSTER SHELL) 500 mg(1,250mg) -200 unit per tablet, Take 1 Tab by mouth daily. , Disp: , Rfl:     cholecalciferol, vitamin D3, (VITAMIN D3) 2,000 unit Tab, Take  by mouth. Taking 1 1/2 tabs of 2000 international units once daily, Disp: , Rfl:    Date Last Reviewed: 7/5/17  Current Dietary Status:  regular/nectar       History of reflux:  YES    Reflux medication: Omeprazole  Social History/Home Situation: residing with spouse      Work/Activity History: retired     OBJECTIVE:  Objective Measure: Tool Used: National Outcomes Measurement System: Functional Communication Measures: SWALLOWING  Score:  Initial: 5 Most Recent: X (Date: -- )   Interpretation of Tool: This measure describes the change in functional communication status subsequent to speech-language pathology treatment of patients with dysphagia.  o Level 1:  Individual is not able to swallow anything safely by mouth.  All nutrition and hydration is received through non-oral means (e.g., nasogastric tube, PEG). o Level 2: Individual is not able to swallow safely by mouth for nutrition and hydration, but may take some consistency with consistent maximal cues in therapy only. Alternative method of feeding required. o Level 3:  Alternative method of feeding required as individual takes less than 50% of nutrition and hydration by mouth, and/or swallowing is safe with consistent use of moderate cues to use compensatory strategies and/or requires maximum diet restriction. o Level 4:  Swallowing is safe, but usually requires moderate cues to use compensatory strategies, and/or the individual has moderate diet restrictions and/or still requires tube feeding and/or oral supplements. o Level 5:  Swallowing is safe with minimal diet restriction and/or occasionally requires minimal cueing to use compensatory strategies. The individual may occasionally self-cue. All nutrition and hydration needs are met by mouth at mealtime. o Level 6:  Swallowing is safe, and the individual eats and drinks independently and may rarely require minimal cueing. The individual usually self-cues when difficulty occurs. May need to avoid specific food items (e.g., popcorn and nuts), or require additional time (due to dysphagia). o Level 7: The individuals ability to eat independently is not limited by swallow function. Swallowing would be safe and efficient for all consistencies. Compensatory strategies are effectively used when needed. Score Level 7 Level 6 Level 5 Level 4 Level 3 Level 2 Level 1   Modifier CH CI CJ CK CL CM CN   ?  Swallowing:     - CURRENT STATUS: CJ - 20%-39% impaired, limited or restricted    - GOAL STATUS:  CI - 1%-19% impaired, limited or restricted    - D/C STATUS:  ---------------To be determined---------------    Cognitive and Communication Status:  Mental status: alert       TREATMENT:    (In addition to Assessment/Re-Assessment sessions the following treatments were rendered)  Dysphagia Activities: Activities/Procedures listed utilized to improve progress in swallow function and swallow safety. Required minimal cueing to improve swallow safety. LARYNGEAL / PHARYNGEAL EXERCISES:         Effortful Swallow: Yes  Reps : 15  Sets : 1  Hard Glottal Attack: Yes  Reps : 15 (cues to use force )  Sets : 1                       Chichi: Yes  Reps : 15  Sets : 1  Mendelsohn Maneuver: Yes  Reps : 15  Sets : 1          Shaker: Yes  Reps :  (3 sets of 30 seconds; 3 sets of 15 repetitions)  Sets : 1  Sing \"EEE\": Yes  Reps : 15  Sets : 1                    Sustained \"ah\": Yes  Sets : 1  Reps : 15              __________________________________________________________________________________________________  Treatment Assessment:    Progression/Medical Necessity:   · Skilled intervention continues to be required due to patient still consuming a modified diet. Compliance with Program/Exercises: Will assess as treatment progresses. Reason for Continuation of Services/Other Comments:  · Patient continues to require skilled intervention due to dysphagia. Recommendations/Intent for next treatment session: \"Treatment next visit will focus on laryngeal exercises\".    Total Treatment Duration:  Time In: 1525  Time Out: Cherri 23, MSP, CCC-SLP

## 2017-07-06 NOTE — PROGRESS NOTES
Chadwick Whitman  : 1945 [facility] at [address], [address]  [phone]   [phone]        OUTPATIENT SPEECH LANGUAGE PATHOLOGY: Daily Note: 5  ICD-10: Treatment Diagnosis: dysphagia, oropharyngeal 13.12  REFERRING PHYSICIAN: Tee Zarate MD MD Orders: speech therapy  PAST MEDICAL HISTORY:    Mr. Becky Sims is a 80 y.o. male who  has a past medical history of Arthritis; Elevated prostate specific antigen (PSA); History of prostate surgery (3/12/2015); HLA-B27 positive; Impotence of organic origin; Neck pain; Osteoarthritis, hand; Osteopenia; Osteoporosis; Other nonspecific abnormal finding of lung field; Polyarthritis; Polymyalgia rheumatica ([address] Prostate cancer ([address] Raynaud's disease; 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[address] and Thoracic spine pain. He also has no past medical history of Asthma; Autoimmune disease ([address] Chronic kidney disease; Chronic obstructive pulmonary disease ([address] Chronic pain; Diabetes ([address] Difficult intubation; GERD (gastroesophageal reflux disease); Heart abnormalities; Hypertension; Liver disease; Malignant hyperthermia due to anesthesia; Nausea & vomiting; Neurological disorder; Other unknown and unspecified cause of morbidity or mortality; Pseudocholinesterase deficiency; Psychiatric disorder; PUD (peptic ulcer disease); Seizures ([address] Stroke [facility]); Thromboembolus ([address] Thyroid disease; or Unspecified adverse effect of anesthesia. He also  has a past surgical history that includes vasectomy (40+ yrs); knee arthroscopy (); urological; biopsy prostate; prostatectomy (); cataract removal (); cataract removal (); and colonoscopy (2010). MEDICAL/REFERRING DIAGNOSIS: Dysphagia [R13.10]  DATE OF ONSET:   PRIOR LEVEL OF FUNCTION: residing with spouse  PRECAUTIONS/ALLERGIES: Codeine     ASSESSMENT:  Pt continues to need mild cues to use force with forceful \"ahs\".   He independently kept his shoulders stationary with the Shaker. Patient will benefit from skilled intervention to address the below impairments. ?????? ? ? This section established at most recent assessment??????????  PROBLEM LIST (Impairments causing functional limitations):  1. Dysphagia   GOALS: (Goals have been discussed and agreed upon with patient.)  SHORT-TERM FUNCTIONAL GOALS: Time Frame: 3 months  Pt will complete laryngeal exercises with 80% accuracy. Pt will state conditions for [facility] Protocol with min cues. Pt will complete exercises a minimum of 5 days weekly at home. DISCHARGE GOALS: Time Frame: 4-5 months  1. Pt will tolerate least restrictive diet without signs/sx aspiration 100% for safe swallow function. REHABILITATION POTENTIAL FOR STATED GOALS: GoodPLAN OF CARE:  Patient will benefit from skilled intervention to address the following impairments. RECOMMENDATIONS AND PLANNED INTERVENTIONS (Benefits and precautions of therapy have been discussed with the patient.):  · PO:  Regular  · Liquids:  nectar  · no mixed consistencies  MEDICATIONS:  · with thickened liquids  COMPENSATORY STRATEGIES/MODIFICATIONS INCLUDING:  · Alternate liquids/solids  · Double swallow  OTHER RECOMMENDATIONS (including follow up treatment recommendations):   · Laryngeal exercises  RECOMMENDED DIET MODIFICATIONS DISCUSSED WITH:  · Family  · Patient  TREATMENT PLAN EFFECTIVE DATES: 6/16/17 TO 9/16/17  FREQUENCY/DURATION: Continue to follow patient 2 times a week for 12 weeks to address above goals. Regarding Rachel Haji's therapy, I certify that the treatment plan above will be carried out by a therapist or under their direction. Thank you for this referral,  Tommy Ventura, [facility], [facility], [id number] [facility]                    Referring Physician Signature: Armond Friedman MD    Date      SUBJECTIVE:  Pt apologized for being late. He reported he had a f/u appointment with his PCP and he was running behind.   Present Symptoms: dysphagia Current Medications:   Current Outpatient Prescriptions:     aspirin 81 mg chewable tablet, Take 1 Tab by mouth daily. , Disp: 30 Tab, Rfl: 1    ferrous sulfate 325 mg (65 mg iron) tablet, Take 1 Tab by mouth two (2) times daily (with meals). , Disp: 60 Tab, Rfl: 1    predniSONE (DELTASONE) 20 mg tablet, Take 2 tablets daily for 1 week then 1 tablet daily until seen in the office for follow up, Disp: 40 Tab, Rfl: 0    potassium chloride (K-DUR, KLOR-CON) 20 mEq tablet, Take 2 Tabs by mouth daily. , Disp: 30 Tab, Rfl: 1    DULoxetine (CYMBALTA) 30 mg capsule, Take 1 Cap by mouth daily. , Disp: 90 Cap, Rfl: 1    traMADol (ULTRAM) 50 mg tablet, Take 1 Tab by mouth three (3) times daily as needed for Pain. Max Daily Amount: 150 mg., Disp: 90 Tab, Rfl: 2    omeprazole (PRILOSEC) 40 mg capsule, Take 1 Cap by mouth daily. , Disp: 30 Cap, Rfl: 5    diclofenac (VOLTAREN) 1 % topical gel, Apply 4 g to affected area four (4) times daily. , Disp: 100 g, Rfl: 2    calcium-vitamin D (OYSTER SHELL) 500 mg(1,250mg) -200 unit per tablet, Take 1 Tab by mouth daily. , Disp: , Rfl:     cholecalciferol, vitamin D3, (VITAMIN D3) 2,000 unit Tab, Take  by mouth. Taking 1 1/2 tabs of 2000 international units once daily, Disp: , Rfl:   No current facility-administered medications for this encounter. Date Last Reviewed: 7/5/17  Current Dietary Status:  regular/nectar       History of reflux:  YES    Reflux medication: Omeprazole  Social History/Home Situation: residing with spouse      Work/Activity History: retired     OBJECTIVE:  Objective Measure:   Tool Used: National Outcomes Measurement System: Functional Communication Measures: SWALLOWING  Score:  Initial: 5 Most Recent: X (Date: -- )   Interpretation of Tool: This measure describes the change in functional communication status subsequent to speech-language pathology treatment of patients with dysphagia.  o Level 1:  Individual is not able to swallow anything safely by mouth. All nutrition and hydration is received through non-oral means (e.g., nasogastric tube, PEG). o Level 2: Individual is not able to swallow safely by mouth for nutrition and hydration, but may take some consistency with consistent maximal cues in therapy only. Alternative method of feeding required. o Level 3:  Alternative method of feeding required as individual takes less than 50% of nutrition and hydration by mouth, and/or swallowing is safe with consistent use of moderate cues to use compensatory strategies and/or requires maximum diet restriction. o Level 4:  Swallowing is safe, but usually requires moderate cues to use compensatory strategies, and/or the individual has moderate diet restrictions and/or still requires tube feeding and/or oral supplements. o Level 5:  Swallowing is safe with minimal diet restriction and/or occasionally requires minimal cueing to use compensatory strategies. The individual may occasionally self-cue. All nutrition and hydration needs are met by mouth at mealtime. o Level 6:  Swallowing is safe, and the individual eats and drinks independently and may rarely require minimal cueing. The individual usually self-cues when difficulty occurs. May need to avoid specific food items (e.g., popcorn and nuts), or require additional time (due to dysphagia). o Level 7: The individuals ability to eat independently is not limited by swallow function. Swallowing would be safe and efficient for all consistencies. Compensatory strategies are effectively used when needed. Score Level 7 Level 6 Level 5 Level 4 Level 3 Level 2 Level 1   Modifier CH CI CJ CK CL CM CN   ?  Swallowing:     - CURRENT STATUS: CJ - 20%-39% impaired, limited or restricted    - GOAL STATUS:  CI - 1%-19% impaired, limited or restricted    - D/C STATUS:  ---------------To be determined---------------    Cognitive and Communication Status:  Mental status: alert       TREATMENT:    (In addition to Assessment/Re-Assessment sessions the following treatments were rendered)  Dysphagia Activities: Activities/Procedures listed utilized to improve progress in swallow function and swallow safety. Required minimal cueing to improve swallow safety. LARYNGEAL / PHARYNGEAL EXERCISES:         Effortful Swallow: Yes  Reps : 15  Sets : 2  Hard Glottal Attack: Yes  Reps : 15 (mild cues)  Sets : 2                       Chichi: Yes  Reps : 15  Sets : 2  Mendelsohn Maneuver: Yes  Reps : 15  Sets : 2          Shaker: Yes  Reps :  (3 sets of 30 seconds; 3 sets of 15 repetitions)  Sets : 1  Sing \"EEE\": Yes  Reps : 15  Sets : 2                    Sustained \"ah\": Yes  Sets : 2  Reps : 15              __________________________________________________________________________________________________  Treatment Assessment:    Progression/Medical Necessity:   · Skilled intervention continues to be required due to patient still consuming a modified diet. Compliance with Program/Exercises: Will assess as treatment progresses. Reason for Continuation of Services/Other Comments:  · Patient continues to require skilled intervention due to dysphagia. Recommendations/Intent for next treatment session: \"Treatment next visit will focus on laryngeal exercises\".    Total Treatment Duration:  Time In: 1100  Time Out: [address] Gilma Hinds, MSP, CCC-SLP

## 2017-07-07 ENCOUNTER — HOSPITAL ENCOUNTER (OUTPATIENT)
Dept: PHYSICAL THERAPY | Age: 72
Discharge: HOME | End: 2017-07-07
Payer: MEDICARE

## 2017-07-07 VITALS — OXYGEN SATURATION: 98 % | HEART RATE: 75 BPM

## 2017-07-07 PROCEDURE — 97110 THERAPEUTIC EXERCISES: CPT

## 2017-07-07 PROCEDURE — 92526 ORAL FUNCTION THERAPY: CPT

## 2017-07-07 NOTE — PROGRESS NOTES
Noam Client  : 1945 [facility]  [address] [place] [address]  [phone]   [phone]       OUTPATIENT PHYSICAL THERAPY:Daily Note 2017    ICD-10: Treatment Diagnosis: Ankylosing Spondylitis of thoracic region (M45.4); Pain in thoracic spine (M54.5); muscle weakness, generalized (M62.81); difficulty walking, not elsewhere classified (R26.2)  Precautions/Allergies:  Codeine   Fall Risk Score: 2 (? 5 = High Risk)  MD Orders: Eval and treat MEDICAL/REFERRING DIAGNOSIS: Thoracic back pain; post pneumonia with debility  DATE OF ONSET: 17  REFERRING PHYSICIAN: Rebel Alfaro MD  RETURN PHYSICIAN APPOINTMENT:    RE-CERTIFICATION (): Mr. Della López is showing excellent progress both with strength and endurance. He is now independent with transfers and basic ADLs and has resumed driving to come to therapy. He is up to walking 15 minutes without significant decrease in vital signs and is able to tolerate a full hour of therapy with very minimal rests of less than 2 minutes. He has achieved up to 6000 steps in a single day at this point. TUG score has improved from 23 to 14 seconds. He reports intermittent thoracic back pain related to more prolonged static postures and eased with activity. INITIAL ASSESSMENT ([id number]/42126):  Mr. Della López presents with general debility with overall strength and functional mobility deficits due to prolonged hospitalization due to pneumonia/sepsis; he also complains of thoracic back pain exacerbated due to decreased activity with decreased mobility and strength through the trunk due to worsening ankylosing spondylitis. PROBLEM LIST (Impacting functional limitations): Increased pain through thoracic spine/costal cage limiting tolerance of ADLs and difficulty sleeping  Decreased activity tolerance for basic and instrumental ADLs due to generalized weakness B LE/UE.   Decreased ADL/functional activities specificially with any endurance activity related to decreased overall strength and limited pulmonary capacity post-pneumonia  Outcome measure score of 23 seconds (WNL - 7 seconds) on Timed Up and Go (TUG) test with severe effect of decreased strength/endurance on patient's ability to manage every day life activities  Decreased strength through B LE/UE/trunk limiting all functional mobility INTERVENTIONS PLANNED:  Patient education including pathophysiology of activity tolerance and progression  Manual therapy including soft tissue mobilizations, functional joint mobilizations and neuromuscular re-education to thoracic region  Neuromuscular re-education with focus on initiation/strength and endurance and motor control of B UE/LE/trunk  Therapeutic exercises including strengthening and endurance related tasks  Gait training with focus on correcting deficits, sequencing and honorio  Balance exercise - focused on standing dynamic balance with greatest focus on dynamic center of gravity control  Therapeutic activities with focus on strength and endurance  Therapeutic modalities including pain modalities for thoracic spine  Instructions of home exercise program (HEP)   TREATMENT PLAN:  Effective Dates: 06/28/17 TO 09/28/17  Frequency/Duration: 2 times a week for 6 weeks followed by 1 time a week for 6 weeks  GOALS: (Goals have been discussed and agreed upon with patient.)  Short-Term Functional Goals: Goals achieved; please refer to discharge goals  1. Patient will be able to ambulate greater than 250 ft to get to therapy clinic without the use of a wheelchair (goal achieved). 2. Patient will be able to perform walking on treadmill at home for 5 minutes without O2 saturations below 86% (goal achieved). 3. Patient will be independent with sit-stand without requiring hand support/assistance (goal achieved). Discharge Goals: Time Frame: 12 weeks  1.  Patient will be independent will all functional mobility at home without difficulty or thoracic back pain (goal ongoing). 2. Patient will be able to walk 10,000 steps without difficulty (goal ongoing). 3. Patient will have achieved prior level of function for all ADLs without difficulty or thoracic back pain (goal ongoing). 4. Patient will score less than 12 seconds on TUG and be WNLs (goal ongoing). Rehabilitation Potential For Stated Goals: Excellent    Regarding Marian Haji's therapy, I certify that the treatment plan above will be carried out by a therapist or under their direction. Thank you for this referral,  Don Zaragoza PT, OCS, RYT, CKTP, CFMT    Referring Physician Signature: Eloisa Gomez., MD      Date:                  The information in this section was collected on 7/7/2017 (except where otherwise noted). HISTORY:   History of Present Injury/Illness (Reason for Referral):  Patient reports he was not feeling well from about 05/13-15/17 and was then admitted to hospital for pneumonia/sepsis with a 3-day hospital stay in ICU with complications that included a L lower lobe pneumonia. He was discharged to home on 05/27/17 and referred to outpatient physical therapy for post-hospital physical therapy. Past Medical History/Comorbidities:   Mr. Anuradha Ziegler  has a past medical history of Arthritis; Elevated prostate specific antigen (PSA); History of prostate surgery (3/12/2015); HLA-B27 positive; Neck pain; Osteoarthritis, hand; Osteopenia; Osteoporosis; Polyarthritis; Polymyalgia rheumatica ([address] Prostate cancer ([address] Raynaud's disease; 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[address] Ankylosing Spondylitis and thoracic spine pain. Mr. Anuradha Ziegler  has a past surgical history that includes vasectomy (40+ yrs); knee arthroscopy (2009); urological; biopsy prostate; prostatectomy (2012); cataract removal (2012); cataract removal (2013); and colonoscopy (02/2010). Social History/Living Environment: Patient lives with spouse.  Patient denies and physical barriers at home and has a tub/shower combo.  Prior Level of Function/Work/Activity: Patient is retired. Prior to hospitalization, patient reports he walked 5 days/week for 30-50 minutes. .  Dominant Side:  RIGHT  Current Medications:     Current Outpatient Prescriptions:     aspirin 81 mg chewable tablet, Take 1 Tab by mouth daily. , Disp: 30 Tab, Rfl: 1    ferrous sulfate 325 mg (65 mg iron) tablet, Take 1 Tab by mouth two (2) times daily (with meals). , Disp: 60 Tab, Rfl: 1    predniSONE (DELTASONE) 20 mg tablet, Take 2 tablets daily for 1 week then 1 tablet daily until seen in the office for follow up, Disp: 40 Tab, Rfl: 0    potassium chloride (K-DUR, KLOR-CON) 20 mEq tablet, Take 2 Tabs by mouth daily. , Disp: 30 Tab, Rfl: 1    DULoxetine (CYMBALTA) 30 mg capsule, Take 1 Cap by mouth daily. , Disp: 90 Cap, Rfl: 1    traMADol (ULTRAM) 50 mg tablet, Take 1 Tab by mouth three (3) times daily as needed for Pain. Max Daily Amount: 150 mg., Disp: 90 Tab, Rfl: 2    omeprazole (PRILOSEC) 40 mg capsule, Take 1 Cap by mouth daily. , Disp: 30 Cap, Rfl: 5    diclofenac (VOLTAREN) 1 % topical gel, Apply 4 g to affected area four (4) times daily. , Disp: 100 g, Rfl: 2    calcium-vitamin D (OYSTER SHELL) 500 mg(1,250mg) -200 unit per tablet, Take 1 Tab by mouth daily. , Disp: , Rfl:     cholecalciferol, vitamin D3, (VITAMIN D3) 2,000 unit Tab, Take  by mouth. Taking 1 1/2 tabs of 2000 international units once daily, Disp: , Rfl:    Date Last Reviewed:  7/7/2017   Number of Personal Factors/Comorbidities that affect the Plan of Care: 1-2: MODERATE COMPLEXITY   EXAMINATION:   Observation/Orthostatic Postural Assessment: Cachetic body with significant weight loss from hospitalization. Posture - kyphotic with an anterior rotated rib cage that is posterior positioned over a posterior tilted pelvis. Atrophy noted through all LE and UE musculature, most notable gluteals. ROM:  AROM B UE WNL with shoulder abduction palm down 140 degrees B.  Cervical rotation 70% B and pain free. B LE AROM WNL. Passive trunk rotation less than 50% B. Breathing assessment indicated decreased mobility of L lower rib cage with inhalation between ribs 5-8. Strength:  B UE grossly good except 4+/5 for R shoulder ER and B abduction. B LE grossly fair with 4+/5 for gluts, quadriceps, hamstrings and hip flexors. Neurological Screen:        Myotomes:  Good except for strength deficits noted above. Dermatomes: WNL        Reflexes:  2+ and WNL B UE/LE  Functional Mobility:         Gait/Ambulation:  Independent with narrowed stance and limited arm swing B. Endurance with gait - good-        Transfers:  Independent        Bed Mobility:  Independent        Stairs: Alternating steps with 1 hand for balance only  Balance:          Static standing B LE greater than 60 sec; single leg less than 15 sec B. Mental Status:          Alert and oriented x 4   Body Structures Involved:  1. Thoracic Cage  2. Joints  3. Muscles Body Functions Affected:  1. Cardio  2. Neuromusculoskeletal  3. Movement Related Activities and Participation Affected:  1. Mobility  2. Self Care  3. Domestic Life  4. Community, Social and [place]   Number of elements (examined above) that affect the Plan of Care: 4+: HIGH COMPLEXITY   CLINICAL PRESENTATION:   Presentation: Evolving clinical presentation with changing clinical characteristics: MODERATE COMPLEXITY   CLINICAL DECISION MAKING:   Outcome Measure: Tool Used: Modified Oswestry Low Back Pain Questionnaire  Score:  Initial: 22/50 (06/07/17) Most Recent: X/50 (Date: -- )   Interpretation of Score: Each section is scored on a 0-5 scale, 5 representing the greatest disability. The scores of each section are added together for a total score of 50. Score 0 1-10 11-20 21-30 31-40 41-49 50   Modifier CH CI CJ CK CL CM CN     Tool Used: Timed Up and Go (TUG)  Score:  Initial: 23 seconds (06/07/17) Most Recent: 14 seconds (Date: 06/28/17 )   Interpretation of Score:  The test measures, in seconds, the time taken by an individual to stand up from a standard arm chair (seat height 46 cm [18 in], arm height 65 cm [25.6 in]), walk a distance of 3 meters (118 in, approx 10 ft), turn, walk back to the chair and sit down. If the individual takes longer than 14 seconds to complete TUG, this indicates risk for falls. Score 7 7.5-10.5 11-14 14.5-17.5 18-21 21.5-24.5 25+   Modifier CH CI CJ CK CL CM CN     ? Mobility - Walking and Moving Around:     - CURRENT STATUS: CJ - 20%-39% impaired, limited or restricted    - GOAL STATUS: CI - 1%-19% impaired, limited or restricted    - D/C STATUS: ---------------To be determined---------------  Medical Necessity:   · Patient is expected to demonstrate progress in strength, balance and endurance and functional mobility to increase independence with ADLs, improve safety during daily activities and return to prior functional level. · Patient demonstrates excellent rehab potential due to higher previous functional level. Reason for Services/Other Comments:  · Patient continues to require skilled intervention due to general debility with overall strength and functional mobility deficits due to prolonged hospitalization due to pneumonia/sepsis; he also complains of thoracic back pain exacerbated due to decreased activity with decreased mobility and strength through the trunk due to worsening ankylosing spondylitis.    Use of outcome tool(s) and clinical judgement create a POC that gives a: Questionable prediction of patient's progress: MODERATE COMPLEXITY            TREATMENT:   (In addition to Assessment/Re-Assessment sessions the following treatments were rendered)  Pre-treatment Symptoms/Complaints: Patient reports feeling good and still trying to get his [id number] steps in for a day this week  Pain: Initial:   Pain Intensity 1: 0  Post Session:  0/10   Manual Therapy (     ): (Used abbreviations: MET - muscle energy technique; PNF - proprioceptive neuromuscular facilitation; NMR - neuromuscular re-education; a/p - anterior to posterior; p/a - posterior to anterior) None  Therapeutic Exercise: (57 Minutes):  Exercises per grid below to improve strength and endurance. Required minimal verbal cues to promote proper body alignment and promote proper body breathing techniques. Maintained O2 sats over 90% with no rests between exercises. Increased resistance with strengthening exercises and cardio time today. Date:   06/19/17 Date:  06/20/17 Date:  06/22/17 Date:  06/26/17 Date:  06/28/17 Date:  7/5/17 Date:  7/7/17   Activity/Exercise          Treadmill at home 2.2 mph x 11 mins 2.2 mph x 13 min 2.2 mph x 12 min 2. 4mph 4% grade x 10 min 2.4 mph 4% grade x 12 min 2.4 mph 4% grade x 12 min 2.5 mph 4% grade x 12 min   Sit-stand  2 min 2 min 2 min 2 min 2 min 2 min 2 min   Stairs - heelraises B 5/step x 10 Edge of step x 1 min HEP 50 x 1 50 x 1 50 x 1 50 x 1   Calf stretch  1 min HEP 1 min 1 min 1 min 1 min   Stairs - double step ups 5 x 2 HEP HEP HEP HEP 5 x 2 5 x 2   Stairs - side step up/down 10 steps x 2 ea HEP HEP HEP HEP     B UE bicep curls 4 lb x 2 min 5 lb x 2 min 5 lb x 2 min 5 lb x 2 min HEP     B UE deltoid/abduction (short lever) 4 lb x 2 min  5 lb x 2 min 5 lb x 2 min 5 lb x 2 min HEP     R UE rows Red x 2 min Green x 2 min Green x 2 min Green x 2 min Blue x 2 min Blue x 2 min Blue x 2 min   Unilateral ER Red x 2 min Red x 2 min Red x 2 min Red x 2 min HEP     Triceps dips  1 min 1 min 1 min 1 min     UE/LE ergometer (seat at 9) 2 min 5 min 8 min - level 3 8 min - level 3. .5 10 min - level 3 10 min - level 3 10 min - level 3   Leg press (incline with B LE)   75 lb 5 x 2 75 lb s5 x 3 75 lb 10 x 3 75 lb 10 x 3  80 lb x 10 80 lb  10 x 3   Step down       Off 6 inch step x 20 B   Home Exercise Program (HEP): Treadmill progression and stair/step exercises as noted above  Treatment/Session Assessment:    · Response to Treatment: O2 Sats never dropped below 92%. Started some single leg step downs today. Increased leg press weight  · Compliance with Program/Exercises: Very compliant  · Recommendations/Intent for next treatment session: Next visit will focus on advancements to more challenging activities and progressing to more strengthening and endurance activities.   Total Treatment Duration: (57 minutes of treatment)  PT Patient Time In/Time Out  Time In: 0858  Time Out: TASHA Jackson

## 2017-07-10 ENCOUNTER — HOSPITAL ENCOUNTER (OUTPATIENT)
Dept: PHYSICAL THERAPY | Age: 72
Discharge: HOME | End: 2017-07-10
Payer: MEDICARE

## 2017-07-10 VITALS — HEART RATE: 72 BPM | OXYGEN SATURATION: 98 %

## 2017-07-10 PROCEDURE — 97110 THERAPEUTIC EXERCISES: CPT

## 2017-07-10 NOTE — PROGRESS NOTES
Susanna Abler  : 1945 [facility]  [address] [place] [address]  [phone]   [phone]       OUTPATIENT PHYSICAL THERAPY:Daily Note 7/10/2017    ICD-10: Treatment Diagnosis: Ankylosing Spondylitis of thoracic region (M45.4); Pain in thoracic spine (M54.5); muscle weakness, generalized (M62.81); difficulty walking, not elsewhere classified (R26.2)  Precautions/Allergies:  Codeine   Fall Risk Score: 2 (? 5 = High Risk) MEDICAL/REFERRING DIAGNOSIS: Thoracic back pain; post pneumonia with debility  DATE OF ONSET: 17  REFERRING PHYSICIAN: Brendan Sinha MD  RETURN PHYSICIAN APPOINTMENT:    RE-CERTIFICATION (): Mr. Urbano Juan is showing excellent progress both with strength and endurance. He is now independent with transfers and basic ADLs and has resumed driving to come to therapy. He is up to walking 15 minutes without significant decrease in vital signs and is able to tolerate a full hour of therapy with very minimal rests of less than 2 minutes. He has achieved up to 6000 steps in a single day at this point. TUG score has improved from 23 to 14 seconds. He reports intermittent thoracic back pain related to more prolonged static postures and eased with activity. INITIAL ASSESSMENT (69183):  Mr. Urbano Juan presents with general debility with overall strength and functional mobility deficits due to prolonged hospitalization due to pneumonia/sepsis; he also complains of thoracic back pain exacerbated due to decreased activity with decreased mobility and strength through the trunk due to worsening ankylosing spondylitis. PROBLEM LIST (Impacting functional limitations): Increased pain through thoracic spine/costal cage limiting tolerance of ADLs and difficulty sleeping  Decreased activity tolerance for basic and instrumental ADLs due to generalized weakness B LE/UE.   Decreased ADL/functional activities specificially with any endurance activity related to decreased overall strength and limited pulmonary capacity post-pneumonia  Outcome measure score of 23 seconds (WNL - 7 seconds) on Timed Up and Go (TUG) test with severe effect of decreased strength/endurance on patient's ability to manage every day life activities  Decreased strength through B LE/UE/trunk limiting all functional mobility INTERVENTIONS PLANNED:  Patient education including pathophysiology of activity tolerance and progression  Manual therapy including soft tissue mobilizations, functional joint mobilizations and neuromuscular re-education to thoracic region  Neuromuscular re-education with focus on initiation/strength and endurance and motor control of B UE/LE/trunk  Therapeutic exercises including strengthening and endurance related tasks  Gait training with focus on correcting deficits, sequencing and honorio  Balance exercise - focused on standing dynamic balance with greatest focus on dynamic center of gravity control  Therapeutic activities with focus on strength and endurance  Therapeutic modalities including pain modalities for thoracic spine  Instructions of home exercise program (HEP)   TREATMENT PLAN:  Effective Dates: 06/28/17 TO 09/28/17  Frequency/Duration: 2 times a week for 4 weeks followed by 1 time a week for 6 weeks  GOALS: (Goals have been discussed and agreed upon with patient.)  Short-Term Functional Goals: Goals achieved; please refer to discharge goals  1. Patient will be able to ambulate greater than 250 ft to get to therapy clinic without the use of a wheelchair (goal achieved). 2. Patient will be able to perform walking on treadmill at home for 5 minutes without O2 saturations below 86% (goal achieved). 3. Patient will be independent with sit-stand without requiring hand support/assistance (goal achieved). Discharge Goals: Time Frame: 12 weeks  1.  Patient will be independent will all functional mobility at home without difficulty or thoracic back pain (goal ongoing). 2. Patient will be able to walk 10,000 steps without difficulty (goal ongoing). 3. Patient will have achieved prior level of function for all ADLs without difficulty or thoracic back pain (goal ongoing). 4. Patient will score less than 12 seconds on TUG and be WNLs (goal ongoing). Rehabilitation Potential For Stated Goals: Excellent            The information in this section was collected on 7/10/2017 (except where otherwise noted). HISTORY:   History of Present Injury/Illness (Reason for Referral):  Patient reports he was not feeling well from about 05/13-15/17 and was then admitted to hospital for pneumonia/sepsis with a 3-day hospital stay in ICU with complications that included a L lower lobe pneumonia. He was discharged to home on 05/27/17 and referred to outpatient physical therapy for post-hospital physical therapy. Past Medical History/Comorbidities:   Mr. Genet Moya  has a past medical history of Arthritis; Elevated prostate specific antigen (PSA); History of prostate surgery (3/12/2015); HLA-B27 positive; Neck pain; Osteoarthritis, hand; Osteopenia; Osteoporosis; Polyarthritis; Polymyalgia rheumatica ([address] Prostate cancer ([address] Raynaud's disease; 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[address] Ankylosing Spondylitis and thoracic spine pain. Mr. Genet Moya  has a past surgical history that includes vasectomy (40+ yrs); knee arthroscopy (2009); urological; biopsy prostate; prostatectomy (2012); cataract removal (2012); cataract removal (2013); and colonoscopy (02/2010). Social History/Living Environment: Patient lives with spouse. Patient denies and physical barriers at home and has a tub/shower combo. Prior Level of Function/Work/Activity: Patient is retired. Prior to hospitalization, patient reports he walked 5 days/week for 30-50 minutes. .  Dominant Side:  RIGHT  Current Medications:     Current Outpatient Prescriptions:     aspirin 81 mg chewable tablet, Take 1 Tab by mouth daily. , Disp: 30 Tab, Rfl: 1    ferrous sulfate 325 mg (65 mg iron) tablet, Take 1 Tab by mouth two (2) times daily (with meals). , Disp: 60 Tab, Rfl: 1    predniSONE (DELTASONE) 20 mg tablet, Take 2 tablets daily for 1 week then 1 tablet daily until seen in the office for follow up, Disp: 40 Tab, Rfl: 0    potassium chloride (K-DUR, KLOR-CON) 20 mEq tablet, Take 2 Tabs by mouth daily. , Disp: 30 Tab, Rfl: 1    DULoxetine (CYMBALTA) 30 mg capsule, Take 1 Cap by mouth daily. , Disp: 90 Cap, Rfl: 1    traMADol (ULTRAM) 50 mg tablet, Take 1 Tab by mouth three (3) times daily as needed for Pain. Max Daily Amount: 150 mg., Disp: 90 Tab, Rfl: 2    omeprazole (PRILOSEC) 40 mg capsule, Take 1 Cap by mouth daily. , Disp: 30 Cap, Rfl: 5    diclofenac (VOLTAREN) 1 % topical gel, Apply 4 g to affected area four (4) times daily. , Disp: 100 g, Rfl: 2    calcium-vitamin D (OYSTER SHELL) 500 mg(1,250mg) -200 unit per tablet, Take 1 Tab by mouth daily. , Disp: , Rfl:     cholecalciferol, vitamin D3, (VITAMIN D3) 2,000 unit Tab, Take  by mouth. Taking 1 1/2 tabs of 2000 international units once daily, Disp: , Rfl:    Date Last Reviewed:  7/10/2017   Number of Personal Factors/Comorbidities that affect the Plan of Care: 1-2: MODERATE COMPLEXITY   EXAMINATION:   Observation/Orthostatic Postural Assessment: Cachetic body with significant weight loss from hospitalization. Posture - kyphotic with an anterior rotated rib cage that is posterior positioned over a posterior tilted pelvis. Atrophy noted through all LE and UE musculature, most notable gluteals. ROM:  AROM B UE WNL with shoulder abduction palm down 140 degrees B. Cervical rotation 70% B and pain free. B LE AROM WNL. Passive trunk rotation less than 50% B. Breathing assessment indicated decreased mobility of L lower rib cage with inhalation between ribs 5-8. Strength:  B UE grossly good except 4+/5 for R shoulder ER and B abduction.  B LE grossly fair with 4+/5 for gluts, quadriceps, hamstrings and hip flexors. Neurological Screen:        Myotomes:  Good except for strength deficits noted above. Dermatomes: WNL        Reflexes:  2+ and WNL B UE/LE  Functional Mobility:         Gait/Ambulation:  Independent with narrowed stance and limited arm swing B. Endurance with gait - good-        Transfers:  Independent        Bed Mobility:  Independent        Stairs: Alternating steps with 1 hand for balance only  Balance:          Static standing B LE greater than 60 sec; single leg less than 15 sec B. Mental Status:          Alert and oriented x 4   Body Structures Involved:  1. Thoracic Cage  2. Joints  3. Muscles Body Functions Affected:  1. Cardio  2. Neuromusculoskeletal  3. Movement Related Activities and Participation Affected:  1. Mobility  2. Self Care  3. Domestic Life  4. Community, Social and [place]   Number of elements (examined above) that affect the Plan of Care: 4+: HIGH COMPLEXITY   CLINICAL PRESENTATION:   Presentation: Evolving clinical presentation with changing clinical characteristics: MODERATE COMPLEXITY   CLINICAL DECISION MAKING:   Outcome Measure: Tool Used: Modified Oswestry Low Back Pain Questionnaire  Score:  Initial: 22/50 (06/07/17) Most Recent: X/50 (Date: -- )   Interpretation of Score: Each section is scored on a 0-5 scale, 5 representing the greatest disability. The scores of each section are added together for a total score of 50. Score 0 1-10 11-20 21-30 31-40 41-49 50   Modifier CH CI CJ CK CL CM CN     Tool Used: Timed Up and Go (TUG)  Score:  Initial: 23 seconds (06/07/17) Most Recent: 14 seconds (Date: 06/28/17 )   Interpretation of Score: The test measures, in seconds, the time taken by an individual to stand up from a standard arm chair (seat height 46 cm [18 in], arm height 65 cm [25.6 in]), walk a distance of 3 meters (118 in, approx 10 ft), turn, walk back to the chair and sit down.   If the individual takes longer than 14 seconds to complete TUG, this indicates risk for falls. Score 7 7.5-10.5 11-14 14.5-17.5 18-21 21.5-24.5 25+   Modifier CH CI CJ CK CL CM CN     ? Mobility - Walking and Moving Around:     - CURRENT STATUS: CJ - 20%-39% impaired, limited or restricted    - GOAL STATUS: CI - 1%-19% impaired, limited or restricted    - D/C STATUS: ---------------To be determined---------------  Medical Necessity:   · Patient is expected to demonstrate progress in strength, balance and endurance and functional mobility to increase independence with ADLs, improve safety during daily activities and return to prior functional level. · Patient demonstrates excellent rehab potential due to higher previous functional level. Reason for Services/Other Comments:  · Patient continues to require skilled intervention due to general debility with overall strength and functional mobility deficits due to prolonged hospitalization due to pneumonia/sepsis; he also complains of thoracic back pain exacerbated due to decreased activity with decreased mobility and strength through the trunk due to worsening ankylosing spondylitis. Use of outcome tool(s) and clinical judgement create a POC that gives a: Questionable prediction of patient's progress: MODERATE COMPLEXITY            TREATMENT:   (In addition to Assessment/Re-Assessment sessions the following treatments were rendered)  Pre-treatment Symptoms/Complaints: Patient reports feeling good, stronger every day and is up to doing 25 rounds of his steps at home every day but has not yet made it to [address] steps. Pain: Initial:   Pain Intensity 1: 0  Post Session:  0/10   Manual Therapy (     ): (Used abbreviations: MET - muscle energy technique; PNF - proprioceptive neuromuscular facilitation; NMR - neuromuscular re-education; a/p - anterior to posterior; p/a - posterior to anterior) None  Therapeutic Exercise: (65 Minutes):  Exercises per grid below to improve strength and endurance.   Required minimal verbal cues to promote proper body alignment and promote proper body breathing techniques. Maintained O2 sats over 90% with no rests between exercises. Increased resistance with strengthening exercises and cardio time today. Date:  7/5/17 Date:  7/7/17 Date:  07/10/17   Activity/Exercise      Treadmill at home 2.4 mph 4% grade x 12 min 2.5 mph 4% grade x 12 min 2. 5mph 8% grade x 16 min   Sit-stand  2 min 2 min 2 min   Stairs - heelraises B 50 x 1 50 x 1 50 x 1   Calf stretch 1 min 1 min 1 min   Stairs - double step ups 5 x 2 5 x 2 HEP   Stairs - side step up/down for aerobic training and balance/coordination   1 min x 2   R UE rows Blue x 2 min Blue x 2 min Blue 30 x 2   UE/LE ergometer (seat at 9) 10 min - level 3 10 min - level 3 Level 6 x 5 min   Leg press (incline with B LE) 75 lb 10 x 3  80 lb x 10 80 lb  10 x 3 85 lb 10 x 3; 100 lb 3 x 1   Monster walks - side/front/back   Red theraband 10 ft x 10 ea   Home Exercise Program (HEP): Treadmill progression and stair/step exercises as noted above  Treatment/Session Assessment:    · Response to Treatment: O2 Sats never dropped below 92%. Increased leg press weight and added more aerobic exercise in conjunction with strengthening  · Compliance with Program/Exercises: Very compliant  · Recommendations/Intent for next treatment session: Next visit will focus on advancements to more challenging activities and progressing to more strengthening and endurance activities.   Total Treatment Duration: (65 minutes of treatment)  PT Patient Time In/Time Out  Time In: 1130  Time Out: 1135    Eileen Ross, TASHA

## 2017-07-12 ENCOUNTER — HOSPITAL ENCOUNTER (OUTPATIENT)
Dept: PHYSICAL THERAPY | Age: 72
Discharge: HOME | End: 2017-07-12
Payer: MEDICARE

## 2017-07-12 NOTE — PROGRESS NOTES
Speech Pathology  Patient did not receive ST due to facility cancellation as SLP was sick.     [address], [facility] [address] [facility]

## 2017-07-13 ENCOUNTER — HOSPITAL ENCOUNTER (OUTPATIENT)
Dept: PHYSICAL THERAPY | Age: 72
Discharge: HOME | End: 2017-07-13
Payer: MEDICARE

## 2017-07-13 VITALS — OXYGEN SATURATION: 97 % | HEART RATE: 78 BPM

## 2017-07-13 PROCEDURE — 97110 THERAPEUTIC EXERCISES: CPT

## 2017-07-13 NOTE — PROGRESS NOTES
Susie Ross  : 1945 [facility]  [address] [place] [address]  [phone]   [phone]       OUTPATIENT PHYSICAL THERAPY:Daily Note 2017    ICD-10: Treatment Diagnosis: Ankylosing Spondylitis of thoracic region (M45.4); Pain in thoracic spine (M54.5); muscle weakness, generalized (M62.81); difficulty walking, not elsewhere classified (R26.2)  Precautions/Allergies:  Codeine   Fall Risk Score: 2 (? 5 = High Risk) MEDICAL/REFERRING DIAGNOSIS: Thoracic back pain; post pneumonia with debility  DATE OF ONSET: 17  REFERRING PHYSICIAN: Lorelei Huerta MD  RETURN PHYSICIAN APPOINTMENT:    RE-CERTIFICATION (): Mr. Janice Stringer is showing excellent progress both with strength and endurance. He is now independent with transfers and basic ADLs and has resumed driving to come to therapy. He is up to walking 15 minutes without significant decrease in vital signs and is able to tolerate a full hour of therapy with very minimal rests of less than 2 minutes. He has achieved up to 6000 steps in a single day at this point. TUG score has improved from 23 to 14 seconds. He reports intermittent thoracic back pain related to more prolonged static postures and eased with activity. INITIAL ASSESSMENT ():  Mr. Janice Stringer presents with general debility with overall strength and functional mobility deficits due to prolonged hospitalization due to pneumonia/sepsis; he also complains of thoracic back pain exacerbated due to decreased activity with decreased mobility and strength through the trunk due to worsening ankylosing spondylitis. PROBLEM LIST (Impacting functional limitations): Increased pain through thoracic spine/costal cage limiting tolerance of ADLs and difficulty sleeping  Decreased activity tolerance for basic and instrumental ADLs due to generalized weakness B LE/UE.   Decreased ADL/functional activities specificially with any endurance activity related to decreased overall strength and limited pulmonary capacity post-pneumonia  Outcome measure score of 23 seconds (WNL - 7 seconds) on Timed Up and Go (TUG) test with severe effect of decreased strength/endurance on patient's ability to manage every day life activities  Decreased strength through B LE/UE/trunk limiting all functional mobility INTERVENTIONS PLANNED:  Patient education including pathophysiology of activity tolerance and progression  Manual therapy including soft tissue mobilizations, functional joint mobilizations and neuromuscular re-education to thoracic region  Neuromuscular re-education with focus on initiation/strength and endurance and motor control of B UE/LE/trunk  Therapeutic exercises including strengthening and endurance related tasks  Gait training with focus on correcting deficits, sequencing and honorio  Balance exercise - focused on standing dynamic balance with greatest focus on dynamic center of gravity control  Therapeutic activities with focus on strength and endurance  Therapeutic modalities including pain modalities for thoracic spine  Instructions of home exercise program (HEP)   TREATMENT PLAN:  Effective Dates: 06/28/17 TO 09/28/17  Frequency/Duration: 2 times a week for 4 weeks followed by 1 time a week for 6 weeks  GOALS: (Goals have been discussed and agreed upon with patient.)  Short-Term Functional Goals: Goals achieved; please refer to discharge goals  1. Patient will be able to ambulate greater than 250 ft to get to therapy clinic without the use of a wheelchair (goal achieved). 2. Patient will be able to perform walking on treadmill at home for 5 minutes without O2 saturations below 86% (goal achieved). 3. Patient will be independent with sit-stand without requiring hand support/assistance (goal achieved). Discharge Goals: Time Frame: 12 weeks  1.  Patient will be independent will all functional mobility at home without difficulty or thoracic back pain (goal ongoing). 2. Patient will be able to walk 10,000 steps without difficulty (goal ongoing). 3. Patient will have achieved prior level of function for all ADLs without difficulty or thoracic back pain (goal ongoing). 4. Patient will score less than 12 seconds on TUG and be WNLs (goal ongoing). Rehabilitation Potential For Stated Goals: Excellent            The information in this section was collected on 7/13/2017 (except where otherwise noted). HISTORY:   History of Present Injury/Illness (Reason for Referral):  Patient reports he was not feeling well from about 05/13-15/17 and was then admitted to hospital for pneumonia/sepsis with a 3-day hospital stay in ICU with complications that included a L lower lobe pneumonia. He was discharged to home on 05/27/17 and referred to outpatient physical therapy for post-hospital physical therapy. Past Medical History/Comorbidities:   Mr. iJa Pires  has a past medical history of Arthritis; Elevated prostate specific antigen (PSA); History of prostate surgery (3/12/2015); HLA-B27 positive; Neck pain; Osteoarthritis, hand; Osteopenia; Osteoporosis; Polyarthritis; Polymyalgia rheumatica ([address] Prostate cancer ([address] Raynaud's disease; 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[address] Ankylosing Spondylitis and thoracic spine pain. Mr. Jia Pires  has a past surgical history that includes vasectomy (40+ yrs); knee arthroscopy (2009); urological; biopsy prostate; prostatectomy (2012); cataract removal (2012); cataract removal (2013); and colonoscopy (02/2010). Social History/Living Environment: Patient lives with spouse. Patient denies and physical barriers at home and has a tub/shower combo. Prior Level of Function/Work/Activity: Patient is retired. Prior to hospitalization, patient reports he walked 5 days/week for 30-50 minutes. .  Dominant Side:  RIGHT  Current Medications:     Current Outpatient Prescriptions:     aspirin 81 mg chewable tablet, Take 1 Tab by mouth daily. , Disp: 30 Tab, Rfl: 1    ferrous sulfate 325 mg (65 mg iron) tablet, Take 1 Tab by mouth two (2) times daily (with meals). , Disp: 60 Tab, Rfl: 1    predniSONE (DELTASONE) 20 mg tablet, Take 2 tablets daily for 1 week then 1 tablet daily until seen in the office for follow up, Disp: 40 Tab, Rfl: 0    potassium chloride (K-DUR, KLOR-CON) 20 mEq tablet, Take 2 Tabs by mouth daily. , Disp: 30 Tab, Rfl: 1    DULoxetine (CYMBALTA) 30 mg capsule, Take 1 Cap by mouth daily. , Disp: 90 Cap, Rfl: 1    traMADol (ULTRAM) 50 mg tablet, Take 1 Tab by mouth three (3) times daily as needed for Pain. Max Daily Amount: 150 mg., Disp: 90 Tab, Rfl: 2    omeprazole (PRILOSEC) 40 mg capsule, Take 1 Cap by mouth daily. , Disp: 30 Cap, Rfl: 5    diclofenac (VOLTAREN) 1 % topical gel, Apply 4 g to affected area four (4) times daily. , Disp: 100 g, Rfl: 2    calcium-vitamin D (OYSTER SHELL) 500 mg(1,250mg) -200 unit per tablet, Take 1 Tab by mouth daily. , Disp: , Rfl:     cholecalciferol, vitamin D3, (VITAMIN D3) 2,000 unit Tab, Take  by mouth. Taking 1 1/2 tabs of 2000 international units once daily, Disp: , Rfl:    Date Last Reviewed:  7/13/2017   Number of Personal Factors/Comorbidities that affect the Plan of Care: 1-2: MODERATE COMPLEXITY   EXAMINATION:   Observation/Orthostatic Postural Assessment: Cachetic body with significant weight loss from hospitalization. Posture - kyphotic with an anterior rotated rib cage that is posterior positioned over a posterior tilted pelvis. Atrophy noted through all LE and UE musculature, most notable gluteals. ROM:  AROM B UE WNL with shoulder abduction palm down 140 degrees B. Cervical rotation 70% B and pain free. B LE AROM WNL. Passive trunk rotation less than 50% B. Breathing assessment indicated decreased mobility of L lower rib cage with inhalation between ribs 5-8. Strength:  B UE grossly good except 4+/5 for R shoulder ER and B abduction.  B LE grossly fair with 4+/5 for gluts, quadriceps, hamstrings and hip flexors. Neurological Screen:        Myotomes:  Good except for strength deficits noted above. Dermatomes: WNL        Reflexes:  2+ and WNL B UE/LE  Functional Mobility:         Gait/Ambulation:  Independent with narrowed stance and limited arm swing B. Endurance with gait - good-        Transfers:  Independent        Bed Mobility:  Independent        Stairs: Alternating steps with 1 hand for balance only  Balance:          Static standing B LE greater than 60 sec; single leg less than 15 sec B. Mental Status:          Alert and oriented x 4   Body Structures Involved:  1. Thoracic Cage  2. Joints  3. Muscles Body Functions Affected:  1. Cardio  2. Neuromusculoskeletal  3. Movement Related Activities and Participation Affected:  1. Mobility  2. Self Care  3. Domestic Life  4. Community, Social and [place]   Number of elements (examined above) that affect the Plan of Care: 4+: HIGH COMPLEXITY   CLINICAL PRESENTATION:   Presentation: Evolving clinical presentation with changing clinical characteristics: MODERATE COMPLEXITY   CLINICAL DECISION MAKING:   Outcome Measure: Tool Used: Modified Oswestry Low Back Pain Questionnaire  Score:  Initial: 22/50 (06/07/17) Most Recent: X/50 (Date: -- )   Interpretation of Score: Each section is scored on a 0-5 scale, 5 representing the greatest disability. The scores of each section are added together for a total score of 50. Score 0 1-10 11-20 21-30 31-40 41-49 50   Modifier CH CI CJ CK CL CM CN     Tool Used: Timed Up and Go (TUG)  Score:  Initial: 23 seconds (06/07/17) Most Recent: 14 seconds (Date: 06/28/17 )   Interpretation of Score: The test measures, in seconds, the time taken by an individual to stand up from a standard arm chair (seat height 46 cm [18 in], arm height 65 cm [25.6 in]), walk a distance of 3 meters (118 in, approx 10 ft), turn, walk back to the chair and sit down.   If the individual takes longer than 14 seconds to complete TUG, this indicates risk for falls. Score 7 7.5-10.5 11-14 14.5-17.5 18-21 21.5-24.5 25+   Modifier CH CI CJ CK CL CM CN     ? Mobility - Walking and Moving Around:     - CURRENT STATUS: CJ - 20%-39% impaired, limited or restricted    - GOAL STATUS: CI - 1%-19% impaired, limited or restricted    - D/C STATUS: ---------------To be determined---------------  Medical Necessity:   · Patient is expected to demonstrate progress in strength, balance and endurance and functional mobility to increase independence with ADLs, improve safety during daily activities and return to prior functional level. · Patient demonstrates excellent rehab potential due to higher previous functional level. Reason for Services/Other Comments:  · Patient continues to require skilled intervention due to general debility with overall strength and functional mobility deficits due to prolonged hospitalization due to pneumonia/sepsis; he also complains of thoracic back pain exacerbated due to decreased activity with decreased mobility and strength through the trunk due to worsening ankylosing spondylitis. Use of outcome tool(s) and clinical judgement create a POC that gives a: Questionable prediction of patient's progress: MODERATE COMPLEXITY            TREATMENT:   (In addition to Assessment/Re-Assessment sessions the following treatments were rendered)  Pre-treatment Symptoms/Complaints: Patient reports feeling good, stronger every day and is up to 9200 steps. Pain: Initial:   Pain Intensity 1: 0  Post Session:  0/10   Manual Therapy (     ): (Used abbreviations: MET - muscle energy technique; PNF - proprioceptive neuromuscular facilitation; NMR - neuromuscular re-education; a/p - anterior to posterior; p/a - posterior to anterior) None  Therapeutic Exercise: (70 Minutes):  Exercises per grid below to improve strength and endurance.   Required minimal verbal cues to promote proper body alignment and promote proper body breathing techniques. Maintained O2 sats over 96% with 1-2 rests between exercises. Increased resistance with strengthening exercises and cardio time today. Date:  7/5/17 Date:  7/7/17 Date:  07/10/17 Date:  07/13/17   Activity/Exercise       Treadmill at home 2.4 mph 4% grade x 12 min 2.5 mph 4% grade x 12 min 2. 5mph 8% grade x 16 min 2. 5mph 10% grade x 16 min   Sit-stand  2 min 2 min 2 min 2 min   Stairs - heelraises B 50 x 1 50 x 1 50 x 1 50 x 1   Calf stretch 1 min 1 min 1 min 1 min   Stairs - double step ups 5 x 2 5 x 2 HEP HEP   Side steps up/down for aerobic training and balance/coordination   1 min x 2 1 min x 2   R UE rows Blue x 2 min Blue x 2 min Blue 30 x 2 Blue 30 x 2   UE/LE ergometer (seat at 9) 10 min - level 3 10 min - level 3 Level 6 x 5 min Level 6 x 10 min   Leg press (incline with B LE) 75 lb 10 x 3  80 lb x 10 80 lb  10 x 3 85 lb 10 x 3; 100 lb 3 x 1 95 lb 10 x 3; 105 lb 5 x 1   Monster walks - side/front/back   Red theraband 10 ft x 10 ea for strengthening Red theraband 10 ft x 10 for strengthening   Home Exercise Program (HEP): Treadmill progression and stair/step exercises as noted above  Treatment/Session Assessment:    · Response to Treatment: O2 Sats never dropped below 96%. Increased leg press weight and added more aerobic exercise in conjunction with strengthening  · Compliance with Program/Exercises: Very compliant  · Recommendations/Intent for next treatment session: Next visit will focus on advancements to more challenging activities and progressing to more strengthening and endurance activities.   Total Treatment Duration: (70 minutes of treatment)  PT Patient Time In/Time Out  Time In: 1500  Time Out: 1610    Mart De La Vega, PT

## 2017-07-17 ENCOUNTER — HOSPITAL ENCOUNTER (OUTPATIENT)
Dept: PHYSICAL THERAPY | Age: 72
Discharge: HOME | End: 2017-07-17
Payer: MEDICARE

## 2017-07-17 VITALS — OXYGEN SATURATION: 98 % | HEART RATE: 72 BPM

## 2017-07-17 PROCEDURE — 97110 THERAPEUTIC EXERCISES: CPT

## 2017-07-17 NOTE — PROGRESS NOTES
Sheryl Simmons  : 1945 [facility]  [address] [place] [address]  [phone]   [phone]       OUTPATIENT PHYSICAL THERAPY:Daily Note 2017    ICD-10: Treatment Diagnosis: Ankylosing Spondylitis of thoracic region (M45.4); Pain in thoracic spine (M54.5); muscle weakness, generalized (M62.81); difficulty walking, not elsewhere classified (R26.2)  Precautions/Allergies:  Codeine   Fall Risk Score: 2 (? 5 = High Risk) MEDICAL/REFERRING DIAGNOSIS: Thoracic back pain; post pneumonia with debility  DATE OF ONSET: 17  REFERRING PHYSICIAN: Grisel Ramsay MD  RETURN PHYSICIAN APPOINTMENT:    RE-CERTIFICATION (67): Mr. Rosendo Soriano is showing excellent progress both with strength and endurance. He is now independent with transfers and basic ADLs and has resumed driving to come to therapy. He is up to walking 15 minutes without significant decrease in vital signs and is able to tolerate a full hour of therapy with very minimal rests of less than 2 minutes. He has achieved up to 6000 steps in a single day at this point. TUG score has improved from 23 to 14 seconds. He reports intermittent thoracic back pain related to more prolonged static postures and eased with activity. INITIAL ASSESSMENT ():  Mr. Rosendo Soriano presents with general debility with overall strength and functional mobility deficits due to prolonged hospitalization due to pneumonia/sepsis; he also complains of thoracic back pain exacerbated due to decreased activity with decreased mobility and strength through the trunk due to worsening ankylosing spondylitis. PROBLEM LIST (Impacting functional limitations): Increased pain through thoracic spine/costal cage limiting tolerance of ADLs and difficulty sleeping  Decreased activity tolerance for basic and instrumental ADLs due to generalized weakness B LE/UE.   Decreased ADL/functional activities specificially with any endurance activity related to decreased overall strength and limited pulmonary capacity post-pneumonia  Outcome measure score of 23 seconds (WNL - 7 seconds) on Timed Up and Go (TUG) test with severe effect of decreased strength/endurance on patient's ability to manage every day life activities  Decreased strength through B LE/UE/trunk limiting all functional mobility INTERVENTIONS PLANNED:  Patient education including pathophysiology of activity tolerance and progression  Manual therapy including soft tissue mobilizations, functional joint mobilizations and neuromuscular re-education to thoracic region  Neuromuscular re-education with focus on initiation/strength and endurance and motor control of B UE/LE/trunk  Therapeutic exercises including strengthening and endurance related tasks  Gait training with focus on correcting deficits, sequencing and honorio  Balance exercise - focused on standing dynamic balance with greatest focus on dynamic center of gravity control  Therapeutic activities with focus on strength and endurance  Therapeutic modalities including pain modalities for thoracic spine  Instructions of home exercise program (HEP)   TREATMENT PLAN:  Effective Dates: 06/28/17 TO 09/28/17  Frequency/Duration: Decrease to 1 time a week for 9 weeks due to excellent progress  GOALS: (Goals have been discussed and agreed upon with patient.)  Short-Term Functional Goals: Goals achieved; please refer to discharge goals  1. Patient will be able to ambulate greater than 250 ft to get to therapy clinic without the use of a wheelchair (goal achieved). 2. Patient will be able to perform walking on treadmill at home for 5 minutes without O2 saturations below 86% (goal achieved). 3. Patient will be independent with sit-stand without requiring hand support/assistance (goal achieved). Discharge Goals: Time Frame: 12 weeks  1.  Patient will be independent will all functional mobility at home without difficulty or thoracic back pain (goal ongoing). 2. Patient will be able to walk 10,000 steps without difficulty (goal ongoing). 3. Patient will have achieved prior level of function for all ADLs without difficulty or thoracic back pain (goal ongoing). 4. Patient will score less than 12 seconds on TUG and be WNLs (goal ongoing). Rehabilitation Potential For Stated Goals: Excellent            The information in this section was collected on 7/17/2017 (except where otherwise noted). HISTORY:   History of Present Injury/Illness (Reason for Referral):  Patient reports he was not feeling well from about 05/13-15/17 and was then admitted to hospital for pneumonia/sepsis with a 3-day hospital stay in ICU with complications that included a L lower lobe pneumonia. He was discharged to home on 05/27/17 and referred to outpatient physical therapy for post-hospital physical therapy. Past Medical History/Comorbidities:   Mr. Romero Scott  has a past medical history of Arthritis; Elevated prostate specific antigen (PSA); History of prostate surgery (3/12/2015); HLA-B27 positive; Neck pain; Osteoarthritis, hand; Osteopenia; Osteoporosis; Polyarthritis; Polymyalgia rheumatica ([address] Prostate cancer ([address] Raynaud's disease; 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[address] Ankylosing Spondylitis and thoracic spine pain. Mr. Romero Scott  has a past surgical history that includes vasectomy (40+ yrs); knee arthroscopy (2009); urological; biopsy prostate; prostatectomy (2012); cataract removal (2012); cataract removal (2013); and colonoscopy (02/2010). Social History/Living Environment: Patient lives with spouse. Patient denies and physical barriers at home and has a tub/shower combo. Prior Level of Function/Work/Activity: Patient is retired. Prior to hospitalization, patient reports he walked 5 days/week for 30-50 minutes. .  Dominant Side:  RIGHT  Current Medications:     Current Outpatient Prescriptions:     aspirin 81 mg chewable tablet, Take 1 Tab by mouth daily. , Disp: 30 Tab, Rfl: 1    ferrous sulfate 325 mg (65 mg iron) tablet, Take 1 Tab by mouth two (2) times daily (with meals). , Disp: 60 Tab, Rfl: 1    predniSONE (DELTASONE) 20 mg tablet, Take 2 tablets daily for 1 week then 1 tablet daily until seen in the office for follow up, Disp: 40 Tab, Rfl: 0    potassium chloride (K-DUR, KLOR-CON) 20 mEq tablet, Take 2 Tabs by mouth daily. , Disp: 30 Tab, Rfl: 1    DULoxetine (CYMBALTA) 30 mg capsule, Take 1 Cap by mouth daily. , Disp: 90 Cap, Rfl: 1    traMADol (ULTRAM) 50 mg tablet, Take 1 Tab by mouth three (3) times daily as needed for Pain. Max Daily Amount: 150 mg., Disp: 90 Tab, Rfl: 2    omeprazole (PRILOSEC) 40 mg capsule, Take 1 Cap by mouth daily. , Disp: 30 Cap, Rfl: 5    diclofenac (VOLTAREN) 1 % topical gel, Apply 4 g to affected area four (4) times daily. , Disp: 100 g, Rfl: 2    calcium-vitamin D (OYSTER SHELL) 500 mg(1,250mg) -200 unit per tablet, Take 1 Tab by mouth daily. , Disp: , Rfl:     cholecalciferol, vitamin D3, (VITAMIN D3) 2,000 unit Tab, Take  by mouth. Taking 1 1/2 tabs of 2000 international units once daily, Disp: , Rfl:    Date Last Reviewed:  7/17/2017   Number of Personal Factors/Comorbidities that affect the Plan of Care: 1-2: MODERATE COMPLEXITY   EXAMINATION:   Observation/Orthostatic Postural Assessment: Cachetic body with significant weight loss from hospitalization. Posture - kyphotic with an anterior rotated rib cage that is posterior positioned over a posterior tilted pelvis. Atrophy noted through all LE and UE musculature, most notable gluteals. ROM:  AROM B UE WNL with shoulder abduction palm down 140 degrees B. Cervical rotation 70% B and pain free. B LE AROM WNL. Passive trunk rotation less than 50% B. Breathing assessment indicated decreased mobility of L lower rib cage with inhalation between ribs 5-8. Strength:  B UE grossly good except 4+/5 for R shoulder ER and B abduction.  B LE grossly fair with 4+/5 for gluts, quadriceps, hamstrings and hip flexors. Neurological Screen:        Myotomes:  Good except for strength deficits noted above. Dermatomes: WNL        Reflexes:  2+ and WNL B UE/LE  Functional Mobility:         Gait/Ambulation:  Independent with narrowed stance and limited arm swing B. Endurance with gait - good-        Transfers:  Independent        Bed Mobility:  Independent        Stairs: Alternating steps with 1 hand for balance only  Balance:          Static standing B LE greater than 60 sec; single leg less than 15 sec B. Mental Status:          Alert and oriented x 4   Body Structures Involved:  1. Thoracic Cage  2. Joints  3. Muscles Body Functions Affected:  1. Cardio  2. Neuromusculoskeletal  3. Movement Related Activities and Participation Affected:  1. Mobility  2. Self Care  3. Domestic Life  4. Community, Social and [place]   Number of elements (examined above) that affect the Plan of Care: 4+: HIGH COMPLEXITY   CLINICAL PRESENTATION:   Presentation: Evolving clinical presentation with changing clinical characteristics: MODERATE COMPLEXITY   CLINICAL DECISION MAKING:   Outcome Measure: Tool Used: Modified Oswestry Low Back Pain Questionnaire  Score:  Initial: 22/50 (06/07/17) Most Recent: X/50 (Date: -- )   Interpretation of Score: Each section is scored on a 0-5 scale, 5 representing the greatest disability. The scores of each section are added together for a total score of 50. Score 0 1-10 11-20 21-30 31-40 41-49 50   Modifier CH CI CJ CK CL CM CN     Tool Used: Timed Up and Go (TUG)  Score:  Initial: 23 seconds (06/07/17) Most Recent: 14 seconds (Date: 06/28/17 )   Interpretation of Score: The test measures, in seconds, the time taken by an individual to stand up from a standard arm chair (seat height 46 cm [18 in], arm height 65 cm [25.6 in]), walk a distance of 3 meters (118 in, approx 10 ft), turn, walk back to the chair and sit down.   If the individual takes longer than 14 seconds to complete TUG, this indicates risk for falls. Score 7 7.5-10.5 11-14 14.5-17.5 18-21 21.5-24.5 25+   Modifier CH CI CJ CK CL CM CN     ? Mobility - Walking and Moving Around:     - CURRENT STATUS: CJ - 20%-39% impaired, limited or restricted    - GOAL STATUS: CI - 1%-19% impaired, limited or restricted    - D/C STATUS: ---------------To be determined---------------  Medical Necessity:   · Patient is expected to demonstrate progress in strength, balance and endurance and functional mobility to increase independence with ADLs, improve safety during daily activities and return to prior functional level. · Patient demonstrates excellent rehab potential due to higher previous functional level. Reason for Services/Other Comments:  · Patient continues to require skilled intervention due to general debility with overall strength and functional mobility deficits due to prolonged hospitalization due to pneumonia/sepsis; he also complains of thoracic back pain exacerbated due to decreased activity with decreased mobility and strength through the trunk due to worsening ankylosing spondylitis. Use of outcome tool(s) and clinical judgement create a POC that gives a: Questionable prediction of patient's progress: MODERATE COMPLEXITY            TREATMENT:   (In addition to Assessment/Re-Assessment sessions the following treatments were rendered)  Pre-treatment Symptoms/Complaints: Patient reports he achieved 41000 steps this weekend and is really feeling stronger. Pain: Initial:      Post Session:  0/10   Manual Therapy (     ): (Used abbreviations: MET - muscle energy technique; PNF - proprioceptive neuromuscular facilitation; NMR - neuromuscular re-education; a/p - anterior to posterior; p/a - posterior to anterior) None  Therapeutic Exercise: (65 Minutes):  Exercises per grid below to improve strength and endurance. Required minimal verbal cues to promote proper body alignment and promote proper body breathing techniques. Increased resistance with strengthening exercises and cardio time today. Focused on working in the 40-60% HR Max zone today. Date:  7/5/17 Date:  7/7/17 Date:  07/10/17 Date:  07/13/17 Date:  07/17/17   Activity/Exercise        Treadmill at home 2.4 mph 4% grade x 12 min 2.5 mph 4% grade x 12 min 2. 5mph 8% grade x 16 min 2. 5mph 10% grade x 16 min 2. 5mph 10% grade x 16 min   Sit-stand  2 min 2 min 2 min 2 min 2 min   Stairs - heelraises B 50 x 1 50 x 1 50 x 1 50 x 1 50 x 1   Calf stretch 1 min 1 min 1 min 1 min 1 min   Stairs - double step ups 5 x 2 5 x 2 HEP HEP HEP   Side steps up/down for aerobic training and balance/coordination   1 min x 2 1 min x 2 1 min x 2   R UE rows Blue x 2 min Blue x 2 min Blue 30 x 2 Blue 30 x 2 Blue 30 x 2   UE/LE ergometer (seat at 9) 10 min - level 3 10 min - level 3 Level 6 x 5 min Level 6 x 10 min Level 6 x 10 min   Leg press (incline with B LE) 75 lb 10 x 3  80 lb x 10 80 lb  10 x 3 85 lb 10 x 3; 100 lb 3 x 1 95 lb 10 x 3; 105 lb 5 x 1 105 lb 10 x 3; 110 lb 10 x 1   Monster walks - side/front/back   Red theraband 10 ft x 10 ea for strengthening Red theraband 10 ft x 10 for strengthening Red 10 ft x 10 for strengthening   Home Exercise Program (HEP): Treadmill progression and stair/step exercises as noted above  Treatment/Session Assessment:    · Response to Treatment: O2 Sats never dropped below 96%. Increased leg press weight and added more aerobic exercise in conjunction with strengthening  · Compliance with Program/Exercises: Very compliant  · Recommendations/Intent for next treatment session: Next visit will focus on advancements to more challenging activities and progressing to more strengthening and endurance activities.   Total Treatment Duration: (65 minutes of treatment)  PT Patient Time In/Time Out  Time In: 1030  Time Out: 1130    Bela Wood PT

## 2017-07-19 ENCOUNTER — HOSPITAL ENCOUNTER (OUTPATIENT)
Dept: PHYSICAL THERAPY | Age: 72
Discharge: HOME | End: 2017-07-19
Payer: MEDICARE

## 2017-07-19 PROCEDURE — 97110 THERAPEUTIC EXERCISES: CPT

## 2017-07-19 NOTE — PROGRESS NOTES
Awilda Meadows  : 1945 [facility]  [address] [place] [address]  [phone]   [phone] [id number]       OUTPATIENT PHYSICAL THERAPY:Daily Note 2017    ICD-10: Treatment Diagnosis: Ankylosing Spondylitis of thoracic region (M45.4); Pain in thoracic spine (M54.5); muscle weakness, generalized (M62.81); difficulty walking, not elsewhere classified (R26.2)  Precautions/Allergies:  Codeine   Fall Risk Score: 2 (? 5 = High Risk) MEDICAL/REFERRING DIAGNOSIS: Thoracic back pain; post pneumonia with debility  DATE OF ONSET: 17  REFERRING PHYSICIAN: Vin Allen MD  RETURN PHYSICIAN APPOINTMENT:    RE-CERTIFICATION (): Mr. Jia Pires is showing excellent progress both with strength and endurance. He is now independent with transfers and basic ADLs and has resumed driving to come to therapy. He is up to walking 15 minutes without significant decrease in vital signs and is able to tolerate a full hour of therapy with very minimal rests of less than 2 minutes. He has achieved up to 6000 steps in a single day at this point. TUG score has improved from 23 to 14 seconds. He reports intermittent thoracic back pain related to more prolonged static postures and eased with activity. INITIAL ASSESSMENT ([id number]):  Mr. Jia Pires presents with general debility with overall strength and functional mobility deficits due to prolonged hospitalization due to pneumonia/sepsis; he also complains of thoracic back pain exacerbated due to decreased activity with decreased mobility and strength through the trunk due to worsening ankylosing spondylitis. PROBLEM LIST (Impacting functional limitations): Increased pain through thoracic spine/costal cage limiting tolerance of ADLs and difficulty sleeping  Decreased activity tolerance for basic and instrumental ADLs due to generalized weakness B LE/UE.   Decreased ADL/functional activities specificially with any endurance activity related to decreased overall strength and limited pulmonary capacity post-pneumonia  Outcome measure score of 23 seconds (WNL - 7 seconds) on Timed Up and Go (TUG) test with severe effect of decreased strength/endurance on patient's ability to manage every day life activities  Decreased strength through B LE/UE/trunk limiting all functional mobility INTERVENTIONS PLANNED:  Patient education including pathophysiology of activity tolerance and progression  Manual therapy including soft tissue mobilizations, functional joint mobilizations and neuromuscular re-education to thoracic region  Neuromuscular re-education with focus on initiation/strength and endurance and motor control of B UE/LE/trunk  Therapeutic exercises including strengthening and endurance related tasks  Gait training with focus on correcting deficits, sequencing and honorio  Balance exercise - focused on standing dynamic balance with greatest focus on dynamic center of gravity control  Therapeutic activities with focus on strength and endurance  Therapeutic modalities including pain modalities for thoracic spine  Instructions of home exercise program (HEP)   TREATMENT PLAN:  Effective Dates: 06/28/17 TO 09/28/17  Frequency/Duration: Decrease to 1 time a week for 9 weeks due to excellent progress  GOALS: (Goals have been discussed and agreed upon with patient.)  Short-Term Functional Goals: Goals achieved; please refer to discharge goals  1. Patient will be able to ambulate greater than 250 ft to get to therapy clinic without the use of a wheelchair (goal achieved). 2. Patient will be able to perform walking on treadmill at home for 5 minutes without O2 saturations below 86% (goal achieved). 3. Patient will be independent with sit-stand without requiring hand support/assistance (goal achieved). Discharge Goals: Time Frame: 12 weeks  1.  Patient will be independent will all functional mobility at home without difficulty or thoracic back pain (goal ongoing). 2. Patient will be able to walk 10,000 steps without difficulty (goal ongoing). 3. Patient will have achieved prior level of function for all ADLs without difficulty or thoracic back pain (goal ongoing). 4. Patient will score less than 12 seconds on TUG and be WNLs (goal ongoing). Rehabilitation Potential For Stated Goals: Excellent            The information in this section was collected on 7/19/2017 (except where otherwise noted). HISTORY:   History of Present Injury/Illness (Reason for Referral):  Patient reports he was not feeling well from about 05/13-15/17 and was then admitted to hospital for pneumonia/sepsis with a 3-day hospital stay in ICU with complications that included a L lower lobe pneumonia. He was discharged to home on 05/27/17 and referred to outpatient physical therapy for post-hospital physical therapy. Past Medical History/Comorbidities:   Mr. Zahraa Rowell  has a past medical history of Arthritis; Elevated prostate specific antigen (PSA); History of prostate surgery (3/12/2015); HLA-B27 positive; Neck pain; Osteoarthritis, hand; Osteopenia; Osteoporosis; Polyarthritis; Polymyalgia rheumatica ([address] Prostate cancer ([address] Raynaud's disease; 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[address] Ankylosing Spondylitis and thoracic spine pain. Mr. Zahraa Rowell  has a past surgical history that includes vasectomy (40+ yrs); knee arthroscopy (2009); urological; biopsy prostate; prostatectomy (2012); cataract removal (2012); cataract removal (2013); and colonoscopy (02/2010). Social History/Living Environment: Patient lives with spouse. Patient denies and physical barriers at home and has a tub/shower combo. Prior Level of Function/Work/Activity: Patient is retired. Prior to hospitalization, patient reports he walked 5 days/week for 30-50 minutes. .  Dominant Side:  RIGHT  Current Medications:     Current Outpatient Prescriptions:     aspirin 81 mg chewable tablet, Take 1 Tab by mouth daily. , Disp: 30 Tab, Rfl: 1    ferrous sulfate 325 mg (65 mg iron) tablet, Take 1 Tab by mouth two (2) times daily (with meals). , Disp: 60 Tab, Rfl: 1    predniSONE (DELTASONE) 20 mg tablet, Take 2 tablets daily for 1 week then 1 tablet daily until seen in the office for follow up, Disp: 40 Tab, Rfl: 0    potassium chloride (K-DUR, KLOR-CON) 20 mEq tablet, Take 2 Tabs by mouth daily. , Disp: 30 Tab, Rfl: 1    DULoxetine (CYMBALTA) 30 mg capsule, Take 1 Cap by mouth daily. , Disp: 90 Cap, Rfl: 1    traMADol (ULTRAM) 50 mg tablet, Take 1 Tab by mouth three (3) times daily as needed for Pain. Max Daily Amount: 150 mg., Disp: 90 Tab, Rfl: 2    omeprazole (PRILOSEC) 40 mg capsule, Take 1 Cap by mouth daily. , Disp: 30 Cap, Rfl: 5    diclofenac (VOLTAREN) 1 % topical gel, Apply 4 g to affected area four (4) times daily. , Disp: 100 g, Rfl: 2    calcium-vitamin D (OYSTER SHELL) 500 mg(1,250mg) -200 unit per tablet, Take 1 Tab by mouth daily. , Disp: , Rfl:     cholecalciferol, vitamin D3, (VITAMIN D3) 2,000 unit Tab, Take  by mouth. Taking 1 1/2 tabs of 2000 international units once daily, Disp: , Rfl:    Date Last Reviewed:  7/19/2017   Number of Personal Factors/Comorbidities that affect the Plan of Care: 1-2: MODERATE COMPLEXITY   EXAMINATION:   Observation/Orthostatic Postural Assessment: Cachetic body with significant weight loss from hospitalization. Posture - kyphotic with an anterior rotated rib cage that is posterior positioned over a posterior tilted pelvis. Atrophy noted through all LE and UE musculature, most notable gluteals. ROM:  AROM B UE WNL with shoulder abduction palm down 140 degrees B. Cervical rotation 70% B and pain free. B LE AROM WNL. Passive trunk rotation less than 50% B. Breathing assessment indicated decreased mobility of L lower rib cage with inhalation between ribs 5-8. Strength:  B UE grossly good except 4+/5 for R shoulder ER and B abduction.  B LE grossly fair with 4+/5 for gluts, quadriceps, hamstrings and hip flexors. Neurological Screen:        Myotomes:  Good except for strength deficits noted above. Dermatomes: WNL        Reflexes:  2+ and WNL B UE/LE  Functional Mobility:         Gait/Ambulation:  Independent with narrowed stance and limited arm swing B. Endurance with gait - good-        Transfers:  Independent        Bed Mobility:  Independent        Stairs: Alternating steps with 1 hand for balance only  Balance:          Static standing B LE greater than 60 sec; single leg less than 15 sec B. Mental Status:          Alert and oriented x 4   Body Structures Involved:  1. Thoracic Cage  2. Joints  3. Muscles Body Functions Affected:  1. Cardio  2. Neuromusculoskeletal  3. Movement Related Activities and Participation Affected:  1. Mobility  2. Self Care  3. Domestic Life  4. Community, Social and [place]   Number of elements (examined above) that affect the Plan of Care: 4+: HIGH COMPLEXITY   CLINICAL PRESENTATION:   Presentation: Evolving clinical presentation with changing clinical characteristics: MODERATE COMPLEXITY   CLINICAL DECISION MAKING:   Outcome Measure: Tool Used: Modified Oswestry Low Back Pain Questionnaire  Score:  Initial: 22/50 (06/07/17) Most Recent: X/50 (Date: -- )   Interpretation of Score: Each section is scored on a 0-5 scale, 5 representing the greatest disability. The scores of each section are added together for a total score of 50. Score 0 1-10 11-20 21-30 31-40 41-49 50   Modifier CH CI CJ CK CL CM CN     Tool Used: Timed Up and Go (TUG)  Score:  Initial: 23 seconds (06/07/17) Most Recent: 14 seconds (Date: 06/28/17 )   Interpretation of Score: The test measures, in seconds, the time taken by an individual to stand up from a standard arm chair (seat height 46 cm [18 in], arm height 65 cm [25.6 in]), walk a distance of 3 meters (118 in, approx 10 ft), turn, walk back to the chair and sit down.   If the individual takes longer than 14 seconds to complete TUG, this indicates risk for falls. Score 7 7.5-10.5 11-14 14.5-17.5 18-21 21.5-24.5 25+   Modifier CH CI CJ CK CL CM CN     ? Mobility - Walking and Moving Around:     - CURRENT STATUS: CJ - 20%-39% impaired, limited or restricted    - GOAL STATUS: CI - 1%-19% impaired, limited or restricted    - D/C STATUS: ---------------To be determined---------------  Medical Necessity:   · Patient is expected to demonstrate progress in strength, balance and endurance and functional mobility to increase independence with ADLs, improve safety during daily activities and return to prior functional level. · Patient demonstrates excellent rehab potential due to higher previous functional level. Reason for Services/Other Comments:  · Patient continues to require skilled intervention due to general debility with overall strength and functional mobility deficits due to prolonged hospitalization due to pneumonia/sepsis; he also complains of thoracic back pain exacerbated due to decreased activity with decreased mobility and strength through the trunk due to worsening ankylosing spondylitis. Use of outcome tool(s) and clinical judgement create a POC that gives a: Questionable prediction of patient's progress: MODERATE COMPLEXITY            TREATMENT:   (In addition to Assessment/Re-Assessment sessions the following treatments were rendered)  Pre-treatment Symptoms/Complaints: Patient reports he is feeling good and still getting those steps in  Pain: Initial:   Pain Intensity 1: 0  Post Session:  0/10   Manual Therapy (     ): (Used abbreviations: MET - muscle energy technique; PNF - proprioceptive neuromuscular facilitation; NMR - neuromuscular re-education; a/p - anterior to posterior; p/a - posterior to anterior) None  Therapeutic Exercise: (55 Minutes):  Exercises per grid below to improve strength and endurance. Required minimal verbal cues to promote proper body alignment and promote proper body breathing techniques. Increased resistance with strengthening exercises and cardio time today. Focused on working in the 40-60% HR Max zone today. Date:  7/5/17 Date:  7/7/17 Date:  07/10/17 Date:  07/13/17 Date:  07/17/17 Date:  7/19/17   Activity/Exercise         Treadmill at home 2.4 mph 4% grade x 12 min 2.5 mph 4% grade x 12 min 2. 5mph 8% grade x 16 min 2. 5mph 10% grade x 16 min 2. 5mph 10% grade x 16 min 2. 6mph 10% grade x 16 min   Sit-stand  2 min 2 min 2 min 2 min 2 min 2 min   Stairs - heelraises B 50 x 1 50 x 1 50 x 1 50 x 1 50 x 1 50 x 1   Calf stretch 1 min 1 min 1 min 1 min 1 min 1 min   Stairs - double step ups 5 x 2 5 x 2 HEP HEP HEP HEP   Side steps up/down for aerobic training and balance/coordination   1 min x 2 1 min x 2 1 min x 2 1 min x 2   R UE rows Blue x 2 min Blue x 2 min Blue 30 x 2 Blue 30 x 2 Blue 30 x 2 Blue 30 x 2   UE/LE ergometer (seat at 9) 10 min - level 3 10 min - level 3 Level 6 x 5 min Level 6 x 10 min Level 6 x 10 min    Leg press (incline with B LE) 75 lb 10 x 3  80 lb x 10 80 lb  10 x 3 85 lb 10 x 3; 100 lb 3 x 1 95 lb 10 x 3; 105 lb 5 x 1 105 lb 10 x 3; 110 lb 10 x 1 105 lb 10 x 3; 110 lb 10 x 1   Monster walks - side/front/back   Red theraband 10 ft x 10 ea for strengthening Red theraband 10 ft x 10 for strengthening Red 10 ft x 10 for strengthening Red 10 ft x 10 for strengthening   Home Exercise Program (HEP): Treadmill progression and stair/step exercises as noted above  Treatment/Session Assessment:    · Response to Treatment: O2 Sats never dropped below 96%. Increased leg press weight and added more aerobic exercise in conjunction with strengthening. Continue per plan  · Compliance with Program/Exercises: Very compliant  · Recommendations/Intent for next treatment session: Next visit will focus on advancements to more challenging activities and progressing to more strengthening and endurance activities.   Total Treatment Duration: (55 minutes of treatment)  PT Patient Time In/Time Out  Time In: 1030  Time Out: [address], PT

## 2017-07-24 ENCOUNTER — HOSPITAL ENCOUNTER (OUTPATIENT)
Dept: PHYSICAL THERAPY | Age: 72
Discharge: HOME | End: 2017-07-24
Payer: MEDICARE

## 2017-07-24 PROCEDURE — 97110 THERAPEUTIC EXERCISES: CPT

## 2017-07-24 NOTE — PROGRESS NOTES
Noam Client  : [facility]  [address] [place] [address]  [phone]   [phone]       OUTPATIENT PHYSICAL THERAPY:Daily Note 2017    ICD-10: Treatment Diagnosis: Ankylosing Spondylitis of thoracic region (M45.4); Pain in thoracic spine (M54.5); muscle weakness, generalized (M62.81); difficulty walking, not elsewhere classified (R26.2)  Precautions/Allergies:  Codeine   Fall Risk Score: 2 (? 5 = High Risk) MEDICAL/REFERRING DIAGNOSIS: Thoracic back pain; post pneumonia with debility  DATE OF ONSET: 17  REFERRING PHYSICIAN: Rebel Alfaro MD  RETURN PHYSICIAN APPOINTMENT: [id number]   RE-CERTIFICATION (): Mr. Della López is showing excellent progress both with strength and endurance. He is now independent with transfers and basic ADLs and has resumed driving to come to therapy. He is up to walking 15 minutes without significant decrease in vital signs and is able to tolerate a full hour of therapy with very minimal rests of less than 2 minutes. He has achieved up to 6000 steps in a single day at this point. TUG score has improved from 23 to 14 seconds. He reports intermittent thoracic back pain related to more prolonged static postures and eased with activity. INITIAL ASSESSMENT ([id number]):  Mr. Della López presents with general debility with overall strength and functional mobility deficits due to prolonged hospitalization due to pneumonia/sepsis; he also complains of thoracic back pain exacerbated due to decreased activity with decreased mobility and strength through the trunk due to worsening ankylosing spondylitis. PROBLEM LIST (Impacting functional limitations): Increased pain through thoracic spine/costal cage limiting tolerance of ADLs and difficulty sleeping  Decreased activity tolerance for basic and instrumental ADLs due to generalized weakness B LE/UE.   Decreased ADL/functional activities specificially with any endurance activity related to decreased overall strength and limited pulmonary capacity post-pneumonia  Outcome measure score of 23 seconds (WNL - 7 seconds) on Timed Up and Go (TUG) test with severe effect of decreased strength/endurance on patient's ability to manage every day life activities  Decreased strength through B LE/UE/trunk limiting all functional mobility INTERVENTIONS PLANNED:  Patient education including pathophysiology of activity tolerance and progression  Manual therapy including soft tissue mobilizations, functional joint mobilizations and neuromuscular re-education to thoracic region  Neuromuscular re-education with focus on initiation/strength and endurance and motor control of B UE/LE/trunk  Therapeutic exercises including strengthening and endurance related tasks  Gait training with focus on correcting deficits, sequencing and honorio  Balance exercise - focused on standing dynamic balance with greatest focus on dynamic center of gravity control  Therapeutic activities with focus on strength and endurance  Therapeutic modalities including pain modalities for thoracic spine  Instructions of home exercise program (HEP)   TREATMENT PLAN:  Effective Dates: 06/28/17 TO 09/28/17  Frequency/Duration: Continue 2 times a week for 2 weeks followed by 1 time a week for 8 weeks  GOALS: (Goals have been discussed and agreed upon with patient.)  Short-Term Functional Goals: Goals achieved; please refer to discharge goals  1. Patient will be able to ambulate greater than 250 ft to get to therapy clinic without the use of a wheelchair (goal achieved). 2. Patient will be able to perform walking on treadmill at home for 5 minutes without O2 saturations below 86% (goal achieved). 3. Patient will be independent with sit-stand without requiring hand support/assistance (goal achieved). Discharge Goals: Time Frame: 12 weeks  1.  Patient will be independent will all functional mobility at home without difficulty or thoracic back pain (goal ongoing). 2. Patient will be able to walk 10,000 steps without difficulty (goal ongoing). 3. Patient will have achieved prior level of function for all ADLs without difficulty or thoracic back pain (goal ongoing). 4. Patient will score less than 12 seconds on TUG and be WNLs (goal ongoing). Rehabilitation Potential For Stated Goals: Excellent            The information in this section was collected on 7/24/2017 (except where otherwise noted). HISTORY:   History of Present Injury/Illness (Reason for Referral):  Patient reports he was not feeling well from about 05/13-15/17 and was then admitted to hospital for pneumonia/sepsis with a 3-day hospital stay in ICU with complications that included a L lower lobe pneumonia. He was discharged to home on 05/27/17 and referred to outpatient physical therapy for post-hospital physical therapy. Past Medical History/Comorbidities:   Mr. Kami Warren  has a past medical history of Arthritis; Elevated prostate specific antigen (PSA); History of prostate surgery (3/12/2015); HLA-B27 positive; Neck pain; Osteoarthritis, hand; Osteopenia; Osteoporosis; Polyarthritis; Polymyalgia rheumatica ([address] Prostate cancer ([address] Raynaud's disease; 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[address] Ankylosing Spondylitis and thoracic spine pain. Mr. Kami Warren  has a past surgical history that includes vasectomy (40+ yrs); knee arthroscopy (2009); urological; biopsy prostate; prostatectomy (2012); cataract removal (2012); cataract removal (2013); and colonoscopy (02/2010). Social History/Living Environment: Patient lives with spouse. Patient denies and physical barriers at home and has a tub/shower combo. Prior Level of Function/Work/Activity: Patient is retired. Prior to hospitalization, patient reports he walked 5 days/week for 30-50 minutes. .  Dominant Side:  RIGHT  Current Medications:     Current Outpatient Prescriptions:     aspirin 81 mg chewable tablet, Take 1 Tab by mouth daily. , Disp: 30 Tab, Rfl: 1    ferrous sulfate 325 mg (65 mg iron) tablet, Take 1 Tab by mouth two (2) times daily (with meals). , Disp: 60 Tab, Rfl: 1    predniSONE (DELTASONE) 20 mg tablet, Take 2 tablets daily for 1 week then 1 tablet daily until seen in the office for follow up, Disp: 40 Tab, Rfl: 0    potassium chloride (K-DUR, KLOR-CON) 20 mEq tablet, Take 2 Tabs by mouth daily. , Disp: 30 Tab, Rfl: 1    DULoxetine (CYMBALTA) 30 mg capsule, Take 1 Cap by mouth daily. , Disp: 90 Cap, Rfl: 1    traMADol (ULTRAM) 50 mg tablet, Take 1 Tab by mouth three (3) times daily as needed for Pain. Max Daily Amount: 150 mg., Disp: 90 Tab, Rfl: 2    omeprazole (PRILOSEC) 40 mg capsule, Take 1 Cap by mouth daily. , Disp: 30 Cap, Rfl: 5    diclofenac (VOLTAREN) 1 % topical gel, Apply 4 g to affected area four (4) times daily. , Disp: 100 g, Rfl: 2    calcium-vitamin D (OYSTER SHELL) 500 mg(1,250mg) -200 unit per tablet, Take 1 Tab by mouth daily. , Disp: , Rfl:     cholecalciferol, vitamin D3, (VITAMIN D3) 2,000 unit Tab, Take  by mouth. Taking 1 1/2 tabs of 2000 international units once daily, Disp: , Rfl:    Date Last Reviewed:  7/24/2017   Number of Personal Factors/Comorbidities that affect the Plan of Care: 1-2: MODERATE COMPLEXITY   EXAMINATION:   Observation/Orthostatic Postural Assessment: Cachetic body with significant weight loss from hospitalization. Posture - kyphotic with an anterior rotated rib cage that is posterior positioned over a posterior tilted pelvis. Atrophy noted through all LE and UE musculature, most notable gluteals. ROM:  AROM B UE WNL with shoulder abduction palm down 140 degrees B. Cervical rotation 70% B and pain free. B LE AROM WNL. Passive trunk rotation less than 50% B. Breathing assessment indicated decreased mobility of L lower rib cage with inhalation between ribs 5-8. Strength:  B UE grossly good except 4+/5 for R shoulder ER and B abduction.  B LE grossly fair with 4+/5 for gluts, quadriceps, hamstrings and hip flexors. Neurological Screen:        Myotomes:  Good except for strength deficits noted above. Dermatomes: WNL        Reflexes:  2+ and WNL B UE/LE  Functional Mobility:         Gait/Ambulation:  Independent with narrowed stance and limited arm swing B. Endurance with gait - good-        Transfers:  Independent        Bed Mobility:  Independent        Stairs: Alternating steps with 1 hand for balance only  Balance:          Static standing B LE greater than 60 sec; single leg less than 15 sec B. Mental Status:          Alert and oriented x 4   Body Structures Involved:  1. Thoracic Cage  2. Joints  3. Muscles Body Functions Affected:  1. Cardio  2. Neuromusculoskeletal  3. Movement Related Activities and Participation Affected:  1. Mobility  2. Self Care  3. Domestic Life  4. Community, Social and [place]   Number of elements (examined above) that affect the Plan of Care: 4+: HIGH COMPLEXITY   CLINICAL PRESENTATION:   Presentation: Evolving clinical presentation with changing clinical characteristics: MODERATE COMPLEXITY   CLINICAL DECISION MAKING:   Outcome Measure: Tool Used: Modified Oswestry Low Back Pain Questionnaire  Score:  Initial: 22/50 (06/07/17) Most Recent: X/50 (Date: -- )   Interpretation of Score: Each section is scored on a 0-5 scale, 5 representing the greatest disability. The scores of each section are added together for a total score of 50. Score 0 1-10 11-20 21-30 31-40 41-49 50   Modifier CH CI CJ CK CL CM CN     Tool Used: Timed Up and Go (TUG)  Score:  Initial: 23 seconds (06/07/17) Most Recent: 8 seconds (Date: 07/24/17)   Interpretation of Score: The test measures, in seconds, the time taken by an individual to stand up from a standard arm chair (seat height 46 cm [18 in], arm height 65 cm [25.6 in]), walk a distance of 3 meters (118 in, approx 10 ft), turn, walk back to the chair and sit down.   If the individual takes longer than 14 seconds to complete TUG, this indicates risk for falls. Score 7 7.5-10.5 11-14 14.5-17.5 18-21 21.5-24.5 25+   Modifier CH CI CJ CK CL CM CN     ? Mobility - Walking and Moving Around:     - CURRENT STATUS: CJ - 20%-39% impaired, limited or restricted    - GOAL STATUS: CI - 1%-19% impaired, limited or restricted    - D/C STATUS: ---------------To be determined---------------  Medical Necessity:   · Patient is expected to demonstrate progress in strength, balance and endurance and functional mobility to increase independence with ADLs, improve safety during daily activities and return to prior functional level. · Patient demonstrates excellent rehab potential due to higher previous functional level. Reason for Services/Other Comments:  · Patient continues to require skilled intervention due to general debility with overall strength and functional mobility deficits due to prolonged hospitalization due to pneumonia/sepsis; he also complains of thoracic back pain exacerbated due to decreased activity with decreased mobility and strength through the trunk due to worsening ankylosing spondylitis. Use of outcome tool(s) and clinical judgement create a POC that gives a: Questionable prediction of patient's progress: MODERATE COMPLEXITY            TREATMENT:   (In addition to Assessment/Re-Assessment sessions the following treatments were rendered)  Pre-treatment Symptoms/Complaints: Patient reports he is feeling good and still getting those steps in  Pain: Initial:   Pain Intensity 1: 0  Post Session:  0/10   Manual Therapy (     ): (Used abbreviations: MET - muscle energy technique; PNF - proprioceptive neuromuscular facilitation; NMR - neuromuscular re-education; a/p - anterior to posterior; p/a - posterior to anterior) None  Therapeutic Exercise: (65 Minutes):  Exercises per grid below to improve strength and endurance.   Required minimal verbal cues to promote proper body alignment and promote proper body breathing techniques. Increased resistance with strengthening exercises. Focused on working in the 40-60% HR Max zone today. Date:  7/5/17 Date:  7/7/17 Date:  07/10/17 Date:  07/13/17 Date:  07/17/17 Date:  7/19/17 Date:  07/24/17   Activity/Exercise          Treadmill at home 2.4 mph 4% grade x 12 min 2.5 mph 4% grade x 12 min 2. 5mph 8% grade x 16 min 2. 5mph 10% grade x 16 min 2. 5mph 10% grade x 16 min 2. 6mph 10% grade x 16 min 2. 6mph 10% grade x 16 min   Sit-stand  2 min 2 min 2 min 2 min 2 min 2 min 2 min    Stairs - heelraises B 50 x 1 50 x 1 50 x 1 50 x 1 50 x 1 50 x 1 50 x 1   Calf stretch 1 min 1 min 1 min 1 min 1 min 1 min 1 min   Stairs - double step ups 5 x 2 5 x 2 HEP HEP HEP HEP Hip flexor stretch 30 sec x 1 with double step   Side steps up/down for aerobic training and balance/coordination   1 min x 2 1 min x 2 1 min x 2 1 min x 2 1 min x 2   R UE rows Blue x 2 min Blue x 2 min Blue 30 x 2 Blue 30 x 2 Blue 30 x 2 Blue 30 x 2 Blue 30 x 2   UE/LE ergometer (seat at 9) 10 min - level 3 10 min - level 3 Level 6 x 5 min Level 6 x 10 min Level 6 x 10 min  -   Leg press (incline with B LE) 75 lb 10 x 3  80 lb x 10 80 lb  10 x 3 85 lb 10 x 3; 100 lb 3 x 1 95 lb 10 x 3; 105 lb 5 x 1 105 lb 10 x 3; 110 lb 10 x 1 105 lb 10 x 3; 110 lb 10 x 1 110 lb 10 x 2  115 lb 10 x 1   Monster walks - side/front/back   Red theraband 10 ft x 10 ea for strengthening Red theraband 10 ft x 10 for strengthening Red 10 ft x 10 for strengthening Red 10 ft x 10 for strengthening Green 10 ft x 10 for hip strengthening   Home Exercise Program (HEP): Treadmill progression and stair/step exercises as noted above  Treatment/Session Assessment:    · Response to Treatment: O2 Sats never dropped below 96%. Increased leg press weight and added more aerobic exercise in conjunction with strengthening.   Continue per plan  · Compliance with Program/Exercises: Very compliant  · Recommendations/Intent for next treatment session: Next visit will focus on advancements to more challenging activities and progressing to more strengthening and endurance activities.   Total Treatment Duration: (55 minutes of treatment)  PT Patient Time In/Time Out  Time In: 1030  Time Out: 1135    Blanca Mirza PT

## 2017-07-25 ENCOUNTER — HOSPITAL ENCOUNTER (OUTPATIENT)
Dept: PHYSICAL THERAPY | Age: 72
Discharge: HOME | End: 2017-07-25
Payer: MEDICARE

## 2017-07-25 PROCEDURE — 92526 ORAL FUNCTION THERAPY: CPT

## 2017-07-25 PROCEDURE — G8997 SWALLOW GOAL STATUS: HCPCS

## 2017-07-25 PROCEDURE — G8998 SWALLOW D/C STATUS: HCPCS

## 2017-07-27 ENCOUNTER — APPOINTMENT (OUTPATIENT)
Dept: PHYSICAL THERAPY | Age: 72
End: 2017-07-27
Payer: MEDICARE

## 2017-07-28 ENCOUNTER — HOSPITAL ENCOUNTER (OUTPATIENT)
Dept: PHYSICAL THERAPY | Age: 72
Discharge: HOME | End: 2017-07-28
Payer: MEDICARE

## 2017-07-28 PROCEDURE — 97110 THERAPEUTIC EXERCISES: CPT

## 2017-07-28 NOTE — PROGRESS NOTES
Josse Rboerto  : 1945 [facility]  [address] [place] [address]  [phone]   [phone]       OUTPATIENT PHYSICAL THERAPY:Daily Note 2017    ICD-10: Treatment Diagnosis: Ankylosing Spondylitis of thoracic region (M45.4); Pain in thoracic spine (M54.5); muscle weakness, generalized (M62.81); difficulty walking, not elsewhere classified (R26.2)  Precautions/Allergies:  Codeine   Fall Risk Score: 2 (? 5 = High Risk) MEDICAL/REFERRING DIAGNOSIS: Thoracic back pain; post pneumonia with debility  DATE OF ONSET: 17  REFERRING PHYSICIAN: Jose Sanchez MD  RETURN PHYSICIAN APPOINTMENT:    RE-CERTIFICATION (): Mr. Angelo Snell is showing excellent progress both with strength and endurance. He is now independent with transfers and basic ADLs and has resumed driving to come to therapy. He is up to walking 15 minutes without significant decrease in vital signs and is able to tolerate a full hour of therapy with very minimal rests of less than 2 minutes. He has achieved up to 6000 steps in a single day at this point. TUG score has improved from 23 to 14 seconds. He reports intermittent thoracic back pain related to more prolonged static postures and eased with activity. INITIAL ASSESSMENT ([id number]):  Mr. Angelo Snell presents with general debility with overall strength and functional mobility deficits due to prolonged hospitalization due to pneumonia/sepsis; he also complains of thoracic back pain exacerbated due to decreased activity with decreased mobility and strength through the trunk due to worsening ankylosing spondylitis. PROBLEM LIST (Impacting functional limitations): Increased pain through thoracic spine/costal cage limiting tolerance of ADLs and difficulty sleeping  Decreased activity tolerance for basic and instrumental ADLs due to generalized weakness B LE/UE.   Decreased ADL/functional activities specificially with any endurance activity related to decreased overall strength and limited pulmonary capacity post-pneumonia  Outcome measure score of 23 seconds (WNL - 7 seconds) on Timed Up and Go (TUG) test with severe effect of decreased strength/endurance on patient's ability to manage every day life activities  Decreased strength through B LE/UE/trunk limiting all functional mobility INTERVENTIONS PLANNED:  Patient education including pathophysiology of activity tolerance and progression  Manual therapy including soft tissue mobilizations, functional joint mobilizations and neuromuscular re-education to thoracic region  Neuromuscular re-education with focus on initiation/strength and endurance and motor control of B UE/LE/trunk  Therapeutic exercises including strengthening and endurance related tasks  Gait training with focus on correcting deficits, sequencing and honorio  Balance exercise - focused on standing dynamic balance with greatest focus on dynamic center of gravity control  Therapeutic activities with focus on strength and endurance  Therapeutic modalities including pain modalities for thoracic spine  Instructions of home exercise program (HEP)   TREATMENT PLAN:  Effective Dates: 06/28/17 TO 09/28/17  Frequency/Duration: Continue 2 times a week for 2 weeks followed by 1 time a week for 8 weeks  GOALS: (Goals have been discussed and agreed upon with patient.)  Short-Term Functional Goals: Goals achieved; please refer to discharge goals  1. Patient will be able to ambulate greater than 250 ft to get to therapy clinic without the use of a wheelchair (goal achieved). 2. Patient will be able to perform walking on treadmill at home for 5 minutes without O2 saturations below 86% (goal achieved). 3. Patient will be independent with sit-stand without requiring hand support/assistance (goal achieved). Discharge Goals: Time Frame: 12 weeks  1.  Patient will be independent will all functional mobility at home without difficulty or thoracic back pain (goal ongoing). 2. Patient will be able to walk 10,000 steps without difficulty (goal ongoing). 3. Patient will have achieved prior level of function for all ADLs without difficulty or thoracic back pain (goal ongoing). 4. Patient will score less than 12 seconds on TUG and be WNLs (goal ongoing). Rehabilitation Potential For Stated Goals: Excellent            The information in this section was collected on 7/28/2017 (except where otherwise noted). HISTORY:   History of Present Injury/Illness (Reason for Referral):  Patient reports he was not feeling well from about 05/13-15/17 and was then admitted to hospital for pneumonia/sepsis with a 3-day hospital stay in ICU with complications that included a L lower lobe pneumonia. He was discharged to home on 05/27/17 and referred to outpatient physical therapy for post-hospital physical therapy. Past Medical History/Comorbidities:   Mr. Spenser Lozoya  has a past medical history of Arthritis; Elevated prostate specific antigen (PSA); History of prostate surgery (3/12/2015); HLA-B27 positive; Neck pain; Osteoarthritis, hand; Osteopenia; Osteoporosis; Polyarthritis; Polymyalgia rheumatica ([address] Prostate cancer ([address] Raynaud's disease; 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[address] Ankylosing Spondylitis and thoracic spine pain. Mr. Spenser Lozoya  has a past surgical history that includes vasectomy (40+ yrs); knee arthroscopy (2009); urological; biopsy prostate; prostatectomy (2012); cataract removal (2012); cataract removal (2013); and colonoscopy (02/2010). Social History/Living Environment: Patient lives with spouse. Patient denies and physical barriers at home and has a tub/shower combo. Prior Level of Function/Work/Activity: Patient is retired. Prior to hospitalization, patient reports he walked 5 days/week for 30-50 minutes. .  Dominant Side:  RIGHT  Current Medications:     Current Outpatient Prescriptions:     aspirin 81 mg chewable tablet, Take 1 Tab by mouth daily. , Disp: 30 Tab, Rfl: 1    ferrous sulfate 325 mg (65 mg iron) tablet, Take 1 Tab by mouth two (2) times daily (with meals). , Disp: 60 Tab, Rfl: 1    predniSONE (DELTASONE) 20 mg tablet, Take 2 tablets daily for 1 week then 1 tablet daily until seen in the office for follow up, Disp: 40 Tab, Rfl: 0    potassium chloride (K-DUR, KLOR-CON) 20 mEq tablet, Take 2 Tabs by mouth daily. , Disp: 30 Tab, Rfl: 1    DULoxetine (CYMBALTA) 30 mg capsule, Take 1 Cap by mouth daily. , Disp: 90 Cap, Rfl: 1    traMADol (ULTRAM) 50 mg tablet, Take 1 Tab by mouth three (3) times daily as needed for Pain. Max Daily Amount: 150 mg., Disp: 90 Tab, Rfl: 2    omeprazole (PRILOSEC) 40 mg capsule, Take 1 Cap by mouth daily. , Disp: 30 Cap, Rfl: 5    diclofenac (VOLTAREN) 1 % topical gel, Apply 4 g to affected area four (4) times daily. , Disp: 100 g, Rfl: 2    calcium-vitamin D (OYSTER SHELL) 500 mg(1,250mg) -200 unit per tablet, Take 1 Tab by mouth daily. , Disp: , Rfl:     cholecalciferol, vitamin D3, (VITAMIN D3) 2,000 unit Tab, Take  by mouth. Taking 1 1/2 tabs of 2000 international units once daily, Disp: , Rfl:    Date Last Reviewed:  7/28/2017   Number of Personal Factors/Comorbidities that affect the Plan of Care: 1-2: MODERATE COMPLEXITY   EXAMINATION:   Observation/Orthostatic Postural Assessment: Cachetic body with significant weight loss from hospitalization. Posture - kyphotic with an anterior rotated rib cage that is posterior positioned over a posterior tilted pelvis. Atrophy noted through all LE and UE musculature, most notable gluteals. ROM:  AROM B UE WNL with shoulder abduction palm down 140 degrees B. Cervical rotation 70% B and pain free. B LE AROM WNL. Passive trunk rotation less than 50% B. Breathing assessment indicated decreased mobility of L lower rib cage with inhalation between ribs 5-8. Strength:  B UE grossly good except 4+/5 for R shoulder ER and B abduction.  B LE grossly fair with 4+/5 for gluts, quadriceps, hamstrings and hip flexors. Neurological Screen:        Myotomes:  Good except for strength deficits noted above. Dermatomes: WNL        Reflexes:  2+ and WNL B UE/LE  Functional Mobility:         Gait/Ambulation:  Independent with narrowed stance and limited arm swing B. Endurance with gait - good-        Transfers:  Independent        Bed Mobility:  Independent        Stairs: Alternating steps with 1 hand for balance only  Balance:          Static standing B LE greater than 60 sec; single leg less than 15 sec B. Mental Status:          Alert and oriented x 4   Body Structures Involved:  1. Thoracic Cage  2. Joints  3. Muscles Body Functions Affected:  1. Cardio  2. Neuromusculoskeletal  3. Movement Related Activities and Participation Affected:  1. Mobility  2. Self Care  3. Domestic Life  4. Community, Social and [place]   Number of elements (examined above) that affect the Plan of Care: 4+: HIGH COMPLEXITY   CLINICAL PRESENTATION:   Presentation: Evolving clinical presentation with changing clinical characteristics: MODERATE COMPLEXITY   CLINICAL DECISION MAKING:   Outcome Measure: Tool Used: Modified Oswestry Low Back Pain Questionnaire  Score:  Initial: 22/50 (06/07/17) Most Recent: X/50 (Date: -- )   Interpretation of Score: Each section is scored on a 0-5 scale, 5 representing the greatest disability. The scores of each section are added together for a total score of 50. Score 0 1-10 11-20 21-30 31-40 41-49 50   Modifier CH CI CJ CK CL CM CN     Tool Used: Timed Up and Go (TUG)  Score:  Initial: 23 seconds (06/07/17) Most Recent: 8 seconds (Date: 07/24/17)   Interpretation of Score: The test measures, in seconds, the time taken by an individual to stand up from a standard arm chair (seat height 46 cm [18 in], arm height 65 cm [25.6 in]), walk a distance of 3 meters (118 in, approx 10 ft), turn, walk back to the chair and sit down.   If the individual takes longer than 14 seconds to complete TUG, this indicates risk for falls. Score 7 7.5-10.5 11-14 14.5-17.5 18-21 21.5-24.5 25+   Modifier CH CI CJ CK CL CM CN     ? Mobility - Walking and Moving Around:     - CURRENT STATUS: CJ - 20%-39% impaired, limited or restricted    - GOAL STATUS: CI - 1%-19% impaired, limited or restricted    - D/C STATUS: ---------------To be determined---------------  Medical Necessity:   · Patient is expected to demonstrate progress in strength, balance and endurance and functional mobility to increase independence with ADLs, improve safety during daily activities and return to prior functional level. · Patient demonstrates excellent rehab potential due to higher previous functional level. Reason for Services/Other Comments:  · Patient continues to require skilled intervention due to general debility with overall strength and functional mobility deficits due to prolonged hospitalization due to pneumonia/sepsis; he also complains of thoracic back pain exacerbated due to decreased activity with decreased mobility and strength through the trunk due to worsening ankylosing spondylitis. Use of outcome tool(s) and clinical judgement create a POC that gives a: Questionable prediction of patient's progress: MODERATE COMPLEXITY            TREATMENT:   (In addition to Assessment/Re-Assessment sessions the following treatments were rendered)  Pre-treatment Symptoms/Complaints: Patient reports he is feeling good and still getting those steps in. I will be walking around [place] some this weekend  Pain: Initial:   Pain Intensity 1: 0  Post Session:  0/10   Manual Therapy (     ): (Used abbreviations: MET - muscle energy technique; PNF - proprioceptive neuromuscular facilitation; NMR - neuromuscular re-education; a/p - anterior to posterior; p/a - posterior to anterior) None  Therapeutic Exercise: (55 Minutes):  Exercises per grid below to improve strength and endurance.   Required minimal verbal cues to promote proper body alignment and promote proper body breathing techniques. Increased resistance with strengthening exercises. Focused on working in the 40-60% HR Max zone today. Date:  07/17/17 Date:  7/19/17 Date:  07/24/17 Date:  7/28/17   Activity/Exercise       Treadmill at home 2. 5mph 10% grade x 16 min 2. 6mph 10% grade x 16 min 2. 6mph 10% grade x 16 min 2. 6mph 10% grade x 16 min   Sit-stand  2 min 2 min 2 min  2 min   Stairs - heelraises B 50 x 1 50 x 1 50 x 1 X 50   Calf stretch 1 min 1 min 1 min 2 x 1 min   Stairs - double step ups HEP HEP Hip flexor stretch 30 sec x 1 with double step Hip flexor stretch 30 sec x 1 with double step   Side steps up/down for aerobic training and balance/coordination 1 min x 2 1 min x 2 1 min x 2 1 min x 2   R UE rows Blue 30 x 2 Blue 30 x 2 Blue 30 x 2 Blue 30 x 2   UE/LE ergometer (seat at 9) Level 6 x 10 min  -    Leg press (incline with B LE) 105 lb 10 x 3; 110 lb 10 x 1 105 lb 10 x 3; 110 lb 10 x 1 110 lb 10 x 2  115 lb 10 x 1 110 lb 10 x 3  120 lb 10 x 2   Monster walks - side/front/back Red 10 ft x 10 for strengthening Red 10 ft x 10 for strengthening Green 10 ft x 10 for hip strengthening Blue 10 ft x 10 for hip strengthening   Home Exercise Program (HEP): Treadmill progression and stair/step exercises as noted above  Treatment/Session Assessment:    · Response to Treatment: O2 Sats never dropped below 96%. Increased leg press weight and added more aerobic exercise in conjunction with strengthening. · Compliance with Program/Exercises: Very compliant  · Recommendations/Intent for next treatment session: Next visit will focus on advancements to more challenging activities and progressing to more strengthening and endurance activities.   Total Treatment Duration: (55 minutes of treatment)  PT Patient Time In/Time Out  Time In: 1100  Time Out: Alla Dalton 86, PT

## 2017-07-31 ENCOUNTER — HOSPITAL ENCOUNTER (OUTPATIENT)
Dept: PHYSICAL THERAPY | Age: 72
Discharge: HOME | End: 2017-07-31
Payer: MEDICARE

## 2017-07-31 PROCEDURE — 97110 THERAPEUTIC EXERCISES: CPT

## 2017-07-31 NOTE — PROGRESS NOTES
Susie Ross  : 1945 [facility]  [address] [place] [address]  [phone]   [phone]       OUTPATIENT PHYSICAL THERAPY:Daily Note 2017    ICD-10: Treatment Diagnosis: Ankylosing Spondylitis of thoracic region (M45.4); Pain in thoracic spine (M54.5); muscle weakness, generalized (M62.81); difficulty walking, not elsewhere classified (R26.2)  Precautions/Allergies:  Codeine   Fall Risk Score: 2 (? 5 = High Risk) MEDICAL/REFERRING DIAGNOSIS: Thoracic back pain; post pneumonia with debility  DATE OF ONSET: 17  REFERRING PHYSICIAN: Lorelei Huerta MD  RETURN PHYSICIAN APPOINTMENT:    RE-CERTIFICATION (): Mr. Janice Stringer is showing excellent progress both with strength and endurance. He is now independent with transfers and basic ADLs and has resumed driving to come to therapy. He is up to walking 15 minutes without significant decrease in vital signs and is able to tolerate a full hour of therapy with very minimal rests of less than 2 minutes. He has achieved up to 6000 steps in a single day at this point. TUG score has improved from 23 to 14 seconds. He reports intermittent thoracic back pain related to more prolonged static postures and eased with activity. INITIAL ASSESSMENT ([id number]):  Mr. Janice Stringer presents with general debility with overall strength and functional mobility deficits due to prolonged hospitalization due to pneumonia/sepsis; he also complains of thoracic back pain exacerbated due to decreased activity with decreased mobility and strength through the trunk due to worsening ankylosing spondylitis. PROBLEM LIST (Impacting functional limitations): Increased pain through thoracic spine/costal cage limiting tolerance of ADLs and difficulty sleeping  Decreased activity tolerance for basic and instrumental ADLs due to generalized weakness B LE/UE.   Decreased ADL/functional activities specificially with any endurance activity related to decreased overall strength and limited pulmonary capacity post-pneumonia  Outcome measure score of 23 seconds (WNL - 7 seconds) on Timed Up and Go (TUG) test with severe effect of decreased strength/endurance on patient's ability to manage every day life activities  Decreased strength through B LE/UE/trunk limiting all functional mobility INTERVENTIONS PLANNED:  Patient education including pathophysiology of activity tolerance and progression  Manual therapy including soft tissue mobilizations, functional joint mobilizations and neuromuscular re-education to thoracic region  Neuromuscular re-education with focus on initiation/strength and endurance and motor control of B UE/LE/trunk  Therapeutic exercises including strengthening and endurance related tasks  Gait training with focus on correcting deficits, sequencing and honorio  Balance exercise - focused on standing dynamic balance with greatest focus on dynamic center of gravity control  Therapeutic activities with focus on strength and endurance  Therapeutic modalities including pain modalities for thoracic spine  Instructions of home exercise program (HEP)   TREATMENT PLAN:  Effective Dates: 06/28/17 TO 09/28/17  Frequency/Duration: Continue 2 times a week for 2 weeks followed by 1 time a week for 8 weeks  GOALS: (Goals have been discussed and agreed upon with patient.)  Short-Term Functional Goals: Goals achieved; please refer to discharge goals  1. Patient will be able to ambulate greater than 250 ft to get to therapy clinic without the use of a wheelchair (goal achieved). 2. Patient will be able to perform walking on treadmill at home for 5 minutes without O2 saturations below 86% (goal achieved). 3. Patient will be independent with sit-stand without requiring hand support/assistance (goal achieved). Discharge Goals: Time Frame: 12 weeks  1.  Patient will be independent will all functional mobility at home without difficulty or thoracic back pain (goal ongoing). 2. Patient will be able to walk 10,000 steps without difficulty (goal ongoing). 3. Patient will have achieved prior level of function for all ADLs without difficulty or thoracic back pain (goal ongoing). 4. Patient will score less than 12 seconds on TUG and be WNLs (goal ongoing). Rehabilitation Potential For Stated Goals: Excellent            The information in this section was collected on 7/31/2017 (except where otherwise noted). HISTORY:   History of Present Injury/Illness (Reason for Referral):  Patient reports he was not feeling well from about 05/13-15/17 and was then admitted to hospital for pneumonia/sepsis with a 3-day hospital stay in ICU with complications that included a L lower lobe pneumonia. He was discharged to home on 05/27/17 and referred to outpatient physical therapy for post-hospital physical therapy. Past Medical History/Comorbidities:   Mr. Urbano Juan  has a past medical history of Arthritis; Elevated prostate specific antigen (PSA); History of prostate surgery (3/12/2015); HLA-B27 positive; Neck pain; Osteoarthritis, hand; Osteopenia; Osteoporosis; Polyarthritis; Polymyalgia rheumatica ([address] Prostate cancer ([address] Raynaud's disease; 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[address] Ankylosing Spondylitis and thoracic spine pain. Mr. Urbano Juan  has a past surgical history that includes vasectomy (40+ yrs); knee arthroscopy (2009); urological; biopsy prostate; prostatectomy (2012); cataract removal (2012); cataract removal (2013); and colonoscopy (02/2010). Social History/Living Environment: Patient lives with spouse. Patient denies and physical barriers at home and has a tub/shower combo. Prior Level of Function/Work/Activity: Patient is retired. Prior to hospitalization, patient reports he walked 5 days/week for 30-50 minutes. .  Dominant Side:  RIGHT  Current Medications:     Current Outpatient Prescriptions:     aspirin 81 mg chewable tablet, Take 1 Tab by mouth daily. , Disp: 30 Tab, Rfl: 1    ferrous sulfate 325 mg (65 mg iron) tablet, Take 1 Tab by mouth two (2) times daily (with meals). , Disp: 60 Tab, Rfl: 1    predniSONE (DELTASONE) 20 mg tablet, Take 2 tablets daily for 1 week then 1 tablet daily until seen in the office for follow up, Disp: 40 Tab, Rfl: 0    potassium chloride (K-DUR, KLOR-CON) 20 mEq tablet, Take 2 Tabs by mouth daily. , Disp: 30 Tab, Rfl: 1    DULoxetine (CYMBALTA) 30 mg capsule, Take 1 Cap by mouth daily. , Disp: 90 Cap, Rfl: 1    traMADol (ULTRAM) 50 mg tablet, Take 1 Tab by mouth three (3) times daily as needed for Pain. Max Daily Amount: 150 mg., Disp: 90 Tab, Rfl: 2    omeprazole (PRILOSEC) 40 mg capsule, Take 1 Cap by mouth daily. , Disp: 30 Cap, Rfl: 5    diclofenac (VOLTAREN) 1 % topical gel, Apply 4 g to affected area four (4) times daily. , Disp: 100 g, Rfl: 2    calcium-vitamin D (OYSTER SHELL) 500 mg(1,250mg) -200 unit per tablet, Take 1 Tab by mouth daily. , Disp: , Rfl:     cholecalciferol, vitamin D3, (VITAMIN D3) 2,000 unit Tab, Take  by mouth. Taking 1 1/2 tabs of 2000 international units once daily, Disp: , Rfl:    Date Last Reviewed:  7/31/2017   Number of Personal Factors/Comorbidities that affect the Plan of Care: 1-2: MODERATE COMPLEXITY   EXAMINATION:   Observation/Orthostatic Postural Assessment: Cachetic body with significant weight loss from hospitalization. Posture - kyphotic with an anterior rotated rib cage that is posterior positioned over a posterior tilted pelvis. Atrophy noted through all LE and UE musculature, most notable gluteals. ROM:  AROM B UE WNL with shoulder abduction palm down 140 degrees B. Cervical rotation 70% B and pain free. B LE AROM WNL. Passive trunk rotation less than 50% B. Breathing assessment indicated decreased mobility of L lower rib cage with inhalation between ribs 5-8. Strength:  B UE grossly good except 4+/5 for R shoulder ER and B abduction.  B LE grossly fair with 4+/5 for gluts, quadriceps, hamstrings and hip flexors. Neurological Screen:        Myotomes:  Good except for strength deficits noted above. Dermatomes: WNL        Reflexes:  2+ and WNL B UE/LE  Functional Mobility:         Gait/Ambulation:  Independent with narrowed stance and limited arm swing B. Endurance with gait - good-        Transfers:  Independent        Bed Mobility:  Independent        Stairs: Alternating steps with 1 hand for balance only  Balance:          Static standing B LE greater than 60 sec; single leg less than 15 sec B. Mental Status:          Alert and oriented x 4   Body Structures Involved:  1. Thoracic Cage  2. Joints  3. Muscles Body Functions Affected:  1. Cardio  2. Neuromusculoskeletal  3. Movement Related Activities and Participation Affected:  1. Mobility  2. Self Care  3. Domestic Life  4. Community, Social and [place]   Number of elements (examined above) that affect the Plan of Care: 4+: HIGH COMPLEXITY   CLINICAL PRESENTATION:   Presentation: Evolving clinical presentation with changing clinical characteristics: MODERATE COMPLEXITY   CLINICAL DECISION MAKING:   Outcome Measure: Tool Used: Modified Oswestry Low Back Pain Questionnaire  Score:  Initial: 22/50 (06/07/17) Most Recent: X/50 (Date: -- )   Interpretation of Score: Each section is scored on a 0-5 scale, 5 representing the greatest disability. The scores of each section are added together for a total score of 50. Score 0 1-10 11-20 21-30 31-40 41-49 50   Modifier CH CI CJ CK CL CM CN     Tool Used: Timed Up and Go (TUG)  Score:  Initial: 23 seconds (06/07/17) Most Recent: 8 seconds (Date: 07/24/17)   Interpretation of Score: The test measures, in seconds, the time taken by an individual to stand up from a standard arm chair (seat height 46 cm [18 in], arm height 65 cm [25.6 in]), walk a distance of 3 meters (118 in, approx 10 ft), turn, walk back to the chair and sit down.   If the individual takes longer than 14 seconds to complete TUG, this indicates risk for falls. Score 7 7.5-10.5 11-14 14.5-17.5 18-21 21.5-24.5 25+   Modifier CH CI CJ CK CL CM CN     ? Mobility - Walking and Moving Around:     - CURRENT STATUS: CJ - 20%-39% impaired, limited or restricted    - GOAL STATUS: CI - 1%-19% impaired, limited or restricted    - D/C STATUS: ---------------To be determined---------------  Medical Necessity:   · Patient is expected to demonstrate progress in strength, balance and endurance and functional mobility to increase independence with ADLs, improve safety during daily activities and return to prior functional level. · Patient demonstrates excellent rehab potential due to higher previous functional level. Reason for Services/Other Comments:  · Patient continues to require skilled intervention due to general debility with overall strength and functional mobility deficits due to prolonged hospitalization due to pneumonia/sepsis; he also complains of thoracic back pain exacerbated due to decreased activity with decreased mobility and strength through the trunk due to worsening ankylosing spondylitis. Use of outcome tool(s) and clinical judgement create a POC that gives a: Questionable prediction of patient's progress: MODERATE COMPLEXITY            TREATMENT:   (In addition to Assessment/Re-Assessment sessions the following treatments were rendered)  Pre-treatment Symptoms/Complaints: Patient reports he is feeling good. He is starting to walk some more hills outside  Pain: Initial:   Pain Intensity 1: 0  Post Session:  0/10   Manual Therapy (     ): (Used abbreviations: MET - muscle energy technique; PNF - proprioceptive neuromuscular facilitation; NMR - neuromuscular re-education; a/p - anterior to posterior; p/a - posterior to anterior) None  Therapeutic Exercise: (60 Minutes):  Exercises per grid below to improve strength and endurance.   Required minimal verbal cues to promote proper body alignment and promote proper body breathing techniques. Increased resistance with strengthening exercises. Focused on working in the 40-60% HR Max zone today. Date:  07/17/17 Date:  7/19/17 Date:  07/24/17 Date:  7/28/17 Date:  7/31/17   Activity/Exercise        Treadmill at home 2. 5mph 10% grade x 16 min 2. 6mph 10% grade x 16 min 2. 6mph 10% grade x 16 min 2. 6mph 10% grade x 16 min 2. 6mph 10% grade x 16 min   Sit-stand  2 min 2 min 2 min  2 min 2 min   Stairs - heelraises B 50 x 1 50 x 1 50 x 1 X 50 X 50   Calf stretch 1 min 1 min 1 min 2 x 1 min 2 x 1 min   Stairs - double step ups HEP HEP Hip flexor stretch 30 sec x 1 with double step Hip flexor stretch 30 sec x 1 with double step Hip flexor stretch 30 sec x 1 with double step   Side steps up/down for aerobic training and balance/coordination 1 min x 2 1 min x 2 1 min x 2 1 min x 2 1 min x 2   R UE rows Blue 30 x 2 Blue 30 x 2 Blue 30 x 2 Blue 30 x 2 Blue 30 x 2   UE/LE ergometer (seat at 9) Level 6 x 10 min  -  Level 5 x 10 min   Leg press (incline with B LE) 105 lb 10 x 3; 110 lb 10 x 1 105 lb 10 x 3; 110 lb 10 x 1 110 lb 10 x 2  115 lb 10 x 1 110 lb 10 x 3  120 lb 10 x 2 110 lb 10 x 3  120 lb 10 x 2   Monster walks - side/front/back Red 10 ft x 10 for strengthening Red 10 ft x 10 for strengthening Green 10 ft x 10 for hip strengthening Blue 10 ft x 10 for hip strengthening Blue 10 ft x 10 for hip strengthening   Home Exercise Program (HEP): Treadmill progression and stair/step exercises as noted above  Treatment/Session Assessment:    · Response to Treatment: O2 Sats never dropped below 96%. Increased leg press weight and added more aerobic exercise in conjunction with strengthening. Progress note next visit  · Compliance with Program/Exercises: Very compliant  · Recommendations/Intent for next treatment session: Next visit will focus on advancements to more challenging activities and progressing to more strengthening and endurance activities.   Total Treatment Duration: (60 minutes of treatment)  PT Patient Time In/Time Out  Time In: 1258  Time Out: Gerard 62, PT

## 2017-08-02 ENCOUNTER — HOSPITAL ENCOUNTER (OUTPATIENT)
Dept: PHYSICAL THERAPY | Age: 72
Discharge: HOME | End: 2017-08-02
Payer: MEDICARE

## 2017-08-02 PROCEDURE — 97110 THERAPEUTIC EXERCISES: CPT

## 2017-08-02 PROCEDURE — G8978 MOBILITY CURRENT STATUS: HCPCS

## 2017-08-02 PROCEDURE — G8979 MOBILITY GOAL STATUS: HCPCS

## 2017-08-02 NOTE — PROGRESS NOTES
Manuel Parisi  : 1945 [facility]  [address] [place] [address]  [phone]   [phone]       OUTPATIENT PHYSICAL THERAPY:Daily Note and Progress Report 2017    ICD-10: Treatment Diagnosis: Ankylosing Spondylitis of thoracic region (M45.4); Pain in thoracic spine (M54.5); muscle weakness, generalized (M62.81); difficulty walking, not elsewhere classified (R26.2)  Precautions/Allergies:  Codeine   Fall Risk Score: 2 (? 5 = High Risk) MEDICAL/REFERRING DIAGNOSIS: Thoracic back pain; post pneumonia with debility  DATE OF ONSET: 17  REFERRING PHYSICIAN: Zach Sanches MD  RETURN PHYSICIAN APPOINTMENT: [id number]   RE-CERTIFICATION (): Mr. Che Steen is showing excellent progress both with strength and endurance. He is now independent with transfers and basic ADLs and has resumed driving to come to therapy. He is up to walking 15 minutes without significant decrease in vital signs and is able to tolerate a full hour of therapy with very minimal rests of less than 2 minutes. He has achieved up to 84614 steps in a single day at this point. TUG score has improved from 23 to 8 seconds. He reports intermittent thoracic back pain related to more prolonged static postures and eased with activity. INITIAL ASSESSMENT ():  Mr. Che Steen presents with general debility with overall strength and functional mobility deficits due to prolonged hospitalization due to pneumonia/sepsis; he also complains of thoracic back pain exacerbated due to decreased activity with decreased mobility and strength through the trunk due to worsening ankylosing spondylitis. PROBLEM LIST (Impacting functional limitations): Increased pain through thoracic spine/costal cage limiting tolerance of ADLs and difficulty sleeping  Decreased activity tolerance for basic and instrumental ADLs due to generalized weakness B LE/UE.   Decreased ADL/functional activities specificially with any endurance activity related to decreased overall strength and limited pulmonary capacity post-pneumonia  Outcome measure score of 23 seconds (WNL - 7 seconds) on Timed Up and Go (TUG) test with severe effect of decreased strength/endurance on patient's ability to manage every day life activities  Decreased strength through B LE/UE/trunk limiting all functional mobility INTERVENTIONS PLANNED:  Patient education including pathophysiology of activity tolerance and progression  Manual therapy including soft tissue mobilizations, functional joint mobilizations and neuromuscular re-education to thoracic region  Neuromuscular re-education with focus on initiation/strength and endurance and motor control of B UE/LE/trunk  Therapeutic exercises including strengthening and endurance related tasks  Gait training with focus on correcting deficits, sequencing and honorio  Balance exercise - focused on standing dynamic balance with greatest focus on dynamic center of gravity control  Therapeutic activities with focus on strength and endurance  Therapeutic modalities including pain modalities for thoracic spine  Instructions of home exercise program (HEP)   TREATMENT PLAN:  Effective Dates: 06/28/17 TO 09/28/17  Frequency/Duration: Continue 2 times a week for 2 weeks followed by 1 time a week for 8 weeks  GOALS: (Goals have been discussed and agreed upon with patient.)  Short-Term Functional Goals: Goals achieved; please refer to discharge goals  1. Patient will be able to ambulate greater than 250 ft to get to therapy clinic without the use of a wheelchair (goal achieved). 2. Patient will be able to perform walking on treadmill at home for 5 minutes without O2 saturations below 86% (goal achieved). 3. Patient will be independent with sit-stand without requiring hand support/assistance (goal achieved). Discharge Goals: Time Frame: 12 weeks  1.  Patient will be independent will all functional mobility at home without difficulty or thoracic back pain (goal ongoing). 2. Patient will be able to walk 10,000 steps without difficulty (goal MET). 3. Patient will have achieved prior level of function for all ADLs without difficulty or thoracic back pain (goal ongoing). 4. Patient will score less than 12 seconds on TUG and be WNLs (goal MET). Rehabilitation Potential For Stated Goals: Excellent            The information in this section was collected on 8/2/2017 (except where otherwise noted). HISTORY:   History of Present Injury/Illness (Reason for Referral):  Patient reports he was not feeling well from about 05/13-15/17 and was then admitted to hospital for pneumonia/sepsis with a 3-day hospital stay in ICU with complications that included a L lower lobe pneumonia. He was discharged to home on 05/27/17 and referred to outpatient physical therapy for post-hospital physical therapy. Past Medical History/Comorbidities:   Mr. Janice Stringer  has a past medical history of Arthritis; Elevated prostate specific antigen (PSA); History of prostate surgery (3/12/2015); HLA-B27 positive; Neck pain; Osteoarthritis, hand; Osteopenia; Osteoporosis; Polyarthritis; Polymyalgia rheumatica ([address] Prostate cancer ([address] Raynaud's disease; 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[address] Ankylosing Spondylitis and thoracic spine pain. Mr. Janice Stringer  has a past surgical history that includes vasectomy (40+ yrs); knee arthroscopy (2009); urological; biopsy prostate; prostatectomy (2012); cataract removal (2012); cataract removal (2013); and colonoscopy (02/2010). Social History/Living Environment: Patient lives with spouse. Patient denies and physical barriers at home and has a tub/shower combo. Prior Level of Function/Work/Activity: Patient is retired. Prior to hospitalization, patient reports he walked 5 days/week for 30-50 minutes. .  Dominant Side:  RIGHT  Current Medications:     Current Outpatient Prescriptions:     aspirin 81 mg chewable tablet, Take 1 Tab by mouth daily., Disp: 30 Tab, Rfl: 1    ferrous sulfate 325 mg (65 mg iron) tablet, Take 1 Tab by mouth two (2) times daily (with meals). , Disp: 60 Tab, Rfl: 1    predniSONE (DELTASONE) 20 mg tablet, Take 2 tablets daily for 1 week then 1 tablet daily until seen in the office for follow up, Disp: 40 Tab, Rfl: 0    potassium chloride (K-DUR, KLOR-CON) 20 mEq tablet, Take 2 Tabs by mouth daily. , Disp: 30 Tab, Rfl: 1    DULoxetine (CYMBALTA) 30 mg capsule, Take 1 Cap by mouth daily. , Disp: 90 Cap, Rfl: 1    traMADol (ULTRAM) 50 mg tablet, Take 1 Tab by mouth three (3) times daily as needed for Pain. Max Daily Amount: 150 mg., Disp: 90 Tab, Rfl: 2    omeprazole (PRILOSEC) 40 mg capsule, Take 1 Cap by mouth daily. , Disp: 30 Cap, Rfl: 5    diclofenac (VOLTAREN) 1 % topical gel, Apply 4 g to affected area four (4) times daily. , Disp: 100 g, Rfl: 2    calcium-vitamin D (OYSTER SHELL) 500 mg(1,250mg) -200 unit per tablet, Take 1 Tab by mouth daily. , Disp: , Rfl:     cholecalciferol, vitamin D3, (VITAMIN D3) 2,000 unit Tab, Take  by mouth. Taking 1 1/2 tabs of 2000 international units once daily, Disp: , Rfl:    Date Last Reviewed:  8/2/2017   Number of Personal Factors/Comorbidities that affect the Plan of Care: 1-2: MODERATE COMPLEXITY   EXAMINATION:   Observation/Orthostatic Postural Assessment: Cachetic body with significant weight loss from hospitalization. Posture - kyphotic with an anterior rotated rib cage that is posterior positioned over a posterior tilted pelvis. Atrophy noted through all LE and UE musculature, most notable gluteals. ROM:  AROM B UE WNL with shoulder abduction palm down 140 degrees B. Cervical rotation 70% B and pain free. B LE AROM WNL. Passive trunk rotation less than 50% B. Breathing assessment indicated decreased mobility of L lower rib cage with inhalation between ribs 5-8. Strength:  B UE grossly good except 4+/5 for R shoulder ER and B abduction.  B LE grossly fair with 4+/5 for gluts, quadriceps, hamstrings and hip flexors. Neurological Screen:        Myotomes:  Good except for strength deficits noted above. Dermatomes: WNL        Reflexes:  2+ and WNL B UE/LE  Functional Mobility:         Gait/Ambulation:  Independent with narrowed stance and limited arm swing B. Endurance with gait - good-        Transfers:  Independent        Bed Mobility:  Independent        Stairs: Alternating steps with 1 hand for balance only  Balance:          Static standing B LE greater than 60 sec; single leg less than 15 sec B. Mental Status:          Alert and oriented x 4   Body Structures Involved:  1. Thoracic Cage  2. Joints  3. Muscles Body Functions Affected:  1. Cardio  2. Neuromusculoskeletal  3. Movement Related Activities and Participation Affected:  1. Mobility  2. Self Care  3. Domestic Life  4. Community, Social and [place]   Number of elements (examined above) that affect the Plan of Care: 4+: HIGH COMPLEXITY   CLINICAL PRESENTATION:   Presentation: Evolving clinical presentation with changing clinical characteristics: MODERATE COMPLEXITY   CLINICAL DECISION MAKING:   Outcome Measure: Tool Used: Modified Oswestry Low Back Pain Questionnaire  Score:  Initial: 22/50 (06/07/17) Most Recent: 8/50 (Date: 8/2/17 )   Interpretation of Score: Each section is scored on a 0-5 scale, 5 representing the greatest disability. The scores of each section are added together for a total score of 50. Score 0 1-10 11-20 21-30 31-40 41-49 50   Modifier CH CI CJ CK CL CM CN     Tool Used: Timed Up and Go (TUG)  Score:  Initial: 23 seconds (06/07/17) Most Recent: 7.5 seconds (Date: 08/02/17/17)   Interpretation of Score: The test measures, in seconds, the time taken by an individual to stand up from a standard arm chair (seat height 46 cm [18 in], arm height 65 cm [25.6 in]), walk a distance of 3 meters (118 in, approx 10 ft), turn, walk back to the chair and sit down.   If the individual takes longer than 14 seconds to complete TUG, this indicates risk for falls. Score 7 7.5-10.5 11-14 14.5-17.5 18-21 21.5-24.5 25+   Modifier CH CI CJ CK CL CM CN     ? Mobility - Walking and Moving Around:     - CURRENT STATUS: CI - 1%-19% impaired, limited or restricted    - GOAL STATUS: CI - 1%-19% impaired, limited or restricted    - D/C STATUS: ---------------To be determined---------------  Medical Necessity:   · Patient is expected to demonstrate progress in strength, balance and endurance and functional mobility to increase independence with ADLs, improve safety during daily activities and return to prior functional level. · Patient demonstrates excellent rehab potential due to higher previous functional level. Reason for Services/Other Comments:  · Patient continues to require skilled intervention due to general debility with overall strength and functional mobility deficits due to prolonged hospitalization due to pneumonia/sepsis; he also complains of thoracic back pain exacerbated due to decreased activity with decreased mobility and strength through the trunk due to worsening ankylosing spondylitis. Use of outcome tool(s) and clinical judgement create a POC that gives a: Questionable prediction of patient's progress: MODERATE COMPLEXITY            TREATMENT:   (In addition to Assessment/Re-Assessment sessions the following treatments were rendered)  Pre-treatment Symptoms/Complaints: Patient reports he did some yard work yesterday and felt pretty good digging up plants without issues  Pain: Initial:   Pain Intensity 1: 0  Post Session:  0/10   Manual Therapy (     ): (Used abbreviations: MET - muscle energy technique; PNF - proprioceptive neuromuscular facilitation; NMR - neuromuscular re-education; a/p - anterior to posterior; p/a - posterior to anterior) None  Therapeutic Exercise: (60 Minutes):  Exercises per grid below to improve strength and endurance.   Required minimal verbal cues to promote proper body alignment and promote proper body breathing techniques. Increased resistance with strengthening exercises. Focused on working in the 40-60% HR Max zone today. Date:  07/17/17 Date:  7/19/17 Date:  07/24/17 Date:  7/28/17 Date:  7/31/17 Date:  8/2/17   Activity/Exercise         Treadmill at home 2. 5mph 10% grade x 16 min 2. 6mph 10% grade x 16 min 2. 6mph 10% grade x 16 min 2. 6mph 10% grade x 16 min 2. 6mph 10% grade x 16 min 2. 6mph 10% grade x 16 min   Sit-stand  2 min 2 min 2 min  2 min 2 min 2 min   Stairs - heelraises B 50 x 1 50 x 1 50 x 1 X 50 X 50 X 50   Calf stretch 1 min 1 min 1 min 2 x 1 min 2 x 1 min 2 x 1 min   Stairs - double step ups HEP HEP Hip flexor stretch 30 sec x 1 with double step Hip flexor stretch 30 sec x 1 with double step Hip flexor stretch 30 sec x 1 with double step Hip flexor stretch 30 sec x 1 with double step   Side steps up/down for aerobic training and balance/coordination 1 min x 2 1 min x 2 1 min x 2 1 min x 2 1 min x 2 1 min x 2   R UE rows Blue 30 x 2 Blue 30 x 2 Blue 30 x 2 Blue 30 x 2 Blue 30 x 2 Blue 30 x 2   UE/LE ergometer (seat at 9) Level 6 x 10 min  -  Level 5 x 10 min -   Leg press (incline with B LE) 105 lb 10 x 3; 110 lb 10 x 1 105 lb 10 x 3; 110 lb 10 x 1 110 lb 10 x 2  115 lb 10 x 1 110 lb 10 x 3  120 lb 10 x 2 110 lb 10 x 3  120 lb 10 x 2 120 lb 10 x 3  125 lb 10 x 2   Monster walks - side/front/back Red 10 ft x 10 for strengthening Red 10 ft x 10 for strengthening Green 10 ft x 10 for hip strengthening Blue 10 ft x 10 for hip strengthening Blue 10 ft x 10 for hip strengthening Blue 10 ft x 10 for hip strengthening   Home Exercise Program (HEP): Treadmill progression and stair/step exercises as noted above  Treatment/Session Assessment:    · Response to Treatment: O2 Sats never dropped below 96%. Increased leg press weight and added more aerobic exercise in conjunction with strengthening.   Patient improved TUG to 7.5 sec and continues to increase endurance · Compliance with Program/Exercises: Very compliant  · Recommendations/Intent for next treatment session: Next visit will focus on advancements to more challenging activities and progressing to more strengthening and endurance activities.   Total Treatment Duration: (60 minutes of treatment)  PT Patient Time In/Time Out  Time In: 0826  Time Out: Carlos 131, PT

## 2017-08-07 ENCOUNTER — HOSPITAL ENCOUNTER (OUTPATIENT)
Dept: PHYSICAL THERAPY | Age: 72
Discharge: HOME | End: 2017-08-07
Payer: MEDICARE

## 2017-08-07 PROCEDURE — 97110 THERAPEUTIC EXERCISES: CPT

## 2017-08-07 NOTE — PROGRESS NOTES
David Malone  : 1945 [facility]  [address] [place] [address]  [phone]   [phone]       OUTPATIENT PHYSICAL THERAPY:Daily Note and Progress Report 2017    ICD-10: Treatment Diagnosis: Ankylosing Spondylitis of thoracic region (M45.4); Pain in thoracic spine (M54.5); muscle weakness, generalized (M62.81); difficulty walking, not elsewhere classified (R26.2)  Precautions/Allergies:  Codeine   Fall Risk Score: 2 (? 5 = High Risk) MEDICAL/REFERRING DIAGNOSIS: Thoracic back pain; post pneumonia with debility  DATE OF ONSET: 17  REFERRING PHYSICIAN: Minnie Fiore MD  RETURN PHYSICIAN APPOINTMENT: [id number]   RE-CERTIFICATION (): Mr. Usman Farah is showing excellent progress both with strength and endurance. He is now independent with transfers and basic ADLs and has resumed driving to come to therapy. He is up to walking 15 minutes without significant decrease in vital signs and is able to tolerate a full hour of therapy with very minimal rests of less than 2 minutes. He has achieved up to 19535 steps in a single day at this point. TUG score has improved from 23 to 8 seconds. He reports intermittent thoracic back pain related to more prolonged static postures and eased with activity. INITIAL ASSESSMENT ([id number]):  Mr. Usman Farah presents with general debility with overall strength and functional mobility deficits due to prolonged hospitalization due to pneumonia/sepsis; he also complains of thoracic back pain exacerbated due to decreased activity with decreased mobility and strength through the trunk due to worsening ankylosing spondylitis. PROBLEM LIST (Impacting functional limitations): Increased pain through thoracic spine/costal cage limiting tolerance of ADLs and difficulty sleeping  Decreased activity tolerance for basic and instrumental ADLs due to generalized weakness B LE/UE.   Decreased ADL/functional activities specificially with any endurance activity related to decreased overall strength and limited pulmonary capacity post-pneumonia  Outcome measure score of 23 seconds (WNL - 7 seconds) on Timed Up and Go (TUG) test with severe effect of decreased strength/endurance on patient's ability to manage every day life activities  Decreased strength through B LE/UE/trunk limiting all functional mobility INTERVENTIONS PLANNED:  Patient education including pathophysiology of activity tolerance and progression  Manual therapy including soft tissue mobilizations, functional joint mobilizations and neuromuscular re-education to thoracic region  Neuromuscular re-education with focus on initiation/strength and endurance and motor control of B UE/LE/trunk  Therapeutic exercises including strengthening and endurance related tasks  Gait training with focus on correcting deficits, sequencing and honorio  Balance exercise - focused on standing dynamic balance with greatest focus on dynamic center of gravity control  Therapeutic activities with focus on strength and endurance  Therapeutic modalities including pain modalities for thoracic spine  Instructions of home exercise program (HEP)   TREATMENT PLAN:  Effective Dates: 06/28/17 TO 09/28/17  Frequency/Duration: 1 time a week for 7 weeks  GOALS: (Goals have been discussed and agreed upon with patient.)  Discharge Goals: Time Frame: 12 weeks  1. Patient will be independent will all functional mobility at home without difficulty or thoracic back pain (goal achieved). 2. Patient will be able to walk 10,000 steps without difficulty (goal achieved). 3. Patient will have achieved prior level of function for all ADLs without difficulty or thoracic back pain (goal ongoing). 4. Patient will score less than 12 seconds on TUG and be WNLs (goal achieved).   Rehabilitation Potential For Stated Goals: Excellent            The information in this section was collected on 8/7/2017 (except where otherwise noted). HISTORY:   History of Present Injury/Illness (Reason for Referral):  Patient reports he was not feeling well from about 05/13-15/17 and was then admitted to hospital for pneumonia/sepsis with a 3-day hospital stay in ICU with complications that included a L lower lobe pneumonia. He was discharged to home on 05/27/17 and referred to outpatient physical therapy for post-hospital physical therapy. Past Medical History/Comorbidities:   Mr. Ever Berry  has a past medical history of Arthritis; Elevated prostate specific antigen (PSA); History of prostate surgery (3/12/2015); HLA-B27 positive; Neck pain; Osteoarthritis, hand; Osteopenia; Osteoporosis; Polyarthritis; Polymyalgia rheumatica ([address] Prostate cancer ([address] Raynaud's disease; 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[address] Ankylosing Spondylitis and thoracic spine pain. Mr. Ever Berry  has a past surgical history that includes vasectomy (40+ yrs); knee arthroscopy (2009); urological; biopsy prostate; prostatectomy (2012); cataract removal (2012); cataract removal (2013); and colonoscopy (02/2010). Social History/Living Environment: Patient lives with spouse. Patient denies and physical barriers at home and has a tub/shower combo. Prior Level of Function/Work/Activity: Patient is retired. Prior to hospitalization, patient reports he walked 5 days/week for 30-50 minutes. .  Dominant Side:  RIGHT  Current Medications:     Current Outpatient Prescriptions:     aspirin 81 mg chewable tablet, Take 1 Tab by mouth daily. , Disp: 30 Tab, Rfl: 1    ferrous sulfate 325 mg (65 mg iron) tablet, Take 1 Tab by mouth two (2) times daily (with meals). , Disp: 60 Tab, Rfl: 1    predniSONE (DELTASONE) 20 mg tablet, Take 2 tablets daily for 1 week then 1 tablet daily until seen in the office for follow up, Disp: 40 Tab, Rfl: 0    potassium chloride (K-DUR, KLOR-CON) 20 mEq tablet, Take 2 Tabs by mouth daily. , Disp: 30 Tab, Rfl: 1    DULoxetine (CYMBALTA) 30 mg capsule, Take 1 Cap by mouth daily., Disp: 90 Cap, Rfl: 1    traMADol (ULTRAM) 50 mg tablet, Take 1 Tab by mouth three (3) times daily as needed for Pain. Max Daily Amount: 150 mg., Disp: 90 Tab, Rfl: 2    omeprazole (PRILOSEC) 40 mg capsule, Take 1 Cap by mouth daily. , Disp: 30 Cap, Rfl: 5    diclofenac (VOLTAREN) 1 % topical gel, Apply 4 g to affected area four (4) times daily. , Disp: 100 g, Rfl: 2    calcium-vitamin D (OYSTER SHELL) 500 mg(1,250mg) -200 unit per tablet, Take 1 Tab by mouth daily. , Disp: , Rfl:     cholecalciferol, vitamin D3, (VITAMIN D3) 2,000 unit Tab, Take  by mouth. Taking 1 1/2 tabs of 2000 international units once daily, Disp: , Rfl:    Date Last Reviewed:  8/7/2017   Number of Personal Factors/Comorbidities that affect the Plan of Care: 1-2: MODERATE COMPLEXITY   EXAMINATION:   Observation/Orthostatic Postural Assessment: Cachetic body with significant weight loss from hospitalization but with now 9 pound weight gain in the past 2 months. Posture - kyphotic with an anterior rotated rib cage that is posterior positioned over a posterior tilted pelvis. Atrophy noted through all LE and UE musculature, most notable gluteals. ROM:  AROM B UE WNL with shoulder abduction palm down 140 degrees B. Cervical rotation 70% B and pain free. B LE AROM WNL. Passive trunk rotation less than 50% B. Breathing assessment indicated decreased mobility of L lower rib cage with inhalation between ribs 5-8. Strength:  B UE grossly good except 4+/5 for R shoulder ER and B abduction. B LE grossly fair with 4+/5 for gluts, quadriceps, hamstrings and hip flexors. Neurological Screen:        Myotomes:  Good except for strength deficits noted above. Dermatomes: WNL        Reflexes:  2+ and WNL B UE/LE  Functional Mobility:         Gait/Ambulation:  Independent with narrowed stance and limited arm swing B. Endurance with gait - good-        Transfers:  Independent        Bed Mobility:  Independent        Stairs:   Alternating steps with 1 hand for balance only  Balance:          Static standing B LE greater than 60 sec; single leg less than 15 sec B. Mental Status:          Alert and oriented x 4   Body Structures Involved:  1. Thoracic Cage  2. Joints  3. Muscles Body Functions Affected:  1. Cardio  2. Neuromusculoskeletal  3. Movement Related Activities and Participation Affected:  1. Mobility  2. Self Care  3. Domestic Life  4. Community, Social and [place]   Number of elements (examined above) that affect the Plan of Care: 4+: HIGH COMPLEXITY   CLINICAL PRESENTATION:   Presentation: Evolving clinical presentation with changing clinical characteristics: MODERATE COMPLEXITY   CLINICAL DECISION MAKING:   Outcome Measure: Tool Used: Modified Oswestry Low Back Pain Questionnaire  Score:  Initial: 22/50 (06/07/17) Most Recent: 8/50 (Date: 8/2/17 )   Interpretation of Score: Each section is scored on a 0-5 scale, 5 representing the greatest disability. The scores of each section are added together for a total score of 50. Score 0 1-10 11-20 21-30 31-40 41-49 50   Modifier CH CI CJ CK CL CM CN     Tool Used: Timed Up and Go (TUG)  Score:  Initial: 23 seconds (06/07/17) Most Recent: 6 seconds (Date: 08/17/17)   Interpretation of Score: The test measures, in seconds, the time taken by an individual to stand up from a standard arm chair (seat height 46 cm [18 in], arm height 65 cm [25.6 in]), walk a distance of 3 meters (118 in, approx 10 ft), turn, walk back to the chair and sit down. If the individual takes longer than 14 seconds to complete TUG, this indicates risk for falls. Score 7 7.5-10.5 11-14 14.5-17.5 18-21 21.5-24.5 25+   Modifier CH CI CJ CK CL CM CN ?    Mobility - Walking and Moving Around:     - CURRENT STATUS: CI - 1%-19% impaired, limited or restricted    - GOAL STATUS: CI - 1%-19% impaired, limited or restricted    - D/C STATUS: ---------------To be determined---------------  Medical Necessity: · Patient is expected to demonstrate progress in strength, balance and endurance and functional mobility to increase independence with ADLs, improve safety during daily activities and return to prior functional level. · Patient demonstrates excellent rehab potential due to higher previous functional level. Reason for Services/Other Comments:  · Patient continues to require skilled intervention due to general debility with overall strength and functional mobility deficits due to prolonged hospitalization due to pneumonia/sepsis; he also complains of thoracic back pain exacerbated due to decreased activity with decreased mobility and strength through the trunk due to worsening ankylosing spondylitis. Use of outcome tool(s) and clinical judgement create a POC that gives a: Clear prediction of patient's progress: LOW COMPLEXITY            TREATMENT:   (In addition to Assessment/Re-Assessment sessions the following treatments were rendered)  Pre-treatment Symptoms/Complaints: Patient reports he has continued to do great with his only concern is some shortness of breath with working in his yard for more than 30 minutes. He reports he has no difficulty working through it but feels bad for the rest of the day. Pain: Initial:   Pain Intensity 1: 0  Post Session:  0/10   Manual Therapy (     ): (Used abbreviations: MET - muscle energy technique; PNF - proprioceptive neuromuscular facilitation; NMR - neuromuscular re-education; a/p - anterior to posterior; p/a - posterior to anterior) None  Therapeutic Exercise: (65 Minutes):  Exercises per grid below to improve strength and endurance. Required minimal verbal cues to promote proper body alignment and promote proper body breathing techniques. Increased resistance with strengthening exercises. Focused on working in the 40-60% HR Max zone today.    Date:  07/17/17 Date:  7/19/17 Date:  07/24/17 Date:  7/28/17 Date:  7/31/17 Date:  8/2/17 Date:  08/07/17   Activity/Exercise Treadmill at home 2. 5mph 10% grade x 16 min 2. 6mph 10% grade x 16 min 2. 6mph 10% grade x 16 min 2. 6mph 10% grade x 16 min 2. 6mph 10% grade x 16 min 2. 6mph 10% grade x 16 min 2. 6mph 10% grade x 16 min   Sit-stand  2 min 2 min 2 min  2 min 2 min 2 min 2 min   Stairs - heelraises B 50 x 1 50 x 1 50 x 1 X 50 X 50 X 50 X 50   Calf stretch 1 min 1 min 1 min 2 x 1 min 2 x 1 min 2 x 1 min 2 x 1 min   Stairs - double step ups HEP HEP Hip flexor stretch 30 sec x 1 with double step Hip flexor stretch 30 sec x 1 with double step Hip flexor stretch 30 sec x 1 with double step Hip flexor stretch 30 sec x 1 with double step Hip flexor stretch 30 sec x 1 with double step   Side steps up/down for aerobic training and balance/coordination 1 min x 2 1 min x 2 1 min x 2 1 min x 2 1 min x 2 1 min x 2 1 min x 2 (8\" step)   R UE rows Blue 30 x 2 Blue 30 x 2 Blue 30 x 2 Blue 30 x 2 Blue 30 x 2 Blue 30 x 2 Blue 30 x 2   Leg press (incline with B LE) 105 lb 10 x 3; 110 lb 10 x 1 105 lb 10 x 3; 110 lb 10 x 1 110 lb 10 x 2  115 lb 10 x 1 110 lb 10 x 3  120 lb 10 x 2 110 lb 10 x 3  120 lb 10 x 2 120 lb 10 x 3  125 lb 10 x 2 130 lb 10 x 3   Monster walks - side/front/back Red 10 ft x 10 for strengthening Red 10 ft x 10 for strengthening Green 10 ft x 10 for hip strengthening Blue 10 ft x 10 for hip strengthening Blue 10 ft x 10 for hip strengthening Blue 10 ft x 10 for hip strengthening Blue 10 ft x 10 for hip strengthening   Home Exercise Program (HEP): Treadmill progression and stair/step exercises as noted above  Treatment/Session Assessment:    · Response to Treatment: ncreased leg press weight and added more aerobic exercise in conjunction with strengthening without rest breaks to continue to focus on endurance of cardiovascular exercise. Patient improved TUG to 6 seconds with goal achieved.   · Compliance with Program/Exercises: Very compliant  · Recommendations/Intent for next treatment session: Next visit will focus on advancements to more challenging activities and progressing to more strengthening and endurance activities. Discussed discharge planning for end of month.   Total Treatment Duration: (65 minutes of treatment)  PT Patient Time In/Time Out  Time In: 1030  Time Out: 1135    Josette Mckeon, PT

## 2017-08-09 ENCOUNTER — HOSPITAL ENCOUNTER (OUTPATIENT)
Dept: PHYSICAL THERAPY | Age: 72
Discharge: HOME | End: 2017-08-09
Payer: MEDICARE

## 2017-08-09 PROCEDURE — 97110 THERAPEUTIC EXERCISES: CPT

## 2017-08-09 NOTE — PROGRESS NOTES
Ericka Kam  : 1945 [facility]  [address] [place] [address]  [phone]   [phone]       OUTPATIENT PHYSICAL THERAPY:Daily Note and Progress Report 2017    ICD-10: Treatment Diagnosis: Ankylosing Spondylitis of thoracic region (M45.4); Pain in thoracic spine (M54.5); muscle weakness, generalized (M62.81); difficulty walking, not elsewhere classified (R26.2)  Precautions/Allergies:  Codeine   Fall Risk Score: 2 (? 5 = High Risk) MEDICAL/REFERRING DIAGNOSIS: Thoracic back pain; post pneumonia with debility  DATE OF ONSET: 17  REFERRING PHYSICIAN: Pilar Ruvalcaba MD  RETURN PHYSICIAN APPOINTMENT:    RE-CERTIFICATION (): Mr. Erlin Castellanos is showing excellent progress both with strength and endurance. He is now independent with transfers and basic ADLs and has resumed driving to come to therapy. He is up to walking 15 minutes without significant decrease in vital signs and is able to tolerate a full hour of therapy with very minimal rests of less than 2 minutes. He has achieved up to 45808 steps in a single day at this point. TUG score has improved from 23 to 8 seconds. He reports intermittent thoracic back pain related to more prolonged static postures and eased with activity. INITIAL ASSESSMENT ():  Mr. Erlin Castellanos presents with general debility with overall strength and functional mobility deficits due to prolonged hospitalization due to pneumonia/sepsis; he also complains of thoracic back pain exacerbated due to decreased activity with decreased mobility and strength through the trunk due to worsening ankylosing spondylitis. PROBLEM LIST (Impacting functional limitations): Increased pain through thoracic spine/costal cage limiting tolerance of ADLs and difficulty sleeping  Decreased activity tolerance for basic and instrumental ADLs due to generalized weakness B LE/UE.   Decreased ADL/functional activities specificially with any endurance activity related to decreased overall strength and limited pulmonary capacity post-pneumonia  Outcome measure score of 23 seconds (WNL - 7 seconds) on Timed Up and Go (TUG) test with severe effect of decreased strength/endurance on patient's ability to manage every day life activities  Decreased strength through B LE/UE/trunk limiting all functional mobility INTERVENTIONS PLANNED:  Patient education including pathophysiology of activity tolerance and progression  Manual therapy including soft tissue mobilizations, functional joint mobilizations and neuromuscular re-education to thoracic region  Neuromuscular re-education with focus on initiation/strength and endurance and motor control of B UE/LE/trunk  Therapeutic exercises including strengthening and endurance related tasks  Gait training with focus on correcting deficits, sequencing and honorio  Balance exercise - focused on standing dynamic balance with greatest focus on dynamic center of gravity control  Therapeutic activities with focus on strength and endurance  Therapeutic modalities including pain modalities for thoracic spine  Instructions of home exercise program (HEP)   TREATMENT PLAN:  Effective Dates: 06/28/17 TO 09/28/17  Frequency/Duration: 1 time a week for 6 weeks  GOALS: (Goals have been discussed and agreed upon with patient.)  Discharge Goals: Time Frame: 12 weeks  1. Patient will be independent will all functional mobility at home without difficulty or thoracic back pain (goal achieved). 2. Patient will be able to walk 10,000 steps without difficulty (goal achieved). 3. Patient will have achieved prior level of function for all ADLs without difficulty or thoracic back pain (goal ongoing). 4. Patient will score less than 12 seconds on TUG and be WNLs (goal achieved).   Rehabilitation Potential For Stated Goals: Excellent            The information in this section was collected on 8/9/2017 (except where otherwise noted). HISTORY:   History of Present Injury/Illness (Reason for Referral):  Patient reports he was not feeling well from about 05/13-15/17 and was then admitted to hospital for pneumonia/sepsis with a 3-day hospital stay in ICU with complications that included a L lower lobe pneumonia. He was discharged to home on 05/27/17 and referred to outpatient physical therapy for post-hospital physical therapy. Past Medical History/Comorbidities:   Mr. Che Steen  has a past medical history of Arthritis; Elevated prostate specific antigen (PSA); History of prostate surgery (3/12/2015); HLA-B27 positive; Neck pain; Osteoarthritis, hand; Osteopenia; Osteoporosis; Polyarthritis; Polymyalgia rheumatica ([address] Prostate cancer ([address] Raynaud's disease; 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[facility] Ankylosing Spondylitis and thoracic spine pain. Mr. Che Steen  has a past surgical history that includes vasectomy (40+ yrs); knee arthroscopy (2009); urological; biopsy prostate; prostatectomy (2012); cataract removal (2012); cataract removal (2013); and colonoscopy (02/2010). Social History/Living Environment: Patient lives with spouse. Patient denies and physical barriers at home and has a tub/shower combo. Prior Level of Function/Work/Activity: Patient is retired. Prior to hospitalization, patient reports he walked 5 days/week for 30-50 minutes. .  Dominant Side:  RIGHT  Current Medications:     Current Outpatient Prescriptions:     aspirin 81 mg chewable tablet, Take 1 Tab by mouth daily. , Disp: 30 Tab, Rfl: 1    ferrous sulfate 325 mg (65 mg iron) tablet, Take 1 Tab by mouth two (2) times daily (with meals). , Disp: 60 Tab, Rfl: 1    predniSONE (DELTASONE) 20 mg tablet, Take 2 tablets daily for 1 week then 1 tablet daily until seen in the office for follow up, Disp: 40 Tab, Rfl: 0    potassium chloride (K-DUR, KLOR-CON) 20 mEq tablet, Take 2 Tabs by mouth daily. , Disp: 30 Tab, Rfl: 1    DULoxetine (CYMBALTA) 30 mg capsule, Take 1 Cap by mouth daily., Disp: 90 Cap, Rfl: 1    traMADol (ULTRAM) 50 mg tablet, Take 1 Tab by mouth three (3) times daily as needed for Pain. Max Daily Amount: 150 mg., Disp: 90 Tab, Rfl: 2    omeprazole (PRILOSEC) 40 mg capsule, Take 1 Cap by mouth daily. , Disp: 30 Cap, Rfl: 5    diclofenac (VOLTAREN) 1 % topical gel, Apply 4 g to affected area four (4) times daily. , Disp: 100 g, Rfl: 2    calcium-vitamin D (OYSTER SHELL) 500 mg(1,250mg) -200 unit per tablet, Take 1 Tab by mouth daily. , Disp: , Rfl:     cholecalciferol, vitamin D3, (VITAMIN D3) 2,000 unit Tab, Take  by mouth. Taking 1 1/2 tabs of 2000 international units once daily, Disp: , Rfl:    Date Last Reviewed:  8/9/2017   Number of Personal Factors/Comorbidities that affect the Plan of Care: 1-2: MODERATE COMPLEXITY   EXAMINATION:   Observation/Orthostatic Postural Assessment: Cachetic body with significant weight loss from hospitalization but with now 9 pound weight gain in the past 2 months. Posture - kyphotic with an anterior rotated rib cage that is posterior positioned over a posterior tilted pelvis. Atrophy noted through all LE and UE musculature, most notable gluteals. ROM:  AROM B UE WNL with shoulder abduction palm down 140 degrees B. Cervical rotation 70% B and pain free. B LE AROM WNL. Passive trunk rotation less than 50% B. Breathing assessment indicated decreased mobility of L lower rib cage with inhalation between ribs 5-8. Strength:  B UE grossly good except 4+/5 for R shoulder ER and B abduction. B LE grossly fair with 4+/5 for gluts, quadriceps, hamstrings and hip flexors. Neurological Screen:        Myotomes:  Good except for strength deficits noted above. Dermatomes: WNL        Reflexes:  2+ and WNL B UE/LE  Functional Mobility:         Gait/Ambulation:  Independent with narrowed stance and limited arm swing B. Endurance with gait - good-        Transfers:  Independent        Bed Mobility:  Independent        Stairs:   Alternating steps with 1 hand for balance only  Balance:          Static standing B LE greater than 60 sec; single leg less than 15 sec B. Mental Status:          Alert and oriented x 4   Body Structures Involved:  1. Thoracic Cage  2. Joints  3. Muscles Body Functions Affected:  1. Cardio  2. Neuromusculoskeletal  3. Movement Related Activities and Participation Affected:  1. Mobility  2. Self Care  3. Domestic Life  4. Community, Social and [place]   Number of elements (examined above) that affect the Plan of Care: 4+: HIGH COMPLEXITY   CLINICAL PRESENTATION:   Presentation: Evolving clinical presentation with changing clinical characteristics: MODERATE COMPLEXITY   CLINICAL DECISION MAKING:   Outcome Measure: Tool Used: Modified Oswestry Low Back Pain Questionnaire  Score:  Initial: 22/50 (06/07/17) Most Recent: 8/50 (Date: 8/2/17 )   Interpretation of Score: Each section is scored on a 0-5 scale, 5 representing the greatest disability. The scores of each section are added together for a total score of 50. Score 0 1-10 11-20 21-30 31-40 41-49 50   Modifier CH CI CJ CK CL CM CN     Tool Used: Timed Up and Go (TUG)  Score:  Initial: 23 seconds (06/07/17) Most Recent: 6 seconds (Date: 08/17/17)   Interpretation of Score: The test measures, in seconds, the time taken by an individual to stand up from a standard arm chair (seat height 46 cm [18 in], arm height 65 cm [25.6 in]), walk a distance of 3 meters (118 in, approx 10 ft), turn, walk back to the chair and sit down. If the individual takes longer than 14 seconds to complete TUG, this indicates risk for falls. Score 7 7.5-10.5 11-14 14.5-17.5 18-21 21.5-24.5 25+   Modifier CH CI CJ CK CL CM CN ?    Mobility - Walking and Moving Around:     - CURRENT STATUS: CI - 1%-19% impaired, limited or restricted    - GOAL STATUS: CI - 1%-19% impaired, limited or restricted    - D/C STATUS: ---------------To be determined---------------  Medical Necessity: · Patient is expected to demonstrate progress in strength, balance and endurance and functional mobility to increase independence with ADLs, improve safety during daily activities and return to prior functional level. · Patient demonstrates excellent rehab potential due to higher previous functional level. Reason for Services/Other Comments:  · Patient continues to require skilled intervention due to general debility with overall strength and functional mobility deficits due to prolonged hospitalization due to pneumonia/sepsis; he also complains of thoracic back pain exacerbated due to decreased activity with decreased mobility and strength through the trunk due to worsening ankylosing spondylitis. Use of outcome tool(s) and clinical judgement create a POC that gives a: Clear prediction of patient's progress: LOW COMPLEXITY            TREATMENT:   (In addition to Assessment/Re-Assessment sessions the following treatments were rendered)  Pre-treatment Symptoms/Complaints: Patient reports he is overall doing very well but was really tired/fatigued yesterday but attributes it to the weather and no being able to get out and get his 10,000 steps. Pain: Initial:   Pain Intensity 1: 0  Post Session:  0/10   Manual Therapy (     ): (Used abbreviations: MET - muscle energy technique; PNF - proprioceptive neuromuscular facilitation; NMR - neuromuscular re-education; a/p - anterior to posterior; p/a - posterior to anterior) None  Therapeutic Exercise: (60 Minutes):  Exercises per grid below to improve strength and endurance. Required minimal verbal cues to promote proper body alignment and promote proper body breathing techniques. Increased resistance with strengthening exercises. Focused on working in the 40-60% HR Max zone today. Date:  07/24/17 Date:  7/28/17 Date:  7/31/17 Date:  8/2/17 Date:  08/07/17 Date:   08/09/17   Activity/Exercise         Treadmill at home 2. 6mph 10% grade x 16 min 2. 6mph 10% grade x 16 min 2. 6mph 10% grade x 16 min 2. 6mph 10% grade x 16 min 2. 6mph 10% grade x 16 min 2. 5mph 11% grade x 16 min   Sit-stand  2 min  2 min 2 min 2 min 2 min 2 min   Stairs - heelraises B 50 x 1 X 50 X 50 X 50 X 50 X 50 B   Calf stretch 1 min 2 x 1 min 2 x 1 min 2 x 1 min 2 x 1 min 1 min x 2   Stairs - double step ups Hip flexor stretch 30 sec x 1 with double step Hip flexor stretch 30 sec x 1 with double step Hip flexor stretch 30 sec x 1 with double step Hip flexor stretch 30 sec x 1 with double step Hip flexor stretch 30 sec x 1 with double step Hip flexor stretch 30 sec x 1 with double step   Side steps up/down for aerobic training and balance/coordination 1 min x 2 1 min x 2 1 min x 2 1 min x 2 1 min x 2 (8\" step) 1 min x 2 (8\" step)   R UE rows Blue 30 x 2 Blue 30 x 2 Blue 30 x 2 Blue 30 x 2 Blue 30 x 2 Blue 30 x 2   Leg press (incline with B LE) 110 lb 10 x 2  115 lb 10 x 1 110 lb 10 x 3  120 lb 10 x 2 110 lb 10 x 3  120 lb 10 x 2 120 lb 10 x 3  125 lb 10 x 2 130 lb 10 x 3 145 lb 10 x 3   Monster walks - side/front/back Green 10 ft x 10 for hip strengthening Blue 10 ft x 10 for hip strengthening Blue 10 ft x 10 for hip strengthening Blue 10 ft x 10 for hip strengthening Blue 10 ft x 10 for hip strengthening Blue 10 ft x 10 for hip strengthening   Home Exercise Program (HEP): Treadmill progression and stair/step exercises as noted above  Treatment/Session Assessment:    · Response to Treatment: Increased leg press weight and added more aerobic exercise in conjunction with strengthening without rest breaks to continue to focus on endurance of cardiovascular exercise. · Compliance with Program/Exercises: Very compliant  · Recommendations/Intent for next treatment session: Next visit will focus on advancements to more challenging activities and progressing to more strengthening and endurance activities. Discussed discharge planning for end of month.   Total Treatment Duration: (60 minutes of treatment)  PT Patient Time In/Time Out  Time In: 0930  Time Out: 1030    Eileen Ross, PT

## 2017-08-11 ENCOUNTER — HOSPITAL ENCOUNTER (OUTPATIENT)
Dept: GENERAL RADIOLOGY | Age: 72
Discharge: HOME | End: 2017-08-11
Payer: MEDICARE

## 2017-08-11 VITALS — BODY MASS INDEX: 16.88 KG/M2 | WEIGHT: 114 LBS | HEIGHT: 69 IN

## 2017-08-11 DIAGNOSIS — R13.12 OROPHARYNGEAL DYSPHAGIA: ICD-10-CM

## 2017-08-11 DIAGNOSIS — R13.10 DYSPHAGIA: ICD-10-CM

## 2017-08-11 PROCEDURE — G8998 SWALLOW D/C STATUS: HCPCS

## 2017-08-11 PROCEDURE — G8997 SWALLOW GOAL STATUS: HCPCS

## 2017-08-11 PROCEDURE — 74011000255 HC RX REV CODE- 255: Performed by: INTERNAL MEDICINE

## 2017-08-11 PROCEDURE — G8996 SWALLOW CURRENT STATUS: HCPCS

## 2017-08-11 PROCEDURE — 74230 X-RAY XM SWLNG FUNCJ C+: CPT

## 2017-08-11 PROCEDURE — 92611 MOTION FLUOROSCOPY/SWALLOW: CPT

## 2017-08-11 RX ADMIN — BARIUM SULFATE 45 ML: 980 POWDER, FOR SUSPENSION ORAL at 09:05

## 2017-08-11 RX ADMIN — BARIUM SULFATE 15 ML: 400 PASTE ORAL at 09:04

## 2017-08-11 NOTE — PROGRESS NOTES
Ata Hall  : 1945 [facility] at [facility], [facility], [address]  [phone]   [phone]       OUTPATIENT SPEECH LANGUAGE PATHOLOGY: MODIFIED BARIUM SWALLOW    ICD-10: Treatment Diagnosis: Oropharyngeal dysphagia (R13.12)  DATE: 2017  REFERRING PHYSICIAN: Neema Harkins MD MD Orders: Modified Barium Swallow   PAST MEDICAL HISTORY:   Mr. Silvia Sanderson is a 71 y.o. male who  has a past medical history of Arthritis; Elevated prostate specific antigen (PSA); History of prostate surgery (3/12/2015); HLA-B27 positive; Impotence of organic origin; Neck pain; Osteoarthritis, hand; Osteopenia; Osteoporosis; Other nonspecific abnormal finding of lung field; Polyarthritis; Polymyalgia rheumatica ([address] Prostate cancer ([address] Raynaud's disease; 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[address] and Thoracic spine pain. He also has no past medical history of Asthma; Autoimmune disease ([address] Chronic kidney disease; Chronic obstructive pulmonary disease ([address] Chronic pain; Diabetes ([address] Difficult intubation; GERD (gastroesophageal reflux disease); Heart abnormalities; Hypertension; Liver disease; Malignant hyperthermia due to anesthesia; Nausea & vomiting; Neurological disorder; Other unknown and unspecified cause of morbidity or mortality; Pseudocholinesterase deficiency; Psychiatric disorder; PUD (peptic ulcer disease); Seizures ([address] Stroke [facility]); Thromboembolus ([address] Thyroid disease; or Unspecified adverse effect of anesthesia. He also  has a past surgical history that includes vasectomy (40+ yrs); knee arthroscopy (); urological; biopsy prostate; prostatectomy (); cataract removal (); cataract removal (); and colonoscopy (2010).     RADIOLOGIST:  Dr. Brooklynn Werner  MEDICAL/REFERRING DIAGNOSIS: Dysphagia [R13.10]    PRECAUTIONS/ALLERGIES: Codeine   ASSESSMENT/PLAN OF CARE:  Based on the objective data described below, the patient presents with oral and pharyngeal swallow essentially within functional limits. Intermittent trace transient laryngeal penetration without aspiration of thin liquids. Lining of valleculae and pyriform sinuses after swallow of solids that clears with liquid rinse. No other instances of laryngeal penetration or aspiration with pudding or solids. Patient has been discharged from speech therapy but reports he continues to perform laryngeal exercises. Recommend regular diet with thin liquids. Patient should clear throat and dry swallow at conclusion of meal. Patient should continue HEP of laryngeal exercises. No further skilled speech/swallow intervention currently indicated. RECOMMENDATIONS AND PLANNED INTERVENTIONS (Benefits and precautions of therapy have been discussed with the patient.):  · PO:  Regular  · Liquids:  regular thin  MEDICATIONS:  · With liquid  COMPENSATORY STRATEGIES/MODIFICATIONS INCLUDING:  · Alternate liquids/solids  · Double swallow  OTHER RECOMMENDATIONS (including follow up treatment recommendations):   · Laryngeal exercises    Thank you for this referral,  Vernell Hess MA, CCC-SLP  SUBJECTIVE:  Patient pleasant and cooperative. He is accompanied by his wife. Present Symptoms: history of laryngeal penetration on prior MBS      Current Dietary Status:  Regular textures, nectar liquids      History of reflux:  [x]YES     []NO      Reflux medication:omeprazole  Social History/Home Situation: , resides with wife      Work/Activity History: Retired    OBJECTIVE:  Objective Measure: Tool Used: National Outcomes Measurement System: Functional Communication Measures: SWALLOWING  Score:  Initial: 6 Most Recent: X (Date: -- )   Interpretation of Tool: This measure describes the change in functional communication status subsequent to speech-language pathology treatment of patients with dysphagia.  o Level 1:  Individual is not able to swallow anything safely by mouth.  All nutrition and hydration is received through non-oral means (e.g., nasogastric tube, PEG). o Level 2: Individual is not able to swallow safely by mouth for nutrition and hydration, but may take some consistency with consistent maximal cues in therapy only. Alternative method of feeding required. o Level 3:  Alternative method of feeding required as individual takes less than 50% of nutrition and hydration by mouth, and/or swallowing is safe with consistent use of moderate cues to use compensatory strategies and/or requires maximum diet restriction. o Level 4:  Swallowing is safe, but usually requires moderate cues to use compensatory strategies, and/or the individual has moderate diet restrictions and/or still requires tube feeding and/or oral supplements. o Level 5:  Swallowing is safe with minimal diet restriction and/or occasionally requires minimal cueing to use compensatory strategies. The individual may occasionally self-cue. All nutrition and hydration needs are met by mouth at mealtime. o Level 6:  Swallowing is safe, and the individual eats and drinks independently and may rarely require minimal cueing. The individual usually self-cues when difficulty occurs. May need to avoid specific food items (e.g., popcorn and nuts), or require additional time (due to dysphagia). o Level 7: The individuals ability to eat independently is not limited by swallow function. Swallowing would be safe and efficient for all consistencies. Compensatory strategies are effectively used when needed. Score Level 7 Level 6 Level 5 Level 4 Level 3 Level 2 Level 1   Modifier CH CI CJ CK CL CM CN   ?  Swallowing:     - CURRENT STATUS: CI - 1%-19% impaired, limited or restricted    - GOAL STATUS:  CI - 1%-19% impaired, limited or restricted    - D/C STATUS:  CI - 1%-19% impaired, limited or restricted        Cognitive/Communication Status:  Mental Status  Neurologic State: Alert  Orientation Level: Appropriate for age  Cognition: Appropriate decision making  Perception: Appears intact  Perseveration: No perseveration noted  Safety/Judgement: Insight into deficits    Oral Assessment:  Oral Assessment  Labial: No impairment  Dentition: Partials (comment)  Lingual: No impairment  Velum: No impairment    Vocal Quality: adequate    Patient Viewed:    Film Views: Lateral, Fluoro    Oral Prepatory:  The patient was given the following: Consistency Presented:  Thin liquid, Solid, Pudding, Mixed consistency  How Presented: Self-fed/presented, SLP-fed/presented, Cup/sip, Cup/gulp, Spoon, Straw, Successive swallows    Oral Phase:  Bolus Acceptance: No impairment  Bolus Formation/Control: No impairment  Propulsion: No impairment        Initiation of Swallow: No impairment  Oral Phase Severity: No impairment    Pharyngeal Phase:  Timing: No impairment  Decreased Tongue Base Retraction?: No  Laryngeal Elevation: WFL (within functional limits)  Penetration: Flash/transient, Trace  Aspiration/Timing: No evidence of aspiration  Aspiration/Penetration Score: 2 (Penetration/No residue-Contrast enters the airway penetrates, remains above the folds/cords, and is cleared)     Pharyngeal Dysfunction: None  Pharyngeal Phase Severity: N/A  Pharyngeal-Esophageal Segment: No impairment    Assessment/Reassessment only, no treatment provided today   _______________________________________________________    Total Treatment Duration:  Time In: 2022   Time Out: [address], [place], CCC-SLP

## 2017-08-14 ENCOUNTER — HOSPITAL ENCOUNTER (OUTPATIENT)
Dept: PHYSICAL THERAPY | Age: 72
Discharge: HOME | End: 2017-08-14
Payer: MEDICARE

## 2017-08-14 PROCEDURE — 97110 THERAPEUTIC EXERCISES: CPT

## 2017-08-14 NOTE — PROGRESS NOTES
Maynor Dhaliwal  : 1945 [facility]  [address] [place] [address]  [phone]   [phone]       OUTPATIENT PHYSICAL THERAPY:Daily Note and Progress Report 2017    ICD-10: Treatment Diagnosis: Ankylosing Spondylitis of thoracic region (M45.4); Pain in thoracic spine (M54.5); muscle weakness, generalized (M62.81); difficulty walking, not elsewhere classified (R26.2)  Precautions/Allergies:  Codeine   Fall Risk Score: 2 (? 5 = High Risk) MEDICAL/REFERRING DIAGNOSIS: Thoracic back pain; post pneumonia with debility  DATE OF ONSET: 17  REFERRING PHYSICIAN: Armond Friedman MD  RETURN PHYSICIAN APPOINTMENT: [id number]/9332   RE-CERTIFICATION (): Mr. Margret Jarvis is showing excellent progress both with strength and endurance. He is now independent with transfers and basic ADLs and has resumed driving to come to therapy. He is up to walking 15 minutes without significant decrease in vital signs and is able to tolerate a full hour of therapy with very minimal rests of less than 2 minutes. He has achieved up to 77887 steps in a single day at this point. TUG score has improved from 23 to 8 seconds. He reports intermittent thoracic back pain related to more prolonged static postures and eased with activity. INITIAL ASSESSMENT ([id number]):  Mr. Margret Jarvis presents with general debility with overall strength and functional mobility deficits due to prolonged hospitalization due to pneumonia/sepsis; he also complains of thoracic back pain exacerbated due to decreased activity with decreased mobility and strength through the trunk due to worsening ankylosing spondylitis. PROBLEM LIST (Impacting functional limitations): Increased pain through thoracic spine/costal cage limiting tolerance of ADLs and difficulty sleeping  Decreased activity tolerance for basic and instrumental ADLs due to generalized weakness B LE/UE.   Decreased ADL/functional activities specificially with any endurance activity related to decreased overall strength and limited pulmonary capacity post-pneumonia  Outcome measure score of 23 seconds (WNL - 7 seconds) on Timed Up and Go (TUG) test with severe effect of decreased strength/endurance on patient's ability to manage every day life activities  Decreased strength through B LE/UE/trunk limiting all functional mobility INTERVENTIONS PLANNED:  Patient education including pathophysiology of activity tolerance and progression  Manual therapy including soft tissue mobilizations, functional joint mobilizations and neuromuscular re-education to thoracic region  Neuromuscular re-education with focus on initiation/strength and endurance and motor control of B UE/LE/trunk  Therapeutic exercises including strengthening and endurance related tasks  Gait training with focus on correcting deficits, sequencing and honorio  Balance exercise - focused on standing dynamic balance with greatest focus on dynamic center of gravity control  Therapeutic activities with focus on strength and endurance  Therapeutic modalities including pain modalities for thoracic spine  Instructions of home exercise program (HEP)   TREATMENT PLAN:  Effective Dates: 06/28/17 TO 09/28/17  Frequency/Duration: 1 time a week for 5 weeks  GOALS: (Goals have been discussed and agreed upon with patient.)  Discharge Goals: Time Frame: 12 weeks  1. Patient will be independent will all functional mobility at home without difficulty or thoracic back pain (goal achieved). 2. Patient will be able to walk 10,000 steps without difficulty (goal achieved). 3. Patient will have achieved prior level of function for all ADLs without difficulty or thoracic back pain (goal ongoing). 4. Patient will score less than 12 seconds on TUG and be WNLs (goal achieved).   Rehabilitation Potential For Stated Goals: Excellent            The information in this section was collected on 8/14/2017 (except where otherwise noted). HISTORY:   History of Present Injury/Illness (Reason for Referral):  Patient reports he was not feeling well from about 05/13-15/17 and was then admitted to hospital for pneumonia/sepsis with a 3-day hospital stay in ICU with complications that included a L lower lobe pneumonia. He was discharged to home on 05/27/17 and referred to outpatient physical therapy for post-hospital physical therapy. Past Medical History/Comorbidities:   Mr. Urbano Juan  has a past medical history of Arthritis; Elevated prostate specific antigen (PSA); History of prostate surgery (3/12/2015); HLA-B27 positive; Neck pain; Osteoarthritis, hand; Osteopenia; Osteoporosis; Polyarthritis; Polymyalgia rheumatica ([address] Prostate cancer ([address] Raynaud's disease; 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[address] Ankylosing Spondylitis and thoracic spine pain. Mr. Urbano Juan  has a past surgical history that includes vasectomy (40+ yrs); knee arthroscopy (2009); urological; biopsy prostate; prostatectomy (2012); cataract removal (2012); cataract removal (2013); and colonoscopy (02/2010). Social History/Living Environment: Patient lives with spouse. Patient denies and physical barriers at home and has a tub/shower combo. Prior Level of Function/Work/Activity: Patient is retired. Prior to hospitalization, patient reports he walked 5 days/week for 30-50 minutes. .  Dominant Side:  RIGHT  Current Medications:     Current Outpatient Prescriptions:     aspirin 81 mg chewable tablet, Take 1 Tab by mouth daily. , Disp: 30 Tab, Rfl: 1    ferrous sulfate 325 mg (65 mg iron) tablet, Take 1 Tab by mouth two (2) times daily (with meals). , Disp: 60 Tab, Rfl: 1    predniSONE (DELTASONE) 20 mg tablet, Take 2 tablets daily for 1 week then 1 tablet daily until seen in the office for follow up, Disp: 40 Tab, Rfl: 0    potassium chloride (K-DUR, KLOR-CON) 20 mEq tablet, Take 2 Tabs by mouth daily. , Disp: 30 Tab, Rfl: 1    DULoxetine (CYMBALTA) 30 mg capsule, Take 1 Cap by mouth daily., Disp: 90 Cap, Rfl: 1    traMADol (ULTRAM) 50 mg tablet, Take 1 Tab by mouth three (3) times daily as needed for Pain. Max Daily Amount: 150 mg., Disp: 90 Tab, Rfl: 2    omeprazole (PRILOSEC) 40 mg capsule, Take 1 Cap by mouth daily. , Disp: 30 Cap, Rfl: 5    diclofenac (VOLTAREN) 1 % topical gel, Apply 4 g to affected area four (4) times daily. , Disp: 100 g, Rfl: 2    calcium-vitamin D (OYSTER SHELL) 500 mg(1,250mg) -200 unit per tablet, Take 1 Tab by mouth daily. , Disp: , Rfl:     cholecalciferol, vitamin D3, (VITAMIN D3) 2,000 unit Tab, Take  by mouth. Taking 1 1/2 tabs of 2000 international units once daily, Disp: , Rfl:    Date Last Reviewed:  8/14/2017   Number of Personal Factors/Comorbidities that affect the Plan of Care: 1-2: MODERATE COMPLEXITY   EXAMINATION:   Observation/Orthostatic Postural Assessment: Cachetic body with significant weight loss from hospitalization but with now 9 pound weight gain in the past 2 months. Posture - kyphotic with an anterior rotated rib cage that is posterior positioned over a posterior tilted pelvis. Atrophy noted through all LE and UE musculature, most notable gluteals. ROM:  AROM B UE WNL with shoulder abduction palm down 140 degrees B. Cervical rotation 70% B and pain free. B LE AROM WNL. Passive trunk rotation less than 50% B. Breathing assessment indicated decreased mobility of L lower rib cage with inhalation between ribs 5-8. Strength:  B UE grossly good except 4+/5 for R shoulder ER and B abduction. B LE grossly fair with 4+/5 for gluts, quadriceps, hamstrings and hip flexors. Neurological Screen:        Myotomes:  Good except for strength deficits noted above. Dermatomes: WNL        Reflexes:  2+ and WNL B UE/LE  Functional Mobility:         Gait/Ambulation:  Independent with narrowed stance and limited arm swing B. Endurance with gait - good-        Transfers:  Independent        Bed Mobility:  Independent        Stairs:   Alternating steps with 1 hand for balance only  Balance:          Static standing B LE greater than 60 sec; single leg less than 15 sec B. Mental Status:          Alert and oriented x 4   Body Structures Involved:  1. Thoracic Cage  2. Joints  3. Muscles Body Functions Affected:  1. Cardio  2. Neuromusculoskeletal  3. Movement Related Activities and Participation Affected:  1. Mobility  2. Self Care  3. Domestic Life  4. Community, Social and [place]   Number of elements (examined above) that affect the Plan of Care: 4+: HIGH COMPLEXITY   CLINICAL PRESENTATION:   Presentation: Evolving clinical presentation with changing clinical characteristics: MODERATE COMPLEXITY   CLINICAL DECISION MAKING:   Outcome Measure: Tool Used: Modified Oswestry Low Back Pain Questionnaire  Score:  Initial: 22/50 (06/07/17) Most Recent: 8/50 (Date: 8/2/17 )   Interpretation of Score: Each section is scored on a 0-5 scale, 5 representing the greatest disability. The scores of each section are added together for a total score of 50. Score 0 1-10 11-20 21-30 31-40 41-49 50   Modifier CH CI CJ CK CL CM CN     Tool Used: Timed Up and Go (TUG)  Score:  Initial: 23 seconds (06/07/17) Most Recent: 6 seconds (Date: 08/17/17)   Interpretation of Score: The test measures, in seconds, the time taken by an individual to stand up from a standard arm chair (seat height 46 cm [18 in], arm height 65 cm [25.6 in]), walk a distance of 3 meters (118 in, approx 10 ft), turn, walk back to the chair and sit down. If the individual takes longer than 14 seconds to complete TUG, this indicates risk for falls. Score 7 7.5-10.5 11-14 14.5-17.5 18-21 21.5-24.5 25+   Modifier CH CI CJ CK CL CM CN ?    Mobility - Walking and Moving Around:     - CURRENT STATUS: CI - 1%-19% impaired, limited or restricted    - GOAL STATUS: CI - 1%-19% impaired, limited or restricted    - D/C STATUS: ---------------To be determined---------------  Medical Necessity: · Patient is expected to demonstrate progress in strength, balance and endurance and functional mobility to increase independence with ADLs, improve safety during daily activities and return to prior functional level. · Patient demonstrates excellent rehab potential due to higher previous functional level. Reason for Services/Other Comments:  · Patient continues to require skilled intervention due to general debility with overall strength and functional mobility deficits due to prolonged hospitalization due to pneumonia/sepsis; he also complains of thoracic back pain exacerbated due to decreased activity with decreased mobility and strength through the trunk due to worsening ankylosing spondylitis. Use of outcome tool(s) and clinical judgement create a POC that gives a: Clear prediction of patient's progress: LOW COMPLEXITY            TREATMENT:   (In addition to Assessment/Re-Assessment sessions the following treatments were rendered)  Pre-treatment Symptoms/Complaints: Patient reports he felt great over the weekend and got in 10,000 steps every day as well as some gardening without shortness of breath. Pain: Initial:   Pain Intensity 1: 0  Post Session:  0/10   Manual Therapy (     ): (Used abbreviations: MET - muscle energy technique; PNF - proprioceptive neuromuscular facilitation; NMR - neuromuscular re-education; a/p - anterior to posterior; p/a - posterior to anterior) None  Therapeutic Exercise: (65 Minutes):  Exercises per grid below to improve strength and endurance. Required minimal verbal cues to promote proper body alignment and promote proper body breathing techniques. Increased resistance with strengthening exercises. Focused on working in the 40-60% HR Max zone today. Date:  7/31/17 Date:  8/2/17 Date:  08/07/17 Date:   08/09/17 Date:  08/14/17   Activity/Exercise        Treadmill at home 2. 6mph 10% grade x 16 min 2. 6mph 10% grade x 16 min 2. 6mph 10% grade x 16 min 2. 5mph 11% grade x 16 min 2.5mph 11% grade x 16 min   Sit-stand  2 min 2 min 2 min 2 min 2 min   Stairs - heelraises B X 50 X 50 X 50 X 50 B X 50 B   Calf stretch 2 x 1 min 2 x 1 min 2 x 1 min 1 min x 2 1 min x 2   Stairs - double step ups Hip flexor stretch 30 sec x 1 with double step Hip flexor stretch 30 sec x 1 with double step Hip flexor stretch 30 sec x 1 with double step Hip flexor stretch 30 sec x 1 with double step Hip flexor stretch 30 sec x 1 with double step   Side steps up/down for aerobic training and balance/coordination 1 min x 2 1 min x 2 1 min x 2 (8\" step) 1 min x 2 (8\" step) 1 min x 2 (8\" step)   R UE rows Blue 30 x 2 Blue 30 x 2 Blue 30 x 2 Blue 30 x 2 Blue 30 x 2   Leg press (incline with B LE) 110 lb 10 x 3  120 lb 10 x 2 120 lb 10 x 3  125 lb 10 x 2 130 lb 10 x 3 145 lb 10 x 3 150 lb 10 x 3   Monster walks - side/front/back Blue 10 ft x 10 for hip strengthening Blue 10 ft x 10 for hip strengthening Blue 10 ft x 10 for hip strengthening Blue 10 ft x 10 for hip strengthening Curtis 10 ft x 10 for hip strengthening   Home Exercise Program (HEP): Treadmill progression and stair/step exercises as noted above  Treatment/Session Assessment:    · Response to Treatment: Increased leg press weight and added more aerobic exercise in conjunction with strengthening without rest breaks to continue to focus on endurance of cardiovascular exercise. · Compliance with Program/Exercises: Very compliant  · Recommendations/Intent for next treatment session: Next visit will focus on advancements to more challenging activities and progressing to more strengthening and endurance activities with planned discharge.   Total Treatment Duration: (65 minutes of treatment)  PT Patient Time In/Time Out  Time In: 1030  Time Out: 1135    Dick Tovar, PT

## 2017-08-21 ENCOUNTER — HOSPITAL ENCOUNTER (OUTPATIENT)
Dept: PHYSICAL THERAPY | Age: 72
Discharge: HOME | End: 2017-08-21
Payer: MEDICARE

## 2017-08-21 PROCEDURE — 97110 THERAPEUTIC EXERCISES: CPT

## 2017-08-21 PROCEDURE — G8980 MOBILITY D/C STATUS: HCPCS

## 2017-08-21 PROCEDURE — G8979 MOBILITY GOAL STATUS: HCPCS

## 2017-08-21 NOTE — PROGRESS NOTES
Joselito Carter  : 1945 [facility]  [address] [place] [address]  [phone]   [phone]       OUTPATIENT PHYSICAL THERAPY:Daily Note and Discharge 2017    ICD-10: Treatment Diagnosis: Ankylosing Spondylitis of thoracic region (M45.4); Pain in thoracic spine (M54.5); muscle weakness, generalized (M62.81); difficulty walking, not elsewhere classified (R26.2)  Precautions/Allergies:  Codeine   Fall Risk Score: 2 (? 5 = High Risk) MEDICAL/REFERRING DIAGNOSIS: Thoracic back pain; post pneumonia with debility  DATE OF ONSET: 17  REFERRING PHYSICIAN: Giovanna Mancera MD  RETURN PHYSICIAN APPOINTMENT: 2017   ATTENDANCE:  Patient has now attended 24 out of 24 scheduled physical therapy visits with initial evaluation completed on 17. DISCHARGE (17): Mr. Malinda Newsome has shown excellent progress and is fully independent with all transfers, ADLs and recreational activity. He is up to walking 30-45 minutes daily for exercise and achieved 77501 steps/day regularly. TUG score has improved from 23 to 6 seconds. He reports his toracic back pain is now fully resolved as long as he continues his breathing program.  RE-CERTIFICATION (02/22/15): Mr. Malinda Newsome is showing excellent progress both with strength and endurance. He is now independent with transfers and basic ADLs and has resumed driving to come to therapy. He is up to walking 15 minutes without significant decrease in vital signs and is able to tolerate a full hour of therapy with very minimal rests of less than 2 minutes. He has achieved up to 91664 steps in a single day at this point. TUG score has improved from 23 to 8 seconds. He reports intermittent thoracic back pain related to more prolonged static postures and eased with activity.   INITIAL ASSESSMENT (80):  Mr. Malinda Newsome presents with general debility with overall strength and functional mobility deficits due to prolonged hospitalization due to pneumonia/sepsis; he also complains of thoracic back pain exacerbated due to decreased activity with decreased mobility and strength through the trunk due to worsening ankylosing spondylitis. TREATMENT PLAN:  Discharge to home program with all goals achieved. GOALS:   Discharge Goals:   1. Patient will be independent will all functional mobility at home without difficulty or thoracic back pain (goal achieved). 2. Patient will be able to walk 10,000 steps without difficulty (goal achieved). 3. Patient will have achieved prior level of function for all ADLs without difficulty or thoracic back pain (goal achieved). 4. Patient will score less than 12 seconds on TUG and be WNLs (goal achieved). The information in this section was collected on 8/21/2017 (except where otherwise noted). HISTORY:   History of Present Injury/Illness (Reason for Referral):  Patient reports he was not feeling well from about 05/13-15/17 and was then admitted to hospital for pneumonia/sepsis with a 3-day hospital stay in ICU with complications that included a L lower lobe pneumonia. He was discharged to home on 05/27/17 and referred to outpatient physical therapy for post-hospital physical therapy. Past Medical History/Comorbidities:   Mr. Angelo Snell  has a past medical history of Arthritis; Elevated prostate specific antigen (PSA); History of prostate surgery (3/12/2015); HLA-B27 positive; Neck pain; Osteoarthritis, hand; Osteopenia; Osteoporosis; Polyarthritis; Polymyalgia rheumatica ([address] Prostate cancer ([address] Raynaud's disease; 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[address] Ankylosing Spondylitis and thoracic spine pain. Mr. Angelo Snell  has a past surgical history that includes vasectomy (40+ yrs); knee arthroscopy (2009); urological; biopsy prostate; prostatectomy (2012); cataract removal (2012); cataract removal (2013); and colonoscopy (02/2010). Social History/Living Environment: Patient lives with spouse.  Patient denies and physical barriers at home and has a tub/shower combo. Prior Level of Function/Work/Activity: Patient is retired. Prior to hospitalization, patient reports he walked 5 days/week for 30-50 minutes. .  Dominant Side:  RIGHT  Current Medications:     Current Outpatient Prescriptions:     aspirin 81 mg chewable tablet, Take 1 Tab by mouth daily. , Disp: 30 Tab, Rfl: 1    ferrous sulfate 325 mg (65 mg iron) tablet, Take 1 Tab by mouth two (2) times daily (with meals). , Disp: 60 Tab, Rfl: 1    predniSONE (DELTASONE) 20 mg tablet, Take 2 tablets daily for 1 week then 1 tablet daily until seen in the office for follow up, Disp: 40 Tab, Rfl: 0    potassium chloride (K-DUR, KLOR-CON) 20 mEq tablet, Take 2 Tabs by mouth daily. , Disp: 30 Tab, Rfl: 1    DULoxetine (CYMBALTA) 30 mg capsule, Take 1 Cap by mouth daily. , Disp: 90 Cap, Rfl: 1    traMADol (ULTRAM) 50 mg tablet, Take 1 Tab by mouth three (3) times daily as needed for Pain. Max Daily Amount: 150 mg., Disp: 90 Tab, Rfl: 2    omeprazole (PRILOSEC) 40 mg capsule, Take 1 Cap by mouth daily. , Disp: 30 Cap, Rfl: 5    diclofenac (VOLTAREN) 1 % topical gel, Apply 4 g to affected area four (4) times daily. , Disp: 100 g, Rfl: 2    calcium-vitamin D (OYSTER SHELL) 500 mg(1,250mg) -200 unit per tablet, Take 1 Tab by mouth daily. , Disp: , Rfl:     cholecalciferol, vitamin D3, (VITAMIN D3) 2,000 unit Tab, Take  by mouth. Taking 1 1/2 tabs of 2000 international units once daily, Disp: , Rfl:    Date Last Reviewed:  8/21/2017   EXAMINATION:   Observation/Orthostatic Postural Assessment: Cachetic body with significant weight loss from hospitalization but with now 9 pound weight gain in the past 2 months. Posture - kyphotic with an anterior rotated rib cage that is posterior positioned over a posterior tilted pelvis. Atrophy noted through all LE and UE musculature, most notable gluteals. ROM:  AROM B UE WNL with shoulder abduction palm down 140 degrees B.  Cervical rotation 70% B and pain free. B LE AROM WNL. Passive trunk rotation less than 50% B. Breathing assessment indicated decreased mobility of L lower rib cage with inhalation between ribs 5-8. Strength:  B UE grossly good except 4+/5 for R shoulder ER and B abduction. B LE grossly fair with 4+/5 for gluts, quadriceps, hamstrings and hip flexors. Neurological Screen:        Myotomes:  Good except for strength deficits noted above. Dermatomes: WNL        Reflexes:  2+ and WNL B UE/LE  Functional Mobility:         Gait/Ambulation:  Independent with narrowed stance and limited arm swing B. Endurance with gait - good-        Transfers:  Independent        Bed Mobility:  Independent        Stairs: Alternating steps with 1 hand for balance only  Balance:          Static standing B LE greater than 60 sec; single leg less than 15 sec B. Mental Status:          Alert and oriented x 4   Outcome Measure: Tool Used: Modified Oswestry Low Back Pain Questionnaire  Score:  Initial: 22/50 (06/07/17) Most Recent: 6/50 (Date: 8/21/17 )   Interpretation of Score: Each section is scored on a 0-5 scale, 5 representing the greatest disability. The scores of each section are added together for a total score of 50. Score 0 1-10 11-20 21-30 31-40 41-49 50   Modifier CH CI CJ CK CL CM CN     Tool Used: Timed Up and Go (TUG)  Score:  Initial: 23 seconds (06/07/17) Most Recent: 6 seconds (Date: 08/21/17)   Interpretation of Score: The test measures, in seconds, the time taken by an individual to stand up from a standard arm chair (seat height 46 cm [18 in], arm height 65 cm [25.6 in]), walk a distance of 3 meters (118 in, approx 10 ft), turn, walk back to the chair and sit down. If the individual takes longer than 14 seconds to complete TUG, this indicates risk for falls. Score 7 7.5-10.5 11-14 14.5-17.5 18-21 21.5-24.5 25+   Modifier CH CI CJ CK CL CM CN ?    Mobility - Walking and Moving Around:     - CURRENT STATUS: CI - 1%-19% impaired, limited or restricted    - GOAL STATUS: CI - 1%-19% impaired, limited or restricted    - D/C STATUS: CI - 1%-19% impaired, limited or restricted        TREATMENT:   (In addition to Assessment/Re-Assessment sessions the following treatments were rendered)  Pre-treatment Symptoms/Complaints: Patient reports he felt great over the weekend and got in 10,000 steps every day as well as some gardening without shortness of breath. Pain: Initial:   Pain Intensity 1: 0  Post Session:  0/10   Therapeutic Exercise: (60 Minutes):  Exercises per grid below to improve strength and endurance. Date:  7/31/17 Date:  8/2/17 Date:  08/07/17 Date:   08/09/17 Date:  08/14/17 Date:  08/21/17   Activity/Exercise         Treadmill at home 2. 6mph 10% grade x 16 min 2. 6mph 10% grade x 16 min 2. 6mph 10% grade x 16 min 2. 5mph 11% grade x 16 min 2. 5mph 11% grade x 16 min 2. 5mph 11% grade x 16 min   Sit-stand  2 min 2 min 2 min 2 min 2 min 2 min   Stairs - heelraises B X 50 X 50 X 50 X 50 B X 50 B X 50 B   Calf stretch 2 x 1 min 2 x 1 min 2 x 1 min 1 min x 2 1 min x 2 1 min x 2   Stairs - double step ups Hip flexor stretch 30 sec x 1 with double step Hip flexor stretch 30 sec x 1 with double step Hip flexor stretch 30 sec x 1 with double step Hip flexor stretch 30 sec x 1 with double step Hip flexor stretch 30 sec x 1 with double step Hip flexor stretch 30 sec x 1 with double step   Side steps up/down for aerobic training and balance/coordination 1 min x 2 1 min x 2 1 min x 2 (8\" step) 1 min x 2 (8\" step) 1 min x 2 (8\" step) 1 min x 2 (8\" step)   R UE rows Blue 30 x 2 Blue 30 x 2 Blue 30 x 2 Blue 30 x 2 Blue 30 x 2 Blue 30 x 2   Leg press (incline with B LE) 110 lb 10 x 3  120 lb 10 x 2 120 lb 10 x 3  125 lb 10 x 2 130 lb 10 x 3 145 lb 10 x 3 150 lb 10 x 3 160 lb 10 x 3   Monster walks - side/front/back Blue 10 ft x 10 for hip strengthening Blue 10 ft x 10 for hip strengthening Blue 10 ft x 10 for hip strengthening Blue 10 ft x 10 for hip strengthening Curtis 10 ft x 10 for hip strengthening Curtis 10 ft x 10 for hip strengthening   Home Exercise Program (HEP): Treadmill progression and stair/step exercises as noted above  Treatment/Session Assessment:    · Response to Treatment: Increased leg press weight and added more aerobic exercise in conjunction with strengthening without rest breaks to continue to focus on endurance of cardiovascular exercise.    · Compliance with Program/Exercises: Very compliant  Total Treatment Duration: (60 minutes of treatment)  PT Patient Time In/Time Out  Time In: 1030  Time Out: 923 [facility]

## 2017-08-29 ENCOUNTER — APPOINTMENT (OUTPATIENT)
Dept: PHYSICAL THERAPY | Age: 72
End: 2017-08-29
Payer: MEDICARE

## 2017-11-08 ENCOUNTER — HOSPITAL ENCOUNTER (OUTPATIENT)
Dept: GENERAL RADIOLOGY | Age: 72
Discharge: HOME | End: 2017-11-08
Payer: MEDICARE

## 2017-11-08 DIAGNOSIS — J18.9 CAP (COMMUNITY ACQUIRED PNEUMONIA): ICD-10-CM

## 2017-11-08 PROCEDURE — 71020 XR CHEST PA LAT: CPT

## 2018-04-24 NOTE — PROGRESS NOTES
Patient stable with no complaints at this time. Patient sitting up in bed eating with daughter at bedside. Call light within reach.   Report to be given to oncoming RN [id number] Yue Rae is a 78year old female. Patient presents with:  Pre-Op Exam:  . RT Knee @ [facility] on 4/24/18 . inrm 6       HPI:   PREOP EXAM      PRE-OP Physical  What is the full name of procedure/ surgery? RIGHT TOTAL KNEE ARTHROPLASTY  Date bein degenerative 5/11/2015   • Coronary atherosclerosis    • Encounter for long-term (current) use of other medications 5/11/2015   • Essential hypertension 9/21/2015   • Heart attack ([facility] [address] 2000    Mild    • High blood pressure    • High cholesterol    • Hyper 5.4   Eosinophils %      % 2.4   Basophils %      % 0.9   Immature Granulocyte %      % 0.4   Glucose      70 - 99 mg/dL 123 (H)   BUN      8 - 20 mg/dL 34 (H)   CREATININE      0.55 - 1.02 mg/dL 1.46 (H)   GFR, Non-      >=60 37 (L)   GFR,

## 2018-11-12 NOTE — PROGRESS NOTES
Speech Pathology  Pt called SLP this date as he hasn't heard anything about a modified barium swallow study. Called 's office to f/u. They reported they didn't see anything in Dr. Elizabeth Dorsey box to be signed. Therefore, they requested for it to be re-faxed. Re-faxed it this date. Called pt to inform him of status.       [address] [facility] [address] [facility]

## 2018-12-20 PROBLEM — J18.9 CAP (COMMUNITY ACQUIRED PNEUMONIA): Status: RESOLVED | Noted: 2017-05-15 | Resolved: 2018-12-20

## 2018-12-20 PROBLEM — N17.9 AKI (ACUTE KIDNEY INJURY) (HCC): Status: RESOLVED | Noted: 2017-05-15 | Resolved: 2018-12-20

## 2018-12-20 PROBLEM — J91.8 PARAPNEUMONIC EFFUSION: Status: RESOLVED | Noted: 2017-05-22 | Resolved: 2018-12-20

## 2018-12-20 PROBLEM — R63.6 UNDERWEIGHT: Status: ACTIVE | Noted: 2018-12-20

## 2018-12-20 PROBLEM — R06.89 INEFFECTIVE AIRWAY CLEARANCE: Status: RESOLVED | Noted: 2017-05-24 | Resolved: 2018-12-20

## 2018-12-20 PROBLEM — J18.9 PARAPNEUMONIC EFFUSION: Status: RESOLVED | Noted: 2017-05-22 | Resolved: 2018-12-20

## 2018-12-20 PROBLEM — M25.519 SHOULDER JOINT PAIN: Status: ACTIVE | Noted: 2018-12-20

## 2018-12-20 PROBLEM — D75.839 THROMBOCYTOSIS: Status: RESOLVED | Noted: 2017-05-27 | Resolved: 2018-12-20

## 2018-12-20 PROBLEM — A41.9 SEPSIS (HCC): Status: RESOLVED | Noted: 2017-05-15 | Resolved: 2018-12-20

## 2018-12-20 PROBLEM — J90 PLEURAL EFFUSION, BILATERAL: Status: RESOLVED | Noted: 2017-05-21 | Resolved: 2018-12-20

## 2018-12-20 PROBLEM — J93.9 PNEUMOTHORAX ON LEFT: Status: RESOLVED | Noted: 2017-05-30 | Resolved: 2018-12-20

## 2018-12-20 PROBLEM — M81.0 OSTEOPOROSIS: Status: ACTIVE | Noted: 2018-12-20

## 2018-12-20 PROBLEM — R09.02 HYPOXEMIA: Status: RESOLVED | Noted: 2017-05-15 | Resolved: 2018-12-20

## 2019-01-08 ENCOUNTER — HOSPITAL ENCOUNTER (OUTPATIENT)
Dept: GENERAL RADIOLOGY | Age: 74
Discharge: HOME | End: 2019-01-08
Attending: INTERNAL MEDICINE
Payer: MEDICARE

## 2019-01-08 DIAGNOSIS — R05.8 PRODUCTIVE COUGH: ICD-10-CM

## 2019-01-08 PROCEDURE — 71046 X-RAY EXAM CHEST 2 VIEWS: CPT

## 2019-03-15 ENCOUNTER — APPOINTMENT (RX ONLY)
Dept: URBAN - METROPOLITAN AREA CLINIC 349 | Facility: CLINIC | Age: 74
Setting detail: DERMATOLOGY
End: 2019-03-15

## 2019-03-15 DIAGNOSIS — L63.8 OTHER ALOPECIA AREATA: ICD-10-CM

## 2019-03-15 PROBLEM — M12.9 ARTHROPATHY, UNSPECIFIED: Status: ACTIVE | Noted: 2019-03-15

## 2019-03-15 PROBLEM — Z85.46 PERSONAL HISTORY OF MALIGNANT NEOPLASM OF PROSTATE: Status: ACTIVE | Noted: 2019-03-15

## 2019-03-15 PROCEDURE — 99202 OFFICE O/P NEW SF 15 MIN: CPT

## 2019-03-15 PROCEDURE — ? COUNSELING

## 2019-03-15 PROCEDURE — ? RECOMMENDATIONS

## 2019-03-15 PROCEDURE — ? PRESCRIPTION

## 2019-03-15 RX ORDER — TACROLIMUS 1 MG/G
OINTMENT TOPICAL
Qty: 1 | Refills: 2 | Status: ERX | COMMUNITY
Start: 2019-03-15

## 2019-03-15 RX ORDER — TRIAMCINOLONE ACETONIDE 1 MG/G
CREAM TOPICAL
Qty: 1 | Refills: 1 | Status: ERX | COMMUNITY
Start: 2019-03-15

## 2019-03-15 RX ADMIN — TACROLIMUS: 1 OINTMENT TOPICAL at 00:00

## 2019-03-15 RX ADMIN — TRIAMCINOLONE ACETONIDE: 1 CREAM TOPICAL at 00:00

## 2019-03-15 ASSESSMENT — LOCATION DETAILED DESCRIPTION DERM
LOCATION DETAILED: RIGHT ANTERIOR DISTAL THIGH
LOCATION DETAILED: LEFT MEDIAL FRONTAL SCALP
LOCATION DETAILED: LEFT ANTERIOR DISTAL THIGH
LOCATION DETAILED: XIPHOID
LOCATION DETAILED: RIGHT MEDIAL UPPER BACK
LOCATION DETAILED: RIGHT SUPERIOR LATERAL BUCCAL CHEEK
LOCATION DETAILED: LEFT SUPERIOR LATERAL BUCCAL CHEEK
LOCATION DETAILED: GENITALIA

## 2019-03-15 ASSESSMENT — LOCATION SIMPLE DESCRIPTION DERM
LOCATION SIMPLE: LEFT CHEEK
LOCATION SIMPLE: LEFT SCALP
LOCATION SIMPLE: LEFT THIGH
LOCATION SIMPLE: GENITALIA
LOCATION SIMPLE: RIGHT CHEEK
LOCATION SIMPLE: ABDOMEN
LOCATION SIMPLE: RIGHT THIGH
LOCATION SIMPLE: RIGHT UPPER BACK

## 2019-03-15 ASSESSMENT — LOCATION ZONE DERM
LOCATION ZONE: FACE
LOCATION ZONE: GENITALIA
LOCATION ZONE: LEG
LOCATION ZONE: SCALP
LOCATION ZONE: TRUNK

## 2019-03-15 NOTE — PROCEDURE: RECOMMENDATIONS
Detail Level: Detailed
Recommendations (Free Text): Pt states that he has had labs done by his PCP\\nPt take Remicaide every 6 months \\nProtopic apply nightly\\nTriamcinolone cream to scalp nightly \\nPt states that he goes through periods of sweating and cold.  Told can happy with hair loss,  he has also discussed this with his PCP

## 2019-05-02 ENCOUNTER — HOSPITAL ENCOUNTER (OUTPATIENT)
Dept: SURGERY | Age: 74
Discharge: HOME | End: 2019-05-02

## 2019-05-02 VITALS — BODY MASS INDEX: 19.78 KG/M2 | WEIGHT: 126 LBS | HEIGHT: 67 IN

## 2019-05-02 RX ORDER — TRAMADOL HYDROCHLORIDE 50 MG/1
50 TABLET ORAL
COMMUNITY
End: 2019-09-18 | Stop reason: SDUPTHER

## 2019-05-02 NOTE — PERIOP NOTES
Patient verified name, , and procedure. Type: 1a; abbreviated assessment per anesthesia guidelines  Labs per surgeon: none  Labs per anesthesia: none    Instructed pt that they will be notified via office/GI LAB for time of arrival to GI lab. Follow diet and prep instructions per Dr Malcolm Pride instructions as follows: You will be on a clear liquid diet the day before your procedure and you may have any of the following clear liquids:   Water.  Strained fruit juices, without pulp, including apple, orange, white grape, or white cranberry.  Limeade or lemonade.  Coffee or tea (do not use any non-dairy creamer.)   Chicken broth.  Gelatin desserts, without added fruit or toppings (no red or purple gelatin.)  You should NOT:   Do NOT drink any milk products or use any milk products in coffee or tea.  Do NOT drink anything with red or purple dye.  Do NOT drink any alcoholic beverages. Bath or shower the night before and the am of surgery with non-moisturizing soap. No lotions, oils, powders, cologne on skin. No make up, eye make up or jewelry. Wear loose fitting comfortable, clean clothing. Must have adult present in building the entire time and MUST bring someone with you or arrange for someone to drive you home after the test.      You may take medications up to 3 hours prior to your procedure with sips of water only. Medications to take none, patient to hold Aspirin for 5 days prior to procedure    The following discharge instructions reviewed with patient: medication given during procedure may cause drowsiness for several hours, therefore, do not drive or operate machinery for remainder of the day, no alcohol on the day of your procedure, resume regular diet and activity unless otherwise directed, you may experience abdominal distention for several hours that is relieved by the passage of gas.  Contact your physician if you have any of the following: fever or chills, severe abdominal pain or excessive amount of bleeding or a large amount when having a bowel movement.  Occasional specks of blood with bowel movement would not be unusual.

## 2019-05-13 ENCOUNTER — ANESTHESIA EVENT (OUTPATIENT)
Dept: ENDOSCOPY | Age: 74
End: 2019-05-13
Payer: MEDICARE

## 2019-05-14 ENCOUNTER — ANESTHESIA (OUTPATIENT)
Dept: ENDOSCOPY | Age: 74
End: 2019-05-14
Payer: MEDICARE

## 2019-05-14 ENCOUNTER — HOSPITAL ENCOUNTER (OUTPATIENT)
Age: 74
Setting detail: OUTPATIENT SURGERY
Discharge: HOME | End: 2019-05-14
Attending: SURGERY | Admitting: SURGERY
Payer: MEDICARE

## 2019-05-14 VITALS
HEART RATE: 58 BPM | SYSTOLIC BLOOD PRESSURE: 104 MMHG | BODY MASS INDEX: 18.95 KG/M2 | WEIGHT: 121 LBS | RESPIRATION RATE: 16 BRPM | DIASTOLIC BLOOD PRESSURE: 53 MMHG | OXYGEN SATURATION: 99 %

## 2019-05-14 PROBLEM — Z12.11 ENCOUNTER FOR SCREENING COLONOSCOPY: Status: ACTIVE | Noted: 2019-05-14

## 2019-05-14 PROCEDURE — 74011250636 HC RX REV CODE- 250/636

## 2019-05-14 PROCEDURE — 76060000031 HC ANESTHESIA FIRST 0.5 HR: Performed by: SURGERY

## 2019-05-14 PROCEDURE — 74011250636 HC RX REV CODE- 250/636: Performed by: ANESTHESIOLOGY

## 2019-05-14 PROCEDURE — 76040000025: Performed by: SURGERY

## 2019-05-14 RX ORDER — SODIUM CHLORIDE, SODIUM LACTATE, POTASSIUM CHLORIDE, CALCIUM CHLORIDE 600; 310; 30; 20 MG/100ML; MG/100ML; MG/100ML; MG/100ML
100 INJECTION, SOLUTION INTRAVENOUS CONTINUOUS
Status: DISCONTINUED | OUTPATIENT
Start: 2019-05-14 | End: 2019-05-14 | Stop reason: HOSPADM

## 2019-05-14 RX ORDER — FAMOTIDINE 20 MG/1
20 TABLET, FILM COATED ORAL AS NEEDED
Status: DISCONTINUED | OUTPATIENT
Start: 2019-05-14 | End: 2019-05-14 | Stop reason: HOSPADM

## 2019-05-14 RX ORDER — PROPOFOL 10 MG/ML
INJECTION, EMULSION INTRAVENOUS AS NEEDED
Status: DISCONTINUED | OUTPATIENT
Start: 2019-05-14 | End: 2019-05-14 | Stop reason: HOSPADM

## 2019-05-14 RX ADMIN — SODIUM CHLORIDE, SODIUM LACTATE, POTASSIUM CHLORIDE, AND CALCIUM CHLORIDE: 600; 310; 30; 20 INJECTION, SOLUTION INTRAVENOUS at 10:25

## 2019-05-14 RX ADMIN — PROPOFOL 150 MG: 10 INJECTION, EMULSION INTRAVENOUS at 10:36

## 2019-05-14 RX ADMIN — PROPOFOL 50 MG: 10 INJECTION, EMULSION INTRAVENOUS at 10:47

## 2019-05-14 RX ADMIN — SODIUM CHLORIDE, SODIUM LACTATE, POTASSIUM CHLORIDE, AND CALCIUM CHLORIDE 100 ML/HR: 600; 310; 30; 20 INJECTION, SOLUTION INTRAVENOUS at 11:00

## 2019-05-14 NOTE — DISCHARGE INSTRUCTIONS
Gastrointestinal Colonoscopy/Flexible Sigmoidoscopy  Lower Exam Discharge Instructions      1. Call your doctor for any problems or questions. 2. Contact the doctors office for follow up appointment as directed  3. Medication may cause drowsiness for several hours, therefore, do not drive or operate machinery for remainder of the day. 4. No alcohol today. 5. Ordinarily, you may resume regular diet and activity after exam unless otherwise specified by your physician. 6. Because of air put into your colon during exam, you may experience some abdominal distension, relieved by the passage of gas, for several hours. 7. Contact your physician if you have any of the following:  a. Excessive amount of bleeding - large amount when having a bowel movement. Occasional specks of blood with bowel movement would not be unusual.  b. Severe abdominal pain  c.  Fever or Chills

## 2019-05-14 NOTE — ANESTHESIA POSTPROCEDURE EVALUATION
Procedure(s): 
COLONOSCOPY/ 20. 
 
total IV anesthesia Anesthesia Post Evaluation Multimodal analgesia: multimodal analgesia not used between 6 hours prior to anesthesia start to PACU discharge Patient location during evaluation: bedside Patient participation: complete - patient participated Level of consciousness: awake and alert Pain management: adequate Airway patency: patent Anesthetic complications: no 
Cardiovascular status: acceptable Respiratory status: acceptable, spontaneous ventilation and nonlabored ventilation Hydration status: acceptable Post anesthesia nausea and vomiting:  none Vitals Value Taken Time BP 91/44 5/14/2019 11:09 AM  
Temp Pulse 60 5/14/2019 11:09 AM  
Resp 16 5/14/2019 11:09 AM  
SpO2 99 % 5/14/2019 11:09 AM

## 2019-05-14 NOTE — PROCEDURES
Procedure in Detail:  Informed consent was obtained for the procedure. The patient was placed in the left lateral decubitus position and sedation was induced by anesthesia. The [id number] was inserted into the rectum and advanced under direct vision to the cecum, which was identified by the ileocecal valve and appendiceal orifice. The quality of the colonic preparation was excellent. A careful inspection was made as the colonoscope was withdrawn, including a retroflexed view of the rectum; findings and interventions are described below. Appropriate photodocumentation was obtained. Findings:   Rectum:   Normal  Sigmoid:     - Excavated lesions:     - Diverticulosis  Descending Colon:   Normal  Transverse Colon:   Normal  Ascending Colon:   Normal  Cecum:   Normal  Terminal Ileum:   Not entered      Specimens: No specimens were collected. Complications: None; patient tolerated the procedure well. \    EBL - minimal    Recommendations:   - For colon cancer screening in this average-risk patient, colonoscopy may be repeated in 10 years.      Signed By: Jaylan Pinto DO                        May 14, 2019

## 2019-05-14 NOTE — H&P
[facility] 5/14/2019 Date of Admission:  5/14/2019 Subjective:  
 
Patient is a 68 y.o.  male presents for screening colonoscopy. The patient reports no problems. The patient denies family history of colon cancer. The patient has had a colonoscopy in the past. His last colonoscopy was 10 years. ago. Otherwise there is no reported rectal bleeding or melena. No changes in appetite or unusual wt loss. No abdominal pain or bloating. No reported changes in bowel habits. Patient Active Problem List  
 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  
Diagnosis Date  Alopecia areata  Arthritis  Autoimmune disease ([address]  Elevated prostate specific antigen (PSA)  History of prostate surgery 3/12/2015  HLA-B27 positive   
 blood test to look for a protein (B27) found on the surface of white blood cells; Human Leukocyte Antigens are proteins that help the body's immune system tell the difference between it's own cells and foreign, hamrful substances  Impotence of organic origin  Neck pain   
 arthritis  Osteoarthritis, hand  Osteopenia  Osteoporosis  Other nonspecific abnormal finding of lung field  Polyarthritis  Polymyalgia rheumatica ([address]  Prostate cancer ([address]  Raynaud's disease  Spondylarthritis  Thoracic spine pain Past Surgical History:  
Procedure Laterality Date  BIOPSY PROSTATE    
 HX CATARACT REMOVAL  2012  
 left  HX CATARACT REMOVAL  2013  
 right  HX COLONOSCOPY  2010  
 repeat in 7 years  HX KNEE ARTHROSCOPY  2009  
 left  HX PROSTATECTOMY  2012  HX UROLOGICAL    
 prostate ultrasound and biopsy  HX VASECTOMY  40+ yrs Prior to Admission Medications Prescriptions Last Dose Informant Patient Reported? Taking? ASPIRIN PO   Yes No  
Sig: Take 81 mg by mouth daily. Takes for heart health INFLIXIMAB (REMICADE IV)   Yes No  
Si mg by IntraVENous route. Every 6 weeks ; next dose is in  Insulin Needles, Disposable, (SURE-FINE PEN NEEDLES) 31 gauge x \" ndle   No No  
Sig: Use daily with Forteo pen  
calcium-vitamin D (OYSTER SHELL) 500 mg(1,250mg) -200 unit per tablet   Yes No  
Sig: Take 1 Tab by mouth daily. cholecalciferol, vitamin D3, (VITAMIN D3) 2,000 unit Tab   Yes No  
Sig: Take  by mouth.  
ergocalciferol (ERGOCALCIFEROL) 50,000 unit capsule   No No  
Sig: Take 1 Cap by mouth every seven (7) days. Patient taking differently: Take 50,000 Units by mouth every seven (7) days. Will be completed with this dosage mid May 2019  Indications: osteoporosis, Prevention of Vitamin D Deficiency  
teriparatide (FORTEO) 20 mcg/dose - 600 mcg/2.4 mL pnij injection   No No  
Si.08 mL by SubCUTAneous route daily. Patient taking differently: 20 mcg by SubCUTAneous route nightly. Will be finished with this medication 2019  Indications: Osteoporosis in Male Patient  
traMADol (ULTRAM) 50 mg tablet   Yes No  
Sig: Take 50 mg by mouth every six (6) hours as needed for Pain. Indications: Pain Facility-Administered Medications: None Allergies Allergen Reactions  Codeine Nausea and Vomiting Social History Tobacco Use  Smoking status: Former Smoker   Packs/day: 1.00  
 Years: 15.00 Pack years: 15.00 Last attempt to quit: 1980 Years since quittin.3  Smokeless tobacco: Never Used  Tobacco comment: quit  Substance Use Topics  Alcohol use: No  
  Alcohol/week: 0.0 oz Social History Social History Narrative  Not on file Family History Problem Relation Age of Onset  Stroke Mother  Other Mother   
     cirrhosis  Stroke Father  Cancer Other   
     grandmother  Heart Disease Other   
     grandfather  Diabetes Other   
     grandfather  Heart Disease Brother Current Facility-Administered Medications Medication Dose Route Frequency  lactated Ringers infusion  100 mL/hr IntraVENous CONTINUOUS  
 famotidine (PEPCID) tablet 20 mg  20 mg Oral PRN Review of Systems A comprehensive review of systems was negative except for that written in the HPI. Objective:  
 
Vitals:  
 19 1018 BP: 133/76 Pulse: 64 Resp: 18 SpO2: 99% Weight: 121 lb (54.9 kg) PHYSICAL EXAM  
Physical Examination: General appearance - alert, well appearing, and in no distress Mental status - alert, oriented to person, place, and time Eyes - pupils equal and reactive, extraocular eye movements intact Nose - normal and patent, no erythema, discharge or polyps Neck - supple, no significant adenopathy Chest - clear to auscultation, no wheezes, rales or rhonchi, symmetric air entry Heart - normal rate, regular rhythm, normal S1, S2, no murmurs, rubs, clicks or gallops Abdomen - soft, nontender, nondistended, no masses or organomegaly Neurological - alert, oriented, normal speech, no focal findings or movement disorder noted Musculoskeletal - no joint tenderness, deformity or swelling Extremities - peripheral pulses normal, no pedal edema, no clubbing or cyanosis Skin - normal coloration and turgor, no rashes, no suspicious skin lesions noted No results for input(s): WBC, HGB, HCT, PLT, HGBEXT, HCTEXT, PLTEXT in the last 72 hours. No results for input(s): NA, K, CL, GLU, CO2, BUN, CREA, MG, PHOS, TROIQ, INR, BNPP in the last 72 hours. No lab exists for component: TROIP, INREXT No results for input(s): PH, PCO2, PO2, HCO3 in the last 72 hours. Assessment:  
 
Hospital Problems  Date Reviewed: 5/14/2019 Codes Class Noted POA * (Principal) Encounter for screening colonoscopy ICD-10-CM: Z12.11 ICD-9-CM: V76.51  5/14/2019 Unknown Plan:  
Screening colonoscopy Blossom Fleming, DO

## 2019-05-14 NOTE — ANESTHESIA PREPROCEDURE EVALUATION
Relevant Problems No relevant active problems Anesthetic History No history of anesthetic complications Review of Systems / Medical History Patient summary reviewed, nursing notes reviewed and pertinent labs reviewed Pulmonary Within defined limits Neuro/Psych Within defined limits Cardiovascular Within defined limits Exercise tolerance: >4 METS Comments: Normal perfusion scan March 2015 GI/Hepatic/Renal 
Within defined limits Endo/Other Arthritis Other Findings Comments: Multiple rheumatologic/ auto-immune diseases 
allopecia 
reynaud's Polymyalgia rheumatica Physical Exam 
 
Airway Mallampati: III Mouth opening: Diminished (comment) Cardiovascular Regular rate and rhythm,  S1 and S2 normal,  no murmur, click, rub, or gallop Dental 
 
Dentition: Upper partial plate Pulmonary Breath sounds clear to auscultation Abdominal 
 
 
 
 Other Findings Anesthetic Plan ASA: 2 Anesthesia type: total IV anesthesia Induction: Intravenous Anesthetic plan and risks discussed with: Patient and Spouse

## 2019-05-28 ENCOUNTER — HOSPITAL ENCOUNTER (OUTPATIENT)
Dept: MAMMOGRAPHY | Age: 74
Discharge: HOME | End: 2019-05-28
Attending: INTERNAL MEDICINE
Payer: MEDICARE

## 2019-05-28 DIAGNOSIS — T38.0X5A CORTICOSTEROID-INDUCED OSTEOPOROSIS: ICD-10-CM

## 2019-05-28 DIAGNOSIS — M81.8 CORTICOSTEROID-INDUCED OSTEOPOROSIS: ICD-10-CM

## 2019-05-28 PROCEDURE — 77080 DXA BONE DENSITY AXIAL: CPT

## 2019-08-19 ENCOUNTER — HOSPITAL ENCOUNTER (OUTPATIENT)
Dept: GENERAL RADIOLOGY | Age: 74
Discharge: HOME | End: 2019-08-19

## 2019-08-19 DIAGNOSIS — R06.02 SOB (SHORTNESS OF BREATH): ICD-10-CM

## 2019-08-19 DIAGNOSIS — R63.4 UNINTENTIONAL WEIGHT LOSS: ICD-10-CM

## 2019-08-20 NOTE — PROGRESS NOTES
CXR shows chronic changes consistent with emphysema. Will follow up the remainder of testing then have staff call patient will all results.

## 2019-08-22 ENCOUNTER — HOSPITAL ENCOUNTER (OUTPATIENT)
Dept: ULTRASOUND IMAGING | Age: 74
Discharge: HOME | End: 2019-08-22

## 2019-08-22 DIAGNOSIS — R49.9 HOARSENESS OR CHANGING VOICE: ICD-10-CM

## 2019-08-22 DIAGNOSIS — R79.89 LOW TSH LEVEL: ICD-10-CM

## 2019-08-22 DIAGNOSIS — R63.4 UNINTENTIONAL WEIGHT LOSS: ICD-10-CM

## 2019-08-22 NOTE — PROGRESS NOTES
Recommend adding on TPO Ab,  informed. Consider Endocrinology referral as well. Sent my chart message today  Will call patient and discuss in detail when labs back.

## 2019-09-20 PROBLEM — Z12.11 ENCOUNTER FOR SCREENING COLONOSCOPY: Status: RESOLVED | Noted: 2019-05-14 | Resolved: 2019-09-20

## 2021-08-09 NOTE — PROGRESS NOTES
Pt given 7.5 mg Norco for ongoing left lower rib pain, 7/10. Retention Suture Text: Retention sutures were placed to support the closure and prevent dehiscence.

## 2023-06-28 NOTE — PROGRESS NOTES
Bedside report received from night nurse Radha Lynn. Assessment done as noted  Respiration even and unlabored 20/min; denies pain or nausea at present. Encouraged to call with needs. No

## 2025-04-23 NOTE — PROGRESS NOTES
53-year-old woman with a history of migraine, disease of the heart (hypertension), sickle cell trait, dated BMI (34.66 kilograms/meter squared off GLP 1), degenerative joint disease of multiple sites, insomnia, here with a URI.    History of present illness and pertinent review of systems:  The patient has a 1 day history.  The patient has felt feverish, had chills, and sweats.  The patient notes sinus pressure.  She has no dental pain or facial swelling.  The patient has right ear is painful but there was no hearing loss, ringing in the ear, or discharge.  She has had no loss of sense of taste or smell.  She has nasal congestion, rhinorrhea, and postnasal drip.  She has a sore throat but no swollen glands. The patient has a cough with green sputum.  She notes no wheezing, pleurisy, hemoptysis, and is not short of breath.  There has been no diarrhea, rash, but notes myalgia and fatigue.  The patient has taken ibuprofen for generalized discomfort fever, gabapentin for the cough, and Mucinex.  The patient has used her nasal wash 3 times.    Problem list, medication list, and allergies have been reviewed.  Acetaminophen causes a rash.    Review of systems is otherwise negative.    Physical Exam  Vitals reviewed.   Constitutional:       General: She is not in acute distress.     Appearance: She is not ill-appearing, toxic-appearing or diaphoretic.   HENT:      Head: Atraumatic.      Right Ear: Tympanic membrane and ear canal normal.      Left Ear: Tympanic membrane and ear canal normal.      Nose: Congestion present. No rhinorrhea.      Mouth/Throat:      Mouth: Mucous membranes are moist.      Pharynx: Oropharynx is clear. Posterior oropharyngeal erythema present. No oropharyngeal exudate.   Eyes:      General: No scleral icterus.     Conjunctiva/sclera: Conjunctivae normal.   Cardiovascular:      Rate and Rhythm: Normal rate and regular rhythm.      Pulses: Normal pulses.      Heart sounds: Normal heart sounds. No  Pt stated he was having trouble breathing. O2 stat 88%. Currently on 4 L via NC. Increased to 6 L to maintain sat at 90%, although it continues to fluctuate from 88%-90% if pt talks. LLL diminshed. Given 7.5 mg Norco for right lower side pain and left rib pain. States this pain has been ongoing since admission. murmur heard.     No gallop.   Pulmonary:      Effort: Pulmonary effort is normal. No respiratory distress.      Breath sounds: No wheezing, rhonchi or rales.   Abdominal:      General: Bowel sounds are normal.      Palpations: Abdomen is soft.      Tenderness: There is no abdominal tenderness.      Comments: No hepatosplenomegaly by percussion   Musculoskeletal:         General: No tenderness.      Cervical back: Neck supple. No tenderness.      Right lower leg: No edema.      Left lower leg: No edema.   Lymphadenopathy:      Cervical: No cervical adenopathy.   Skin:     Coloration: Skin is not jaundiced or pale.   Neurological:      General: No focal deficit present.      Mental Status: She is alert and oriented to person, place, and time.      Gait: Gait normal.   Psychiatric:         Mood and Affect: Mood normal.         Behavior: Behavior normal.     POCT testing:   COVID:   Negative     URI:  The patient has a URI.  COVID testing is in progress and I will contact the patient when the results are available.  At this time treatment would be symptomatic and there was no indication for antibiotics.  Plan:  Zyrtec 10 g at bedtime   Mucinex DM twice daily   Saline nasal spray or saline nasal rinses every 2 hours  Honey 1-2 tbsp directly off the spoon for cough and throat irritation   Halls relief cough drops   Ibuprofen 1-2 tablets every 8 hours or naproxen 1-2 tablets every 12 hours for general discomfort and fever   Hydration   Sleep with head elevated     Addendum:  The COVID test is negative.  The patient was contacted.  Plan as noted

## (undated) DEVICE — AIRLIFE™ OXYGEN TUBING 7 FEET (2.1 M) CRUSH RESISTANT OXYGEN TUBING, VINYL TIPPED: Brand: AIRLIFE™

## (undated) DEVICE — STERILE POLYISOPRENE POWDER-FREE SURGICAL GLOVES: Brand: PROTEXIS

## (undated) DEVICE — CANNULA NSL ORAL AD FOR CAPNOFLEX CO2 O2 AIRLFE

## (undated) DEVICE — CONNECTOR TBNG OD5-7MM O2 END DISP

## (undated) DEVICE — MOUTHPIECE ENDOSCP 20X27MM --

## (undated) DEVICE — BLOCK BITE AD 60FR W/ VELC STRP ADDRESSES MOST PT AND

## (undated) DEVICE — KIT THORCENT 8FR L5IN POLYUR W/ 18/22/25GA NDL 3 W STPCOCK

## (undated) DEVICE — KENDALL RADIOLUCENT FOAM MONITORING ELECTRODE RECTANGULAR SHAPE: Brand: KENDALL

## (undated) DEVICE — SINGLE USE SUCTION VALVE MAJ-209: Brand: SINGLE USE SUCTION VALVE (STERILE)

## (undated) DEVICE — BRONCHOSCOPY PACK: Brand: MEDLINE INDUSTRIES, INC.

## (undated) DEVICE — GEL MEDC ULTRASOUND 5L -- REPLACED BY 326862

## (undated) DEVICE — SINGLE USE BIOPSY VALVE MAJ-210: Brand: SINGLE USE BIOPSY VALVE (STERILE)